# Patient Record
Sex: FEMALE | Race: WHITE | NOT HISPANIC OR LATINO | Employment: OTHER | URBAN - METROPOLITAN AREA
[De-identification: names, ages, dates, MRNs, and addresses within clinical notes are randomized per-mention and may not be internally consistent; named-entity substitution may affect disease eponyms.]

---

## 2017-01-27 ENCOUNTER — OFFICE VISIT (OUTPATIENT)
Dept: INTERNAL MEDICINE | Facility: CLINIC | Age: 52
End: 2017-01-27

## 2017-01-27 ENCOUNTER — TRANSCRIBE ORDERS (OUTPATIENT)
Dept: INTERNAL MEDICINE | Facility: CLINIC | Age: 52
End: 2017-01-27

## 2017-01-27 VITALS
SYSTOLIC BLOOD PRESSURE: 124 MMHG | HEIGHT: 66 IN | WEIGHT: 212 LBS | DIASTOLIC BLOOD PRESSURE: 86 MMHG | BODY MASS INDEX: 34.07 KG/M2

## 2017-01-27 DIAGNOSIS — R23.4 BREAST SKIN CHANGES: Primary | ICD-10-CM

## 2017-01-27 DIAGNOSIS — N63.24 BREAST LUMP ON LEFT SIDE AT 7 O'CLOCK POSITION: ICD-10-CM

## 2017-01-27 DIAGNOSIS — M05.79 RHEUMATOID ARTHRITIS INVOLVING MULTIPLE SITES WITH POSITIVE RHEUMATOID FACTOR (HCC): ICD-10-CM

## 2017-01-27 PROCEDURE — 99214 OFFICE O/P EST MOD 30 MIN: CPT | Performed by: INTERNAL MEDICINE

## 2017-01-27 RX ORDER — NAPROXEN SODIUM 220 MG
220 TABLET ORAL 2 TIMES DAILY PRN
COMMUNITY

## 2017-01-27 RX ORDER — IBUPROFEN 200 MG
200 TABLET ORAL NIGHTLY PRN
COMMUNITY
End: 2017-11-28 | Stop reason: ALTCHOICE

## 2017-01-27 NOTE — PROGRESS NOTES
"Chief Complaint   Patient presents with   • Left breast tenderness and redness       History of Present Illness  51 y.o.  woman, accompanied by her , presents for further eval of left breast tenderness and redness.  HPI started only 3 days ago (Tues) when she noticed purple discoloration at the left breast.  Since then it was become red and lighter but also associated with thickened skin.  It involves the dark area around the nipple but there is no nipple discharge.  Denies associated pain or itching.  Denies previous similar sxs.     Review of Systems  Having severe joint pain flare-up today.  Was slated to start new RA med next week. Has plans to change rheumatologist to Dr. Rodriguez in 3/2017.  All other ROS reviewed and negative.    [facility]  The following portions of the patient's history were reviewed and updated as appropriate: allergies, current medications, past family history, past medical history, past social history, past surgical history and problem list.      Current Outpatient Prescriptions:   •  cetirizine (zyrTEC) 10 MG tablet, Take 10 mg by mouth Daily., Disp: , Rfl:   •  DULoxetine (CYMBALTA) 60 MG capsule, take 1 capsule by mouth at bedtime, Disp: 30 capsule, Rfl: 4  •  ibuprofen (ADVIL,MOTRIN) 200 MG tablet, Take 200 mg by mouth At Night As Needed for mild pain (1-3)., Disp: , Rfl:   •  levothyroxine (SYNTHROID, LEVOTHROID) 75 MCG tablet, Take 1 tablet by mouth Every Morning., Disp: 90 tablet, Rfl: 1  •  naproxen sodium (ALEVE) 220 MG tablet, Take 220 mg by mouth 2 (Two) Times a Day As Needed for mild pain (1-3)., Disp: , Rfl:   •  traZODone (DESYREL) 50 MG tablet, take 1 tablet by mouth at bedtime, Disp: 30 tablet, Rfl: 5    Visit Vitals   • /86   • Ht 66\" (167.6 cm)   • Wt 212 lb (96.2 kg)   • BMI 34.22 kg/m2       Physical Exam   Constitutional: She is oriented to person, place, and time. She appears well-developed and well-nourished.   Cardiovascular: Normal rate, regular " "rhythm and normal heart sounds.    Pulmonary/Chest: Effort normal and breath sounds normal.   Abdominal: Soft. Bowel sounds are normal.   Genitourinary:   Genitourinary Comments: Breast exam unremarkable on the right without masses, skin changes, nipple discharge, or axillary adenopathy; left breast with faint pink discoloration at the 7 o'clock area with half breast and half extending to areola, approx 1x2 inch area, associated with peau d'orange change, no nipple discharge, no increased warmth, no clear delineation of \"rash\", no associated masses or other SQ changes     Neurological: She is alert and oriented to person, place, and time.   Psychiatric: She has a normal mood and affect. Her behavior is normal.   Nursing note and vitals reviewed.      LABS  12/21/16  ROUTINE SCREENING MAMMOGRAM        FINDINGS: The breasts are heterogeneously dense, which may obscure small masses. Presumed postsurgical changes are seen in the upper outer  quadrant on the left. There is no worrisome mass, group of calcifications, or architectural distortion to suggest malignancy.      IMPRESSION:  No findings suspicious for malignancy.       BI-RADS CATEGORY: II, BENIGN      RECOMMENDATION: Yearly mammogram, yearly clinical breast exam, and encourage self breast awareness.      ASSESSMENT/PLAN  Problem List Items Addressed This Visit     Rheumatoid arthritis     Flare-up currently with diffuse joint pains; new med to be started but would hold off until resolution of current concerns with the left breast; note patient will be transferring care to Dr. Rodriguez           Other Visit Diagnoses     Breast skin changes    -  Primary    LEFT breast; new changes 3d duration; nl mammo 12/16; dx L. mammo ordered    Relevant Orders    Mammo Diagnostic Left With CAD      Time Documentation  Counseled patient and   Counseling topics: lab results and follow up plan  Total encounter time: counseling time more than 50% of visit: 25 " minutes    FOLLOW-UP  1. Health maintenance - flu vaccine 11/16; screening mammo stable 12/16  2. RTC prn; next PE duea fter 11/28/17; fasting labs the week prior to appt (CBC, CMP, TSH, lipids, UA/micro, FT4, vit D)      Electronically signed by:    Brie Jalloh MD  01/27/2017

## 2017-01-27 NOTE — MR AVS SNAPSHOT
Yanet Clifton   1/27/2017 11:15 AM   Office Visit    Dept Phone:  [phone]   Encounter #:  [id number]    Provider:  Brie Jalloh MD   Department:  [facility] INTERNAL MEDICINE AND ENDOCRINOLOGY [place]                Your Full Care Plan              Today's Medication Changes          These changes are accurate as of: 1/27/17 12:32 PM.  If you have any questions, ask your nurse or doctor.               Stop taking medication(s)listed here:     melatonin 5 MG tablet tablet   Stopped by:  Brie Jalloh MD                      Your Updated Medication List          This list is accurate as of: 1/27/17 12:32 PM.  Always use your most recent med list.                cetirizine 10 MG tablet   Commonly known as:  zyrTEC       DULoxetine 60 MG capsule   Commonly known as:  CYMBALTA   take 1 capsule by mouth at bedtime       ibuprofen 200 MG tablet   Commonly known as:  ADVIL,MOTRIN       levothyroxine 75 MCG tablet   Commonly known as:  SYNTHROID, LEVOTHROID   Take 1 tablet by mouth Every Morning.       naproxen sodium 220 MG tablet   Commonly known as:  ALEVE       traZODone 50 MG tablet   Commonly known as:  DESYREL   take 1 tablet by mouth at bedtime               You Were Diagnosed With        Codes Comments    Breast skin changes    -  Primary ICD-10-CM: R23.4  ICD-9-CM: 782.8 LEFT breast; new changes 3d duration      Instructions     None    Patient Instructions History      Upcoming Appointments     Visit Type Date Time Department    FOLLOW UP 1/27/2017 11:15 AM [employer]    MAMMO MAUDE DIAG DIG DARÍO LT 2/6/2017  7:45 AM  [facility]    PHYSICAL 11/28/2017  9:30 AM [facility] [employer] Signup     Our records indicate that you have an active [email or web address] account.    You can view your After Visit Summary by going to [facility] and logging in with your [employer] username and password.  If you don't have a [employer] username and password but a parent  "or guardian has access to your record, the parent or guardian should login with their own [employer] username and password and access your record to view the After Visit Summary.    If you have questions, you can email [email or web address] or call [phone] to talk to our [employer] staff.  Remember, [employer] is NOT to be used for urgent needs.  For medical emergencies, dial 911.               Other Info from Your Visit           Your Appointments     Feb 06, 2017  7:45 AM EST   Mammo maude diag dig john lt with [facility] MAMM 5   [facility] ([place])    [address] [place], [place] [address] [phone]           Same day results for Mammography and Ultrasound may extend the patient's time in the department from 1 - 3 or more hours. Bring outside films/reports to your appoint.  Area of concern must be marked on films. If patinet is breastfeeding, vie instructions. Do not apply any powders, perfumes, deodorants, lotions, oils, or body sprays prior to your appointment on the day of the exam. Arrive 20 min prior to your scheduled appointment time.            Nov 28, 2017  9:30 AM EST   Physical with Brie Jalloh MD   [facility] INTERNAL MEDICINE AND ENDOCRINOLOGY [place] (--)    [address]  [facility] [address] [phone]           Arrive 15 minutes prior to appointment.              Allergies     Adalimumab      Other reaction(s): Headache (Humira)    Cimzia [Certolizumab Pegol]  Rash    Sulfa Antibiotics  Rash      Reason for Visit     Left breast tenderness and redness           Vital Signs     Blood Pressure Height Weight Body Mass Index Smoking Status       124/86 66\" (167.6 cm) 212 lb (96.2 kg) 34.22 kg/m2 Never Smoker       Problems and Diagnoses Noted     Breast skin changes    -  Primary        "

## 2017-01-28 NOTE — ASSESSMENT & PLAN NOTE
Flare-up currently with diffuse joint pains; new med to be started but would hold off until resolution of current concerns with the left breast; note patient will be transferring care to Dr. Rodriguez

## 2017-01-30 ENCOUNTER — TRANSCRIBE ORDERS (OUTPATIENT)
Dept: MAMMOGRAPHY | Facility: HOSPITAL | Age: 52
End: 2017-01-30

## 2017-01-30 ENCOUNTER — HOSPITAL ENCOUNTER (OUTPATIENT)
Dept: MAMMOGRAPHY | Facility: HOSPITAL | Age: 52
Discharge: HOME | End: 2017-01-30
Attending: INTERNAL MEDICINE | Admitting: INTERNAL MEDICINE

## 2017-01-30 ENCOUNTER — HOSPITAL ENCOUNTER (OUTPATIENT)
Dept: MAMMOGRAPHY | Facility: HOSPITAL | Age: 52
Discharge: HOME | End: 2017-01-30

## 2017-01-30 ENCOUNTER — HOSPITAL ENCOUNTER (OUTPATIENT)
Dept: ULTRASOUND IMAGING | Facility: HOSPITAL | Age: 52
Discharge: HOME | End: 2017-01-30

## 2017-01-30 DIAGNOSIS — R23.4 BREAST SKIN CHANGES: ICD-10-CM

## 2017-01-30 DIAGNOSIS — N63.24 BREAST LUMP ON LEFT SIDE AT 7 O'CLOCK POSITION: ICD-10-CM

## 2017-01-30 DIAGNOSIS — R92.8 ABNORMAL MAMMOGRAM: Primary | ICD-10-CM

## 2017-01-30 PROCEDURE — 76642 ULTRASOUND BREAST LIMITED: CPT | Performed by: RADIOLOGY

## 2017-01-30 PROCEDURE — 88305 TISSUE EXAM BY PATHOLOGIST: CPT | Performed by: RADIOLOGY

## 2017-01-30 PROCEDURE — 11100: CPT | Performed by: RADIOLOGY

## 2017-01-30 PROCEDURE — G0279 TOMOSYNTHESIS, MAMMO: HCPCS

## 2017-01-30 PROCEDURE — G0206 DX MAMMO INCL CAD UNI: HCPCS

## 2017-01-30 PROCEDURE — 77065 DX MAMMO INCL CAD UNI: CPT | Performed by: RADIOLOGY

## 2017-01-30 PROCEDURE — 76642 ULTRASOUND BREAST LIMITED: CPT

## 2017-01-30 PROCEDURE — 77061 BREAST TOMOSYNTHESIS UNI: CPT | Performed by: RADIOLOGY

## 2017-01-30 RX ORDER — LIDOCAINE HYDROCHLORIDE 10 MG/ML
5 INJECTION, SOLUTION INFILTRATION; PERINEURAL ONCE
Status: COMPLETED | OUTPATIENT
Start: 2017-01-30 | End: 2017-01-30

## 2017-01-30 RX ADMIN — LIDOCAINE HYDROCHLORIDE 2 ML: 10 INJECTION, SOLUTION INFILTRATION; PERINEURAL at 11:36

## 2017-01-31 LAB
CYTO UR: NORMAL
LAB AP CASE REPORT: NORMAL
LAB AP CLINICAL INFORMATION: NORMAL
LAB AP DIAGNOSIS COMMENT: NORMAL
Lab: NORMAL
PATH REPORT.FINAL DX SPEC: NORMAL
PATH REPORT.GROSS SPEC: NORMAL

## 2017-02-02 ENCOUNTER — TELEPHONE (OUTPATIENT)
Dept: MAMMOGRAPHY | Facility: HOSPITAL | Age: 52
End: 2017-02-02

## 2017-02-06 ENCOUNTER — APPOINTMENT (OUTPATIENT)
Dept: MAMMOGRAPHY | Facility: HOSPITAL | Age: 52
End: 2017-02-06
Attending: INTERNAL MEDICINE

## 2017-02-07 ENCOUNTER — OFFICE VISIT (OUTPATIENT)
Dept: INTERNAL MEDICINE | Facility: CLINIC | Age: 52
End: 2017-02-07

## 2017-02-07 ENCOUNTER — HOSPITAL ENCOUNTER (OUTPATIENT)
Dept: GENERAL RADIOLOGY | Facility: HOSPITAL | Age: 52
Discharge: HOME | End: 2017-02-07
Attending: INTERNAL MEDICINE | Admitting: INTERNAL MEDICINE

## 2017-02-07 VITALS
DIASTOLIC BLOOD PRESSURE: 84 MMHG | HEIGHT: 66 IN | BODY MASS INDEX: 34.07 KG/M2 | SYSTOLIC BLOOD PRESSURE: 124 MMHG | WEIGHT: 212 LBS

## 2017-02-07 DIAGNOSIS — R23.4 BREAST SKIN CHANGES: ICD-10-CM
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
## 2017-02-07 DIAGNOSIS — M25.532 LEFT WRIST PAIN: Primary | ICD-10-CM

## 2017-02-07 DIAGNOSIS — M25.532 LEFT WRIST PAIN: ICD-10-CM

## 2017-02-07 PROCEDURE — 73110 X-RAY EXAM OF WRIST: CPT

## 2017-02-07 PROCEDURE — 99214 OFFICE O/P EST MOD 30 MIN: CPT | Performed by: INTERNAL MEDICINE

## 2017-02-07 RX ORDER — PYRAZINAMIDE 500 MG/1
1-2 TABLET ORAL EVERY 6 HOURS PRN
Qty: 20 TABLET | Refills: 0 | Status: SHIPPED | OUTPATIENT
Start: 2017-02-07 | End: 2017-11-28

## 2017-02-07 NOTE — PROGRESS NOTES
Left wrist xray shows no fracture or dislocation; shows advanced arthritic changes, brenna on the thumb side; ice and elevate as discussed

## 2017-02-28 ENCOUNTER — APPOINTMENT (OUTPATIENT)
Dept: CT IMAGING | Facility: HOSPITAL | Age: 52
End: 2017-02-28

## 2017-02-28 ENCOUNTER — HOSPITAL ENCOUNTER (EMERGENCY)
Facility: HOSPITAL | Age: 52
Discharge: HOME | End: 2017-02-28
Attending: EMERGENCY MEDICINE | Admitting: EMERGENCY MEDICINE

## 2017-02-28 ENCOUNTER — APPOINTMENT (OUTPATIENT)
Dept: GENERAL RADIOLOGY | Facility: HOSPITAL | Age: 52
End: 2017-02-28

## 2017-02-28 VITALS
SYSTOLIC BLOOD PRESSURE: 136 MMHG | OXYGEN SATURATION: 99 % | HEIGHT: 66 IN | DIASTOLIC BLOOD PRESSURE: 74 MMHG | TEMPERATURE: 98 F | HEART RATE: 73 BPM | RESPIRATION RATE: 18 BRPM | BODY MASS INDEX: 32.14 KG/M2 | WEIGHT: 200 LBS

## 2017-02-28 DIAGNOSIS — R51.9 ACUTE NONINTRACTABLE HEADACHE, UNSPECIFIED HEADACHE TYPE: ICD-10-CM

## 2017-02-28 DIAGNOSIS — R03.0 ELEVATED BLOOD PRESSURE (NOT HYPERTENSION): Primary | ICD-10-CM

## 2017-02-28 LAB
ALBUMIN SERPL-MCNC: 4.3 G/DL (ref 3.2–4.8)
ALBUMIN/GLOB SERPL: 1.7 G/DL (ref 1.5–2.5)
ALP SERPL-CCNC: 65 U/L (ref 25–100)
ALT SERPL W P-5'-P-CCNC: 46 U/L (ref 7–40)
ANION GAP SERPL CALCULATED.3IONS-SCNC: 7 MMOL/L (ref 3–11)
AST SERPL-CCNC: 51 U/L (ref 0–33)
BASOPHILS # BLD AUTO: 0.02 10*3/MM3 (ref 0–0.2)
BASOPHILS NFR BLD AUTO: 0.4 % (ref 0–1)
BILIRUB SERPL-MCNC: 0.9 MG/DL (ref 0.3–1.2)
BNP SERPL-MCNC: 113 PG/ML (ref 0–100)
BUN BLD-MCNC: 15 MG/DL (ref 9–23)
BUN/CREAT SERPL: 15 (ref 7–25)
CALCIUM SPEC-SCNC: 9.8 MG/DL (ref 8.7–10.4)
CHLORIDE SERPL-SCNC: 104 MMOL/L (ref 99–109)
CO2 SERPL-SCNC: 27 MMOL/L (ref 20–31)
CREAT BLD-MCNC: 1 MG/DL (ref 0.6–1.3)
DEPRECATED RDW RBC AUTO: 41.1 FL (ref 37–54)
EOSINOPHIL # BLD AUTO: 0.35 10*3/MM3 (ref 0.1–0.3)
EOSINOPHIL NFR BLD AUTO: 7.4 % (ref 0–3)
ERYTHROCYTE [DISTWIDTH] IN BLOOD BY AUTOMATED COUNT: 13.7 % (ref 11.3–14.5)
GFR SERPL CREATININE-BSD FRML MDRD: 58 ML/MIN/1.73
GLOBULIN UR ELPH-MCNC: 2.6 GM/DL
GLUCOSE BLD-MCNC: 86 MG/DL (ref 70–100)
HCT VFR BLD AUTO: 37.2 % (ref 34.5–44)
HGB BLD-MCNC: 12.1 G/DL (ref 11.5–15.5)
HOLD SPECIMEN: NORMAL
HOLD SPECIMEN: NORMAL
IMM GRANULOCYTES # BLD: 0.01 10*3/MM3 (ref 0–0.03)
IMM GRANULOCYTES NFR BLD: 0.2 % (ref 0–0.6)
LIPASE SERPL-CCNC: 61 U/L (ref 6–51)
LYMPHOCYTES # BLD AUTO: 0.88 10*3/MM3 (ref 0.6–4.8)
LYMPHOCYTES NFR BLD AUTO: 18.7 % (ref 24–44)
MCH RBC QN AUTO: 26.9 PG (ref 27–31)
MCHC RBC AUTO-ENTMCNC: 32.5 G/DL (ref 32–36)
MCV RBC AUTO: 82.9 FL (ref 80–99)
MONOCYTES # BLD AUTO: 0.46 10*3/MM3 (ref 0–1)
MONOCYTES NFR BLD AUTO: 9.8 % (ref 0–12)
NEUTROPHILS # BLD AUTO: 2.99 10*3/MM3 (ref 1.5–8.3)
NEUTROPHILS NFR BLD AUTO: 63.5 % (ref 41–71)
PLATELET # BLD AUTO: 194 10*3/MM3 (ref 150–450)
PMV BLD AUTO: 9.7 FL (ref 6–12)
POTASSIUM BLD-SCNC: 3.8 MMOL/L (ref 3.5–5.5)
PROT SERPL-MCNC: 6.9 G/DL (ref 5.7–8.2)
RBC # BLD AUTO: 4.49 10*6/MM3 (ref 3.89–5.14)
SODIUM BLD-SCNC: 138 MMOL/L (ref 132–146)
TROPONIN I SERPL-MCNC: 0 NG/ML (ref 0–0.07)
TROPONIN I SERPL-MCNC: 0 NG/ML (ref 0–0.07)
WBC NRBC COR # BLD: 4.71 10*3/MM3 (ref 3.5–10.8)
WHOLE BLOOD HOLD SPECIMEN: NORMAL
WHOLE BLOOD HOLD SPECIMEN: NORMAL

## 2017-02-28 PROCEDURE — 70450 CT HEAD/BRAIN W/O DYE: CPT

## 2017-02-28 PROCEDURE — 96374 THER/PROPH/DIAG INJ IV PUSH: CPT

## 2017-02-28 PROCEDURE — 83880 ASSAY OF NATRIURETIC PEPTIDE: CPT | Performed by: EMERGENCY MEDICINE

## 2017-02-28 PROCEDURE — 71020 HC CHEST PA AND LATERAL: CPT

## 2017-02-28 PROCEDURE — 83690 ASSAY OF LIPASE: CPT | Performed by: EMERGENCY MEDICINE

## 2017-02-28 PROCEDURE — 99284 EMERGENCY DEPT VISIT MOD MDM: CPT

## 2017-02-28 PROCEDURE — 96361 HYDRATE IV INFUSION ADD-ON: CPT

## 2017-02-28 PROCEDURE — 93005 ELECTROCARDIOGRAM TRACING: CPT

## 2017-02-28 PROCEDURE — 80053 COMPREHEN METABOLIC PANEL: CPT | Performed by: EMERGENCY MEDICINE

## 2017-02-28 PROCEDURE — 99283 EMERGENCY DEPT VISIT LOW MDM: CPT

## 2017-02-28 PROCEDURE — 84484 ASSAY OF TROPONIN QUANT: CPT

## 2017-02-28 PROCEDURE — 25010000002 KETOROLAC TROMETHAMINE PER 15 MG: Performed by: EMERGENCY MEDICINE

## 2017-02-28 PROCEDURE — 85025 COMPLETE CBC W/AUTO DIFF WBC: CPT | Performed by: EMERGENCY MEDICINE

## 2017-02-28 PROCEDURE — 96375 TX/PRO/DX INJ NEW DRUG ADDON: CPT

## 2017-02-28 PROCEDURE — 25010000002 MORPHINE PER 10 MG: Performed by: EMERGENCY MEDICINE

## 2017-02-28 PROCEDURE — 25010000002 PROMETHAZINE PER 50 MG: Performed by: EMERGENCY MEDICINE

## 2017-02-28 PROCEDURE — 93005 ELECTROCARDIOGRAM TRACING: CPT | Performed by: EMERGENCY MEDICINE

## 2017-02-28 RX ORDER — DIPHENHYDRAMINE HCL 25 MG
25 CAPSULE ORAL NIGHTLY
COMMUNITY
End: 2018-06-08

## 2017-02-28 RX ORDER — SODIUM CHLORIDE 0.9 % (FLUSH) 0.9 %
10 SYRINGE (ML) INJECTION AS NEEDED
Status: DISCONTINUED | OUTPATIENT
Start: 2017-02-28 | End: 2017-03-01 | Stop reason: HOSPADM

## 2017-02-28 RX ORDER — PROMETHAZINE HYDROCHLORIDE 25 MG/ML
12.5 INJECTION, SOLUTION INTRAMUSCULAR; INTRAVENOUS ONCE
Status: COMPLETED | OUTPATIENT
Start: 2017-02-28 | End: 2017-02-28

## 2017-02-28 RX ORDER — METOPROLOL TARTRATE 5 MG/5ML
5 INJECTION INTRAVENOUS ONCE
Status: COMPLETED | OUTPATIENT
Start: 2017-02-28 | End: 2017-02-28

## 2017-02-28 RX ORDER — KETOROLAC TROMETHAMINE 30 MG/ML
30 INJECTION, SOLUTION INTRAMUSCULAR; INTRAVENOUS ONCE
Status: COMPLETED | OUTPATIENT
Start: 2017-02-28 | End: 2017-02-28

## 2017-02-28 RX ORDER — CLONIDINE HYDROCHLORIDE 0.2 MG/1
0.2 TABLET ORAL EVERY 8 HOURS PRN
Qty: 20 TABLET | Refills: 0 | Status: SHIPPED | OUTPATIENT
Start: 2017-02-28 | End: 2017-11-28

## 2017-02-28 RX ORDER — ASPIRIN 81 MG/1
324 TABLET, CHEWABLE ORAL ONCE
Status: DISCONTINUED | OUTPATIENT
Start: 2017-02-28 | End: 2017-02-28

## 2017-02-28 RX ORDER — MORPHINE SULFATE 4 MG/ML
4 INJECTION, SOLUTION INTRAMUSCULAR; INTRAVENOUS ONCE
Status: COMPLETED | OUTPATIENT
Start: 2017-02-28 | End: 2017-02-28

## 2017-02-28 RX ADMIN — KETOROLAC TROMETHAMINE 30 MG: 30 INJECTION, SOLUTION INTRAMUSCULAR at 19:40

## 2017-02-28 RX ADMIN — MORPHINE SULFATE 4 MG: 4 INJECTION, SOLUTION INTRAMUSCULAR; INTRAVENOUS at 21:14

## 2017-02-28 RX ADMIN — METOPROLOL TARTRATE 5 MG: 5 INJECTION INTRAVENOUS at 21:15

## 2017-02-28 RX ADMIN — PROMETHAZINE HYDROCHLORIDE 12.5 MG: 25 INJECTION INTRAMUSCULAR; INTRAVENOUS at 19:44

## 2017-02-28 RX ADMIN — SODIUM CHLORIDE 500 ML: 9 INJECTION, SOLUTION INTRAVENOUS at 19:46

## 2017-03-03 ENCOUNTER — OFFICE VISIT (OUTPATIENT)
Dept: INTERNAL MEDICINE | Facility: CLINIC | Age: 52
End: 2017-03-03

## 2017-03-03 VITALS
DIASTOLIC BLOOD PRESSURE: 82 MMHG | WEIGHT: 210 LBS | BODY MASS INDEX: 33.75 KG/M2 | SYSTOLIC BLOOD PRESSURE: 134 MMHG | HEIGHT: 66 IN

## 2017-03-03 DIAGNOSIS — R03.0 ELEVATED BLOOD-PRESSURE READING WITHOUT DIAGNOSIS OF HYPERTENSION: Primary | ICD-10-CM

## 2017-03-03 DIAGNOSIS — E03.9 ACQUIRED HYPOTHYROIDISM: ICD-10-CM

## 2017-03-03 DIAGNOSIS — R21 RASH OF ENTIRE BODY: ICD-10-CM

## 2017-03-03 LAB
T4 FREE SERPL-MCNC: 1.31 NG/DL (ref 0.89–1.76)
TSH SERPL DL<=0.05 MIU/L-ACNC: 3.02 MIU/ML (ref 0.35–5.35)

## 2017-03-03 PROCEDURE — 99214 OFFICE O/P EST MOD 30 MIN: CPT | Performed by: INTERNAL MEDICINE

## 2017-03-03 PROCEDURE — 84439 ASSAY OF FREE THYROXINE: CPT | Performed by: INTERNAL MEDICINE

## 2017-03-03 PROCEDURE — 84443 ASSAY THYROID STIM HORMONE: CPT | Performed by: INTERNAL MEDICINE

## 2017-03-03 RX ORDER — LEVOTHYROXINE SODIUM 0.07 MG/1
75 TABLET ORAL EVERY MORNING
Qty: 90 TABLET | Refills: 3 | Status: SHIPPED | OUTPATIENT
Start: 2017-03-03 | End: 2017-11-30 | Stop reason: SDUPTHER

## 2017-03-03 NOTE — PROGRESS NOTES
Chief Complaint   Patient presents with   • ER follow up     Elevated blood pressure, headache        History of Present Illness  51 y.o.  woman presents for ER follow-up for headache and elevated BP, attributed to initiation of actemra infusion for RA. Actemra infusion was about 3 weeks ago and she subsequently developed headache and tremors.  With worries that BP was going too low, she checked and found elevated BPs.  As it cont'd to rise, she called us and was referred to ER.  She called Dr. Espinoza, who stated that BP issues were not common s/e of actemra.  Patient has subsequently researched that it is top 5 of potential s/e.    ER eval was unremarkable.  She received morphine and clonidine.  Has only taken 1 dose of clonidine since ER visit.  Baseline BPs 130s/80s.  Took dose yesterday for 145/102.    Had rash on the right shin and back of left hand; using clobetasol cream since areas itch.  Not like the rash all over previously.    Review of Systems  Today is a good day.  ROS (+) for bord BPs.  Denies headache today.  Denies CP, palpitations, SOB, lightheadedness, abd pain, n/v.  ROS (+) rash as above.  Has rheum appt with [facility] next week but had wanted to see Dr. Rodriguez and thinks she is scheduled with someone else.    States feeling better on this dose levothyroxine.   All other ROS reviewed and negative.    [facility]  The following portions of the patient's history were reviewed and updated as appropriate: allergies, current medications, past family history, past medical history, past social history, past surgical history and problem list.      Current Outpatient Prescriptions:   •  acetaminophen-codeine (TYLENOL with CODEINE #3) 300-30 MG per tablet, Take 1-2 tablets by mouth Every 6 (Six) Hours As Needed for moderate pain (4-6)., Disp: 20 tablet, Rfl: 0  •  cetirizine (zyrTEC) 10 MG tablet, Take 10 mg by mouth Daily., Disp: , Rfl:   •  CloNIDine (CATAPRES) 0.2 MG tablet, Take 1 tablet by mouth Every 8  "(Eight) Hours As Needed for high blood pressure., Disp: 20 tablet, Rfl: 0  •  diphenhydrAMINE (BENADRYL) 25 mg capsule, Take 25 mg by mouth Every 6 (Six) Hours As Needed for itching., Disp: , Rfl:   •  DULoxetine (CYMBALTA) 60 MG capsule, take 1 capsule by mouth at bedtime, Disp: 30 capsule, Rfl: 4  •  ibuprofen (ADVIL,MOTRIN) 200 MG tablet, Take 200 mg by mouth At Night As Needed for mild pain (1-3)., Disp: , Rfl:   •  levothyroxine (SYNTHROID, LEVOTHROID) 75 MCG tablet, Take 1 tablet by mouth Every Morning., Disp: 90 tablet, Rfl: 3  •  naproxen sodium (ALEVE) 220 MG tablet, Take 220 mg by mouth 2 (Two) Times a Day As Needed for mild pain (1-3)., Disp: , Rfl:   •  traZODone (DESYREL) 50 MG tablet, take 1 tablet by mouth at bedtime (Patient taking differently: take 1 tablet by mouth at bedtime AS NEEDED), Disp: 30 tablet, Rfl: 5    Visit Vitals   • /82   • Ht 66\" (167.6 cm)   • Wt 210 lb (95.3 kg)   • LMP  (Approximate)   • BMI 33.89 kg/m2       Physical Exam   Constitutional: She is oriented to person, place, and time. She appears well-developed and well-nourished.   Mild moon facies   Eyes: Conjunctivae and EOM are normal. Pupils are equal, round, and reactive to light.   Neck: Normal range of motion. Neck supple. Carotid bruit is not present (bilaterally).   Cardiovascular: Normal rate, regular rhythm and normal heart sounds.    Pulmonary/Chest: Effort normal and breath sounds normal.   Abdominal: Soft. Bowel sounds are normal.   Lymphadenopathy:     She has no cervical adenopathy.   Neurological: She is alert and oriented to person, place, and time.   Skin: Skin is warm and dry. Rash (excoriations on right ant shin from scratching; maculopapule 2mm erythematous lesions dorsal left hand, pruritic, no vesicles) noted.   Psychiatric: She has a normal mood and affect. Her behavior is normal.   Nursing note and vitals reviewed.      LABS  2/28/17 normal head CT, nl CXR  2/28/17 stable CMP (stable bord LFTs), " CBC, lipase, neg troponins    ASSESSMENT/PLAN  Problem List Items Addressed This Visit     Rash of entire body     Current rash self-limited, not diffuse like cimzia; remains on zyrtec and prn clobetasol cream         Elevated blood-pressure reading without diagnosis of hypertension - Primary     Currently attributed to actemra infusion; should be resolving since monthly infusion, and was done 3 weeks ago; discussed low Na diet; discussed that headache could also elevate BP; has prn clonidine to use if BP consistently > 160/100 and symptomatic         Hypothyroidism     F/u TFTs on the way out the door on levothyroxine 75nmcg QAM #90, 3RF; patient states feeling better on this dose         Relevant Medications    levothyroxine (SYNTHROID, LEVOTHROID) 75 MCG tablet          FOLLOW-UP  RTC prn if BP remains elevated; warnings and recs re: use of prn clonidine    Electronically signed by:    Brie Jalloh MD  03/03/2017

## 2017-03-03 NOTE — PROGRESS NOTES
"Chief Complaint   Patient presents with   • ER follow up     Elevated blood pressure, headache        History of Present Illness  51 y.o. *** presents for ***    Review of Systems  *** All other ROS reviewed and negative.    [facility]  {Common H&P Review Areas:62603}      Current Outpatient Prescriptions:   •  acetaminophen-codeine (TYLENOL with CODEINE #3) 300-30 MG per tablet, Take 1-2 tablets by mouth Every 6 (Six) Hours As Needed for moderate pain (4-6)., Disp: 20 tablet, Rfl: 0  •  cetirizine (zyrTEC) 10 MG tablet, Take 10 mg by mouth Daily., Disp: , Rfl:   •  CloNIDine (CATAPRES) 0.2 MG tablet, Take 1 tablet by mouth Every 8 (Eight) Hours As Needed for high blood pressure., Disp: 20 tablet, Rfl: 0  •  diphenhydrAMINE (BENADRYL) 25 mg capsule, Take 25 mg by mouth Every 6 (Six) Hours As Needed for itching., Disp: , Rfl:   •  DULoxetine (CYMBALTA) 60 MG capsule, take 1 capsule by mouth at bedtime, Disp: 30 capsule, Rfl: 4  •  ibuprofen (ADVIL,MOTRIN) 200 MG tablet, Take 200 mg by mouth At Night As Needed for mild pain (1-3)., Disp: , Rfl:   •  levothyroxine (SYNTHROID, LEVOTHROID) 75 MCG tablet, Take 1 tablet by mouth Every Morning., Disp: 90 tablet, Rfl: 1  •  naproxen sodium (ALEVE) 220 MG tablet, Take 220 mg by mouth 2 (Two) Times a Day As Needed for mild pain (1-3)., Disp: , Rfl:   •  Tocilizumab (ACTEMRA IV), Infuse  into a venous catheter Every 30 (Thirty) Days. LAST INFUSION 3 WEEKS AGO, Disp: , Rfl:   •  traZODone (DESYREL) 50 MG tablet, take 1 tablet by mouth at bedtime (Patient taking differently: take 1 tablet by mouth at bedtime AS NEEDED), Disp: 30 tablet, Rfl: 5    Visit Vitals   • /82   • Ht 66\" (167.6 cm)   • Wt 210 lb (95.3 kg)   • LMP  (Approximate)   • BMI 33.89 kg/m2       Physical Exam    LABS  Results for orders placed or performed during the hospital encounter of 02/28/17   Comprehensive Metabolic Panel   Result Value Ref Range    Glucose 86 70 - 100 mg/dL    BUN 15 9 - 23 mg/dL    " Creatinine 1.00 0.60 - 1.30 mg/dL    Sodium 138 132 - 146 mmol/L    Potassium 3.8 3.5 - 5.5 mmol/L    Chloride 104 99 - 109 mmol/L    CO2 27.0 20.0 - 31.0 mmol/L    Calcium 9.8 8.7 - 10.4 mg/dL    Total Protein 6.9 5.7 - 8.2 g/dL    Albumin 4.30 3.20 - 4.80 g/dL    ALT (SGPT) 46 (H) 7 - 40 U/L    AST (SGOT) 51 (H) 0 - 33 U/L    Alkaline Phosphatase 65 25 - 100 U/L    Total Bilirubin 0.9 0.3 - 1.2 mg/dL    eGFR Non African Amer 58 (L) >60 mL/min/1.73    Globulin 2.6 gm/dL    A/G Ratio 1.7 1.5 - 2.5 g/dL    BUN/Creatinine Ratio 15.0 7.0 - 25.0    Anion Gap 7.0 3.0 - 11.0 mmol/L   Lipase   Result Value Ref Range    Lipase 61 (H) 6 - 51 U/L   BNP   Result Value Ref Range    .0 (H) 0.0 - 100.0 pg/mL   CBC Auto Differential   Result Value Ref Range    WBC 4.71 3.50 - 10.80 10*3/mm3    RBC 4.49 3.89 - 5.14 10*6/mm3    Hemoglobin 12.1 11.5 - 15.5 g/dL    Hematocrit 37.2 34.5 - 44.0 %    MCV 82.9 80.0 - 99.0 fL    MCH 26.9 (L) 27.0 - 31.0 pg    MCHC 32.5 32.0 - 36.0 g/dL    RDW 13.7 11.3 - 14.5 %    RDW-SD 41.1 37.0 - 54.0 fl    MPV 9.7 6.0 - 12.0 fL    Platelets 194 150 - 450 10*3/mm3    Neutrophil % 63.5 41.0 - 71.0 %    Lymphocyte % 18.7 (L) 24.0 - 44.0 %    Monocyte % 9.8 0.0 - 12.0 %    Eosinophil % 7.4 (H) 0.0 - 3.0 %    Basophil % 0.4 0.0 - 1.0 %    Immature Grans % 0.2 0.0 - 0.6 %    Neutrophils, Absolute 2.99 1.50 - 8.30 10*3/mm3    Lymphocytes, Absolute 0.88 0.60 - 4.80 10*3/mm3    Monocytes, Absolute 0.46 0.00 - 1.00 10*3/mm3    Eosinophils, Absolute 0.35 (H) 0.10 - 0.30 10*3/mm3    Basophils, Absolute 0.02 0.00 - 0.20 10*3/mm3    Immature Grans, Absolute 0.01 0.00 - 0.03 10*3/mm3   POC Troponin, Rapid   Result Value Ref Range    Troponin I 0.00 0.00 - 0.07 ng/mL   POC Troponin, Rapid   Result Value Ref Range    Troponin I 0.00 0.00 - 0.07 ng/mL   Light Blue Top   Result Value Ref Range    Extra Tube hold for add-on    Green Top (Gel)   Result Value Ref Range    Extra Tube Hold for add-ons.    Lavender Top    Result Value Ref Range    Extra Tube hold for add-on    Gold Top - SST   Result Value Ref Range    Extra Tube Hold for add-ons.        ASSESSMENT/PLAN  Problem List Items Addressed This Visit     None          FOLLOW-UP  RTC ***    Electronically signed by:    Brie Jalloh MD  03/03/2017

## 2017-03-03 NOTE — ASSESSMENT & PLAN NOTE
Currently attributed to actemra infusion; should be resolving since monthly infusion, and was done 3 weeks ago; discussed low Na diet; discussed that headache could also elevate BP; has prn clonidine to use if BP consistently > 160/100 and symptomatic

## 2017-03-03 NOTE — ASSESSMENT & PLAN NOTE
F/u TFTs on the way out the door on levothyroxine 75nmcg QAM #90, 3RF; patient states feeling better on this dose

## 2017-03-13 RX ORDER — DULOXETIN HYDROCHLORIDE 60 MG/1
CAPSULE, DELAYED RELEASE ORAL
Qty: 30 CAPSULE | Refills: 4 | Status: SHIPPED | OUTPATIENT
Start: 2017-03-13 | End: 2017-08-17 | Stop reason: SDUPTHER

## 2017-07-31 ENCOUNTER — HOSPITAL ENCOUNTER (OUTPATIENT)
Dept: MAMMOGRAPHY | Facility: HOSPITAL | Age: 52
Discharge: HOME | End: 2017-07-31
Attending: INTERNAL MEDICINE | Admitting: INTERNAL MEDICINE

## 2017-07-31 ENCOUNTER — HOSPITAL ENCOUNTER (OUTPATIENT)
Dept: ULTRASOUND IMAGING | Facility: HOSPITAL | Age: 52
Discharge: HOME | End: 2017-07-31

## 2017-07-31 ENCOUNTER — TRANSCRIBE ORDERS (OUTPATIENT)
Dept: MAMMOGRAPHY | Facility: HOSPITAL | Age: 52
End: 2017-07-31

## 2017-07-31 DIAGNOSIS — R92.8 ABNORMAL MAMMOGRAM: Primary | ICD-10-CM

## 2017-07-31 DIAGNOSIS — R92.8 ABNORMAL MAMMOGRAM: ICD-10-CM

## 2017-07-31 PROCEDURE — 76642 ULTRASOUND BREAST LIMITED: CPT

## 2017-07-31 PROCEDURE — 76642 ULTRASOUND BREAST LIMITED: CPT | Performed by: RADIOLOGY

## 2017-08-01 NOTE — PROGRESS NOTES
See L. Breast u/s (7/31/17) -  decreased size 11 o'clock mass (T[employer]ist), repeat L. Breast u/s with bilat dx mammo after 12/21/17

## 2017-08-17 RX ORDER — DULOXETIN HYDROCHLORIDE 60 MG/1
CAPSULE, DELAYED RELEASE ORAL
Qty: 30 CAPSULE | Refills: 4 | Status: SHIPPED | OUTPATIENT
Start: 2017-08-17 | End: 2018-01-18 | Stop reason: SDUPTHER

## 2017-08-17 RX ORDER — LUBIPROSTONE 24 UG/1
CAPSULE, GELATIN COATED ORAL
Qty: 60 CAPSULE | Refills: 5 | Status: SHIPPED | OUTPATIENT
Start: 2017-08-17 | End: 2017-11-28

## 2017-09-11 ENCOUNTER — TELEPHONE (OUTPATIENT)
Dept: ENDOCRINOLOGY | Facility: CLINIC | Age: 52
End: 2017-09-11

## 2017-09-11 NOTE — TELEPHONE ENCOUNTER
Pt called and states she has a UTI but theres no way she can get over here for a test bc she is in the hospital with her mom.  [phone]

## 2017-09-14 PROCEDURE — 87086 URINE CULTURE/COLONY COUNT: CPT | Performed by: FAMILY MEDICINE

## 2017-09-14 PROCEDURE — 87147 CULTURE TYPE IMMUNOLOGIC: CPT | Performed by: FAMILY MEDICINE

## 2017-09-15 ENCOUNTER — APPOINTMENT (OUTPATIENT)
Dept: CT IMAGING | Facility: HOSPITAL | Age: 52
End: 2017-09-15

## 2017-09-15 ENCOUNTER — HOSPITAL ENCOUNTER (EMERGENCY)
Facility: HOSPITAL | Age: 52
Discharge: HOME | End: 2017-09-16
Attending: EMERGENCY MEDICINE | Admitting: EMERGENCY MEDICINE

## 2017-09-15 ENCOUNTER — APPOINTMENT (OUTPATIENT)
Dept: GENERAL RADIOLOGY | Facility: HOSPITAL | Age: 52
End: 2017-09-15

## 2017-09-15 DIAGNOSIS — R50.9 FEVER, UNSPECIFIED FEVER CAUSE: Primary | ICD-10-CM

## 2017-09-15 DIAGNOSIS — J18.9 PNEUMONIA OF LEFT LOWER LOBE DUE TO INFECTIOUS ORGANISM: ICD-10-CM

## 2017-09-15 LAB
ALBUMIN SERPL-MCNC: 4 G/DL (ref 3.2–4.8)
ALBUMIN/GLOB SERPL: 1.4 G/DL (ref 1.5–2.5)
ALP SERPL-CCNC: 69 U/L (ref 25–100)
ALT SERPL W P-5'-P-CCNC: 28 U/L (ref 7–40)
ANION GAP SERPL CALCULATED.3IONS-SCNC: 5 MMOL/L (ref 3–11)
AST SERPL-CCNC: 33 U/L (ref 0–33)
BASOPHILS # BLD AUTO: 0.01 10*3/MM3 (ref 0–0.2)
BASOPHILS NFR BLD AUTO: 0.2 % (ref 0–1)
BILIRUB SERPL-MCNC: 0.7 MG/DL (ref 0.3–1.2)
BILIRUB UR QL STRIP: NEGATIVE
BUN BLD-MCNC: 11 MG/DL (ref 9–23)
BUN/CREAT SERPL: 13.8 (ref 7–25)
CALCIUM SPEC-SCNC: 9 MG/DL (ref 8.7–10.4)
CHLORIDE SERPL-SCNC: 108 MMOL/L (ref 99–109)
CLARITY UR: CLEAR
CO2 SERPL-SCNC: 24 MMOL/L (ref 20–31)
COLOR UR: ABNORMAL
CREAT BLD-MCNC: 0.8 MG/DL (ref 0.6–1.3)
D-LACTATE SERPL-SCNC: 1.1 MMOL/L (ref 0.5–2)
DEPRECATED RDW RBC AUTO: 44.9 FL (ref 37–54)
EOSINOPHIL # BLD AUTO: 0.22 10*3/MM3 (ref 0–0.3)
EOSINOPHIL NFR BLD AUTO: 4.3 % (ref 0–3)
ERYTHROCYTE [DISTWIDTH] IN BLOOD BY AUTOMATED COUNT: 14.4 % (ref 11.3–14.5)
FLUAV AG NPH QL: NEGATIVE
FLUBV AG NPH QL IA: NEGATIVE
GFR SERPL CREATININE-BSD FRML MDRD: 75 ML/MIN/1.73
GLOBULIN UR ELPH-MCNC: 2.8 GM/DL
GLUCOSE BLD-MCNC: 105 MG/DL (ref 70–100)
GLUCOSE UR STRIP-MCNC: NEGATIVE MG/DL
HCT VFR BLD AUTO: 35.2 % (ref 34.5–44)
HGB BLD-MCNC: 11.4 G/DL (ref 11.5–15.5)
HGB UR QL STRIP.AUTO: NEGATIVE
IMM GRANULOCYTES # BLD: 0.02 10*3/MM3 (ref 0–0.03)
IMM GRANULOCYTES NFR BLD: 0.4 % (ref 0–0.6)
KETONES UR QL STRIP: NEGATIVE
LEUKOCYTE ESTERASE UR QL STRIP.AUTO: NEGATIVE
LYMPHOCYTES # BLD AUTO: 0.2 10*3/MM3 (ref 0.6–4.8)
LYMPHOCYTES NFR BLD AUTO: 3.9 % (ref 24–44)
MCH RBC QN AUTO: 27.5 PG (ref 27–31)
MCHC RBC AUTO-ENTMCNC: 32.4 G/DL (ref 32–36)
MCV RBC AUTO: 84.8 FL (ref 80–99)
MONOCYTES # BLD AUTO: 0.53 10*3/MM3 (ref 0–1)
MONOCYTES NFR BLD AUTO: 10.4 % (ref 0–12)
NEUTROPHILS # BLD AUTO: 4.1 10*3/MM3 (ref 1.5–8.3)
NEUTROPHILS NFR BLD AUTO: 80.8 % (ref 41–71)
NITRITE UR QL STRIP: NEGATIVE
PH UR STRIP.AUTO: 7.5 [PH] (ref 5–8)
PLATELET # BLD AUTO: 188 10*3/MM3 (ref 150–450)
PMV BLD AUTO: 9.9 FL (ref 6–12)
POTASSIUM BLD-SCNC: 3.9 MMOL/L (ref 3.5–5.5)
PROT SERPL-MCNC: 6.8 G/DL (ref 5.7–8.2)
PROT UR QL STRIP: NEGATIVE
RBC # BLD AUTO: 4.15 10*6/MM3 (ref 3.89–5.14)
SODIUM BLD-SCNC: 137 MMOL/L (ref 132–146)
SP GR UR STRIP: 1.02 (ref 1–1.03)
UROBILINOGEN UR QL STRIP: ABNORMAL
WBC NRBC COR # BLD: 5.08 10*3/MM3 (ref 3.5–10.8)

## 2017-09-15 PROCEDURE — 87040 BLOOD CULTURE FOR BACTERIA: CPT | Performed by: PHYSICIAN ASSISTANT

## 2017-09-15 PROCEDURE — 87804 INFLUENZA ASSAY W/OPTIC: CPT | Performed by: EMERGENCY MEDICINE

## 2017-09-15 PROCEDURE — 25010000002 KETOROLAC TROMETHAMINE PER 15 MG: Performed by: PHYSICIAN ASSISTANT

## 2017-09-15 PROCEDURE — 25010000002 ONDANSETRON PER 1 MG: Performed by: PHYSICIAN ASSISTANT

## 2017-09-15 PROCEDURE — 80053 COMPREHEN METABOLIC PANEL: CPT | Performed by: PHYSICIAN ASSISTANT

## 2017-09-15 PROCEDURE — 96361 HYDRATE IV INFUSION ADD-ON: CPT

## 2017-09-15 PROCEDURE — 85025 COMPLETE CBC W/AUTO DIFF WBC: CPT | Performed by: PHYSICIAN ASSISTANT

## 2017-09-15 PROCEDURE — 96375 TX/PRO/DX INJ NEW DRUG ADDON: CPT

## 2017-09-15 PROCEDURE — 71010 HC CHEST PA OR AP: CPT

## 2017-09-15 PROCEDURE — 99284 EMERGENCY DEPT VISIT MOD MDM: CPT

## 2017-09-15 PROCEDURE — 74176 CT ABD & PELVIS W/O CONTRAST: CPT

## 2017-09-15 PROCEDURE — 83605 ASSAY OF LACTIC ACID: CPT | Performed by: PHYSICIAN ASSISTANT

## 2017-09-15 PROCEDURE — 81003 URINALYSIS AUTO W/O SCOPE: CPT | Performed by: EMERGENCY MEDICINE

## 2017-09-15 RX ORDER — CEFTRIAXONE SODIUM 1 G/50ML
1 INJECTION, SOLUTION INTRAVENOUS ONCE
Status: COMPLETED | OUTPATIENT
Start: 2017-09-15 | End: 2017-09-16

## 2017-09-15 RX ORDER — ONDANSETRON 2 MG/ML
4 INJECTION INTRAMUSCULAR; INTRAVENOUS ONCE
Status: COMPLETED | OUTPATIENT
Start: 2017-09-15 | End: 2017-09-15

## 2017-09-15 RX ORDER — CEFDINIR 300 MG/1
300 CAPSULE ORAL 2 TIMES DAILY
Qty: 20 CAPSULE | Refills: 0 | Status: SHIPPED | OUTPATIENT
Start: 2017-09-15 | End: 2017-11-28

## 2017-09-15 RX ORDER — HYDROMORPHONE HYDROCHLORIDE 1 MG/ML
0.5 INJECTION, SOLUTION INTRAMUSCULAR; INTRAVENOUS; SUBCUTANEOUS ONCE
Status: COMPLETED | OUTPATIENT
Start: 2017-09-15 | End: 2017-09-16

## 2017-09-15 RX ORDER — ONDANSETRON 4 MG/1
4 TABLET, ORALLY DISINTEGRATING ORAL EVERY 6 HOURS PRN
Qty: 15 TABLET | Refills: 0 | Status: SHIPPED | OUTPATIENT
Start: 2017-09-15 | End: 2017-11-28

## 2017-09-15 RX ORDER — KETOROLAC TROMETHAMINE 30 MG/ML
15 INJECTION, SOLUTION INTRAMUSCULAR; INTRAVENOUS ONCE
Status: COMPLETED | OUTPATIENT
Start: 2017-09-15 | End: 2017-09-15

## 2017-09-15 RX ORDER — SODIUM CHLORIDE 0.9 % (FLUSH) 0.9 %
10 SYRINGE (ML) INJECTION AS NEEDED
Status: DISCONTINUED | OUTPATIENT
Start: 2017-09-15 | End: 2017-09-16 | Stop reason: HOSPADM

## 2017-09-15 RX ADMIN — SODIUM CHLORIDE 1000 ML: 9 INJECTION, SOLUTION INTRAVENOUS at 22:33

## 2017-09-15 RX ADMIN — ONDANSETRON 4 MG: 2 INJECTION INTRAMUSCULAR; INTRAVENOUS at 22:34

## 2017-09-15 RX ADMIN — KETOROLAC TROMETHAMINE 15 MG: 30 INJECTION, SOLUTION INTRAMUSCULAR at 22:34

## 2017-09-16 VITALS
SYSTOLIC BLOOD PRESSURE: 97 MMHG | WEIGHT: 200 LBS | BODY MASS INDEX: 32.14 KG/M2 | DIASTOLIC BLOOD PRESSURE: 62 MMHG | OXYGEN SATURATION: 98 % | TEMPERATURE: 102 F | RESPIRATION RATE: 22 BRPM | HEART RATE: 83 BPM | HEIGHT: 66 IN

## 2017-09-16 PROCEDURE — 25010000002 HYDROMORPHONE PER 4 MG: Performed by: EMERGENCY MEDICINE

## 2017-09-16 PROCEDURE — 96375 TX/PRO/DX INJ NEW DRUG ADDON: CPT

## 2017-09-16 PROCEDURE — 96365 THER/PROPH/DIAG IV INF INIT: CPT

## 2017-09-16 PROCEDURE — 96367 TX/PROPH/DG ADDL SEQ IV INF: CPT

## 2017-09-16 PROCEDURE — 96366 THER/PROPH/DIAG IV INF ADDON: CPT

## 2017-09-16 PROCEDURE — 25010000002 AZITHROMYCIN: Performed by: PHYSICIAN ASSISTANT

## 2017-09-16 PROCEDURE — 25010000002 CEFTRIAXONE PER 250 MG: Performed by: PHYSICIAN ASSISTANT

## 2017-09-16 RX ADMIN — HYDROMORPHONE HYDROCHLORIDE 0.5 MG: 1 INJECTION, SOLUTION INTRAMUSCULAR; INTRAVENOUS; SUBCUTANEOUS at 00:05

## 2017-09-16 RX ADMIN — AZITHROMYCIN 500 MG: 500 INJECTION, POWDER, LYOPHILIZED, FOR SOLUTION INTRAVENOUS at 00:49

## 2017-09-16 RX ADMIN — CEFTRIAXONE SODIUM 1 G: 1 INJECTION, SOLUTION INTRAVENOUS at 00:09

## 2017-09-16 NOTE — DISCHARGE INSTRUCTIONS
Rest.  Tylenol or Motrin as directed for fever and pain.  Omnicef as prescribed.  Stop the Macrobid.  Zofran for nausea.  Follow up with your PCP on Monday.  Return to ER if worse.

## 2017-09-16 NOTE — ED PROVIDER NOTES
Subjective   HPI Comments: 52-year-old female presents to the emergency department with complaints of fever, chills, body aches and pain underneath the right ribs.  She has had some nausea without vomiting.  She has mild diarrhea.  She also has some low back pain diffusely.  No significant cough or shortness of breath.  The patient has a history of rheumatoid arthritis (on Zeljanz), Sjogren's syndrome, Hashimoto's thyroiditis, kidney stones, prior meningitis ×2, benign lumpectomy.  She has had a cholecystectomy in 2002 hysterectomy in 2013.  Her PCP is Brie Jalloh.  She is a nonsmoker.  No alcohol or drug use.  The patient states that she was seen by [facility] urgent care yesterday and diagnosed with UTI and discharged on Macrobid.  Her symptoms of been present for several days but worse today.    Patient is a 52 y.o. female presenting with fever.   History provided by:  Patient  Fever   Max temp prior to arrival:  101.3  Temp source:  Oral  Severity:  Moderate  Onset quality:  Gradual  Duration: several days, worse today.  Timing:  Constant  Progression:  Worsening  Chronicity:  New  Relieved by:  Nothing  Worsened by:  Nothing  Ineffective treatments:  None tried  Associated symptoms: chills, diarrhea (mild), myalgias and nausea    Associated symptoms: no chest pain, no congestion, no cough, no dysuria, no ear pain, no headaches, no rash, no rhinorrhea, no sore throat and no vomiting        Review of Systems   Constitutional: Positive for chills and fever.   HENT: Negative for congestion, ear pain, nosebleeds, rhinorrhea and sore throat.    Eyes: Negative for pain, discharge and visual disturbance.   Respiratory: Negative for cough, shortness of breath and wheezing.    Cardiovascular: Negative for chest pain, palpitations and leg swelling.   Gastrointestinal: Positive for abdominal pain (RUQ), diarrhea (mild) and nausea. Negative for blood in stool and vomiting.   Endocrine: Negative.    Genitourinary: Negative for  dysuria, hematuria and urgency.   Musculoskeletal: Positive for back pain (diffuse lower) and myalgias. Negative for arthralgias.   Skin: Negative for pallor and rash.   Allergic/Immunologic: Negative for immunocompromised state.   Neurological: Negative for dizziness, speech difficulty, weakness and headaches.   Hematological: Negative for adenopathy. Does not bruise/bleed easily.   Psychiatric/Behavioral: Negative.        Past Medical History:   Diagnosis Date   • Aseptic meningitis 12/2009   • Nephrolithiasis    • Rheumatoid arthritis        Allergies   Allergen Reactions   • Adalimumab      Other reaction(s): Headache (Humira)   • Cimzia [Certolizumab Pegol] Rash   • Sulfa Antibiotics Rash       Past Surgical History:   Procedure Laterality Date   • BREAST EXCISIONAL BIOPSY Left 1984   • CHOLECYSTECTOMY  2001    secondary to cholelithiasis   • MAMMO SKIN LESION BIOPSY SINGLE LESION UNILATERAL Left 01/30/2017    s/p L. breast skin bx (1/30/17); nonspecific chronic perivascular dermatitis   • TEMPORAL ARTERY BIOPSY Left 11/05/2015    neg for arteritis; surg -  Page   • TONSILLECTOMY     • TOTAL ABDOMINAL HYSTERECTOMY WITH SALPINGO OOPHORECTOMY  03/2013    secondary to fibroids/endometriosis (3/13); GYN - Dr. Naik       Family History   Problem Relation Age of Onset   • Fibromyalgia Mother    • Hypertension Sister    • Other Sister      loss of hair from head   • Breast cancer Maternal Grandmother 60   • Multiple myeloma Paternal Grandmother    • Thyroid disease Neg Hx    • Diabetes Neg Hx    • BRCA 1/2 Neg Hx    • Colon cancer Neg Hx    • Endometrial cancer Neg Hx    • Ovarian cancer Neg Hx        Social History     Social History   • Marital status:      Spouse name: N/A   • Number of children: N/A   • Years of education: N/A     Occupational History   • , [employer] accompanist      Social History Main Topics   • Smoking status: Never Smoker   • Smokeless tobacco: Never Used   • Alcohol  use Yes   • Drug use: No   • Sexual activity: Defer     Other Topics Concern   • None     Social History Narrative   • None           Objective   Physical Exam   Constitutional: She is oriented to person, place, and time. She appears well-developed and well-nourished. She appears distressed (shivering, appears uncomfortable).   HENT:   Head: Normocephalic and atraumatic.   Nose: Nose normal.   Mouth/Throat: Oropharynx is clear and moist.   Eyes: EOM are normal. Pupils are equal, round, and reactive to light. No scleral icterus.   Neck: Normal range of motion. Neck supple.   Cardiovascular: Regular rhythm and normal heart sounds.    No murmur heard.  Sinus tachycardia in the 120s   Pulmonary/Chest: Effort normal and breath sounds normal. No respiratory distress. She has no wheezes. She has no rales. She exhibits no tenderness.   Abdominal: Soft. Bowel sounds are normal. There is tenderness (moderate right upper quadrant tenderness.). There is no rebound and no guarding.   Musculoskeletal: Normal range of motion. She exhibits no edema or tenderness.   Neurological: She is alert and oriented to person, place, and time.   Skin: Skin is warm and dry. No rash noted. She is not diaphoretic.   Psychiatric: She has a normal mood and affect.   Nursing note and vitals reviewed.      Procedures         ED Course  ED Course    The patient is febrile.  Chest x-ray shows atelectasis versus pneumonia in the left lower lobe.  Urinalysis is unremarkable.  White blood cell count is normal at 5.08.  Lactic acid is normal at 1.1.  No concerning labs.  CT scan of the abdomen and pelvis shows no acute process.  Given the patient's fever and the chest x-ray suggesting atelectasis versus pneumonia, the patient was given IV Rocephin and Zithromax.  This may turn out to be a viral syndrome, but I think it is reasonable to cover her for pneumonia.  I will plan to discharge her home on Omnicef and have her follow-up with her PCP on Monday.  She  has been instructed to return to the emergency department if her symptoms worsen.              MDM    Final diagnoses:   Fever, unspecified fever cause   Pneumonia of left lower lobe due to infectious organism            MIKE Campbell  09/15/17 0096

## 2017-09-17 ENCOUNTER — TELEPHONE (OUTPATIENT)
Dept: URGENT CARE | Facility: CLINIC | Age: 52
End: 2017-09-17

## 2017-09-17 NOTE — TELEPHONE ENCOUNTER
Patient called regarding urine culture.  Asked per Dr. Chaudhary if patient felt better.  Patient explained she went to ER and received anti-biotics.  Patient is feeling better and instructed to finish medication.  FF 9/17/17

## 2017-09-19 ENCOUNTER — OFFICE VISIT (OUTPATIENT)
Dept: INTERNAL MEDICINE | Facility: CLINIC | Age: 52
End: 2017-09-19

## 2017-09-19 ENCOUNTER — APPOINTMENT (OUTPATIENT)
Dept: CT IMAGING | Facility: HOSPITAL | Age: 52
End: 2017-09-19
Attending: INTERNAL MEDICINE

## 2017-09-19 DIAGNOSIS — B34.9 VIRAL SYNDROME: Primary | ICD-10-CM

## 2017-09-19 DIAGNOSIS — E55.9 VITAMIN D DEFICIENCY: ICD-10-CM

## 2017-09-19 DIAGNOSIS — E78.2 MIXED HYPERLIPIDEMIA: ICD-10-CM

## 2017-09-19 DIAGNOSIS — E03.9 ACQUIRED HYPOTHYROIDISM: ICD-10-CM

## 2017-09-19 DIAGNOSIS — R06.02 SHORTNESS OF BREATH: ICD-10-CM

## 2017-09-19 DIAGNOSIS — R79.89 ELEVATED LFTS: ICD-10-CM

## 2017-09-19 DIAGNOSIS — G93.31 POSTVIRAL FATIGUE SYNDROME: ICD-10-CM

## 2017-09-19 PROCEDURE — 99214 OFFICE O/P EST MOD 30 MIN: CPT | Performed by: INTERNAL MEDICINE

## 2017-09-19 NOTE — PROGRESS NOTES
Chief Complaint   Patient presents with   • ER follow up       History of Present Illness  52 y.o.  woman presents for ER follow-up and further evaluation of fever and fatigue.    HPI started last week with some burning with urination and OTC RX'd her with abx.  She then went to the ER the next day due to fevers, feeling ill, and headache and was prescribed different abx (omnicef) for presumptive pneumonia (CXR read pneumonia vs left basilar atelectasis). Labs were otherwise unremarkable, including nl blood counts.  CT abd/pelvis was negative as well.    Subsequent lab results have shown neg bcxs and bord ucx.    [place] better yesterday but today feels more run down, subjective fevers, and mild headache.  Denies visual changes, confusion, runny nose, sore throat, abd pain, n/v, rashes, neck stiffness, new joint sxs.  Has mild dry cough and feels some difficulty in breathing, unchanged with activity or at rest.      Review of Systems  ROS (+) for fevers, fatigue, headaches, dry cough.  No further urinary sxs.  No over bleeding. Denies CP, palpitations, SOB, abd pain, n/v/d, vaginal bleeding, rashes, new joint sxs. All other ROS reviewed and negative.    [facility]  The following portions of the patient's history were reviewed and updated as appropriate: allergies, current medications, past family history, past medical history, past social history, past surgical history and problem list.      Current Outpatient Prescriptions:   •  acetaminophen-codeine (TYLENOL with CODEINE #3) 300-30 MG per tablet, Take 1-2 tablets by mouth Every 6 (Six) Hours As Needed for moderate pain (4-6)., Disp: 20 tablet, Rfl: 0  •  AMITIZA 24 MCG capsule, take 1 capsule by mouth twice a day with food, Disp: 60 capsule, Rfl: 5  •  cefdinir (OMNICEF) 300 MG capsule, Take 1 capsule by mouth 2 (Two) Times a Day., Disp: 20 capsule, Rfl: 0  •  cetirizine (zyrTEC) 10 MG tablet, Take 10 mg by mouth Daily., Disp: , Rfl:   •  CloNIDine (CATAPRES) 0.2  MG tablet, Take 1 tablet by mouth Every 8 (Eight) Hours As Needed for high blood pressure., Disp: 20 tablet, Rfl: 0  •  diphenhydrAMINE (BENADRYL) 25 mg capsule, Take 25 mg by mouth Every 6 (Six) Hours As Needed for itching., Disp: , Rfl:   •  DULoxetine (CYMBALTA) 60 MG capsule, take 1 capsule by mouth at bedtime, Disp: 30 capsule, Rfl: 4  •  ibuprofen (ADVIL,MOTRIN) 200 MG tablet, Take 200 mg by mouth At Night As Needed for mild pain (1-3)., Disp: , Rfl:   •  levothyroxine (SYNTHROID, LEVOTHROID) 75 MCG tablet, Take 1 tablet by mouth Every Morning., Disp: 90 tablet, Rfl: 3  •  naproxen sodium (ALEVE) 220 MG tablet, Take 220 mg by mouth 2 (Two) Times a Day As Needed for mild pain (1-3)., Disp: , Rfl:   •  ondansetron ODT (ZOFRAN-ODT) 4 MG disintegrating tablet, Take 1 tablet by mouth Every 6 (Six) Hours As Needed (nausea)., Disp: 15 tablet, Rfl: 0  •  traZODone (DESYREL) 50 MG tablet, take 1 tablet by mouth at bedtime (Patient taking differently: take 1 tablet by mouth at bedtime AS NEEDED), Disp: 30 tablet, Rfl: 5    VITALS:  /74  Temp 98.3 °F (36.8 °C)  Wt 207 lb (93.9 kg)  LMP  (Approximate)  BMI 33.41 kg/m2 R12, pulse ox 98%, pulse 86    Physical Exam   Constitutional: She is oriented to person, place, and time. She appears well-developed and well-nourished.   HENT:   Right Ear: External ear normal.   Left Ear: External ear normal.   Mouth/Throat: Oropharynx is clear and moist. No oropharyngeal exudate.   Eyes: Conjunctivae and EOM are normal. No scleral icterus.   Neck: Normal range of motion. Neck supple. No thyromegaly present.   Cardiovascular: Normal rate, regular rhythm and normal heart sounds.    Pulmonary/Chest: Effort normal and breath sounds normal. No respiratory distress. She has no wheezes. She has no rales.   Abdominal: Soft. Bowel sounds are normal. She exhibits no distension and no mass. There is no tenderness. There is no rebound and no guarding.   Musculoskeletal: Normal range of  motion. She exhibits no tenderness.   Lymphadenopathy:     She has no cervical adenopathy.   Neurological: She is alert and oriented to person, place, and time.   Skin: Skin is warm and dry. Rash noted.   Psychiatric: She has a normal mood and affect. Her behavior is normal.   Nursing note and vitals reviewed.      LABS  Results for orders placed or performed during the hospital encounter of 09/15/17   Influenza Antigen   Result Value Ref Range    Influenza A Ag, EIA Negative Negative    Influenza B Ag, EIA Negative Negative   Blood Culture   Result Value Ref Range    Blood Culture No growth at 4 days    Blood Culture   Result Value Ref Range    Blood Culture No growth at 4 days    Urinalysis With / Culture If Indicated   Result Value Ref Range    Color, UA Dark Yellow (A) Yellow, Straw    Appearance, UA Clear Clear    pH, UA 7.5 5.0 - 8.0    Specific Gravity, UA 1.022 1.001 - 1.030    Glucose, UA Negative Negative    Ketones, UA Negative Negative    Bilirubin, UA Negative Negative    Blood, UA Negative Negative    Protein, UA Negative Negative    Leuk Esterase, UA Negative Negative    Nitrite, UA Negative Negative    Urobilinogen, UA 1.0 E.U./dL 0.2 - 1.0 E.U./dL   Comprehensive Metabolic Panel   Result Value Ref Range    Glucose 105 (H) 70 - 100 mg/dL    BUN 11 9 - 23 mg/dL    Creatinine 0.80 0.60 - 1.30 mg/dL    Sodium 137 132 - 146 mmol/L    Potassium 3.9 3.5 - 5.5 mmol/L    Chloride 108 99 - 109 mmol/L    CO2 24.0 20.0 - 31.0 mmol/L    Calcium 9.0 8.7 - 10.4 mg/dL    Total Protein 6.8 5.7 - 8.2 g/dL    Albumin 4.00 3.20 - 4.80 g/dL    ALT (SGPT) 28 7 - 40 U/L    AST (SGOT) 33 0 - 33 U/L    Alkaline Phosphatase 69 25 - 100 U/L    Total Bilirubin 0.7 0.3 - 1.2 mg/dL    eGFR Non African Amer 75 >60 mL/min/1.73    Globulin 2.8 gm/dL    A/G Ratio 1.4 (L) 1.5 - 2.5 g/dL    BUN/Creatinine Ratio 13.8 7.0 - 25.0    Anion Gap 5.0 3.0 - 11.0 mmol/L   Lactic Acid, Plasma   Result Value Ref Range    Lactate 1.1 0.5 - 2.0  mmol/L   CBC Auto Differential   Result Value Ref Range    WBC 5.08 3.50 - 10.80 10*3/mm3    RBC 4.15 3.89 - 5.14 10*6/mm3    Hemoglobin 11.4 (L) 11.5 - 15.5 g/dL    Hematocrit 35.2 34.5 - 44.0 %    MCV 84.8 80.0 - 99.0 fL    MCH 27.5 27.0 - 31.0 pg    MCHC 32.4 32.0 - 36.0 g/dL    RDW 14.4 11.3 - 14.5 %    RDW-SD 44.9 37.0 - 54.0 fl    MPV 9.9 6.0 - 12.0 fL    Platelets 188 150 - 450 10*3/mm3    Neutrophil % 80.8 (H) 41.0 - 71.0 %    Lymphocyte % 3.9 (L) 24.0 - 44.0 %    Monocyte % 10.4 0.0 - 12.0 %    Eosinophil % 4.3 (H) 0.0 - 3.0 %    Basophil % 0.2 0.0 - 1.0 %    Immature Grans % 0.4 0.0 - 0.6 %    Neutrophils, Absolute 4.10 1.50 - 8.30 10*3/mm3    Lymphocytes, Absolute 0.20 (L) 0.60 - 4.80 10*3/mm3    Monocytes, Absolute 0.53 0.00 - 1.00 10*3/mm3    Eosinophils, Absolute 0.22 0.00 - 0.30 10*3/mm3    Basophils, Absolute 0.01 0.00 - 0.20 10*3/mm3    Immature Grans, Absolute 0.02 0.00 - 0.03 10*3/mm3     9/15/17 pCXR: left basilar atelectasis vs pneumonia  9/15/17 CT abd/pelvis: diffuse fatty liver, h/o priya and hyst      ASSESSMENT/PLAN  Problem List Items Addressed This Visit     Elevated LFTs     fatty liver on CT abd/pelvis but most recent LFTs normal         Fatigue     Attributed to viral syndrome but not completely certain nothing else if brewing; blood counts unremarkable; TFTs stable; bord pCXR; follow clinically           Other Visit Diagnoses     Viral syndrome    -  Primary    versus L. pneumonia; neg lung exam today; encourage fluids/rest; finish omnicef; f/u if further fevers, neck stiffness, new sxs    Shortness of breath        with fever, fatigue, dry cough; pulse ox normal; travel to [place] 1 month ago; check chest CT to r/o PE    Relevant Orders    CT Angiogram Chest With & Without Contrast          FOLLOW-UP  1. Health maintenance - she will update flu vaccine when feeling better, when no longer having fevers  2. RTC 11/28/17 as scheduled for PE: fasting labs the week prior to appt (only  needs TSH, FT4, vit D, lipids) - escribed      Electronically signed by:    Brie Jalloh MD  09/19/2017

## 2017-09-20 VITALS
DIASTOLIC BLOOD PRESSURE: 74 MMHG | OXYGEN SATURATION: 98 % | BODY MASS INDEX: 33.41 KG/M2 | TEMPERATURE: 98.3 F | HEART RATE: 86 BPM | SYSTOLIC BLOOD PRESSURE: 108 MMHG | WEIGHT: 207 LBS

## 2017-09-21 LAB
BACTERIA SPEC AEROBE CULT: NORMAL
BACTERIA SPEC AEROBE CULT: NORMAL

## 2017-09-21 NOTE — ASSESSMENT & PLAN NOTE
Attributed to viral syndrome but not completely certain nothing else if brewing; blood counts unremarkable; TFTs stable; bord pCXR; follow clinically

## 2017-11-28 ENCOUNTER — OFFICE VISIT (OUTPATIENT)
Dept: INTERNAL MEDICINE | Facility: CLINIC | Age: 52
End: 2017-11-28

## 2017-11-28 VITALS
SYSTOLIC BLOOD PRESSURE: 102 MMHG | BODY MASS INDEX: 33.43 KG/M2 | HEIGHT: 66 IN | DIASTOLIC BLOOD PRESSURE: 70 MMHG | WEIGHT: 208 LBS

## 2017-11-28 DIAGNOSIS — K21.9 GASTROESOPHAGEAL REFLUX DISEASE, ESOPHAGITIS PRESENCE NOT SPECIFIED: ICD-10-CM

## 2017-11-28 DIAGNOSIS — M05.79 RHEUMATOID ARTHRITIS INVOLVING MULTIPLE SITES WITH POSITIVE RHEUMATOID FACTOR (HCC): ICD-10-CM

## 2017-11-28 DIAGNOSIS — Z00.00 PE (PHYSICAL EXAM), ROUTINE: Primary | ICD-10-CM

## 2017-11-28 DIAGNOSIS — E03.9 ACQUIRED HYPOTHYROIDISM: ICD-10-CM

## 2017-11-28 DIAGNOSIS — E78.2 MIXED HYPERLIPIDEMIA: ICD-10-CM

## 2017-11-28 DIAGNOSIS — E55.9 VITAMIN D DEFICIENCY: ICD-10-CM

## 2017-11-28 DIAGNOSIS — M25.442 FINGER JOINT SWELLING, LEFT: ICD-10-CM

## 2017-11-28 DIAGNOSIS — Z78.0 MENOPAUSE: ICD-10-CM

## 2017-11-28 LAB
25(OH)D3 SERPL-MCNC: 26.2 NG/ML
ALBUMIN SERPL-MCNC: 4.4 G/DL (ref 3.2–4.8)
ALBUMIN/GLOB SERPL: 1.8 G/DL (ref 1.5–2.5)
ALP SERPL-CCNC: 70 U/L (ref 25–100)
ALT SERPL W P-5'-P-CCNC: 20 U/L (ref 7–40)
ANION GAP SERPL CALCULATED.3IONS-SCNC: 6 MMOL/L (ref 3–11)
ARTICHOKE IGE QN: 172 MG/DL (ref 0–130)
AST SERPL-CCNC: 20 U/L (ref 0–33)
BASOPHILS # BLD AUTO: 0.01 10*3/MM3 (ref 0–0.2)
BASOPHILS NFR BLD AUTO: 0.2 % (ref 0–1)
BILIRUB SERPL-MCNC: 0.5 MG/DL (ref 0.3–1.2)
BUN BLD-MCNC: 17 MG/DL (ref 9–23)
BUN/CREAT SERPL: 17 (ref 7–25)
CALCIUM SPEC-SCNC: 9.2 MG/DL (ref 8.7–10.4)
CHLORIDE SERPL-SCNC: 102 MMOL/L (ref 99–109)
CHOLEST SERPL-MCNC: 221 MG/DL (ref 0–200)
CO2 SERPL-SCNC: 31 MMOL/L (ref 20–31)
CREAT BLD-MCNC: 1 MG/DL (ref 0.6–1.3)
CRP SERPL-MCNC: 5.14 MG/DL (ref 0–1)
DEPRECATED RDW RBC AUTO: 44.9 FL (ref 37–54)
EOSINOPHIL # BLD AUTO: 0.22 10*3/MM3 (ref 0–0.3)
EOSINOPHIL NFR BLD AUTO: 4.3 % (ref 0–3)
ERYTHROCYTE [DISTWIDTH] IN BLOOD BY AUTOMATED COUNT: 13.7 % (ref 11.3–14.5)
ERYTHROCYTE [SEDIMENTATION RATE] IN BLOOD: 16 MM/HR (ref 0–30)
GFR SERPL CREATININE-BSD FRML MDRD: 58 ML/MIN/1.73
GLOBULIN UR ELPH-MCNC: 2.5 GM/DL
GLUCOSE BLD-MCNC: 99 MG/DL (ref 70–100)
HCT VFR BLD AUTO: 38 % (ref 34.5–44)
HDLC SERPL-MCNC: 49 MG/DL (ref 40–60)
HGB BLD-MCNC: 12.1 G/DL (ref 11.5–15.5)
IMM GRANULOCYTES # BLD: 0.02 10*3/MM3 (ref 0–0.03)
IMM GRANULOCYTES NFR BLD: 0.4 % (ref 0–0.6)
LYMPHOCYTES # BLD AUTO: 0.93 10*3/MM3 (ref 0.6–4.8)
LYMPHOCYTES NFR BLD AUTO: 18.1 % (ref 24–44)
MCH RBC QN AUTO: 28.3 PG (ref 27–31)
MCHC RBC AUTO-ENTMCNC: 31.8 G/DL (ref 32–36)
MCV RBC AUTO: 89 FL (ref 80–99)
MONOCYTES # BLD AUTO: 0.44 10*3/MM3 (ref 0–1)
MONOCYTES NFR BLD AUTO: 8.6 % (ref 0–12)
NEUTROPHILS # BLD AUTO: 3.52 10*3/MM3 (ref 1.5–8.3)
NEUTROPHILS NFR BLD AUTO: 68.4 % (ref 41–71)
PLATELET # BLD AUTO: 252 10*3/MM3 (ref 150–450)
PMV BLD AUTO: 10.1 FL (ref 6–12)
POTASSIUM BLD-SCNC: 4.1 MMOL/L (ref 3.5–5.5)
PROT SERPL-MCNC: 6.9 G/DL (ref 5.7–8.2)
RBC # BLD AUTO: 4.27 10*6/MM3 (ref 3.89–5.14)
SODIUM BLD-SCNC: 139 MMOL/L (ref 132–146)
T4 FREE SERPL-MCNC: 1.06 NG/DL (ref 0.89–1.76)
TRIGL SERPL-MCNC: 135 MG/DL (ref 0–150)
TSH SERPL DL<=0.05 MIU/L-ACNC: 2.35 MIU/ML (ref 0.35–5.35)
WBC NRBC COR # BLD: 5.14 10*3/MM3 (ref 3.5–10.8)

## 2017-11-28 PROCEDURE — 80061 LIPID PANEL: CPT | Performed by: INTERNAL MEDICINE

## 2017-11-28 PROCEDURE — 99213 OFFICE O/P EST LOW 20 MIN: CPT | Performed by: INTERNAL MEDICINE

## 2017-11-28 PROCEDURE — 82306 VITAMIN D 25 HYDROXY: CPT | Performed by: INTERNAL MEDICINE

## 2017-11-28 PROCEDURE — 86140 C-REACTIVE PROTEIN: CPT | Performed by: INTERNAL MEDICINE

## 2017-11-28 PROCEDURE — 84443 ASSAY THYROID STIM HORMONE: CPT | Performed by: INTERNAL MEDICINE

## 2017-11-28 PROCEDURE — 99396 PREV VISIT EST AGE 40-64: CPT | Performed by: INTERNAL MEDICINE

## 2017-11-28 PROCEDURE — 84439 ASSAY OF FREE THYROXINE: CPT | Performed by: INTERNAL MEDICINE

## 2017-11-28 PROCEDURE — 85652 RBC SED RATE AUTOMATED: CPT | Performed by: INTERNAL MEDICINE

## 2017-11-28 PROCEDURE — 85025 COMPLETE CBC W/AUTO DIFF WBC: CPT | Performed by: INTERNAL MEDICINE

## 2017-11-28 PROCEDURE — 90471 IMMUNIZATION ADMIN: CPT | Performed by: INTERNAL MEDICINE

## 2017-11-28 PROCEDURE — 80053 COMPREHEN METABOLIC PANEL: CPT | Performed by: INTERNAL MEDICINE

## 2017-11-28 PROCEDURE — 90686 IIV4 VACC NO PRSV 0.5 ML IM: CPT | Performed by: INTERNAL MEDICINE

## 2017-11-28 RX ORDER — OMEPRAZOLE 40 MG/1
40 CAPSULE, DELAYED RELEASE ORAL DAILY
Qty: 30 CAPSULE | Refills: 5 | Status: SHIPPED | OUTPATIENT
Start: 2017-11-28 | End: 2018-09-01 | Stop reason: SDUPTHER

## 2017-11-28 RX ORDER — CHOLECALCIFEROL (VITAMIN D3) 125 MCG
5 CAPSULE ORAL NIGHTLY
COMMUNITY
End: 2018-10-18

## 2017-11-28 RX ORDER — FUROSEMIDE 20 MG/1
20 TABLET ORAL DAILY
Qty: 10 TABLET | Refills: 0 | Status: SHIPPED | OUTPATIENT
Start: 2017-11-28 | End: 2018-06-08

## 2017-11-28 RX ORDER — TOFACITINIB 11 MG/1
11 TABLET, FILM COATED, EXTENDED RELEASE ORAL DAILY
COMMUNITY
Start: 2017-11-27 | End: 2018-10-18

## 2017-11-28 NOTE — PROGRESS NOTES
"Chief Complaint   Patient presents with   • Annual Exam       History of Present Illness  52 y.o.  timbo presents for updated phys examination.      Reports increased reflux since starting Xeljanz in 8/17; has been taking 4 TUMS nightly.  No assoc'd n/v, bleeding.    Complains of finger swelling; unable to get ring off of left 4th finger.  Notes pain and \"cutting off circulation.\"    Notes worsened depression; also with fatigue and inability to lose weight.  Has been going to [place] 2-4x/week and eating better (more fruits/vegetables) and not losing weight.  Concerned about depression versus thyroid.    Review of Systems  Denies headaches, visual changes, CP, palpitations, SOB, cough, n/v/d, difficulty with urination, vaginal discharge/bleeding (hyst due to fibroids, endometriosis), numbness/tingling, falls, mood changes, lightheadedness, hearing changes, rashes.    ROS (+) for worsened depression.  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  Remains on duloxetine.    ROS (+) for increased reflux.     ROS (+) for chronic insomnia - utilizing benadryl and melatonin nightly; no longer on trazodone.     Needs labs every 12 weeks per Dr. Rodriguez; wants to do them today.   Cannot recall last DEXA.     All other ROS reviewed and negative.    [facility]  The following portions of the patient's history were reviewed and updated as appropriate: allergies, current medications, past family history, past medical history, past social history, past surgical history and problem list.    Current Outpatient Prescriptions:   •  diphenhydrAMINE (BENADRYL) 25 mg capsule, q6 prn itching  •  DULoxetine (CYMBALTA) 60 MG capsule, QPM  •  levothyroxine (SYNTHROID, LEVOTHROID) 75 MCG tablet, QD  •  naproxen sodium (ALEVE) 220 MG tablet, BID prn  •  XELJANZ XR 11 MG tablet sustained-release 24 hour, QD    VITALS:  /70  Ht 66\" (167.6 cm)  Wt 208 lb (94.3 kg)  BMI 33.57 kg/m2    Physical Exam   Constitutional: " She is oriented to person, place, and time. She appears well-developed and well-nourished.   HENT:   Head: Normocephalic.   Right Ear: External ear normal.   Left Ear: External ear normal.   Nose: Nose normal.   Mouth/Throat: Oropharynx is clear and moist and mucous membranes are normal. No oropharyngeal exudate.   Eyes: Conjunctivae and EOM are normal. Pupils are equal, round, and reactive to light.   Neck: Normal range of motion. Neck supple. Carotid bruit is not present (bilaterally). No thyromegaly present.   Cardiovascular: Normal rate, regular rhythm and normal heart sounds.    Pulmonary/Chest: Effort normal and breath sounds normal. No respiratory distress. She has no wheezes. She has no rales.   Abdominal: Soft. Bowel sounds are normal. She exhibits no distension and no mass. There is no hepatosplenomegaly. There is no tenderness.   Genitourinary:   Genitourinary Comments: Breast exam unremarkable without masses, skin changes, nipple discharge, or axillary adenopathy.       Musculoskeletal: Normal range of motion. She exhibits edema (diffuse finger edema, ring on 4th ring finger with erythema at base).   Lymphadenopathy:     She has no cervical adenopathy.   Neurological: She is alert and oriented to person, place, and time. She has normal reflexes. She displays normal reflexes. No cranial nerve deficit. Coordination normal.   Skin: Skin is warm and dry. No rash noted.   Psychiatric: She has a normal mood and affect. Her behavior is normal.   Nursing note and vitals reviewed.      LABS  9/17 stable CBC, CMP (Cr 0.8, GFR 75), UA, lactate    ASSESSMENT/PLAN  Problem List Items Addressed This Visit     Gastroesophageal reflux disease     Patient states sxs worsened after starting xeljanz (for RA); reviewed common food triggers and advised staying upright for at least 2-3 hours after eating; resume omeprazole 40mg QD - she will take 30 min before dinner #30, 5RF         Relevant Medications    omeprazole  (priLOSEC) 40 MG capsule    Hyperlipidemia     Update lipids, currently no meds; goal LDL < 130; patient notes xeljanz can affect lipids         Hypothyroidism     Needs updated TFTs on levothyroxine 75mcg QD; note complaints of fatigue, inability to lose weight, worsened depression         Rheumatoid arthritis     CBC, CMP, ESR, CRP per Dr. Rodriguez; patient also states Xeljanz can affect lipids         Relevant Orders    Comprehensive Metabolic Panel    CBC & Differential    Sedimentation Rate    C-reactive Protein    CBC Auto Differential    Vitamin D deficiency     Needs updated vit D level; currently no supplementation         PE (physical exam), routine - Primary     Health maintenance - flu vacc given toay; Prevnar 9/15, PVX 11/16, Tdap 2/11; no Zostavax per rheum; mammo pending 1/2/18; no further Paps unless abnlities s/p USMAN/BSO; DEXA ordered (sees rheum Dr. Rodriguez); will request last colonosc with Marcelo 2016 - poss repeat 2019; eye exam to be updated; dental exam - rec every 6mos (currently annually); (+) seat belt use         Menopause    Relevant Orders    DEXA Bone Density Axial      Other Visit Diagnoses     Finger joint swelling, left        unclear etiology and ? med s/e; proceed with furosemide 20mg QD for 3d (prn #10, 0RF); discussed s/e including diuretic effect and hypokalemia    Relevant Medications    furosemide (LASIX) 20 MG tablet          FOLLOW-UP  RTC 1yr for annual PE; fasting labs the week prior to appt (CBC, CMP, TSH, lipids, UA/micro, FT4, vit D)      Electronically signed by:    Brie Jalloh MD  11/28/2017

## 2017-11-28 NOTE — ASSESSMENT & PLAN NOTE
Needs updated TFTs on levothyroxine 75mcg QD; note complaints of fatigue, inability to lose weight, worsened depression

## 2017-11-28 NOTE — ASSESSMENT & PLAN NOTE
Health maintenance - flu vacc given toay; Prevnar 9/15, PVX 11/16, Tdap 2/11; no Zostavax per rheum; mammo pending 1/2/18; no further Paps unless abnlities s/p USMAN/BSO; DEXA ordered (sees rheum Dr. Rodriguez); will request last colonosc with Marcelo 2016 - poss repeat 2019; eye exam to be updated; dental exam - rec every 6mos (currently annually); (+) seat belt use

## 2017-11-28 NOTE — ASSESSMENT & PLAN NOTE
Patient states sxs worsened after starting xeljanz (for RA); reviewed common food triggers and advised staying upright for at least 2-3 hours after eating; resume omeprazole 40mg QD - she will take 30 min before dinner #30, 5RF

## 2017-11-30 ENCOUNTER — PATIENT MESSAGE (OUTPATIENT)
Dept: INTERNAL MEDICINE | Facility: CLINIC | Age: 52
End: 2017-11-30

## 2017-11-30 DIAGNOSIS — E78.2 MIXED HYPERLIPIDEMIA: Primary | ICD-10-CM

## 2017-11-30 DIAGNOSIS — E03.9 ACQUIRED HYPOTHYROIDISM: ICD-10-CM

## 2017-11-30 RX ORDER — LEVOTHYROXINE SODIUM 88 UG/1
88 TABLET ORAL EVERY MORNING
Qty: 30 TABLET | Refills: 3 | Status: SHIPPED | OUTPATIENT
Start: 2017-11-30 | End: 2018-05-07 | Stop reason: SDUPTHER

## 2017-11-30 NOTE — PROGRESS NOTES
Please forward labs to rheum Dr. Rodriguez    Over labs are stable; thyroid function is normal (no change in med dose recommended).  Blood counts, liver/kidney function, electrolytes are all stable.  Vitamin D is borderline at 26 (goal > 30).    The only significant abnormality is the elevated cholesterol profile.  Total cholesterol 221 (normal < 200) and your LDL, the bad cholesterol, is very elevated at 172 (previously 127 last year and goal < 130).  Recommend decreasing saturated fats/cholesterol in the diet and repeat the cholesterol profile in 6 months.

## 2018-01-02 ENCOUNTER — APPOINTMENT (OUTPATIENT)
Dept: MAMMOGRAPHY | Facility: HOSPITAL | Age: 53
End: 2018-01-02
Attending: INTERNAL MEDICINE

## 2018-01-18 RX ORDER — DULOXETIN HYDROCHLORIDE 60 MG/1
CAPSULE, DELAYED RELEASE ORAL
Qty: 30 CAPSULE | Refills: 4 | Status: SHIPPED | OUTPATIENT
Start: 2018-01-18 | End: 2018-06-08 | Stop reason: SINTOL

## 2018-02-09 ENCOUNTER — HOSPITAL ENCOUNTER (OUTPATIENT)
Dept: ULTRASOUND IMAGING | Facility: HOSPITAL | Age: 53
Discharge: HOME | End: 2018-02-09

## 2018-02-09 ENCOUNTER — HOSPITAL ENCOUNTER (OUTPATIENT)
Dept: MAMMOGRAPHY | Facility: HOSPITAL | Age: 53
Discharge: HOME | End: 2018-02-09
Attending: INTERNAL MEDICINE | Admitting: INTERNAL MEDICINE

## 2018-02-09 DIAGNOSIS — R92.8 ABNORMAL MAMMOGRAM: ICD-10-CM

## 2018-02-09 PROCEDURE — 76642 ULTRASOUND BREAST LIMITED: CPT

## 2018-02-09 PROCEDURE — 77062 BREAST TOMOSYNTHESIS BI: CPT | Performed by: RADIOLOGY

## 2018-02-09 PROCEDURE — G0279 TOMOSYNTHESIS, MAMMO: HCPCS

## 2018-02-09 PROCEDURE — 77066 DX MAMMO INCL CAD BI: CPT

## 2018-02-09 PROCEDURE — 77066 DX MAMMO INCL CAD BI: CPT | Performed by: RADIOLOGY

## 2018-02-09 PROCEDURE — 76642 ULTRASOUND BREAST LIMITED: CPT | Performed by: RADIOLOGY

## 2018-02-09 PROCEDURE — 88307 TISSUE EXAM BY PATHOLOGIST: CPT | Performed by: RADIOLOGY

## 2018-02-09 PROCEDURE — 19083 BX BREAST 1ST LESION US IMAG: CPT | Performed by: RADIOLOGY

## 2018-02-09 RX ORDER — LIDOCAINE HYDROCHLORIDE 10 MG/ML
5 INJECTION, SOLUTION INFILTRATION; PERINEURAL ONCE
Status: COMPLETED | OUTPATIENT
Start: 2018-02-09 | End: 2018-02-09

## 2018-02-09 RX ADMIN — LIDOCAINE HYDROCHLORIDE 3 ML: 10 INJECTION, SOLUTION INFILTRATION; PERINEURAL at 11:01

## 2018-02-15 ENCOUNTER — TELEPHONE (OUTPATIENT)
Dept: MAMMOGRAPHY | Facility: HOSPITAL | Age: 53
End: 2018-02-15

## 2018-02-15 NOTE — TELEPHONE ENCOUNTER
02.15.18 @ 1215: Pathology results given. Pt will be notified of recommendation when Dr Bailey returns from vacation and signs off report. Verbalizes understanding. Denies discomfort. Denies any signs and symptoms of infection.

## 2018-02-21 LAB
CYTO UR: NORMAL
LAB AP CASE REPORT: NORMAL
LAB AP CLINICAL INFORMATION: NORMAL
LAB AP FLOW CYTOMETRY SUMMARY: NORMAL
Lab: NORMAL
PATH REPORT.FINAL DX SPEC: NORMAL
PATH REPORT.GROSS SPEC: NORMAL

## 2018-05-07 DIAGNOSIS — E03.9 ACQUIRED HYPOTHYROIDISM: ICD-10-CM

## 2018-05-07 RX ORDER — LEVOTHYROXINE SODIUM 88 UG/1
88 TABLET ORAL EVERY MORNING
Qty: 30 TABLET | Refills: 3 | Status: SHIPPED | OUTPATIENT
Start: 2018-05-07 | End: 2018-10-11 | Stop reason: SDUPTHER

## 2018-05-15 ENCOUNTER — LAB (OUTPATIENT)
Dept: INTERNAL MEDICINE | Facility: CLINIC | Age: 53
End: 2018-05-15

## 2018-05-15 DIAGNOSIS — E78.2 MIXED HYPERLIPIDEMIA: Primary | ICD-10-CM

## 2018-05-15 LAB
ARTICHOKE IGE QN: 128 MG/DL (ref 0–130)
CHOLEST SERPL-MCNC: 185 MG/DL (ref 0–200)
HDLC SERPL-MCNC: 43 MG/DL (ref 40–60)
TRIGL SERPL-MCNC: 165 MG/DL (ref 0–150)

## 2018-05-15 PROCEDURE — 80061 LIPID PANEL: CPT | Performed by: INTERNAL MEDICINE

## 2018-05-16 NOTE — PROGRESS NOTES
Cholesterol profile is much improved.  LDL, the bad cholesterol, improved from 172 down to 128 (goal LDL < 130).  Continue healthy diet and physical activity; will repeat cholesterol profile with next Physical in 11/2018

## 2018-06-08 ENCOUNTER — OFFICE VISIT (OUTPATIENT)
Dept: INTERNAL MEDICINE | Facility: CLINIC | Age: 53
End: 2018-06-08

## 2018-06-08 VITALS
RESPIRATION RATE: 16 BRPM | BODY MASS INDEX: 33.92 KG/M2 | DIASTOLIC BLOOD PRESSURE: 70 MMHG | SYSTOLIC BLOOD PRESSURE: 116 MMHG | WEIGHT: 210.13 LBS | HEART RATE: 104 BPM

## 2018-06-08 DIAGNOSIS — R61 EXCESSIVE SWEATING: ICD-10-CM

## 2018-06-08 DIAGNOSIS — F33.1 MODERATE EPISODE OF RECURRENT MAJOR DEPRESSIVE DISORDER (HCC): ICD-10-CM

## 2018-06-08 DIAGNOSIS — Z78.0 MENOPAUSE: ICD-10-CM

## 2018-06-08 DIAGNOSIS — E78.2 MIXED HYPERLIPIDEMIA: Primary | ICD-10-CM

## 2018-06-08 PROCEDURE — 99214 OFFICE O/P EST MOD 30 MIN: CPT | Performed by: INTERNAL MEDICINE

## 2018-06-08 RX ORDER — DULOXETIN HYDROCHLORIDE 30 MG/1
30 CAPSULE, DELAYED RELEASE ORAL DAILY
Qty: 30 CAPSULE | Refills: 0 | Status: SHIPPED | OUTPATIENT
Start: 2018-06-08 | End: 2018-06-15

## 2018-06-08 NOTE — ASSESSMENT & PLAN NOTE
Much improved lipids with  down to 128;patient unsure what she did to make it better; currently no meds; decrease saturated fats and cholesterol in the diet; repeat lipids in 6 mos

## 2018-06-09 NOTE — ASSESSMENT & PLAN NOTE
Probably efficacy with duloxetine but notes sexual s/e with decreased libido; duloxetine chosen (prev on paroxetine) due to potential to help joint pains; she will wean off duloxetine 30mg QD x 1 week, then QOD x 1 week, then discontinue thereafter; RX for #30, 0RF; thereafter, she should re-evaluate her mood and decide whether she wants to try bupropion XL; reviewed indications, side effects, risks of bupropion - discussed most likely take 150mg QD x 1 week, then ramp up to 300mg QD thereafter

## 2018-06-09 NOTE — ASSESSMENT & PLAN NOTE
Symptomatic with hot flashes and sweats; ongoing for years; unsure whether it coincides with initiation of duloxetine; discussed possibility of contribution from duloxetine; since she is weaning off duloxetine, follow clinically; discussed weighing risks/benefits of HRT

## 2018-06-15 ENCOUNTER — PATIENT MESSAGE (OUTPATIENT)
Dept: INTERNAL MEDICINE | Facility: CLINIC | Age: 53
End: 2018-06-15

## 2018-06-15 RX ORDER — DULOXETIN HYDROCHLORIDE 20 MG/1
20 CAPSULE, DELAYED RELEASE ORAL DAILY
Qty: 30 CAPSULE | Refills: 0 | Status: SHIPPED | OUTPATIENT
Start: 2018-06-15 | End: 2018-07-19 | Stop reason: SDUPTHER

## 2018-06-15 NOTE — TELEPHONE ENCOUNTER
From: Yanet Clifton  To: Brie Jalloh MD  Sent: 6/15/2018 7:38 AM EDT  Subject: Prescription Question    Dr Jalloh, I have reduced my Duloxetine dose to 30 mg since Monday. I am now experiencing bad headaches, nausea, and dizziness. Is it possible I could try 2-20mg capsules for a week or so, then, 30s, 20s, then off?    Thank you  --Nancy Clifton

## 2018-06-17 PROBLEM — K57.30 DIVERTICULOSIS OF COLON: Status: ACTIVE | Noted: 2018-06-17

## 2018-06-17 PROBLEM — K64.9 HEMORRHOIDS: Status: ACTIVE | Noted: 2018-06-17

## 2018-07-19 ENCOUNTER — TELEPHONE (OUTPATIENT)
Dept: INTERNAL MEDICINE | Facility: CLINIC | Age: 53
End: 2018-07-19

## 2018-07-19 DIAGNOSIS — F33.1 MODERATE EPISODE OF RECURRENT MAJOR DEPRESSIVE DISORDER (HCC): Primary | ICD-10-CM

## 2018-07-19 RX ORDER — DULOXETIN HYDROCHLORIDE 20 MG/1
20 CAPSULE, DELAYED RELEASE ORAL DAILY
Qty: 30 CAPSULE | Refills: 0 | OUTPATIENT
Start: 2018-07-19

## 2018-07-19 RX ORDER — DULOXETIN HYDROCHLORIDE 20 MG/1
20 CAPSULE, DELAYED RELEASE ORAL DAILY
Qty: 30 CAPSULE | Refills: 2 | Status: SHIPPED | OUTPATIENT
Start: 2018-07-19 | End: 2018-11-07 | Stop reason: SDUPTHER

## 2018-07-19 NOTE — TELEPHONE ENCOUNTER
Spoke with PT and she states she was trying to titrate off but with the infusions (rituxan) and the side effects were too much and she went back on the 20mg qd.

## 2018-07-19 NOTE — TELEPHONE ENCOUNTER
"Message is not clear on what dose of duloxetine patient is actually taking at this time.    Should have already weaned off theduloxetine by now since she started weaning 6/15/18.    And what meds exactly is she getting for the infusion and when exactly is that \"over\" as this would dictate what we would prescribe.  "

## 2018-07-19 NOTE — TELEPHONE ENCOUNTER
CALL BACK [phone]    PATIENT IS REQUESTING A RETURN CALL RE: A DENIED REFILL REQUEST FOR DULOXETINE. SHE STATES SHE IS CURRENTLY RECEIVING CHEMO INFUSION AND WOULD LIKE TO CONTINUE TAKING THIS MEDICATION UNTIL THE INFUSION IS OVER TO AVOID SIDE EFFECTS/WITHDRAWAL ISSUES DURING THIS TIME.

## 2018-09-01 DIAGNOSIS — K21.9 GASTROESOPHAGEAL REFLUX DISEASE, ESOPHAGITIS PRESENCE NOT SPECIFIED: ICD-10-CM

## 2018-09-01 RX ORDER — OMEPRAZOLE 40 MG/1
CAPSULE, DELAYED RELEASE ORAL
Qty: 30 CAPSULE | Refills: 5 | Status: SHIPPED | OUTPATIENT
Start: 2018-09-01 | End: 2019-06-24 | Stop reason: SDUPTHER

## 2018-09-26 NOTE — PROGRESS NOTES
Chief Complaint   Patient presents with   • Medication Problem     medication side effect   • Hot Flashes       History of Present Illness  52 y.o.  woman presents for further discussion re: depression and hot flashes. Patient interested in getting off of duloxetine due to sexual s/e; notes missing it for a weekend and libido improved.  Was told wellbutrin wouldn't have same side effect.    Reports having night sweats and hot flashes, ongoing for a few years (at least 3-4 yrs), unchanged. Has had hysterectomy.    Reports frustration with Dr. Rodriguez's office.  States they were going to change her Xeljanz to rituxan in 11/17 - didn't ever hear back from the office re: authorization.  Then saw Dr. Rodriguez in 4/18 and she said it still must be in process; patient has since made several more phone calls and still no response from the office.  Complains of swelling in hands bilaterally.    Review of Systems  ROS (+) decreased libido (attributed to cymbalta) and hot flashes/night sweats.   Notes puffy fingers diffusely and joint pains. (+) frustration as above.   All other ROS reviewed and negative.    [facility]  The following portions of the patient's history were reviewed and updated as appropriate: allergies, current medications, past family history, past medical history, past social history, past surgical history and problem list.    Current Outpatient Prescriptions:   •  DULoxetine (CYMBALTA) 60 MG capsule, QHS  •  levothyroxine (SYNTHROID, LEVOTHROID) 88 MCG tablet,QD  •  melatonin 5 MG tablet tablet, QD  •  naproxen sodium (ALEVE) 220 MG tablet, BID prn  •  omeprazole (priLOSEC) 40 MG capsule,QD  •  XELJANZ XR 11 MG tablet sustained-release 24 hour, QD    VITALS:  /70 (BP Location: Left arm, Patient Position: Sitting)   Pulse 104   Resp 16   Wt 95.3 kg (210 lb 2 oz)   BMI 33.92 kg/m²     Physical Exam   Constitutional: She is oriented to person, place, and time. She appears well-developed and  well-nourished.   Eyes: Conjunctivae and EOM are normal.   Cardiovascular: Normal rate, regular rhythm and normal heart sounds.    Pulmonary/Chest: Effort normal and breath sounds normal. No respiratory distress. She has no wheezes.   Neurological: She is alert and oriented to person, place, and time.   Skin: Skin is warm and dry.   Psychiatric: She has a normal mood and affect. Her behavior is normal.   Nursing note and vitals reviewed.      LABS  Results for orders placed or performed in visit on 05/15/18   Lipid Panel   Result Value Ref Range    Total Cholesterol 185 0 - 200 mg/dL    Triglycerides 165 (H) 0 - 150 mg/dL    HDL Cholesterol 43 40 - 60 mg/dL    LDL Cholesterol  128 0 - 130 mg/dL       ASSESSMENT/PLAN  Problem List Items Addressed This Visit     Depression     Probably efficacy with duloxetine but notes sexual s/e with decreased libido; duloxetine chosen (prev on paroxetine) due to potential to help joint pains; she will wean off duloxetine 30mg QD x 1 week, then QOD x 1 week, then discontinue thereafter; RX for #30, 0RF; thereafter, she should re-evaluate her mood and decide whether she wants to try bupropion XL; reviewed indications, side effects, risks of bupropion - discussed most likely take 150mg QD x 1 week, then ramp up to 300mg QD thereafter         Relevant Medications    DULoxetine (CYMBALTA) 30 MG capsule    Hyperlipidemia - Primary     Much improved lipids with  down to 128;patient unsure what she did to make it better; currently no meds; decrease saturated fats and cholesterol in the diet; repeat lipids in 6 mos           Menopause     Symptomatic with hot flashes and sweats; ongoing for years; unsure whether it coincides with initiation of duloxetine; discussed possibility of contribution from duloxetine; since she is weaning off duloxetine, follow clinically; discussed weighing risks/benefits of HRT           Other Visit Diagnoses     Excessive sweating        discussed  possibility due to duloxetine; eval after weaning off duloxetine for sexual s/e; if persistent, consider menopause; TSH nl 11/17          FOLLOW-UP  1. Health maintenance - rec she discuss Shingrix with rheum  2. Recommend she discuss her rheum office problems with the   3. RTC for next PE as scheduled 11/29/18; fasting labs wk prior to appt (CBC, CMP, TSH, lipids, UA/micr, FT4, vit D)    Electronically signed by:    Brie Jalloh MD  06/08/2018       none

## 2018-10-11 DIAGNOSIS — E03.9 ACQUIRED HYPOTHYROIDISM: ICD-10-CM

## 2018-10-12 RX ORDER — LEVOTHYROXINE SODIUM 88 UG/1
88 TABLET ORAL EVERY MORNING
Qty: 30 TABLET | Refills: 3 | Status: SHIPPED | OUTPATIENT
Start: 2018-10-12 | End: 2018-12-04 | Stop reason: SDUPTHER

## 2018-10-17 ENCOUNTER — APPOINTMENT (OUTPATIENT)
Dept: GENERAL RADIOLOGY | Facility: HOSPITAL | Age: 53
End: 2018-10-17

## 2018-10-17 ENCOUNTER — HOSPITAL ENCOUNTER (EMERGENCY)
Facility: HOSPITAL | Age: 53
Discharge: HOME | End: 2018-10-18
Attending: EMERGENCY MEDICINE | Admitting: EMERGENCY MEDICINE

## 2018-10-17 DIAGNOSIS — R51.9 ACUTE NONINTRACTABLE HEADACHE, UNSPECIFIED HEADACHE TYPE: Primary | ICD-10-CM

## 2018-10-17 DIAGNOSIS — R42 DIZZINESS: ICD-10-CM

## 2018-10-17 DIAGNOSIS — H53.143 PHOTOPHOBIA OF BOTH EYES: ICD-10-CM

## 2018-10-17 DIAGNOSIS — M35.00 HISTORY OF SJOGREN'S DISEASE (HCC): ICD-10-CM

## 2018-10-17 DIAGNOSIS — E03.9 HYPOTHYROIDISM, UNSPECIFIED TYPE: ICD-10-CM

## 2018-10-17 DIAGNOSIS — Z87.39 HISTORY OF RHEUMATOID ARTHRITIS: ICD-10-CM

## 2018-10-17 LAB
BASOPHILS # BLD AUTO: 0.01 10*3/MM3 (ref 0–0.2)
BASOPHILS NFR BLD AUTO: 0.1 % (ref 0–1)
DEPRECATED RDW RBC AUTO: 42.2 FL (ref 37–54)
EOSINOPHIL # BLD AUTO: 0.43 10*3/MM3 (ref 0–0.3)
EOSINOPHIL NFR BLD AUTO: 6.4 % (ref 0–3)
ERYTHROCYTE [DISTWIDTH] IN BLOOD BY AUTOMATED COUNT: 14.2 % (ref 11.3–14.5)
HCT VFR BLD AUTO: 37.1 % (ref 34.5–44)
HGB BLD-MCNC: 11.6 G/DL (ref 11.5–15.5)
IMM GRANULOCYTES # BLD: 0.01 10*3/MM3 (ref 0–0.03)
IMM GRANULOCYTES NFR BLD: 0.1 % (ref 0–0.6)
LYMPHOCYTES # BLD AUTO: 1.21 10*3/MM3 (ref 0.6–4.8)
LYMPHOCYTES NFR BLD AUTO: 18.1 % (ref 24–44)
MCH RBC QN AUTO: 25.5 PG (ref 27–31)
MCHC RBC AUTO-ENTMCNC: 31.3 G/DL (ref 32–36)
MCV RBC AUTO: 81.5 FL (ref 80–99)
MONOCYTES # BLD AUTO: 0.55 10*3/MM3 (ref 0–1)
MONOCYTES NFR BLD AUTO: 8.2 % (ref 0–12)
NEUTROPHILS # BLD AUTO: 4.47 10*3/MM3 (ref 1.5–8.3)
NEUTROPHILS NFR BLD AUTO: 67.2 % (ref 41–71)
PLATELET # BLD AUTO: 297 10*3/MM3 (ref 150–450)
PMV BLD AUTO: 10.2 FL (ref 6–12)
RBC # BLD AUTO: 4.55 10*6/MM3 (ref 3.89–5.14)
WBC NRBC COR # BLD: 6.67 10*3/MM3 (ref 3.5–10.8)

## 2018-10-17 PROCEDURE — 96361 HYDRATE IV INFUSION ADD-ON: CPT

## 2018-10-17 PROCEDURE — 25010000002 ONDANSETRON PER 1 MG: Performed by: EMERGENCY MEDICINE

## 2018-10-17 PROCEDURE — 96374 THER/PROPH/DIAG INJ IV PUSH: CPT

## 2018-10-17 PROCEDURE — 25010000002 MORPHINE PER 10 MG: Performed by: EMERGENCY MEDICINE

## 2018-10-17 PROCEDURE — 99284 EMERGENCY DEPT VISIT MOD MDM: CPT

## 2018-10-17 PROCEDURE — 85025 COMPLETE CBC W/AUTO DIFF WBC: CPT | Performed by: EMERGENCY MEDICINE

## 2018-10-17 PROCEDURE — 77003 FLUOROGUIDE FOR SPINE INJECT: CPT

## 2018-10-17 PROCEDURE — 96375 TX/PRO/DX INJ NEW DRUG ADDON: CPT

## 2018-10-17 RX ORDER — ONDANSETRON 2 MG/ML
4 INJECTION INTRAMUSCULAR; INTRAVENOUS ONCE
Status: COMPLETED | OUTPATIENT
Start: 2018-10-17 | End: 2018-10-17

## 2018-10-17 RX ORDER — SODIUM CHLORIDE 0.9 % (FLUSH) 0.9 %
10 SYRINGE (ML) INJECTION AS NEEDED
Status: DISCONTINUED | OUTPATIENT
Start: 2018-10-17 | End: 2018-10-18 | Stop reason: HOSPADM

## 2018-10-17 RX ORDER — LIDOCAINE HYDROCHLORIDE 10 MG/ML
10 INJECTION, SOLUTION EPIDURAL; INFILTRATION; INTRACAUDAL; PERINEURAL ONCE
Status: DISCONTINUED | OUTPATIENT
Start: 2018-10-17 | End: 2018-10-18 | Stop reason: HOSPADM

## 2018-10-17 RX ORDER — MORPHINE SULFATE 4 MG/ML
4 INJECTION, SOLUTION INTRAMUSCULAR; INTRAVENOUS ONCE
Status: COMPLETED | OUTPATIENT
Start: 2018-10-17 | End: 2018-10-17

## 2018-10-17 RX ORDER — LIDOCAINE HYDROCHLORIDE 10 MG/ML
INJECTION, SOLUTION EPIDURAL; INFILTRATION; INTRACAUDAL; PERINEURAL
Status: DISCONTINUED
Start: 2018-10-17 | End: 2018-10-18 | Stop reason: HOSPADM

## 2018-10-17 RX ORDER — DIPHENHYDRAMINE HCL 50 MG
50 CAPSULE ORAL NIGHTLY PRN
COMMUNITY
End: 2022-09-19

## 2018-10-17 RX ADMIN — MORPHINE SULFATE 4 MG: 4 INJECTION INTRAVENOUS at 22:01

## 2018-10-17 RX ADMIN — ONDANSETRON 4 MG: 2 INJECTION, SOLUTION INTRAMUSCULAR; INTRAVENOUS at 22:00

## 2018-10-17 RX ADMIN — SODIUM CHLORIDE 1000 ML: 9 INJECTION, SOLUTION INTRAVENOUS at 22:00

## 2018-10-18 ENCOUNTER — APPOINTMENT (OUTPATIENT)
Dept: CT IMAGING | Facility: HOSPITAL | Age: 53
End: 2018-10-18

## 2018-10-18 VITALS
TEMPERATURE: 98.1 F | DIASTOLIC BLOOD PRESSURE: 67 MMHG | HEIGHT: 66 IN | WEIGHT: 204 LBS | BODY MASS INDEX: 32.78 KG/M2 | SYSTOLIC BLOOD PRESSURE: 129 MMHG | HEART RATE: 75 BPM | OXYGEN SATURATION: 96 % | RESPIRATION RATE: 14 BRPM

## 2018-10-18 LAB
APPEARANCE CSF: CLEAR
APPEARANCE CSF: CLEAR
C GATTII+NEOFOR DNA CSF QL NAA+NON-PROBE: NOT DETECTED
CMV DNA CSF QL NAA+PROBE: NOT DETECTED
COLOR CSF: ABNORMAL
COLOR CSF: ABNORMAL
E COLI K1 DNA CSF QL NAA+NON-PROBE: NOT DETECTED
EV RNA CSF QL NAA+PROBE: NOT DETECTED
GLUCOSE CSF-MCNC: 48 MG/DL (ref 40–70)
GP B STREP DNA SPEC QL NAA+PROBE: NOT DETECTED
HAEM INFLU SEROTYP DNA SPEC NAA+PROBE: NOT DETECTED
HHV6 DNA CSF QL NAA+PROBE: NOT DETECTED
HSV1 DNA CSF QL NAA+PROBE: NOT DETECTED
HSV2 DNA CSF QL NAA+PROBE: NOT DETECTED
L MONOCYTOG RRNA SPEC QL PROBE: NOT DETECTED
LYMPHOCYTES NFR CSF MANUAL: 92 % (ref 62–100)
N MEN DNA SPEC QL NAA+PROBE: NOT DETECTED
NEUTROPHILS NFR CSF MICRO: 8 % (ref 0–6)
PARECHOVIRUS A RNA CSF QL NAA+NON-PROBE: NOT DETECTED
PROT CSF-MCNC: 32 MG/DL (ref 15–45)
RBC # CSF MANUAL: 1700 /MM3 (ref 0–5)
RBC # CSF MANUAL: 2500 /MM3 (ref 0–5)
S PNEUM DNA CSF QL NAA+NON-PROBE: NOT DETECTED
SPECIMEN VOL CSF: 8 ML
SPECIMEN VOL CSF: 8 ML
TUBE # CSF: 1
TUBE # CSF: 4
VZV DNA CSF QL NAA+PROBE: NOT DETECTED
WBC # CSF MANUAL: 2 /MM3 (ref 0–5)
WBC # CSF MANUAL: 8 /MM3 (ref 0–5)

## 2018-10-18 PROCEDURE — 96376 TX/PRO/DX INJ SAME DRUG ADON: CPT

## 2018-10-18 PROCEDURE — 87498 ENTEROVIRUS PROBE&REVRS TRNS: CPT | Performed by: EMERGENCY MEDICINE

## 2018-10-18 PROCEDURE — 87483 CNS DNA AMP PROBE TYPE 12-25: CPT | Performed by: EMERGENCY MEDICINE

## 2018-10-18 PROCEDURE — 25010000002 MORPHINE PER 10 MG: Performed by: PHYSICIAN ASSISTANT

## 2018-10-18 PROCEDURE — 84157 ASSAY OF PROTEIN OTHER: CPT | Performed by: EMERGENCY MEDICINE

## 2018-10-18 PROCEDURE — 89051 BODY FLUID CELL COUNT: CPT | Performed by: EMERGENCY MEDICINE

## 2018-10-18 PROCEDURE — 82945 GLUCOSE OTHER FLUID: CPT | Performed by: EMERGENCY MEDICINE

## 2018-10-18 PROCEDURE — 89050 BODY FLUID CELL COUNT: CPT | Performed by: EMERGENCY MEDICINE

## 2018-10-18 PROCEDURE — 87070 CULTURE OTHR SPECIMN AEROBIC: CPT | Performed by: EMERGENCY MEDICINE

## 2018-10-18 PROCEDURE — 25010000002 ONDANSETRON PER 1 MG: Performed by: PHYSICIAN ASSISTANT

## 2018-10-18 PROCEDURE — 87015 SPECIMEN INFECT AGNT CONCNTJ: CPT | Performed by: EMERGENCY MEDICINE

## 2018-10-18 PROCEDURE — 70450 CT HEAD/BRAIN W/O DYE: CPT

## 2018-10-18 PROCEDURE — 25010000002 ONDANSETRON PER 1 MG

## 2018-10-18 PROCEDURE — 87205 SMEAR GRAM STAIN: CPT | Performed by: EMERGENCY MEDICINE

## 2018-10-18 RX ORDER — OXYCODONE HYDROCHLORIDE AND ACETAMINOPHEN 5; 325 MG/1; MG/1
1 TABLET ORAL EVERY 4 HOURS PRN
Qty: 6 TABLET | Refills: 0 | Status: SHIPPED | OUTPATIENT
Start: 2018-10-18 | End: 2018-12-05

## 2018-10-18 RX ORDER — ONDANSETRON 2 MG/ML
4 INJECTION INTRAMUSCULAR; INTRAVENOUS ONCE
Status: COMPLETED | OUTPATIENT
Start: 2018-10-18 | End: 2018-10-18

## 2018-10-18 RX ORDER — ONDANSETRON 2 MG/ML
INJECTION INTRAMUSCULAR; INTRAVENOUS
Status: COMPLETED
Start: 2018-10-18 | End: 2018-10-18

## 2018-10-18 RX ORDER — MORPHINE SULFATE 4 MG/ML
4 INJECTION, SOLUTION INTRAMUSCULAR; INTRAVENOUS ONCE
Status: COMPLETED | OUTPATIENT
Start: 2018-10-18 | End: 2018-10-18

## 2018-10-18 RX ORDER — LIDOCAINE HYDROCHLORIDE 10 MG/ML
5 INJECTION, SOLUTION EPIDURAL; INFILTRATION; INTRACAUDAL; PERINEURAL ONCE
Status: DISCONTINUED | OUTPATIENT
Start: 2018-10-17 | End: 2018-10-18 | Stop reason: HOSPADM

## 2018-10-18 RX ADMIN — MORPHINE SULFATE 4 MG: 4 INJECTION INTRAVENOUS at 04:21

## 2018-10-18 RX ADMIN — ONDANSETRON 4 MG: 2 INJECTION INTRAMUSCULAR; INTRAVENOUS at 00:03

## 2018-10-18 RX ADMIN — ONDANSETRON HYDROCHLORIDE 4 MG: 2 INJECTION, SOLUTION INTRAMUSCULAR; INTRAVENOUS at 04:21

## 2018-10-18 NOTE — ED PROVIDER NOTES
Subjective   Yanet Clifton is a 53 y.o.female who presents to the ED with c/o headache with onset 4 days ago. She reports that she suddenly developed a severe headache with neck stiffness and back pain. Her headache worsens with light. She has hx of meningitis x 2 and states that her current episode feels similar. She also complains of dizziness and lightheadedness but denies any fever, vomiting, numbness, tingling, weakness, confusion, or any other complaints at this time. She denies any blood thinner use.        History provided by:  Patient  Headache   Pain location:  Generalized  Quality:  Unable to specify  Radiates to:  Does not radiate  Severity currently:  Unable to specify  Severity at highest:  Unable to specify  Onset quality:  Sudden  Timing:  Constant  Progression:  Worsening  Chronicity:  Recurrent  Similar to prior headaches: yes    Relieved by:  None tried  Worsened by:  Nothing  Ineffective treatments:  None tried  Associated symptoms: back pain, dizziness and neck stiffness    Associated symptoms: no fever, no numbness, no vomiting and no weakness        Review of Systems   Constitutional: Negative for fever.   Eyes: Positive for visual disturbance.   Gastrointestinal: Negative for vomiting.   Musculoskeletal: Positive for back pain and neck stiffness.   Neurological: Positive for dizziness, light-headedness and headaches. Negative for weakness and numbness.   Psychiatric/Behavioral: Negative for confusion.   All other systems reviewed and are negative.      Past Medical History:   Diagnosis Date   • Aseptic meningitis 12/2009   • Disease of thyroid gland    • Encephalitis 2014   • Hx of colonoscopy 05/24/2016    colonosc (5/24/16): hyperplastic polyp, ext/int hemorrhoids, diverticulosis, repeat 5 yrs; GI - Dr. Robertson   • Hx of EGD 05/24/2016    EGD (5/24/16): gastritis, reflux esophagitis, neg for H.pylori; GI - Dr. Robertson   • Hypothyroid    • Meningitis 2009   • Nephrolithiasis    • Rheumatoid  arteritis    • Sjogren's disease (CMS/HCC)        Allergies   Allergen Reactions   • Adalimumab      Other reaction(s): Headache (Humira)   • Cimzia [Certolizumab Pegol] Rash   • Sulfa Antibiotics Rash       Past Surgical History:   Procedure Laterality Date   • BREAST EXCISIONAL BIOPSY Left 1984   • CHOLECYSTECTOMY  2001    secondary to cholelithiasis   • MAMMO SKIN LESION BIOPSY SINGLE LESION UNILATERAL Left 01/30/2017    s/p L. breast skin bx (1/30/17); nonspecific chronic perivascular dermatitis   • MAMMO US BREAST BIOPSY 1ST W WO DEVICE UNILATERAL Left 02/09/2018    s/p u/s-guided L. breast axillary lymph node bx (2/9/18): benign reactive LN, repeat dx mammo in 6 mos; rads - Dr. Bailey   • TEMPORAL ARTERY BIOPSY Left 11/05/2015    neg for arteritis; surg - Dr. Cunha   • TONSILLECTOMY     • TOTAL ABDOMINAL HYSTERECTOMY WITH SALPINGO OOPHORECTOMY  03/2013    secondary to fibroids/endometriosis (3/13); GYN - Dr. Naik       Family History   Problem Relation Age of Onset   • Fibromyalgia Mother    • Hypertension Sister    • Other Sister         loss of hair from head   • Breast cancer Maternal Grandmother 60   • Multiple myeloma Paternal Grandmother    • Thyroid disease Neg Hx    • Diabetes Neg Hx    • BRCA 1/2 Neg Hx    • Colon cancer Neg Hx    • Endometrial cancer Neg Hx    • Ovarian cancer Neg Hx        Social History     Social History   • Marital status:      Occupational History   • , [employer] accompanist      Social History Main Topics   • Smoking status: Never Smoker   • Smokeless tobacco: Never Used   • Alcohol use Yes      Comment: RARELY   • Drug use: No   • Sexual activity: Defer     Other Topics Concern   • Not on file         Objective   Physical Exam   Constitutional: She is oriented to person, place, and time. She appears well-developed and well-nourished. No distress.   HENT:   Head: Normocephalic and atraumatic.   Nose: Nose normal.   Eyes: Pupils are equal, round, and  reactive to light. Conjunctivae and EOM are normal. No scleral icterus.   Neck: Normal range of motion. Neck supple.   Cardiovascular: Normal rate, regular rhythm and normal heart sounds.    No murmur heard.  Pulmonary/Chest: Effort normal and breath sounds normal. No respiratory distress.   Abdominal: Soft. Bowel sounds are normal. There is no tenderness.   Neurological: She is alert and oriented to person, place, and time. No cranial nerve deficit. She exhibits normal muscle tone. Coordination normal.   Skin: Skin is warm and dry.   Psychiatric: She has a normal mood and affect. Her behavior is normal.   Nursing note and vitals reviewed.      Lumbar Puncture  Date/Time: 10/18/2018 12:21 AM  Performed by: JAMIE MENESES  Authorized by: JAMIE MENESES     Consent:     Consent obtained:  Verbal    Consent given by:  Patient    Risks discussed:  Bleeding, pain and headache    Alternatives discussed:  No treatment  Pre-procedure details:     Procedure purpose:  Diagnostic    Preparation: Patient was prepped and draped in usual sterile fashion    Anesthesia (see MAR for exact dosages):     Anesthesia method:  Local infiltration    Local anesthetic:  Lidocaine 1% w/o epi  Procedure details:     Lumbar space:  L4-L5 interspace    Patient position: supine.    Needle gauge:  22    Needle type:  Spinal needle - Quincke tip    Needle length (in):  3.5    Ultrasound guidance: no      Number of attempts:  2    Fluid appearance:  Blood-tinged then clearing    Tubes of fluid:  4    Total volume (ml):  5  Post-procedure:     Puncture site:  Adhesive bandage applied    Patient tolerance of procedure:  Tolerated well, no immediate complications  Comments:      Fluoroscopy assisted      LP was apparently a traumatic tap - multiple attempts, fluoroscopy required.  CT head obtained given high RBC count but low suspicion for SAH.  HA improving with meds.  No neuro deficits and no meningeal signs on exam.  Serial rechecks at  the bedside.       ED Course  ED Course as of Oct 18 0447   Thu Oct 18, 2018   0432 CT of the brain without contrast showed no acute intracranial abnormality.  LP results were also within normal limits. RBC count is Tube 1 was 2,500.  RBC count in Tube 4 was 1,700.  I re-examined patient and discussed the case with Dr. Flood.  Patient says headache is still present, but rates it a 4-5/10.  We will give another dose of pain medication prior to discharge and recommend close follow-up with primary care physician.  Also recommend patient to contact her rheumatologist for close follow-up.  She does report headaches associated with her Sjogrens disease.  [FC]      ED Course User Index  [FC] Roslyn Griffiths, NOREEN NICOLE query complete. Treatment plan to include limited course of prescribed  controlled substance. Risks including addiction, benefits, and alternatives presented to patient.               MDM  Number of Diagnoses or Management Options     Amount and/or Complexity of Data Reviewed  Clinical lab tests: ordered and reviewed        Final diagnoses:   Acute nonintractable headache, unspecified headache type   Photophobia of both eyes   Dizziness   History of Sjogren's disease   History of rheumatoid arthritis   Hypothyroidism, unspecified type       Documentation assistance provided by steven Wood.  Information recorded by the steven was done at my direction and has been verified and validated by me.     Beni Wood  10/17/18 [id number]       Aakash Roth MD  10/20/18 [id number]

## 2018-10-18 NOTE — DISCHARGE INSTRUCTIONS
ER evaluation for headache revealed negative lumbar puncture, as well as normal CT the brain without contrast.  There was no evidence of intracranial hemorrhage and labs revealed no evidence of acute infectious process.  Recommend close follow-up with primary care physician for recheck in 24-48 hours.  Also recommended patient to give her rheumatologist for re-evaluation and discussion of headaches.  Rx for Percocet 5 mg by mouth every 4-6 hours as needed for moderate pain dispensed 6 with no refills.  Continue with all other current medical management.  Return if worsening symptoms of headache or any neurologic symptoms.

## 2018-10-22 LAB — EV RNA SPEC QL NAA+PROBE: NEGATIVE

## 2018-10-25 LAB
BACTERIA SPEC AEROBE CULT: NORMAL
GRAM STN SPEC: NORMAL
GRAM STN SPEC: NORMAL

## 2018-11-07 DIAGNOSIS — F33.1 MODERATE EPISODE OF RECURRENT MAJOR DEPRESSIVE DISORDER (HCC): ICD-10-CM

## 2018-11-07 RX ORDER — DULOXETIN HYDROCHLORIDE 20 MG/1
CAPSULE, DELAYED RELEASE ORAL
Qty: 30 CAPSULE | Refills: 2 | Status: SHIPPED | OUTPATIENT
Start: 2018-11-07 | End: 2019-02-10 | Stop reason: SDUPTHER

## 2018-11-29 ENCOUNTER — OFFICE VISIT (OUTPATIENT)
Dept: INTERNAL MEDICINE | Facility: CLINIC | Age: 53
End: 2018-11-29

## 2018-11-29 VITALS
DIASTOLIC BLOOD PRESSURE: 76 MMHG | HEIGHT: 66 IN | WEIGHT: 208 LBS | HEART RATE: 108 BPM | SYSTOLIC BLOOD PRESSURE: 122 MMHG | OXYGEN SATURATION: 98 % | BODY MASS INDEX: 33.43 KG/M2

## 2018-11-29 DIAGNOSIS — M20.001 FINGER DEFORMITY, RIGHT: ICD-10-CM

## 2018-11-29 DIAGNOSIS — F33.1 MODERATE EPISODE OF RECURRENT MAJOR DEPRESSIVE DISORDER (HCC): ICD-10-CM

## 2018-11-29 DIAGNOSIS — N30.00 ACUTE CYSTITIS WITHOUT HEMATURIA: ICD-10-CM

## 2018-11-29 DIAGNOSIS — E55.9 VITAMIN D DEFICIENCY: ICD-10-CM

## 2018-11-29 DIAGNOSIS — Z78.0 MENOPAUSE: ICD-10-CM

## 2018-11-29 DIAGNOSIS — E03.9 ACQUIRED HYPOTHYROIDISM: ICD-10-CM

## 2018-11-29 DIAGNOSIS — R21 RASH: ICD-10-CM

## 2018-11-29 DIAGNOSIS — E78.2 MIXED HYPERLIPIDEMIA: ICD-10-CM

## 2018-11-29 DIAGNOSIS — R79.89 ELEVATED LFTS: ICD-10-CM

## 2018-11-29 DIAGNOSIS — Z00.00 PE (PHYSICAL EXAM), ROUTINE: Primary | ICD-10-CM

## 2018-11-29 LAB
25(OH)D3 SERPL-MCNC: 24.1 NG/ML
ALBUMIN SERPL-MCNC: 4.83 G/DL (ref 3.2–4.8)
ALBUMIN/GLOB SERPL: 2.5 G/DL (ref 1.5–2.5)
ALP SERPL-CCNC: 82 U/L (ref 25–100)
ALT SERPL W P-5'-P-CCNC: 50 U/L (ref 7–40)
ANION GAP SERPL CALCULATED.3IONS-SCNC: 8 MMOL/L (ref 3–11)
ARTICHOKE IGE QN: 158 MG/DL (ref 0–130)
AST SERPL-CCNC: 43 U/L (ref 0–33)
BASOPHILS # BLD AUTO: 0.05 10*3/MM3 (ref 0–0.2)
BASOPHILS NFR BLD AUTO: 0.7 % (ref 0–1)
BILIRUB BLD-MCNC: ABNORMAL MG/DL
BILIRUB SERPL-MCNC: 0.4 MG/DL (ref 0.3–1.2)
BUN BLD-MCNC: 23 MG/DL (ref 9–23)
BUN/CREAT SERPL: 25.3 (ref 7–25)
CALCIUM SPEC-SCNC: 9.4 MG/DL (ref 8.7–10.4)
CHLORIDE SERPL-SCNC: 104 MMOL/L (ref 99–109)
CHOLEST SERPL-MCNC: 203 MG/DL (ref 0–200)
CLARITY, POC: CLEAR
CO2 SERPL-SCNC: 28 MMOL/L (ref 20–31)
COLOR UR: YELLOW
CREAT BLD-MCNC: 0.91 MG/DL (ref 0.6–1.3)
DEPRECATED RDW RBC AUTO: 46 FL (ref 37–54)
EOSINOPHIL # BLD AUTO: 0.49 10*3/MM3 (ref 0–0.3)
EOSINOPHIL NFR BLD AUTO: 7.1 % (ref 0–3)
ERYTHROCYTE [DISTWIDTH] IN BLOOD BY AUTOMATED COUNT: 15.2 % (ref 11.3–14.5)
GFR SERPL CREATININE-BSD FRML MDRD: 65 ML/MIN/1.73
GLOBULIN UR ELPH-MCNC: 2 GM/DL
GLUCOSE BLD-MCNC: 106 MG/DL (ref 70–100)
GLUCOSE UR STRIP-MCNC: NEGATIVE MG/DL
HCT VFR BLD AUTO: 39 % (ref 34.5–44)
HDLC SERPL-MCNC: 37 MG/DL (ref 40–60)
HGB BLD-MCNC: 11.9 G/DL (ref 11.5–15.5)
IMM GRANULOCYTES # BLD: 0.03 10*3/MM3 (ref 0–0.03)
IMM GRANULOCYTES NFR BLD: 0.4 % (ref 0–0.6)
KETONES UR QL: NEGATIVE
LEUKOCYTE EST, POC: ABNORMAL
LYMPHOCYTES # BLD AUTO: 1.27 10*3/MM3 (ref 0.6–4.8)
LYMPHOCYTES NFR BLD AUTO: 18.3 % (ref 24–44)
MCH RBC QN AUTO: 25.6 PG (ref 27–31)
MCHC RBC AUTO-ENTMCNC: 30.5 G/DL (ref 32–36)
MCV RBC AUTO: 83.9 FL (ref 80–99)
MONOCYTES # BLD AUTO: 0.72 10*3/MM3 (ref 0–1)
MONOCYTES NFR BLD AUTO: 10.4 % (ref 0–12)
NEUTROPHILS # BLD AUTO: 4.4 10*3/MM3 (ref 1.5–8.3)
NEUTROPHILS NFR BLD AUTO: 63.5 % (ref 41–71)
NITRITE UR-MCNC: NEGATIVE MG/ML
PH UR: 5 [PH] (ref 5–8)
PLATELET # BLD AUTO: 291 10*3/MM3 (ref 150–450)
PMV BLD AUTO: 10.7 FL (ref 6–12)
POTASSIUM BLD-SCNC: 4 MMOL/L (ref 3.5–5.5)
PROT SERPL-MCNC: 6.8 G/DL (ref 5.7–8.2)
PROT UR STRIP-MCNC: NEGATIVE MG/DL
RBC # BLD AUTO: 4.65 10*6/MM3 (ref 3.89–5.14)
RBC # UR STRIP: NEGATIVE /UL
SODIUM BLD-SCNC: 140 MMOL/L (ref 132–146)
SP GR UR: 1.03 (ref 1–1.03)
T4 FREE SERPL-MCNC: 0.92 NG/DL (ref 0.89–1.76)
TRIGL SERPL-MCNC: 171 MG/DL (ref 0–150)
TSH SERPL DL<=0.05 MIU/L-ACNC: 5.16 MIU/ML (ref 0.35–5.35)
URATE SERPL-MCNC: 4.5 MG/DL (ref 3.1–7.8)
UROBILINOGEN UR QL: ABNORMAL
WBC NRBC COR # BLD: 6.93 10*3/MM3 (ref 3.5–10.8)

## 2018-11-29 PROCEDURE — 82306 VITAMIN D 25 HYDROXY: CPT | Performed by: INTERNAL MEDICINE

## 2018-11-29 PROCEDURE — 90471 IMMUNIZATION ADMIN: CPT | Performed by: INTERNAL MEDICINE

## 2018-11-29 PROCEDURE — 90472 IMMUNIZATION ADMIN EACH ADD: CPT | Performed by: INTERNAL MEDICINE

## 2018-11-29 PROCEDURE — 99396 PREV VISIT EST AGE 40-64: CPT | Performed by: INTERNAL MEDICINE

## 2018-11-29 PROCEDURE — 84439 ASSAY OF FREE THYROXINE: CPT | Performed by: INTERNAL MEDICINE

## 2018-11-29 PROCEDURE — 80053 COMPREHEN METABOLIC PANEL: CPT | Performed by: INTERNAL MEDICINE

## 2018-11-29 PROCEDURE — 84550 ASSAY OF BLOOD/URIC ACID: CPT | Performed by: INTERNAL MEDICINE

## 2018-11-29 PROCEDURE — 93000 ELECTROCARDIOGRAM COMPLETE: CPT | Performed by: INTERNAL MEDICINE

## 2018-11-29 PROCEDURE — 84443 ASSAY THYROID STIM HORMONE: CPT | Performed by: INTERNAL MEDICINE

## 2018-11-29 PROCEDURE — 90632 HEPA VACCINE ADULT IM: CPT | Performed by: INTERNAL MEDICINE

## 2018-11-29 PROCEDURE — 99214 OFFICE O/P EST MOD 30 MIN: CPT | Performed by: INTERNAL MEDICINE

## 2018-11-29 PROCEDURE — 85025 COMPLETE CBC W/AUTO DIFF WBC: CPT | Performed by: INTERNAL MEDICINE

## 2018-11-29 PROCEDURE — 80061 LIPID PANEL: CPT | Performed by: INTERNAL MEDICINE

## 2018-11-29 PROCEDURE — 81003 URINALYSIS AUTO W/O SCOPE: CPT | Performed by: INTERNAL MEDICINE

## 2018-11-29 PROCEDURE — 90674 CCIIV4 VAC NO PRSV 0.5 ML IM: CPT | Performed by: INTERNAL MEDICINE

## 2018-11-29 RX ORDER — MELATONIN 10 MG
10 CAPSULE ORAL DAILY
COMMUNITY
End: 2022-09-19

## 2018-12-01 RX ORDER — NITROFURANTOIN 25; 75 MG/1; MG/1
100 CAPSULE ORAL 2 TIMES DAILY
Qty: 14 CAPSULE | Refills: 0 | Status: SHIPPED | OUTPATIENT
Start: 2018-12-01 | End: 2018-12-05

## 2018-12-01 NOTE — ASSESSMENT & PLAN NOTE
Has been trying to wean off of duloxetine (due to libido s/e) but reports sensitivity so that she cannot get off of the 20mg dose; rec trial of very slow taper, such as 20mg QD except skip 1 day per week for 2 weeks, then skip 2 days during the week (not back to back) for a few weeks, and so on; cannot cut capsules and rec against opening the capsules as it there is a controlled release in the capsules

## 2018-12-01 NOTE — ASSESSMENT & PLAN NOTE
Unlikely gout but will check uric acid; referral to hand ortho with concerns re: long term functional deficit

## 2018-12-03 NOTE — PROGRESS NOTES
pls make sure patient got message about UA showing UTI (macrobid was sent to pharmacy already)    Uric acid level normal - no gout;  Thyroid tests are within normal limits    Abnlities:  1) vitamin D level mildly low at 24.1; if not taking any OTC vitamin D, get 2000 units and take once daily  2) cholesterol profile worsened with  (was 128); repeat again in 3-4 months and will need meds if not back down  3) liver tests borderline elevated; could be due to worsened cholesterol

## 2018-12-04 ENCOUNTER — TELEPHONE (OUTPATIENT)
Dept: INTERNAL MEDICINE | Facility: CLINIC | Age: 53
End: 2018-12-04

## 2018-12-04 DIAGNOSIS — E03.9 ACQUIRED HYPOTHYROIDISM: Primary | ICD-10-CM

## 2018-12-04 RX ORDER — LEVOTHYROXINE SODIUM 0.1 MG/1
100 TABLET ORAL EVERY MORNING
Qty: 30 TABLET | Refills: 3 | Status: SHIPPED | OUTPATIENT
Start: 2018-12-04 | End: 2020-02-20 | Stop reason: SDUPTHER

## 2018-12-04 NOTE — TELEPHONE ENCOUNTER
----- Message from Cinthia Vitale sent at 12/3/2018  1:53 PM EST -----      ----- Message -----  From: Brie Jalloh MD  Sent: 12/2/2018  10:42 PM  To: Cinthia Vitale    pls make sure patient got message about UA showing UTI (macrobid was sent to pharmacy already)    Uric acid level normal - no gout;  Thyroid tests are within normal limits    Abnlities:  1) vitamin D level mildly low at 24.1; if not taking any OTC vitamin D, get 2000 units and take once daily  2) cholesterol profile worsened with  (was 128); repeat again in 3-4 months and will need meds if not back down  3) liver tests borderline elevated; could be due to worsened cholesterol

## 2018-12-04 NOTE — TELEPHONE ENCOUNTER
Pt notified of results. Pt states she noticed her FT4 was very close to low and she has been feeling very tired lately. She wants to know if you would increase her levothyroxine dose?

## 2018-12-05 ENCOUNTER — TELEPHONE (OUTPATIENT)
Dept: INTERNAL MEDICINE | Facility: CLINIC | Age: 53
End: 2018-12-05

## 2018-12-05 ENCOUNTER — OFFICE VISIT (OUTPATIENT)
Dept: INTERNAL MEDICINE | Facility: CLINIC | Age: 53
End: 2018-12-05

## 2018-12-05 VITALS
WEIGHT: 210.8 LBS | HEART RATE: 112 BPM | DIASTOLIC BLOOD PRESSURE: 60 MMHG | SYSTOLIC BLOOD PRESSURE: 100 MMHG | OXYGEN SATURATION: 95 % | HEIGHT: 66 IN | TEMPERATURE: 98.8 F | BODY MASS INDEX: 33.88 KG/M2

## 2018-12-05 DIAGNOSIS — R10.9 FLANK PAIN: ICD-10-CM

## 2018-12-05 DIAGNOSIS — R11.0 NAUSEA: ICD-10-CM

## 2018-12-05 DIAGNOSIS — N39.0 URINARY TRACT INFECTION WITHOUT HEMATURIA, SITE UNSPECIFIED: Primary | ICD-10-CM

## 2018-12-05 DIAGNOSIS — M79.10 MYALGIA: ICD-10-CM

## 2018-12-05 LAB
BILIRUB BLD-MCNC: NEGATIVE MG/DL
CLARITY, POC: CLEAR
COLOR UR: YELLOW
EXPIRATION DATE: NORMAL
FLUAV AG NPH QL: NORMAL
FLUBV AG NPH QL: NORMAL
GLUCOSE UR STRIP-MCNC: NEGATIVE MG/DL
INTERNAL CONTROL: NORMAL
KETONES UR QL: ABNORMAL
LEUKOCYTE EST, POC: ABNORMAL
Lab: NORMAL
NITRITE UR-MCNC: NEGATIVE MG/ML
PH UR: 7 [PH] (ref 5–8)
PROT UR STRIP-MCNC: ABNORMAL MG/DL
RBC # UR STRIP: NEGATIVE /UL
SP GR UR: 1 (ref 1–1.03)
UROBILINOGEN UR QL: ABNORMAL

## 2018-12-05 PROCEDURE — 87086 URINE CULTURE/COLONY COUNT: CPT | Performed by: NURSE PRACTITIONER

## 2018-12-05 PROCEDURE — 87804 INFLUENZA ASSAY W/OPTIC: CPT | Performed by: NURSE PRACTITIONER

## 2018-12-05 PROCEDURE — 87077 CULTURE AEROBIC IDENTIFY: CPT | Performed by: NURSE PRACTITIONER

## 2018-12-05 PROCEDURE — 99214 OFFICE O/P EST MOD 30 MIN: CPT | Performed by: NURSE PRACTITIONER

## 2018-12-05 PROCEDURE — 96372 THER/PROPH/DIAG INJ SC/IM: CPT | Performed by: NURSE PRACTITIONER

## 2018-12-05 PROCEDURE — 81003 URINALYSIS AUTO W/O SCOPE: CPT | Performed by: NURSE PRACTITIONER

## 2018-12-05 PROCEDURE — 87186 SC STD MICRODIL/AGAR DIL: CPT | Performed by: NURSE PRACTITIONER

## 2018-12-05 RX ORDER — CEFTRIAXONE 1 G/1
1 INJECTION, POWDER, FOR SOLUTION INTRAMUSCULAR; INTRAVENOUS ONCE
Status: COMPLETED | OUTPATIENT
Start: 2018-12-05 | End: 2018-12-05

## 2018-12-05 RX ORDER — CEFUROXIME AXETIL 500 MG/1
500 TABLET ORAL 2 TIMES DAILY
Qty: 20 TABLET | Refills: 0 | Status: SHIPPED | OUTPATIENT
Start: 2018-12-05 | End: 2018-12-15

## 2018-12-05 RX ORDER — ONDANSETRON 4 MG/1
4 TABLET, FILM COATED ORAL EVERY 8 HOURS PRN
Qty: 21 TABLET | Refills: 0 | OUTPATIENT
Start: 2018-12-05 | End: 2018-12-31

## 2018-12-05 RX ADMIN — CEFTRIAXONE 1 G: 1 INJECTION, POWDER, FOR SOLUTION INTRAMUSCULAR; INTRAVENOUS at 12:35

## 2018-12-05 NOTE — PROGRESS NOTES
Chief Complaint   Patient presents with   • Generalized Body Aches     flu like symptoms x2 days   • Urinary Tract Infection     dx friday by Dr. Jalloh still in pain.   • Nausea       History of Present Illness  53 y.o.female presents for generalized body aches, uti, nausea.    Last 24 hours with nausea, generalized body aches, continued dysuria and left flank pain no hematuria..  Fever last night 101.9.  Recent seen 11-29 for physical; dx UTI started on Macrobid yesterday.  Not sure if symptoms related to macrobid reaction; only had one dose also feels like might be the flu.  Has mild rhinorrhea with headache.      Review of Systems   Constitutional: Positive for chills and fever.   HENT: Positive for rhinorrhea. Negative for congestion, sinus pressure, sneezing and sore throat.    Respiratory: Negative for cough and shortness of breath.    Gastrointestinal: Positive for nausea and vomiting. Negative for constipation, diarrhea and GERD.   Genitourinary: Positive for dysuria and flank pain. Negative for difficulty urinating, frequency and hematuria.   Musculoskeletal: Positive for myalgias.   Neurological: Positive for headache.         [facility]  The following portions of the patient's history were reviewed and updated as appropriate: allergies, current medications, past family history, past medical history, past social history, past surgical history and problem list.     Past Medical History:   Diagnosis Date   • Aseptic meningitis 12/2009   • Encephalitis 2014   • Hx of colonoscopy 05/24/2016    colonosc (5/24/16): hyperplastic polyp, ext/int hemorrhoids, diverticulosis, repeat 5 yrs; GI - Dr. Robertson   • Hx of CT scan of head 10/18/2018    nl noncontrast head CT (10/18/18); ER   • Hx of EGD 05/24/2016    EGD (5/24/16): gastritis, reflux esophagitis, neg for H.pylori; GI - Dr. Robertson   • Nephrolithiasis       Past Surgical History:   Procedure Laterality Date   • BREAST EXCISIONAL BIOPSY Left 1984   • CHOLECYSTECTOMY   2001    secondary to cholelithiasis   • MAMMO SKIN LESION BIOPSY SINGLE LESION UNILATERAL Left 01/30/2017    s/p L. breast skin bx (1/30/17); nonspecific chronic perivascular dermatitis   • MAMMO US BREAST BIOPSY 1ST W WO DEVICE UNILATERAL Left 02/09/2018    s/p u/s-guided L. breast axillary lymph node bx (2/9/18): benign reactive LN, repeat dx mammo in 6 mos; rads - Dr. Bailey   • TEMPORAL ARTERY BIOPSY Left 11/05/2015    neg for arteritis; surg - Dr. Cunha   • TONSILLECTOMY     • TOTAL ABDOMINAL HYSTERECTOMY WITH SALPINGO OOPHORECTOMY  03/2013    secondary to fibroids/endometriosis (3/13); GYN - Dr. Naik      Allergies   Allergen Reactions   • Adalimumab      Other reaction(s): Headache (Humira)   • Cimzia [Certolizumab Pegol] Rash   • Sulfa Antibiotics Rash      Family History   Problem Relation Age of Onset   • Fibromyalgia Mother    • Hypertension Sister    • Other Sister         loss of hair from head   • Breast cancer Maternal Grandmother 60   • Multiple myeloma Paternal Grandmother    • Thyroid disease Neg Hx    • Diabetes Neg Hx    • BRCA 1/2 Neg Hx    • Colon cancer Neg Hx    • Endometrial cancer Neg Hx    • Ovarian cancer Neg Hx       Social History     Socioeconomic History   • Marital status:    Occupational History   • Occupation: , [employer] accompanist   Tobacco Use   • Smoking status: Never Smoker   • Smokeless tobacco: Never Used   Substance and Sexual Activity   • Alcohol use: Yes     Comment: RARELY   • Drug use: No   • Sexual activity: Defer   Other Topics Concern   • Not on file   Social History Narrative   • Not on file         Current Outpatient Medications:   •  diphenhydrAMINE (BENADRYL) 50 MG capsule, Take 50 mg by mouth At Night As Needed for Itching., Disp: , Rfl:   •  DULoxetine (CYMBALTA) 20 MG capsule, take 1 capsule by mouth once daily, Disp: 30 capsule, Rfl: 2  •  levothyroxine (SYNTHROID, LEVOTHROID) 100 MCG tablet, Take 1 tablet by mouth Every Morning., Disp:  "30 tablet, Rfl: 3  •  Melatonin 10 MG capsule, Take 10 mg by mouth Daily., Disp: , Rfl:   •  naproxen sodium (ALEVE) 220 MG tablet, Take 220 mg by mouth 2 (Two) Times a Day As Needed for mild pain (1-3)., Disp: , Rfl:   •  nitrofurantoin, macrocrystal-monohydrate, (MACROBID) 100 MG capsule, Take 1 capsule by mouth 2 (Two) Times a Day for 7 days., Disp: 14 capsule, Rfl: 0  •  omeprazole (priLOSEC) 40 MG capsule, take 1 capsule by mouth once daily, Disp: 30 capsule, Rfl: 5  •  RiTUXimab (RITUXAN) 10 MG/ML solution injection, Infuse  into a venous catheter Every 4 (Four) Months., Disp: , Rfl:     VITALS:  /60   Pulse 112   Temp 98.8 °F (37.1 °C)   Ht 167.6 cm (66\")   Wt 95.6 kg (210 lb 12.8 oz)   SpO2 95%   BMI 34.02 kg/m²     Physical Exam   Constitutional: She is oriented to person, place, and time. She appears well-developed and well-nourished. No distress.   HENT:   Head: Normocephalic.   Eyes: Pupils are equal, round, and reactive to light.   Cardiovascular: Normal rate, regular rhythm and normal heart sounds.   Pulmonary/Chest: Effort normal and breath sounds normal. No respiratory distress.   Abdominal: Soft. Bowel sounds are normal. There is no tenderness. There is CVA tenderness.   Left flank pain   Neurological: She is alert and oriented to person, place, and time.   Skin: Skin is warm and dry. Capillary refill takes less than 2 seconds.       LABS  Results for orders placed or performed in visit on 12/05/18   POCT Influenza A/B   Result Value Ref Range    Rapid Influenza A Ag neg Negative    Rapid Influenza B Ag neg Negative    Internal Control Passed Passed    Lot Number [id number]     Expiration Date 11-    POCT urinalysis dipstick, automated   Result Value Ref Range    Color Yellow Yellow, Straw, Dark Yellow, Leonie    Clarity, UA Clear Clear    Specific Gravity  1.005 1.005 - 1.030    pH, Urine 7.0 5.0 - 8.0    Leukocytes 500 Elinor/ul (A) Negative    Nitrite, UA Negative Negative    " Protein, POC Trace (A) Negative mg/dL    Glucose, UA Negative Negative, 1000 mg/dL (3+) mg/dL    Ketones, UA 15 mg/dL (A) Negative    Urobilinogen, UA 1 E.U./dL  (A) Normal    Bilirubin Negative Negative    Blood, UA Negative Negative       ASSESSMENT/PLAN  Yanet was seen today for generalized body aches, urinary tract infection and nausea.    Diagnoses and all orders for this visit:    Urinary tract infection without hematuria, site unspecified  -     Urine Culture - Urine, Urine, Clean Catch  -     cefTRIAXone (ROCEPHIN) injection 1 g; Inject 1 g into the appropriate muscle as directed by prescriber 1 (One) Time.  -     cefuroxime (CEFTIN) 500 MG tablet; Take 1 tablet by mouth 2 (Two) Times a Day for 10 days.  -     POCT urinalysis dipstick, automated    Flank pain  Comments:  concerning for pyelonephritis    Myalgia  -     POCT Influenza A/B    Nausea  -     ondansetron (ZOFRAN) 4 MG tablet; Take 1 tablet by mouth Every 8 (Eight) Hours As Needed for Nausea or Vomiting.    Presenting symptoms concerning for pyelonephritis with fever 101.9, uti, flank pain, nausea vomiting, myalgias; tx with 1gm rocephin and to continue oral ceftin starting tomorrow. Pt with immunosuppressant and RA history trying to avoid flouroquinolone; sulfa allergy. If no better or continuing fevers after 24 hours of cephalosporin, pt is to come back for follow up; or worsening go to ED.  Verbalized understanding.    I discussed the patients findings and my recommendations with patient.     Patient was encouraged to keep me informed of any acute changes, lack of improvement, or any new concerning symptoms.    Patient voiced understanding of all instructions and denied further questions.      FOLLOW-UP  Return if symptoms worsen or fail to improve.    Electronically signed by:    CHERYL Chery  12/05/2018

## 2018-12-05 NOTE — TELEPHONE ENCOUNTER
Pt made aware that Dr. Jalloh is ok with increasing dosage of Levothyroxine to 100mcg and that she needs to have follow up labs in 8 weeks. Pt verbalized understanding.

## 2018-12-07 ENCOUNTER — TELEPHONE (OUTPATIENT)
Dept: INTERNAL MEDICINE | Facility: CLINIC | Age: 53
End: 2018-12-07

## 2018-12-07 NOTE — TELEPHONE ENCOUNTER
PT WAS SEEN THIS PAST Wednesday BY BRAIN AND WAS DIAGNOSED WITH A KIDNEY INFECTION; SHE CALLED AND STATED THAT SHE IS NOT FEELING ANY BETTER; SHE HAS NO FEVER BUT HER LEGS ARE ACHY, PAIN IN KIDNEYS, DIARRHEA AND HEAD ACHE;  SHE WAS PRESCRIBED CEFTIN AND ZOFRAN; PT WANTS TO KNOW IF THERE IS ANYTHING ELSE SHE CAN TAKE OR DO; PLEASE CALL PT AND ADVISE [phone]

## 2018-12-08 LAB — BACTERIA SPEC AEROBE CULT: ABNORMAL

## 2018-12-10 NOTE — TELEPHONE ENCOUNTER
Pt states she mostly feels the same as last week so not much better, but her headache is not continuous now. She states she will call back for OV appt later this week if she thinks she needs it.

## 2018-12-10 NOTE — TELEPHONE ENCOUNTER
Would need OV for f/u eval; may need flu test or other blood tests    Ucx did not show overwhelming UTI so that is not the reason for her sxs.  In addition, the abx can cause diarrhea

## 2018-12-20 PROBLEM — M20.011 MALLET FINGER OF RIGHT FINGER(S): Status: ACTIVE | Noted: 2018-11-29

## 2019-01-03 ENCOUNTER — OFFICE VISIT (OUTPATIENT)
Dept: INTERNAL MEDICINE | Facility: CLINIC | Age: 54
End: 2019-01-03

## 2019-01-03 VITALS
HEIGHT: 66 IN | BODY MASS INDEX: 33.43 KG/M2 | DIASTOLIC BLOOD PRESSURE: 80 MMHG | HEART RATE: 89 BPM | SYSTOLIC BLOOD PRESSURE: 138 MMHG | WEIGHT: 208 LBS | TEMPERATURE: 98.6 F | OXYGEN SATURATION: 96 %

## 2019-01-03 DIAGNOSIS — J40 BRONCHITIS: Primary | ICD-10-CM

## 2019-01-03 DIAGNOSIS — J01.00 ACUTE NON-RECURRENT MAXILLARY SINUSITIS: ICD-10-CM

## 2019-01-03 PROCEDURE — 99214 OFFICE O/P EST MOD 30 MIN: CPT | Performed by: NURSE PRACTITIONER

## 2019-01-03 RX ORDER — AZITHROMYCIN 250 MG/1
TABLET, FILM COATED ORAL
Qty: 6 TABLET | Refills: 0 | Status: SHIPPED | OUTPATIENT
Start: 2019-01-03 | End: 2019-03-21

## 2019-01-03 RX ORDER — BENZONATATE 200 MG/1
200 CAPSULE ORAL 3 TIMES DAILY PRN
Qty: 21 CAPSULE | Refills: 0 | Status: SHIPPED | OUTPATIENT
Start: 2019-01-03 | End: 2019-03-21

## 2019-01-03 RX ORDER — ALBUTEROL SULFATE 90 UG/1
2 AEROSOL, METERED RESPIRATORY (INHALATION) EVERY 4 HOURS PRN
Qty: 1 INHALER | Refills: 0 | Status: SHIPPED | OUTPATIENT
Start: 2019-01-03 | End: 2019-03-21

## 2019-01-03 NOTE — PATIENT INSTRUCTIONS
Daily nasal steroid spray such as flonase or nasacort; 1 spray each nostril daily.  Daily oral antihistamine such as allegra or claritin

## 2019-01-03 NOTE — PROGRESS NOTES
Chief Complaint   Patient presents with   • QIANI     has sinus infection moved into her chest       History of Present Illness  53 y.o.female presents for uri    Feels like had Sinus infection Wednesday with nasal congestion sinus pressure; had her infusion thurs of rituxan.  URI worse feels like now in chest cough productive green brown no short of breath, no fever. URI congestion headache.      Review of Systems   Constitutional: Negative for chills and fever.   HENT: Positive for congestion, postnasal drip, rhinorrhea and sinus pressure. Negative for sneezing and sore throat.    Respiratory: Positive for cough. Negative for shortness of breath.    Neurological: Positive for headache.         PMS[facility]  The following portions of the patient's history were reviewed and updated as appropriate: allergies, current medications, past family history, past medical history, past social history, past surgical history and problem list.     Past Medical History:   Diagnosis Date   • Aseptic meningitis 12/2009   • Encephalitis 2014   • Hx of colonoscopy 05/24/2016    colonosc (5/24/16): hyperplastic polyp, ext/int hemorrhoids, diverticulosis, repeat 5 yrs; GI - Dr. Robertson   • Hx of CT scan of head 10/18/2018    nl noncontrast head CT (10/18/18); ER   • Hx of EGD 05/24/2016    EGD (5/24/16): gastritis, reflux esophagitis, neg for H.pylori; GI - Dr. Robertson   • Nephrolithiasis       Past Surgical History:   Procedure Laterality Date   • BREAST EXCISIONAL BIOPSY Left 1984   • CHOLECYSTECTOMY  2001    secondary to cholelithiasis   • MAMMO SKIN LESION BIOPSY SINGLE LESION UNILATERAL Left 01/30/2017    s/p L. breast skin bx (1/30/17); nonspecific chronic perivascular dermatitis   • MAMMO US BREAST BIOPSY 1ST W WO DEVICE UNILATERAL Left 02/09/2018    s/p u/s-guided L. breast axillary lymph node bx (2/9/18): benign reactive LN, repeat dx mammo in 6 mos; rads - Dr. Bailey   • TEMPORAL ARTERY BIOPSY Left 11/05/2015    neg for arteritis;  surg -  Page   • TONSILLECTOMY     • TOTAL ABDOMINAL HYSTERECTOMY WITH SALPINGO OOPHORECTOMY  03/2013    secondary to fibroids/endometriosis (3/13); GYN - Dr. Naik      Allergies   Allergen Reactions   • Adalimumab      Other reaction(s): Headache (Humira)   • Cimzia [Certolizumab Pegol] Rash   • Sulfa Antibiotics Rash      Family History   Problem Relation Age of Onset   • Fibromyalgia Mother    • Hypertension Sister    • Other Sister         loss of hair from head   • Breast cancer Maternal Grandmother 60   • Multiple myeloma Paternal Grandmother    • Thyroid disease Neg Hx    • Diabetes Neg Hx    • BRCA 1/2 Neg Hx    • Colon cancer Neg Hx    • Endometrial cancer Neg Hx    • Ovarian cancer Neg Hx       Social History     Socioeconomic History   • Marital status:      Spouse name: Not on file   • Number of children: Not on file   • Years of education: Not on file   • Highest education level: Not on file   Social Needs   • Financial resource strain: Not on file   • Food insecurity - worry: Not on file   • Food insecurity - inability: Not on file   • Transportation needs - medical: Not on file   • Transportation needs - non-medical: Not on file   Occupational History   • Occupation: , [employer] accompanist   Tobacco Use   • Smoking status: Never Smoker   • Smokeless tobacco: Never Used   Substance and Sexual Activity   • Alcohol use: Yes     Comment: RARELY   • Drug use: No   • Sexual activity: Yes     Partners: Male   Other Topics Concern   • Not on file   Social History Narrative   • Not on file         Current Outpatient Medications:   •  amoxicillin-clavulanate (AUGMENTIN) 875-125 MG per tablet, Take 1 tablet by mouth Every 12 (Twelve) Hours., Disp: 20 tablet, Rfl: 0  •  diphenhydrAMINE (BENADRYL) 50 MG capsule, Take 50 mg by mouth At Night As Needed for Itching., Disp: , Rfl:   •  DULoxetine (CYMBALTA) 20 MG capsule, take 1 capsule by mouth once daily, Disp: 30 capsule, Rfl: 2  •   "levothyroxine (SYNTHROID, LEVOTHROID) 100 MCG tablet, Take 1 tablet by mouth Every Morning., Disp: 30 tablet, Rfl: 3  •  Melatonin 10 MG capsule, Take 10 mg by mouth Daily., Disp: , Rfl:   •  naproxen sodium (ALEVE) 220 MG tablet, Take 220 mg by mouth 2 (Two) Times a Day As Needed for mild pain (1-3)., Disp: , Rfl:   •  omeprazole (priLOSEC) 40 MG capsule, take 1 capsule by mouth once daily, Disp: 30 capsule, Rfl: 5  •  RiTUXimab (RITUXAN) 10 MG/ML solution injection, Infuse  into a venous catheter Every 4 (Four) Months., Disp: , Rfl:     VITALS:  /80   Pulse 89   Temp 98.6 °F (37 °C)   Ht 167.6 cm (66\")   Wt 94.3 kg (208 lb)   SpO2 96%   BMI 33.57 kg/m²     Physical Exam   Constitutional: She is oriented to person, place, and time. She appears well-developed and well-nourished.   HENT:   Head: Normocephalic and atraumatic.   Right Ear: Tympanic membrane, external ear and ear canal normal.   Left Ear: Tympanic membrane, external ear and ear canal normal.   Nose: Mucosal edema, rhinorrhea, sinus tenderness and congestion present. Right sinus exhibits maxillary sinus tenderness. Right sinus exhibits no frontal sinus tenderness. Left sinus exhibits maxillary sinus tenderness. Left sinus exhibits no frontal sinus tenderness.   Mouth/Throat: Oropharynx is clear and moist. No oropharyngeal exudate.   Eyes: Pupils are equal, round, and reactive to light.   Cardiovascular: Normal rate, regular rhythm and normal heart sounds.   Pulmonary/Chest: Effort normal and breath sounds normal. No respiratory distress.   Frequent productive cough   Neurological: She is alert and oriented to person, place, and time.   Skin: Skin is warm and dry.   Nursing note and vitals reviewed.      LABS  No new labs    ASSESSMENT/PLAN  Yanet was seen today for uri.    Diagnoses and all orders for this visit:    Bronchitis  -     azithromycin (ZITHROMAX) 250 MG tablet; Take 2 tablets the first day, then 1 tablet daily for 4 days.  -     " benzonatate (TESSALON) 200 MG capsule; Take 1 capsule by mouth 3 (Three) Times a Day As Needed for Cough.  -     albuterol sulfate  (90 Base) MCG/ACT inhaler; Inhale 2 puffs Every 4 (Four) Hours As Needed for Wheezing (bronchospasm cough).    Acute non-recurrent maxillary sinusitis  -     azithromycin (ZITHROMAX) 250 MG tablet; Take 2 tablets the first day, then 1 tablet daily for 4 days.    Daily nasal steroid spray such as flonase or nasacort; 1 spray each nostril daily.  Daily oral antihistamine such as allegra or claritin  If worsening of symptoms or no improvement in symptoms or fever > 101.5 patient should contact our office for further evaluation treatment or seek urgent care.    I discussed the patients findings and my recommendations with patient.     Patient was encouraged to keep me informed of any acute changes, lack of improvement, or any new concerning symptoms.    Patient voiced understanding of all instructions and denied further questions.      FOLLOW-UP  Return if symptoms worsen or fail to improve.    Electronically signed by:    CHERYL Chery  01/03/2019

## 2019-01-09 ENCOUNTER — APPOINTMENT (OUTPATIENT)
Dept: BONE DENSITY | Facility: HOSPITAL | Age: 54
End: 2019-01-09
Attending: INTERNAL MEDICINE

## 2019-02-08 ENCOUNTER — HOSPITAL ENCOUNTER (OUTPATIENT)
Dept: BONE DENSITY | Facility: HOSPITAL | Age: 54
Discharge: HOME | End: 2019-02-08
Attending: INTERNAL MEDICINE | Admitting: INTERNAL MEDICINE

## 2019-02-08 DIAGNOSIS — Z78.0 MENOPAUSE: ICD-10-CM

## 2019-02-08 PROCEDURE — 77080 DXA BONE DENSITY AXIAL: CPT

## 2019-02-08 NOTE — PROGRESS NOTES
Dear Nancy,    Thank you for obtaining your DEXA (bone density) scan.  I have received those results and would like to review them with you.      Your bone density remains in the normal range.     Please maintain regular calcium and vitamin D supplementation.  Calcium intake should be 1000mg per day between diet and supplements.  Weight bearing exercise is good for bone health as well.    We should plan to repeat your DEXA in 3-4 years.  Please let me know if you have any questions or concerns regarding these results.        Sincerely,  Brie Jalloh MD

## 2019-02-10 DIAGNOSIS — F33.1 MODERATE EPISODE OF RECURRENT MAJOR DEPRESSIVE DISORDER (HCC): ICD-10-CM

## 2019-02-11 RX ORDER — DULOXETIN HYDROCHLORIDE 20 MG/1
CAPSULE, DELAYED RELEASE ORAL
Qty: 30 CAPSULE | Refills: 2 | Status: SHIPPED | OUTPATIENT
Start: 2019-02-11 | End: 2019-05-20 | Stop reason: SDUPTHER

## 2019-03-21 ENCOUNTER — OFFICE VISIT (OUTPATIENT)
Dept: INTERNAL MEDICINE | Facility: CLINIC | Age: 54
End: 2019-03-21

## 2019-03-21 VITALS
BODY MASS INDEX: 34.23 KG/M2 | TEMPERATURE: 98.5 F | HEIGHT: 66 IN | WEIGHT: 213 LBS | SYSTOLIC BLOOD PRESSURE: 122 MMHG | OXYGEN SATURATION: 98 % | HEART RATE: 94 BPM | DIASTOLIC BLOOD PRESSURE: 84 MMHG

## 2019-03-21 DIAGNOSIS — R30.0 DYSURIA: Primary | ICD-10-CM

## 2019-03-21 LAB
BILIRUB BLD-MCNC: NEGATIVE MG/DL
CLARITY, POC: ABNORMAL
COLOR UR: YELLOW
GLUCOSE UR STRIP-MCNC: NEGATIVE MG/DL
KETONES UR QL: NEGATIVE
LEUKOCYTE EST, POC: ABNORMAL
NITRITE UR-MCNC: NEGATIVE MG/ML
PH UR: 5 [PH] (ref 5–8)
PROT UR STRIP-MCNC: ABNORMAL MG/DL
RBC # UR STRIP: ABNORMAL /UL
SP GR UR: 1.02 (ref 1–1.03)
UROBILINOGEN UR QL: NORMAL

## 2019-03-21 PROCEDURE — 87086 URINE CULTURE/COLONY COUNT: CPT | Performed by: NURSE PRACTITIONER

## 2019-03-21 PROCEDURE — 87077 CULTURE AEROBIC IDENTIFY: CPT | Performed by: NURSE PRACTITIONER

## 2019-03-21 PROCEDURE — 81003 URINALYSIS AUTO W/O SCOPE: CPT | Performed by: NURSE PRACTITIONER

## 2019-03-21 PROCEDURE — 87186 SC STD MICRODIL/AGAR DIL: CPT | Performed by: NURSE PRACTITIONER

## 2019-03-21 PROCEDURE — 99213 OFFICE O/P EST LOW 20 MIN: CPT | Performed by: NURSE PRACTITIONER

## 2019-03-21 RX ORDER — NITROFURANTOIN 25; 75 MG/1; MG/1
100 CAPSULE ORAL 2 TIMES DAILY
Qty: 14 CAPSULE | Refills: 0 | Status: SHIPPED | OUTPATIENT
Start: 2019-03-21 | End: 2019-03-21

## 2019-03-21 RX ORDER — CEFDINIR 300 MG/1
300 CAPSULE ORAL 2 TIMES DAILY
Qty: 20 CAPSULE | Refills: 0 | Status: SHIPPED | OUTPATIENT
Start: 2019-03-21 | End: 2019-04-09

## 2019-03-21 NOTE — PROGRESS NOTES
"Subjective   Yanet Clifton is a 53 y.o. female.   Chief Complaint   Patient presents with   • Urinary Tract Infection     x 3 days   • Fatigue      History of Present Illness Has lower back pain, abd cramping, burning with urination onset 3 days ago.  No taking anything for it.  No fever chills hematuria, vag DC.  Has generalize itching/rash when she gets a bladder infection.  She denies headache, ear pain, sore throat, shortness of air, chest pain, nausea vomiting.    The following portions of the patient's history were reviewed and updated as appropriate: allergies, current medications, past family history, past medical history, past social history, past surgical history and problem list.    Current Outpatient Medications:   •  diphenhydrAMINE (BENADRYL) 50 MG capsule, Take 50 mg by mouth At Night As Needed for Itching., Disp: , Rfl:   •  DULoxetine (CYMBALTA) 20 MG capsule, take 1 capsule by mouth once daily, Disp: 30 capsule, Rfl: 2  •  levothyroxine (SYNTHROID, LEVOTHROID) 100 MCG tablet, Take 1 tablet by mouth Every Morning., Disp: 30 tablet, Rfl: 3  •  Melatonin 10 MG capsule, Take 10 mg by mouth Daily., Disp: , Rfl:   •  naproxen sodium (ALEVE) 220 MG tablet, Take 220 mg by mouth 2 (Two) Times a Day As Needed for mild pain (1-3)., Disp: , Rfl:   •  omeprazole (priLOSEC) 40 MG capsule, take 1 capsule by mouth once daily, Disp: 30 capsule, Rfl: 5  •  RiTUXimab (RITUXAN) 10 MG/ML solution injection, Infuse  into a venous catheter Every 4 (Four) Months., Disp: , Rfl:   •  cefdinir (OMNICEF) 300 MG capsule, Take 1 capsule by mouth 2 (Two) Times a Day., Disp: 20 capsule, Rfl: 0    Review of Systems Consitutional, HEENT, Respiratory, CV, GI, , Skin, Musculoskeletal, Neuro-mental, Endocrinological, Hematological were reviewed.  Positives were discussed in the HPI, otherwise ROS was negative   /84   Pulse 94   Temp 98.5 °F (36.9 °C)   Ht 167.6 cm (66\")   Wt 96.6 kg (213 lb)   SpO2 98%   Breastfeeding? " No   BMI 34.38 kg/m²     Objective   Allergies   Allergen Reactions   • Adalimumab      Other reaction(s): Headache (Humira)   • Macrobid [Nitrofurantoin Macrocrystal] Nausea Only   • Cimzia [Certolizumab Pegol] Rash   • Sulfa Antibiotics Rash       Physical Exam   Constitutional: She is oriented to person, place, and time. She appears well-developed and well-nourished.   Cardiovascular: Normal rate, regular rhythm, normal heart sounds and intact distal pulses. Exam reveals no gallop and no friction rub.   No murmur heard.  Pulmonary/Chest: Effort normal and breath sounds normal. No stridor. No respiratory distress. She has no wheezes. She has no rales.   Abdominal: Soft. Bowel sounds are normal. She exhibits no mass. There is no tenderness.   No flank tenderness.   Musculoskeletal:   DAVIS well.  Gait upright and steady    Neurological: She is alert and oriented to person, place, and time.   Skin: Skin is warm and dry. Capillary refill takes less than 2 seconds.   Is pink, no rash    Nursing note and vitals reviewed.      Procedures    LABS  Results for orders placed or performed in visit on 03/21/19   POCT urinalysis dipstick, automated   Result Value Ref Range    Color Yellow Yellow, Straw, Dark Yellow, Leonie    Clarity, UA Cloudy (A) Clear    Specific Gravity  1.025 1.005 - 1.030    pH, Urine 5.0 5.0 - 8.0    Leukocytes 500 Elinor/ul (A) Negative    Nitrite, UA Negative Negative    Protein, POC 30 mg/dL (A) Negative mg/dL    Glucose, UA Negative Negative, 1000 mg/dL (3+) mg/dL    Ketones, UA Negative Negative    Urobilinogen, UA Normal Normal    Bilirubin Negative Negative    Blood,  Stepan/ul (A) Negative       Assessment/Plan   Yanet was seen today for urinary tract infection and fatigue.    Diagnoses and all orders for this visit:    Dysuria  -     POCT urinalysis dipstick, automated  -     Urine Culture - Urine, Urine, Clean Catch  -     cefdinir (OMNICEF) 300 MG capsule; Take 1 capsule by mouth 2 (Two)  Times a Day.    Other orders  -     Discontinue: nitrofurantoin, macrocrystal-monohydrate, (MACROBID) 100 MG capsule; Take 1 capsule by mouth 2 (Two) Times a Day.        Patient Instructions   Urine culture from December 5, 2018 grew 10,000 colonies Proteus mirabilis.  She is allergic to sulfa and intolerant of Macrobid.  We will repeat culture.  Drink plenty of fluids.  Wipe from front to back.  Omnicef as discussed.  Return to the clinic in 1 week for urine recheck sooner if not improving  Pt verbalizes understanding and agreement with plan of care.     EMR Dragon/transcription disclaimer:  Please note that portions of this note were completed with a voice recognition program.  Electronic transcription of the voice recognition program may permit erroneous words or phrases to be inadvertently transcribed.  Although I have reviewed the note for such errors, some may still exist in this documentation     Ashley Uribe APRN

## 2019-03-23 LAB — BACTERIA SPEC AEROBE CULT: ABNORMAL

## 2019-03-25 ENCOUNTER — TELEPHONE (OUTPATIENT)
Dept: INTERNAL MEDICINE | Facility: CLINIC | Age: 54
End: 2019-03-25

## 2019-03-25 NOTE — TELEPHONE ENCOUNTER
----- Message from CHERYL Gavrey sent at 3/25/2019 12:15 PM EDT -----  Please call patient urine culture is growing greater than 100,000 colonies of E. coli.  You are on the appropriate antibiotic.  Follow-up with Dr. Noyola in 1-2 weeks for urine recheck, sooner if you are not getting better.

## 2019-04-09 ENCOUNTER — OFFICE VISIT (OUTPATIENT)
Dept: INTERNAL MEDICINE | Facility: CLINIC | Age: 54
End: 2019-04-09

## 2019-04-09 VITALS
SYSTOLIC BLOOD PRESSURE: 122 MMHG | OXYGEN SATURATION: 94 % | HEART RATE: 102 BPM | TEMPERATURE: 98.3 F | RESPIRATION RATE: 20 BRPM | WEIGHT: 208 LBS | DIASTOLIC BLOOD PRESSURE: 68 MMHG | BODY MASS INDEX: 33.57 KG/M2

## 2019-04-09 DIAGNOSIS — G43.019 COMMON MIGRAINE WITH INTRACTABLE MIGRAINE: ICD-10-CM

## 2019-04-09 DIAGNOSIS — R19.7 DIARRHEA, UNSPECIFIED TYPE: Primary | ICD-10-CM

## 2019-04-09 DIAGNOSIS — R11.2 NON-INTRACTABLE VOMITING WITH NAUSEA, UNSPECIFIED VOMITING TYPE: ICD-10-CM

## 2019-04-09 PROCEDURE — 99214 OFFICE O/P EST MOD 30 MIN: CPT | Performed by: INTERNAL MEDICINE

## 2019-04-09 RX ORDER — ELETRIPTAN HYDROBROMIDE 40 MG/1
TABLET, FILM COATED ORAL
COMMUNITY
Start: 2019-01-09 | End: 2020-12-15

## 2019-04-09 RX ORDER — ONDANSETRON 4 MG/1
4 TABLET, ORALLY DISINTEGRATING ORAL EVERY 8 HOURS PRN
Qty: 20 TABLET | Refills: 0 | Status: SHIPPED | OUTPATIENT
Start: 2019-04-09 | End: 2019-05-09

## 2019-04-10 NOTE — PROGRESS NOTES
Chief Complaint   Patient presents with   • Diarrhea     nausea   • Back Pain     headache       History of Present Illness  53 y.o.  woman presents for further evaluation of nausea/vom/diarrhea and headache.  HPI started with dx of UTI a few wks ago, treated with cefdinir.  States she did not have GI issues at that time.    Subsequently developed increased headache and was placed on relpax per Dr. Mendoza.  This seemed helpful but with increased headaches, was started on Emgality.  No known side effects from the injection.    Over the weekend (Saturday), developed water/nonbloody diarrhea, assoc'd with nausea.  Has had a few episodes of vomiting.  Diarrhea worsens with eating; notes also some assoc'd fecal urgency; denies fecal incontinence. Reports decreased appetite. Has not started any new meds; has not eaten anything out of the ordinary.  No one around her has the same sxs.    Continues to have headache, described as pain all over the head; not the worst headache ever.  No assoc'd vision changes except for light sensitivity; no confusion.  Reports some neck pain but no neck stiffness.    Review of Systems  ROS (+) for migraine with n/v and photophobia.  ROS (+) for n/v/d onset 3 days ago, nonbloody.  Has assoc'd abd pain.  Denies lightheadedness, CP, palpitations, SOB.  All other ROS reviewed and negative.    [facility]  The following portions of the patient's history were reviewed and updated as appropriate: allergies, current medications, past family history, past medical history, past social history, past surgical history and problem list.      Current Outpatient Medications:   •  diphenhydrAMINE (BENADRYL) 50 MG qhs prn  •  DULoxetine (CYMBALTA) 20 MG capsule, QD  •  eletriptan (RELPAX) 40 MG tablet, prn  •  galcanezumab-gnlm (EMGALITY) 120 MG/ML prefilled syringe, per Dr. Mendoza  •  levothyroxine (SYNTHROID, LEVOTHROID) 100 MCG QD  •  Melatonin 10 MG capsule, QHS  •  naproxen sodium (ALEVE) 220 MG tablet,  BID prn  •  omeprazole (priLOSEC) 40 MG QD  •  RiTUXimab (RITUXAN) 10 MG/ML solution injection, q4 mos    VITALS:  /68 (BP Location: Right arm, Patient Position: Sitting, Cuff Size: Adult)   Pulse 102   Temp 98.3 °F (36.8 °C) (Oral)   Resp 20   Wt 94.3 kg (208 lb)   SpO2 94%   BMI 33.57 kg/m²     Physical Exam   Constitutional: She is oriented to person, place, and time. She appears well-developed and well-nourished.   Eyes: Conjunctivae and EOM are normal. Pupils are equal, round, and reactive to light.   (+) sensitivity   Neck: Neck supple.   Able to almost touch chin to chest   Cardiovascular: Normal rate, regular rhythm and normal heart sounds.   Pulmonary/Chest: Effort normal and breath sounds normal. No respiratory distress. She has no wheezes. She has no rales.   Abdominal: Soft. Bowel sounds are normal. She exhibits no distension. There is tenderness (mild-mod tend with palpation at th epigastrium and LLQ> RLQ, no rebound tend, no guarding). There is no guarding.   Lymphadenopathy:     She has no cervical adenopathy.   Neurological: She is alert and oriented to person, place, and time.   Skin: Skin is warm and dry.   Psychiatric: She has a normal mood and affect. Her behavior is normal.   Nursing note and vitals reviewed.      LABS  Results for orders placed or performed in visit on 03/21/19   Urine Culture - Urine, Urine, Clean Catch   Result Value Ref Range    Urine Culture >100,000 CFU/mL Escherichia coli (A)        Susceptibility    Escherichia coli - VAIBHAV     Ampicillin <=8 Susceptible ug/ml     Ampicillin + Sulbactam <=8/4 Susceptible ug/ml     Aztreonam <=8 Susceptible ug/ml     Cefepime <=8 Susceptible ug/ml     Cefotaxime <=2 Susceptible ug/ml     Ceftriaxone <=8 Susceptible ug/ml     Cefuroxime sodium <=4 Susceptible ug/ml     Cephalothin <=8 Susceptible ug/ml     Ertapenem <=1 Susceptible ug/ml     Gentamicin <=4 Susceptible ug/ml     Levofloxacin <=2 Susceptible ug/ml     Meropenem <=1  Susceptible ug/ml     Nitrofurantoin <=32 Susceptible ug/ml     Piperacillin + Tazobactam <=16 Susceptible ug/ml     Tetracycline <=4 Susceptible ug/ml     Tobramycin <=4 Susceptible ug/ml     Trimethoprim + Sulfamethoxazole <=2/38 Susceptible ug/ml   POCT urinalysis dipstick, automated   Result Value Ref Range    Color Yellow Yellow, Straw, Dark Yellow, Leonie    Clarity, UA Cloudy (A) Clear    Specific Gravity  1.025 1.005 - 1.030    pH, Urine 5.0 5.0 - 8.0    Leukocytes 500 Elinor/ul (A) Negative    Nitrite, UA Negative Negative    Protein, POC 30 mg/dL (A) Negative mg/dL    Glucose, UA Negative Negative, 1000 mg/dL (3+) mg/dL    Ketones, UA Negative Negative    Urobilinogen, UA Normal Normal    Bilirubin Negative Negative    Blood,  Stepan/ul (A) Negative       ASSESSMENT/PLAN  Problem List Items Addressed This Visit     Diarrhea - Primary     x3 days, currently 3-4 episodes/day; no known triggers except for recent course of cefdinir; last colonosc 2016; rec bland diet, small portions, aggressive fluids; check stool studies; rec prn imodium only if 5 or more BMs/day to limit fluid loss; f/u if does not improve/resolve over the next week         Relevant Orders    Clostridium Difficile Toxin - Stool, Per Rectum    Fecal Leukocytes - Stool, Per Rectum    Stool Culture - Stool, Per Rectum    Occult Blood X 1, Stool - Stool, Per Rectum    Common migraine with intractable migraine     Prn relpax per Dr. Mendoza and recently started on Emgality; do not except current n/v/d to be due to Emgality; no current neck stiffness; no narcotic to break the headache cycle due to current GI issues; defer further rx plans to Dr. Mendoza           Relevant Medications    eletriptan (RELPAX) 40 MG tablet    galcanezumab-gnlm (EMGALITY) 120 MG/ML prefilled syringe    Nausea and vomiting     Likely gastroenteritis with n/v/d; note recent course of cefdinir; no other new meds except for Emgality; could also be assoc'd with migraine  headaches; bland diet, small portions, and aggressive fluids; RX for zofran 4mg ODT q8 prn #20, 0RF to help her keep fluids down; s/e reviewed; had EGD/colonosc in 2016 with Dr. Fagan         Relevant Medications    ondansetron ODT (ZOFRAN-ODT) 4 MG disintegrating tablet          FOLLOW-UP  RTC prn; next PE already scheduled 12/3/19; fasting labs the week prior to appt (CBC, CMP, TSH, lipids, UA/micro, FT4, vit D)      Electronically signed by:    Brie Jalloh MD  04/09/2019

## 2019-04-10 NOTE — ASSESSMENT & PLAN NOTE
Likely gastroenteritis with n/v/d; note recent course of cefdinir; no other new meds except for Emgality; could also be assoc'd with migraine headaches; bland diet, small portions, and aggressive fluids; RX for zofran 4mg ODT q8 prn #20, 0RF to help her keep fluids down; s/e reviewed; had EGD/colonosc in 2016 with Dr. Fagan

## 2019-04-10 NOTE — ASSESSMENT & PLAN NOTE
x3 days, currently 3-4 episodes/day; no known triggers except for recent course of cefdinir; last colonosc 2016; rec bland diet, small portions, aggressive fluids; check stool studies; rec prn imodium only if 5 or more BMs/day to limit fluid loss; f/u if does not improve/resolve over the next week

## 2019-04-10 NOTE — ASSESSMENT & PLAN NOTE
Prn relpax per Dr. Mendoza and recently started on Emgality; do not except current n/v/d to be due to Emgality; no current neck stiffness; no narcotic to break the headache cycle due to current GI issues; defer further rx plans to Dr. Mendoza

## 2019-04-15 ENCOUNTER — TELEPHONE (OUTPATIENT)
Dept: INTERNAL MEDICINE | Facility: CLINIC | Age: 54
End: 2019-04-15

## 2019-04-15 NOTE — TELEPHONE ENCOUNTER
Pt states that she will be in the hospital for 3-4 more days. She will have them send over notes, labs etc to our office so you can be updated. She will call when she gets discharged to make a hospital follow up with you.

## 2019-04-15 NOTE — TELEPHONE ENCOUNTER
Patient wanted to let dr. hartman know that she was admitted yesterday to [facility] and she has meningitis.

## 2019-04-23 ENCOUNTER — OFFICE VISIT (OUTPATIENT)
Dept: INTERNAL MEDICINE | Facility: CLINIC | Age: 54
End: 2019-04-23

## 2019-04-23 VITALS
OXYGEN SATURATION: 98 % | HEIGHT: 66 IN | TEMPERATURE: 98.6 F | SYSTOLIC BLOOD PRESSURE: 118 MMHG | HEART RATE: 106 BPM | BODY MASS INDEX: 33.43 KG/M2 | DIASTOLIC BLOOD PRESSURE: 72 MMHG | WEIGHT: 208 LBS

## 2019-04-23 DIAGNOSIS — D72.810 LYMPHOCYTOPENIA: ICD-10-CM

## 2019-04-23 DIAGNOSIS — R51.9 NONINTRACTABLE EPISODIC HEADACHE, UNSPECIFIED HEADACHE TYPE: ICD-10-CM

## 2019-04-23 DIAGNOSIS — G03.9 MENINGITIS: Primary | ICD-10-CM

## 2019-04-23 LAB
BASOPHILS # BLD AUTO: 0.02 10*3/MM3 (ref 0–0.2)
BASOPHILS NFR BLD AUTO: 0.3 % (ref 0–1.5)
DEPRECATED RDW RBC AUTO: 45.7 FL (ref 37–54)
EOSINOPHIL # BLD AUTO: 0.34 10*3/MM3 (ref 0–0.4)
EOSINOPHIL NFR BLD AUTO: 5 % (ref 0.3–6.2)
ERYTHROCYTE [DISTWIDTH] IN BLOOD BY AUTOMATED COUNT: 14.5 % (ref 12.3–15.4)
HCT VFR BLD AUTO: 37.7 % (ref 34–46.6)
HGB BLD-MCNC: 11.2 G/DL (ref 12–15.9)
IMM GRANULOCYTES # BLD AUTO: 0.03 10*3/MM3 (ref 0–0.05)
IMM GRANULOCYTES NFR BLD AUTO: 0.4 % (ref 0–0.5)
LYMPHOCYTES # BLD AUTO: 0.96 10*3/MM3 (ref 0.7–3.1)
LYMPHOCYTES NFR BLD AUTO: 14 % (ref 19.6–45.3)
MCH RBC QN AUTO: 25.6 PG (ref 26.6–33)
MCHC RBC AUTO-ENTMCNC: 29.7 G/DL (ref 31.5–35.7)
MCV RBC AUTO: 86.3 FL (ref 79–97)
MONOCYTES # BLD AUTO: 0.73 10*3/MM3 (ref 0.1–0.9)
MONOCYTES NFR BLD AUTO: 10.6 % (ref 5–12)
NEUTROPHILS # BLD AUTO: 4.78 10*3/MM3 (ref 1.7–7)
NEUTROPHILS NFR BLD AUTO: 69.7 % (ref 42.7–76)
NRBC BLD AUTO-RTO: 0 /100 WBC (ref 0–0.2)
PLATELET # BLD AUTO: 297 10*3/MM3 (ref 140–450)
PMV BLD AUTO: 10.3 FL (ref 6–12)
RBC # BLD AUTO: 4.37 10*6/MM3 (ref 3.77–5.28)
WBC NRBC COR # BLD: 6.86 10*3/MM3 (ref 3.4–10.8)

## 2019-04-23 PROCEDURE — 85025 COMPLETE CBC W/AUTO DIFF WBC: CPT | Performed by: INTERNAL MEDICINE

## 2019-04-23 PROCEDURE — 99215 OFFICE O/P EST HI 40 MIN: CPT | Performed by: INTERNAL MEDICINE

## 2019-04-23 PROCEDURE — 96372 THER/PROPH/DIAG INJ SC/IM: CPT | Performed by: INTERNAL MEDICINE

## 2019-04-23 RX ORDER — OXYCODONE HYDROCHLORIDE 10 MG/1
TABLET ORAL
Refills: 0 | COMMUNITY
Start: 2019-04-18 | End: 2019-12-03

## 2019-04-23 RX ORDER — DEXAMETHASONE SODIUM PHOSPHATE 4 MG/ML
4 INJECTION, SOLUTION INTRA-ARTICULAR; INTRALESIONAL; INTRAMUSCULAR; INTRAVENOUS; SOFT TISSUE ONCE
Status: COMPLETED | OUTPATIENT
Start: 2019-04-23 | End: 2019-04-23

## 2019-04-23 RX ORDER — PREDNISONE 10 MG/1
TABLET ORAL
Qty: 48 TABLET | Refills: 0 | Status: SHIPPED | OUTPATIENT
Start: 2019-04-23 | End: 2019-05-05

## 2019-04-23 RX ADMIN — DEXAMETHASONE SODIUM PHOSPHATE 4 MG: 4 INJECTION, SOLUTION INTRA-ARTICULAR; INTRALESIONAL; INTRAMUSCULAR; INTRAVENOUS; SOFT TISSUE at 14:06

## 2019-04-23 NOTE — PROGRESS NOTES
"Chief Complaint   Patient presents with   • Meningitis     follow up, still not feeling well.   • Headache   • Generalized Body Aches       History of Present Illness  53 y.o.  woman presents for hospital follow-up.  Admitted to the hospital for severe headache (pain all over the head) assoc'd with neck stiffness, despite ability to maintain neck flexion. Went to the ER, was given \"migraine cocktail\" with decadron and reglan, which did not help her headache.  She then received LP with subsequent acyclovir and van.  She was seen by Dr. Mukherjee; states she was given 125mg dose of methylprednisolone and discharged with percocet.    Continues to have all over headache but less severe than when she went to the ER.  Still has persistent neck stiffness with cont'd ability to flex neck, also with dizziness and ringing of the ears bilaterally.    States percocet dulls the headache but then the pain comes right back after pain med wears off.  Denies fevers/chills, confusion.    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     Review of Systems  ROS (+) for headache all over the head with neck stiffness and not feeling well in general.  Denies fevers/chills, visual changes, CP, palpitations, SOB, lightheadedness, confusion.  ROS (+) for dizziness but nl gait.  All other ROS reviewed and negative.    [facility]  The following portions of the patient's history were reviewed and updated as appropriate: allergies, current medications, past family history, past medical history, past social history, past surgical history and problem list.      Current Outpatient Medications:   •  diphenhydrAMINE (BENADRYL) 50 MG QHS prn itching  •  DULoxetine (CYMBALTA) 20 MG QD  •  eletriptan (RELPAX) 40 MG tablet, , Disp: , Rfl:   •  galcanezumab-gnlm (EMGALITY) 120 MG/ML prefilled syringe per neuro  •  levothyroxine (SYNTHROID, LEVOTHROID) 100 MCG QD  •  Melatonin 10 MG capsule, QHS  •  naproxen " "sodium (ALEVE) 220 MG tablet, TBID prnake 220 mg by mouth 2 (Two) Times a Day As Needed for mild pain (1-3)., Disp: , Rfl:   •  omeprazole (priLOSEC) 40 MG capsule QD  •  ondansetron ODT (ZOFRAN-ODT) 4 MG q8 prn n/v  •  oxyCODONE (ROXICODONE) 10 MG q6 prn  •  RiTUXimab (RITUXAN) 10 MG/ML solution injection, q4 mos    VITALS:  /72   Pulse 106   Temp 98.6 °F (37 °C) (Oral)   Ht 167.6 cm (66\")   Wt 94.3 kg (208 lb)   SpO2 98%   BMI 33.57 kg/m²     Physical Exam   Constitutional: She is oriented to person, place, and time. She appears well-developed and well-nourished.   Appears fatigued   HENT:   Right Ear: External ear normal.   Left Ear: External ear normal.   Eyes: Conjunctivae and EOM are normal. Pupils are equal, round, and reactive to light.   Neck:   Neck with near FROM   Cardiovascular: Normal rate, regular rhythm and normal heart sounds.   Pulmonary/Chest: Effort normal and breath sounds normal. No respiratory distress.   Abdominal: Soft. Bowel sounds are normal.   Musculoskeletal: She exhibits no edema (BLE).   Normal gait   Neurological: She is alert and oriented to person, place, and time.   Skin: Skin is warm and dry.   Psychiatric: She has a normal mood and affect. Her behavior is normal.   Discouraged, despairing, frustrated affect   Nursing note and vitals reviewed.      LABS  4/15/19 [facility]  Stable BMP with Cr 1.0, lactate 1.0, ammonia < 10  Stable CBC with H&H 10/32; nl diff  BCX neg x2  CSF with rare WBCs, cell count 86 WBCs, 89% lymphocytes    CT head negative    ASSESSMENT/PLAN  Problem List Items Addressed This Visit     Headache     Attributed to meningitis; also has background of migraine; rec f/u with Dr. Mendoza         Meningitis - Primary     Presumed meningitis; headache and neck pain better but persistent, assoc'd with tinnitus and dizziness; rec f/u with Dr. Mendoza; in the interim, RX prednisone taper #48, 0RF         Relevant Medications    predniSONE (DELTASONE) 10 MG tablet    " dexamethasone (DECADRON) injection 4 mg (Completed)    Other Relevant Orders    CBC & Differential (Completed)    CBC Auto Differential (Completed)      Other Visit Diagnoses     Lymphocytopenia        rituxan/inducible CD4 lymphcocytopenia per Dr. Mukherjee; still has a few outstanding tests; will f/u with Dr. Mukherjee; discussed autoimm disease and immune syste    Relevant Medications    predniSONE (DELTASONE) 10 MG tablet      Time Documentation  Counseled patient  Counseling topics: diagnosis, lab results, treatment options and follow up plan  Total encounter time: counseling time more than 50% of visit: 40 minutes      FOLLOW-UP  1. Reviewed Dr. Mukherjee's consult with patient; will request other hospitalization records, incl hospital discharge  2. RTC for next PE 12/3/19; fasting labs wk prior to appt (CBC, CMP, TSH, lipids, UA/micr, FT4, vit D)    Electronically signed by:    Brie Jalloh MD  04/23/2019

## 2019-04-24 ENCOUNTER — TELEPHONE (OUTPATIENT)
Dept: INTERNAL MEDICINE | Facility: CLINIC | Age: 54
End: 2019-04-24

## 2019-04-24 NOTE — TELEPHONE ENCOUNTER
----- Message from Brie Jalloh MD sent at 4/24/2019  2:31 PM EDT -----  Blood counts remain normal - nl WBC and no anemia

## 2019-04-24 NOTE — ASSESSMENT & PLAN NOTE
Presumed meningitis; headache and neck pain better but persistent, assoc'd with tinnitus and dizziness; rec f/u with Dr. Mendoza; in the interim, RX prednisone taper #48, 0RF

## 2019-05-20 DIAGNOSIS — F33.1 MODERATE EPISODE OF RECURRENT MAJOR DEPRESSIVE DISORDER (HCC): ICD-10-CM

## 2019-05-20 RX ORDER — DULOXETIN HYDROCHLORIDE 20 MG/1
CAPSULE, DELAYED RELEASE ORAL
Qty: 30 CAPSULE | Refills: 2 | Status: SHIPPED | OUTPATIENT
Start: 2019-05-20 | End: 2019-08-20 | Stop reason: SDUPTHER

## 2019-06-24 DIAGNOSIS — K21.9 GASTROESOPHAGEAL REFLUX DISEASE, ESOPHAGITIS PRESENCE NOT SPECIFIED: ICD-10-CM

## 2019-06-24 RX ORDER — OMEPRAZOLE 40 MG/1
CAPSULE, DELAYED RELEASE ORAL
Qty: 90 CAPSULE | Refills: 1 | Status: SHIPPED | OUTPATIENT
Start: 2019-06-24 | End: 2019-12-03 | Stop reason: SDUPTHER

## 2019-08-20 DIAGNOSIS — F33.1 MODERATE EPISODE OF RECURRENT MAJOR DEPRESSIVE DISORDER (HCC): ICD-10-CM

## 2019-08-21 RX ORDER — DULOXETIN HYDROCHLORIDE 20 MG/1
CAPSULE, DELAYED RELEASE ORAL
Qty: 30 CAPSULE | Refills: 2 | Status: SHIPPED | OUTPATIENT
Start: 2019-08-21 | End: 2019-11-19 | Stop reason: SDUPTHER

## 2019-11-04 ENCOUNTER — TELEPHONE (OUTPATIENT)
Dept: INTERNAL MEDICINE | Facility: CLINIC | Age: 54
End: 2019-11-04

## 2019-11-04 DIAGNOSIS — E03.9 ACQUIRED HYPOTHYROIDISM: ICD-10-CM

## 2019-11-04 DIAGNOSIS — E55.9 VITAMIN D DEFICIENCY: ICD-10-CM

## 2019-11-04 DIAGNOSIS — Z00.00 PE (PHYSICAL EXAM), ROUTINE: Primary | ICD-10-CM

## 2019-11-04 DIAGNOSIS — E78.2 MIXED HYPERLIPIDEMIA: ICD-10-CM

## 2019-11-19 DIAGNOSIS — F33.1 MODERATE EPISODE OF RECURRENT MAJOR DEPRESSIVE DISORDER (HCC): ICD-10-CM

## 2019-11-19 RX ORDER — DULOXETIN HYDROCHLORIDE 20 MG/1
CAPSULE, DELAYED RELEASE ORAL
Qty: 30 CAPSULE | Refills: 0 | Status: SHIPPED | OUTPATIENT
Start: 2019-11-19 | End: 2019-12-03 | Stop reason: ALTCHOICE

## 2019-12-02 NOTE — ASSESSMENT & PLAN NOTE
Health maintenance - flu vacc and HAV #2 given today; Prevnar 9/15; PVX 11/16; Tdap 2/11 (Td due 2021), rec Shingrix - but needs to ask since she is on rituxan; overdue for 6-mo f/u L. Dx mammo (bilat mammo due after 2/9/19); no further Paps s/p USMAN/BSO; nl DEXA 2/19, repeat 2022; colonosc 5/16, repeat 2021 per Dr. Robertson; eye exam to be updated; dental exam UTD q6 mos; (+) seat belt use

## 2019-12-03 ENCOUNTER — OFFICE VISIT (OUTPATIENT)
Dept: INTERNAL MEDICINE | Facility: CLINIC | Age: 54
End: 2019-12-03

## 2019-12-03 VITALS
BODY MASS INDEX: 33.59 KG/M2 | DIASTOLIC BLOOD PRESSURE: 70 MMHG | OXYGEN SATURATION: 98 % | WEIGHT: 209 LBS | HEART RATE: 72 BPM | HEIGHT: 66 IN | SYSTOLIC BLOOD PRESSURE: 118 MMHG

## 2019-12-03 DIAGNOSIS — E55.9 VITAMIN D DEFICIENCY: ICD-10-CM

## 2019-12-03 DIAGNOSIS — R79.89 ELEVATED LFTS: ICD-10-CM

## 2019-12-03 DIAGNOSIS — N60.12 BILATERAL FIBROCYSTIC BREAST CHANGES: ICD-10-CM

## 2019-12-03 DIAGNOSIS — N60.11 BILATERAL FIBROCYSTIC BREAST CHANGES: ICD-10-CM

## 2019-12-03 DIAGNOSIS — E03.9 ACQUIRED HYPOTHYROIDISM: ICD-10-CM

## 2019-12-03 DIAGNOSIS — Z00.00 PE (PHYSICAL EXAM), ROUTINE: Primary | ICD-10-CM

## 2019-12-03 DIAGNOSIS — E78.2 MIXED HYPERLIPIDEMIA: ICD-10-CM

## 2019-12-03 DIAGNOSIS — K21.9 GASTROESOPHAGEAL REFLUX DISEASE, ESOPHAGITIS PRESENCE NOT SPECIFIED: ICD-10-CM

## 2019-12-03 DIAGNOSIS — F33.1 MODERATE EPISODE OF RECURRENT MAJOR DEPRESSIVE DISORDER (HCC): ICD-10-CM

## 2019-12-03 LAB
25(OH)D3 SERPL-MCNC: 39.1 NG/ML (ref 30–100)
ALBUMIN SERPL-MCNC: 4.4 G/DL (ref 3.5–5.2)
ALBUMIN/GLOB SERPL: 1.7 G/DL
ALP SERPL-CCNC: 66 U/L (ref 39–117)
ALT SERPL W P-5'-P-CCNC: 18 U/L (ref 1–33)
ANION GAP SERPL CALCULATED.3IONS-SCNC: 13.1 MMOL/L (ref 5–15)
AST SERPL-CCNC: 18 U/L (ref 1–32)
BACTERIA UR QL AUTO: NORMAL /HPF
BASOPHILS # BLD AUTO: 0.04 10*3/MM3 (ref 0–0.2)
BASOPHILS NFR BLD AUTO: 0.7 % (ref 0–1.5)
BILIRUB SERPL-MCNC: 0.2 MG/DL (ref 0.2–1.2)
BILIRUB UR QL STRIP: NEGATIVE
BUN BLD-MCNC: 18 MG/DL (ref 6–20)
BUN/CREAT SERPL: 22.2 (ref 7–25)
CALCIUM SPEC-SCNC: 9.7 MG/DL (ref 8.6–10.5)
CHLORIDE SERPL-SCNC: 105 MMOL/L (ref 98–107)
CHOLEST SERPL-MCNC: 189 MG/DL (ref 0–200)
CLARITY UR: CLEAR
CO2 SERPL-SCNC: 25.9 MMOL/L (ref 22–29)
COD CRY URNS QL: NORMAL /HPF
COLOR UR: YELLOW
CREAT BLD-MCNC: 0.81 MG/DL (ref 0.57–1)
DEPRECATED RDW RBC AUTO: 41.9 FL (ref 37–54)
EOSINOPHIL # BLD AUTO: 0.28 10*3/MM3 (ref 0–0.4)
EOSINOPHIL NFR BLD AUTO: 5.2 % (ref 0.3–6.2)
ERYTHROCYTE [DISTWIDTH] IN BLOOD BY AUTOMATED COUNT: 13.8 % (ref 12.3–15.4)
GFR SERPL CREATININE-BSD FRML MDRD: 74 ML/MIN/1.73
GLOBULIN UR ELPH-MCNC: 2.6 GM/DL
GLUCOSE BLD-MCNC: 90 MG/DL (ref 65–99)
GLUCOSE UR STRIP-MCNC: NEGATIVE MG/DL
HCT VFR BLD AUTO: 32.7 % (ref 34–46.6)
HDLC SERPL-MCNC: 35 MG/DL (ref 40–60)
HGB BLD-MCNC: 10.3 G/DL (ref 12–15.9)
HGB UR QL STRIP.AUTO: NEGATIVE
HYALINE CASTS UR QL AUTO: NORMAL /LPF
IMM GRANULOCYTES # BLD AUTO: 0.03 10*3/MM3 (ref 0–0.05)
IMM GRANULOCYTES NFR BLD AUTO: 0.6 % (ref 0–0.5)
KETONES UR QL STRIP: ABNORMAL
LDLC SERPL CALC-MCNC: 115 MG/DL (ref 0–100)
LDLC/HDLC SERPL: 3.3 {RATIO}
LEUKOCYTE ESTERASE UR QL STRIP.AUTO: NEGATIVE
LYMPHOCYTES # BLD AUTO: 1 10*3/MM3 (ref 0.7–3.1)
LYMPHOCYTES NFR BLD AUTO: 18.6 % (ref 19.6–45.3)
MCH RBC QN AUTO: 26.4 PG (ref 26.6–33)
MCHC RBC AUTO-ENTMCNC: 31.5 G/DL (ref 31.5–35.7)
MCV RBC AUTO: 83.8 FL (ref 79–97)
MONOCYTES # BLD AUTO: 0.64 10*3/MM3 (ref 0.1–0.9)
MONOCYTES NFR BLD AUTO: 11.9 % (ref 5–12)
NEUTROPHILS # BLD AUTO: 3.4 10*3/MM3 (ref 1.7–7)
NEUTROPHILS NFR BLD AUTO: 63 % (ref 42.7–76)
NITRITE UR QL STRIP: NEGATIVE
NRBC BLD AUTO-RTO: 0 /100 WBC (ref 0–0.2)
PH UR STRIP.AUTO: 5.5 [PH] (ref 5–8)
PLATELET # BLD AUTO: 250 10*3/MM3 (ref 140–450)
PMV BLD AUTO: 11 FL (ref 6–12)
POTASSIUM BLD-SCNC: 4.2 MMOL/L (ref 3.5–5.2)
PROT SERPL-MCNC: 7 G/DL (ref 6–8.5)
PROT UR QL STRIP: ABNORMAL
RBC # BLD AUTO: 3.9 10*6/MM3 (ref 3.77–5.28)
RBC # UR: NORMAL /HPF
REF LAB TEST METHOD: NORMAL
SODIUM BLD-SCNC: 144 MMOL/L (ref 136–145)
SP GR UR STRIP: >=1.03 (ref 1–1.03)
SQUAMOUS #/AREA URNS HPF: NORMAL /HPF
T4 FREE SERPL-MCNC: 1.18 NG/DL (ref 0.93–1.7)
TRIGL SERPL-MCNC: 193 MG/DL (ref 0–150)
TSH SERPL DL<=0.05 MIU/L-ACNC: 2.73 UIU/ML (ref 0.27–4.2)
UROBILINOGEN UR QL STRIP: ABNORMAL
VLDLC SERPL-MCNC: 38.6 MG/DL (ref 5–40)
WBC NRBC COR # BLD: 5.39 10*3/MM3 (ref 3.4–10.8)
WBC UR QL AUTO: NORMAL /HPF

## 2019-12-03 PROCEDURE — 90632 HEPA VACCINE ADULT IM: CPT | Performed by: INTERNAL MEDICINE

## 2019-12-03 PROCEDURE — 84443 ASSAY THYROID STIM HORMONE: CPT | Performed by: INTERNAL MEDICINE

## 2019-12-03 PROCEDURE — 81001 URINALYSIS AUTO W/SCOPE: CPT | Performed by: INTERNAL MEDICINE

## 2019-12-03 PROCEDURE — 93000 ELECTROCARDIOGRAM COMPLETE: CPT | Performed by: INTERNAL MEDICINE

## 2019-12-03 PROCEDURE — 85025 COMPLETE CBC W/AUTO DIFF WBC: CPT | Performed by: INTERNAL MEDICINE

## 2019-12-03 PROCEDURE — 99214 OFFICE O/P EST MOD 30 MIN: CPT | Performed by: INTERNAL MEDICINE

## 2019-12-03 PROCEDURE — 90472 IMMUNIZATION ADMIN EACH ADD: CPT | Performed by: INTERNAL MEDICINE

## 2019-12-03 PROCEDURE — 80053 COMPREHEN METABOLIC PANEL: CPT | Performed by: INTERNAL MEDICINE

## 2019-12-03 PROCEDURE — 80061 LIPID PANEL: CPT | Performed by: INTERNAL MEDICINE

## 2019-12-03 PROCEDURE — 90686 IIV4 VACC NO PRSV 0.5 ML IM: CPT | Performed by: INTERNAL MEDICINE

## 2019-12-03 PROCEDURE — 82306 VITAMIN D 25 HYDROXY: CPT | Performed by: INTERNAL MEDICINE

## 2019-12-03 PROCEDURE — 99396 PREV VISIT EST AGE 40-64: CPT | Performed by: INTERNAL MEDICINE

## 2019-12-03 PROCEDURE — 90471 IMMUNIZATION ADMIN: CPT | Performed by: INTERNAL MEDICINE

## 2019-12-03 PROCEDURE — 84439 ASSAY OF FREE THYROXINE: CPT | Performed by: INTERNAL MEDICINE

## 2019-12-03 RX ORDER — ESCITALOPRAM OXALATE 10 MG/1
10 TABLET ORAL DAILY
Qty: 30 TABLET | Refills: 1 | Status: SHIPPED | OUTPATIENT
Start: 2019-12-03 | End: 2020-01-31 | Stop reason: SDUPTHER

## 2019-12-03 RX ORDER — OMEPRAZOLE 40 MG/1
40 CAPSULE, DELAYED RELEASE ORAL DAILY
Qty: 90 CAPSULE | Refills: 3 | Status: SHIPPED | OUTPATIENT
Start: 2019-12-03 | End: 2020-12-14 | Stop reason: SDUPTHER

## 2019-12-03 NOTE — PROGRESS NOTES
"Chief Complaint   Patient presents with   • Annual Exam   • Depression   • breast concerns       History of Present Illness  54 y.o.  woman presents for updated phys examination. Wondering about getting order for mammogram, stating currently scheduling out into 2020 but told if we ordered it, it would be quicker.  Reports change at the left nipple, more \"lumpy feeling\" than the right side with some indention.  No associated pain, specific nodules palpated, nipple discharge, or rash.    Feeling more blue during the season.  States that she cannot get off of duloxetine because it makes her feel suicidal and get terrible sweats.  Notes history of Lexapro as well as escitalopram.  Notes history of Paxil caused weight gain.    Complains of swelling at the right side of the neck for the last few weeks.  She notes TMJ jaw pain is flared up.  The right neck swollen area seems have increased in size but does not cause any associated pain.  No teeth problems or gum problems.  No fevers or chills.  No associated rash.    She states she has gotten down to 197lbs with intermittent fasting.     Review of Systems  Denies headaches, visual changes, CP, palpitations, SOB, cough, abd pain, n/v/d, difficulty with urination, numbness/tingling, falls, lightheadedness, hearing changes, rashes.    ROS (+) for right neck swelling as above with R. TMJ pains; denies mouth sores or gum changes.     ROS (+) for seasonal affective disorder but frustrated at not being able to get off of duloxetine due to s/e. Hx of other meds a noted.    ROS (+) for L. Breast/nipple changes as above without discharge, rash, nodules palpated, or pain    .Denies vaginal discharge of abnl bleeding.    All other ROS reviewed and negative.    [facility]  The following portions of the patient's history were reviewed and updated as appropriate: allergies, current medications, past family history, past medical history, past social history, past surgical history and " "problem list.      Current Outpatient Medications:   •  diphenhydrAMINE (BENADRYL) 50 MG qhs prn itch  •  DULoxetine (CYMBALTA) 20 MG QD  •  eletriptan (RELPAX) 40 MG tablet, prn  •  galcanezumab-gnlm (EMGALITY) 120 MG/ML monthly  •  levothyroxine (SYNTHROID, LEVOTHROID) 100 MCG QD  •  Melatonin 10 MG capsule qhs  •  naproxen sodium (ALEVE) 220 MG tablet bid prn  •  omeprazole (priLOSEC) 40 MG QD  •  ondansetron ODT (ZOFRAN-ODT) 4 MG prn  •  RiTUXimab (RITUXAN) 10 MG/ML solution injection q4 mos      VITALS:  /70   Pulse 72   Ht 167.6 cm (66\")   Wt 94.8 kg (209 lb)   SpO2 98%   BMI 33.73 kg/m²     Physical Exam   Constitutional: She is oriented to person, place, and time. She appears well-developed and well-nourished.   HENT:   Head: Normocephalic.   Right Ear: External ear normal.   Left Ear: External ear normal.   Nose: Nose normal.   Mouth/Throat: Oropharynx is clear and moist and mucous membranes are normal. No oropharyngeal exudate.   Eyes: Conjunctivae and EOM are normal. Pupils are equal, round, and reactive to light.   Neck: Normal range of motion. Neck supple. Carotid bruit is not present (bilaterally). No thyromegaly present.   Cardiovascular: Normal rate, regular rhythm and normal heart sounds.   Pulmonary/Chest: Effort normal and breath sounds normal. No respiratory distress. She has no wheezes. She has no rales.   Abdominal: Soft. Bowel sounds are normal. She exhibits no distension and no mass. There is no hepatosplenomegaly. There is no tenderness.   Genitourinary:   Genitourinary Comments: Breast exam with fibrocystic changes, incl at the outer mid aspects of bilat nipples L>R, no discrete nodules palpated and no nipple discharge; otherwise unremarkable without masses, skin changes, or axillary adenopathy.     Musculoskeletal: Normal range of motion. She exhibits no edema.   Lymphadenopathy:     She has no cervical adenopathy.   Neurological: She is alert and oriented to person, place, and " time. She displays normal reflexes. No cranial nerve deficit.   Skin: Skin is warm and dry. No rash noted.   Psychiatric: She has a normal mood and affect. Her behavior is normal.   Nursing note and vitals reviewed.      LABS  11/18 , vit D 24.1      ECG 12 Lead  Date/Time: 12/3/2019 10:53 AM  Performed by: Brie Jalloh MD  Authorized by: Brie Jalloh MD   Comparison: compared with previous ECG from 11/29/2018  Similar to previous ECG  Rhythm: sinus rhythm  Rate: normal  BPM: 68  Conduction: conduction normal  ST Segments: ST segments normal  T Waves: T waves normal  QRS axis: normal  Clinical impression comment: stable EKG              ASSESSMENT/PLAN  Problem List Items Addressed This Visit     Vitamin D deficiency     Update vit D level on no supplementation         PE (physical exam), routine - Primary     Health maintenance - flu vacc and HAV #2 given today; Prevnar 9/15; PVX 11/16; Tdap 2/11 (Td due 2021), rec Shingrix - but needs to ask since she is on rituxan; overdue for 6-mo f/u L. Dx mammo (bilat mammo due after 2/9/19); no further Paps s/p USMAN/BSO; nl DEXA 2/19, repeat 2022; colonosc 5/16, repeat 2021 per Dr. Robertson; eye exam to be updated; dental exam UTD q6 mos; (+) seat belt use         Relevant Orders    CBC Auto Differential (Completed)    Urinalysis without microscopic (no culture) - Urine, Clean Catch (Completed)    Urinalysis, Microscopic Only - Urine, Clean Catch (Completed)    Hypothyroidism     Update TFTs; on levothyroxine 100mcg QD - renew when results available         Hyperlipidemia     Update lipids with goal LDL < 130 (previously 158 in 11/18)         Relevant Orders    ECG 12 Lead    Gastroesophageal reflux disease     Stable on omeprazole 40mg QD #90, 3RF         Relevant Medications    omeprazole (priLOSEC) 40 MG capsule    Elevated LFTs     Needs f/u LFTs, note prev elevation but also with elevated lipids         Depression     Counseled patient regarding multimodal  approach with healthy nutrition, healthy sleep, regular physical activity, social activities, counseling, and medications.  Reviewed medication options and common side effects. Patient would like to proceed with changing duloxetine to escitalopram 10mg QD #30, 1RF; reviewed goals, risks, side effects, and how to take correctly; f/u in 4 wks but call with questions/concerns.           Relevant Medications    escitalopram (LEXAPRO) 10 MG tablet      Other Visit Diagnoses     Bilateral fibrocystic breast changes        increased nodular changes at outer nipple lat areas bilaterally; dx mammo ordered    Relevant Orders    Mammo Diagnostic Digital Tomosynthesis Bilateral With CAD        Time Documentation  Counseled patient  I spent 45 minutes minutes face to face with the patient and > 50 % of the time was spent counseling, coordinating care, answering questions, and discussing evaluation and treatment plans.  Counseling topics: diagnosis, lab results, treatment options, follow up plan and discussion of mental health issues      FOLLOW-UP  1. PE labs on the way out the door  2. RTC 1 month for f/u of change from duloxetine to escitalopram    Electronically signed by:    Brie Jalloh MD, FACP  12/03/2019    EMR Dragon/Transcription disclaimer:  Parts of this encounter note is an electronic transcription/translation of spoken language to printed text. Electronic translation of spoken language may permit erroneous, or at times, nonsensical words or phrases, to be inadvertently transcribed. Although I have reviewed the note for such errors, some may still exist.

## 2019-12-06 NOTE — ASSESSMENT & PLAN NOTE
Counseled patient regarding multimodal approach with healthy nutrition, healthy sleep, regular physical activity, social activities, counseling, and medications.  Reviewed medication options and common side effects. Patient would like to proceed with changing duloxetine to escitalopram 10mg QD #30, 1RF; reviewed goals, risks, side effects, and how to take correctly; f/u in 4 wks but call with questions/concerns.

## 2019-12-10 ENCOUNTER — PATIENT MESSAGE (OUTPATIENT)
Dept: INTERNAL MEDICINE | Facility: CLINIC | Age: 54
End: 2019-12-10

## 2019-12-10 ENCOUNTER — TELEPHONE (OUTPATIENT)
Dept: INTERNAL MEDICINE | Facility: CLINIC | Age: 54
End: 2019-12-10

## 2019-12-10 DIAGNOSIS — D64.9 ANEMIA, UNSPECIFIED TYPE: Primary | ICD-10-CM

## 2019-12-10 NOTE — PROGRESS NOTES
Stable liver, kidney, thyroid, vitamin D tests in addition to much improved cholesterol profile ( down to 115).    Concerning for new anemia. Unsure whether rituxan can cause this.  Would also make her more tired. Recommend repeat labs 1st - CBC, ferritin, iron panel, B12, folate, stool occult x3, reticulocyte count (nonfasting is ok)

## 2019-12-10 NOTE — TELEPHONE ENCOUNTER
----- Message from Brie Jalloh MD sent at 12/9/2019  9:32 PM EST -----  Stable liver, kidney, thyroid, vitamin D tests in addition to much improved cholesterol profile ( down to 115).    Concerning for new anemia. Unsure whether rituxan can cause this.  Would also make her more tired. Recommend repeat labs 1st - CBC, ferritin, iron panel, B12, folate, stool occult x3, reticulocyte count (nonfasting is ok)

## 2019-12-12 NOTE — TELEPHONE ENCOUNTER
From: Yanet Clifton  To: Brie Jalloh MD  Sent: 12/10/2019 6:06 PM EST  Subject: Test Results Question    [facility] Roma Kwan let me know that I need to do blood work and give a stool sample due to the anemia result of my bloodwork. I can't remember if I reported to you last year that my paternal uncle  last year of multiple myeloma, of which my paternal grandmother  as well. Just wanted to make sure you knew family history as anemia was their first symptom.

## 2019-12-18 ENCOUNTER — LAB (OUTPATIENT)
Dept: INTERNAL MEDICINE | Facility: CLINIC | Age: 54
End: 2019-12-18

## 2019-12-18 DIAGNOSIS — D64.9 ANEMIA, UNSPECIFIED TYPE: ICD-10-CM

## 2019-12-18 PROCEDURE — 85025 COMPLETE CBC W/AUTO DIFF WBC: CPT | Performed by: INTERNAL MEDICINE

## 2019-12-18 PROCEDURE — 82607 VITAMIN B-12: CPT | Performed by: INTERNAL MEDICINE

## 2019-12-18 PROCEDURE — 82728 ASSAY OF FERRITIN: CPT | Performed by: INTERNAL MEDICINE

## 2019-12-18 PROCEDURE — 84466 ASSAY OF TRANSFERRIN: CPT | Performed by: INTERNAL MEDICINE

## 2019-12-18 PROCEDURE — 85045 AUTOMATED RETICULOCYTE COUNT: CPT | Performed by: INTERNAL MEDICINE

## 2019-12-18 PROCEDURE — 84155 ASSAY OF PROTEIN SERUM: CPT | Performed by: INTERNAL MEDICINE

## 2019-12-18 PROCEDURE — 84165 PROTEIN E-PHORESIS SERUM: CPT | Performed by: INTERNAL MEDICINE

## 2019-12-18 PROCEDURE — 82746 ASSAY OF FOLIC ACID SERUM: CPT | Performed by: INTERNAL MEDICINE

## 2019-12-18 PROCEDURE — 83540 ASSAY OF IRON: CPT | Performed by: INTERNAL MEDICINE

## 2019-12-19 LAB
BASOPHILS # BLD AUTO: 0.04 10*3/MM3 (ref 0–0.2)
BASOPHILS NFR BLD AUTO: 0.7 % (ref 0–1.5)
DEPRECATED RDW RBC AUTO: 38.5 FL (ref 37–54)
EOSINOPHIL # BLD AUTO: 0.36 10*3/MM3 (ref 0–0.4)
EOSINOPHIL NFR BLD AUTO: 6.1 % (ref 0.3–6.2)
ERYTHROCYTE [DISTWIDTH] IN BLOOD BY AUTOMATED COUNT: 13.6 % (ref 12.3–15.4)
FERRITIN SERPL-MCNC: 17.4 NG/ML (ref 13–150)
FOLATE SERPL-MCNC: 18.3 NG/ML (ref 4.78–24.2)
HCT VFR BLD AUTO: 36.6 % (ref 34–46.6)
HGB BLD-MCNC: 11.6 G/DL (ref 12–15.9)
IMM GRANULOCYTES # BLD AUTO: 0.03 10*3/MM3 (ref 0–0.05)
IMM GRANULOCYTES NFR BLD AUTO: 0.5 % (ref 0–0.5)
IRON 24H UR-MRATE: 37 MCG/DL (ref 37–145)
IRON SATN MFR SERPL: 8 % (ref 20–50)
LYMPHOCYTES # BLD AUTO: 1.12 10*3/MM3 (ref 0.7–3.1)
LYMPHOCYTES NFR BLD AUTO: 19 % (ref 19.6–45.3)
MCH RBC QN AUTO: 25.3 PG (ref 26.6–33)
MCHC RBC AUTO-ENTMCNC: 31.7 G/DL (ref 31.5–35.7)
MCV RBC AUTO: 79.9 FL (ref 79–97)
MONOCYTES # BLD AUTO: 0.57 10*3/MM3 (ref 0.1–0.9)
MONOCYTES NFR BLD AUTO: 9.7 % (ref 5–12)
NEUTROPHILS # BLD AUTO: 3.78 10*3/MM3 (ref 1.7–7)
NEUTROPHILS NFR BLD AUTO: 64 % (ref 42.7–76)
NRBC BLD AUTO-RTO: 0 /100 WBC (ref 0–0.2)
PLATELET # BLD AUTO: 338 10*3/MM3 (ref 140–450)
PMV BLD AUTO: 11 FL (ref 6–12)
RBC # BLD AUTO: 4.58 10*6/MM3 (ref 3.77–5.28)
RETICS # AUTO: 0.07 10*6/MM3 (ref 0.02–0.13)
RETICS/RBC NFR AUTO: 1.52 % (ref 0.7–1.9)
TIBC SERPL-MCNC: 475 MCG/DL (ref 298–536)
TRANSFERRIN SERPL-MCNC: 319 MG/DL (ref 200–360)
VIT B12 BLD-MCNC: 549 PG/ML (ref 211–946)
WBC NRBC COR # BLD: 5.9 10*3/MM3 (ref 3.4–10.8)

## 2019-12-19 NOTE — TELEPHONE ENCOUNTER
Blood counts improved.  Normal B12 and folic acid levels.    Borderline low in iron stores.  Likely would benefit from iron supplement - ferrous sulfate 325mg QD

## 2019-12-20 LAB
ALBUMIN SERPL-MCNC: 3.6 G/DL (ref 2.9–4.4)
ALBUMIN/GLOB SERPL: 1.1 {RATIO} (ref 0.7–1.7)
ALPHA1 GLOB FLD ELPH-MCNC: 0.3 G/DL (ref 0–0.4)
ALPHA2 GLOB SERPL ELPH-MCNC: 0.9 G/DL (ref 0.4–1)
B-GLOBULIN SERPL ELPH-MCNC: 1.2 G/DL (ref 0.7–1.3)
GAMMA GLOB SERPL ELPH-MCNC: 0.9 G/DL (ref 0.4–1.8)
GLOBULIN SER CALC-MCNC: 3.3 G/DL (ref 2.2–3.9)
Lab: NORMAL
M-SPIKE: NORMAL G/DL
PROT PATTERN SERPL ELPH-IMP: NORMAL
PROT SERPL-MCNC: 6.9 G/DL (ref 6–8.5)

## 2020-01-03 ENCOUNTER — HOSPITAL ENCOUNTER (OUTPATIENT)
Dept: MAMMOGRAPHY | Facility: HOSPITAL | Age: 55
Discharge: HOME | End: 2020-01-03
Admitting: INTERNAL MEDICINE

## 2020-01-03 ENCOUNTER — HOSPITAL ENCOUNTER (OUTPATIENT)
Dept: ULTRASOUND IMAGING | Facility: HOSPITAL | Age: 55
Discharge: HOME | End: 2020-01-03

## 2020-01-03 DIAGNOSIS — N60.11 BILATERAL FIBROCYSTIC BREAST CHANGES: ICD-10-CM

## 2020-01-03 DIAGNOSIS — N60.12 BILATERAL FIBROCYSTIC BREAST CHANGES: ICD-10-CM

## 2020-01-03 PROCEDURE — 77066 DX MAMMO INCL CAD BI: CPT | Performed by: RADIOLOGY

## 2020-01-03 PROCEDURE — G0279 TOMOSYNTHESIS, MAMMO: HCPCS

## 2020-01-03 PROCEDURE — 76642 ULTRASOUND BREAST LIMITED: CPT | Performed by: RADIOLOGY

## 2020-01-03 PROCEDURE — 76642 ULTRASOUND BREAST LIMITED: CPT

## 2020-01-03 PROCEDURE — 77062 BREAST TOMOSYNTHESIS BI: CPT | Performed by: RADIOLOGY

## 2020-01-03 PROCEDURE — 77066 DX MAMMO INCL CAD BI: CPT

## 2020-01-30 ENCOUNTER — TELEPHONE (OUTPATIENT)
Dept: INTERNAL MEDICINE | Facility: CLINIC | Age: 55
End: 2020-01-30

## 2020-01-30 DIAGNOSIS — F33.1 MODERATE EPISODE OF RECURRENT MAJOR DEPRESSIVE DISORDER (HCC): ICD-10-CM

## 2020-01-30 RX ORDER — ESCITALOPRAM OXALATE 10 MG/1
10 TABLET ORAL DAILY
Qty: 30 TABLET | Refills: 1 | Status: CANCELLED | OUTPATIENT
Start: 2020-01-30

## 2020-01-30 NOTE — TELEPHONE ENCOUNTER
She needs to f/u to get further refills as we need to discuss efficacy after change of duloxetine to escitalopram.  We also do not want to stop escitalopram cold turkey.    Make appt within next 1-3weeks and ok for RX to give her enough until appt

## 2020-01-31 RX ORDER — ESCITALOPRAM OXALATE 10 MG/1
10 TABLET ORAL DAILY
Qty: 30 TABLET | Refills: 0 | Status: SHIPPED | OUTPATIENT
Start: 2020-01-31 | End: 2020-02-19 | Stop reason: SDUPTHER

## 2020-02-03 DIAGNOSIS — E03.9 ACQUIRED HYPOTHYROIDISM: ICD-10-CM

## 2020-02-03 RX ORDER — LEVOTHYROXINE SODIUM 0.1 MG/1
100 TABLET ORAL EVERY MORNING
Qty: 30 TABLET | Refills: 3 | OUTPATIENT
Start: 2020-02-03

## 2020-02-20 ENCOUNTER — OFFICE VISIT (OUTPATIENT)
Dept: INTERNAL MEDICINE | Facility: CLINIC | Age: 55
End: 2020-02-20

## 2020-02-20 ENCOUNTER — APPOINTMENT (OUTPATIENT)
Dept: LAB | Facility: HOSPITAL | Age: 55
End: 2020-02-20

## 2020-02-20 VITALS
HEART RATE: 71 BPM | HEIGHT: 66 IN | OXYGEN SATURATION: 98 % | WEIGHT: 214 LBS | SYSTOLIC BLOOD PRESSURE: 118 MMHG | BODY MASS INDEX: 34.39 KG/M2 | DIASTOLIC BLOOD PRESSURE: 74 MMHG

## 2020-02-20 DIAGNOSIS — E03.9 ACQUIRED HYPOTHYROIDISM: ICD-10-CM

## 2020-02-20 DIAGNOSIS — R19.7 DIARRHEA, UNSPECIFIED TYPE: ICD-10-CM

## 2020-02-20 DIAGNOSIS — D50.9 IRON DEFICIENCY ANEMIA, UNSPECIFIED IRON DEFICIENCY ANEMIA TYPE: ICD-10-CM

## 2020-02-20 DIAGNOSIS — F33.1 MODERATE EPISODE OF RECURRENT MAJOR DEPRESSIVE DISORDER (HCC): Primary | ICD-10-CM

## 2020-02-20 LAB
ANION GAP SERPL CALCULATED.3IONS-SCNC: 13.5 MMOL/L (ref 5–15)
BASOPHILS # BLD AUTO: 0.04 10*3/MM3 (ref 0–0.2)
BASOPHILS NFR BLD AUTO: 0.8 % (ref 0–1.5)
BUN BLD-MCNC: 14 MG/DL (ref 6–20)
BUN/CREAT SERPL: 16.1 (ref 7–25)
CALCIUM SPEC-SCNC: 9 MG/DL (ref 8.6–10.5)
CHLORIDE SERPL-SCNC: 106 MMOL/L (ref 98–107)
CO2 SERPL-SCNC: 25.5 MMOL/L (ref 22–29)
CREAT BLD-MCNC: 0.87 MG/DL (ref 0.57–1)
DEPRECATED RDW RBC AUTO: 48.5 FL (ref 37–54)
DEVELOPER EXPIRATION DATE: ABNORMAL
DEVELOPER EXPIRATION DATE: NORMAL
DEVELOPER LOT NUMBER: ABNORMAL
DEVELOPER LOT NUMBER: NORMAL
EOSINOPHIL # BLD AUTO: 0.26 10*3/MM3 (ref 0–0.4)
EOSINOPHIL NFR BLD AUTO: 5.2 % (ref 0.3–6.2)
ERYTHROCYTE [DISTWIDTH] IN BLOOD BY AUTOMATED COUNT: 15.9 % (ref 12.3–15.4)
EXPIRATION DATE: ABNORMAL
EXPIRATION DATE: NORMAL
FECAL OCCULT BLOOD SCREEN, POC: NEGATIVE
FECAL OCCULT BLOOD SCREEN, POC: POSITIVE
GFR SERPL CREATININE-BSD FRML MDRD: 68 ML/MIN/1.73
GLUCOSE BLD-MCNC: 100 MG/DL (ref 65–99)
HCT VFR BLD AUTO: 36.5 % (ref 34–46.6)
HGB BLD-MCNC: 11.6 G/DL (ref 12–15.9)
IMM GRANULOCYTES # BLD AUTO: 0.02 10*3/MM3 (ref 0–0.05)
IMM GRANULOCYTES NFR BLD AUTO: 0.4 % (ref 0–0.5)
LYMPHOCYTES # BLD AUTO: 1.16 10*3/MM3 (ref 0.7–3.1)
LYMPHOCYTES NFR BLD AUTO: 23.4 % (ref 19.6–45.3)
Lab: ABNORMAL
Lab: NORMAL
MCH RBC QN AUTO: 26.4 PG (ref 26.6–33)
MCHC RBC AUTO-ENTMCNC: 31.8 G/DL (ref 31.5–35.7)
MCV RBC AUTO: 83.1 FL (ref 79–97)
MONOCYTES # BLD AUTO: 0.56 10*3/MM3 (ref 0.1–0.9)
MONOCYTES NFR BLD AUTO: 11.3 % (ref 5–12)
NEGATIVE CONTROL: NEGATIVE
NEGATIVE CONTROL: NEGATIVE
NEUTROPHILS # BLD AUTO: 2.92 10*3/MM3 (ref 1.7–7)
NEUTROPHILS NFR BLD AUTO: 58.9 % (ref 42.7–76)
NRBC BLD AUTO-RTO: 0 /100 WBC (ref 0–0.2)
PLATELET # BLD AUTO: 266 10*3/MM3 (ref 140–450)
PMV BLD AUTO: 10.7 FL (ref 6–12)
POSITIVE CONTROL: POSITIVE
POSITIVE CONTROL: POSITIVE
POTASSIUM BLD-SCNC: 3.8 MMOL/L (ref 3.5–5.2)
RBC # BLD AUTO: 4.39 10*6/MM3 (ref 3.77–5.28)
SODIUM BLD-SCNC: 145 MMOL/L (ref 136–145)
WBC NRBC COR # BLD: 4.96 10*3/MM3 (ref 3.4–10.8)

## 2020-02-20 PROCEDURE — 86255 FLUORESCENT ANTIBODY SCREEN: CPT | Performed by: INTERNAL MEDICINE

## 2020-02-20 PROCEDURE — 85025 COMPLETE CBC W/AUTO DIFF WBC: CPT | Performed by: INTERNAL MEDICINE

## 2020-02-20 PROCEDURE — 83516 IMMUNOASSAY NONANTIBODY: CPT | Performed by: INTERNAL MEDICINE

## 2020-02-20 PROCEDURE — 99214 OFFICE O/P EST MOD 30 MIN: CPT | Performed by: INTERNAL MEDICINE

## 2020-02-20 PROCEDURE — 80048 BASIC METABOLIC PNL TOTAL CA: CPT | Performed by: INTERNAL MEDICINE

## 2020-02-20 PROCEDURE — 82784 ASSAY IGA/IGD/IGG/IGM EACH: CPT | Performed by: INTERNAL MEDICINE

## 2020-02-20 PROCEDURE — 82270 OCCULT BLOOD FECES: CPT | Performed by: INTERNAL MEDICINE

## 2020-02-20 RX ORDER — LEVOTHYROXINE SODIUM 0.1 MG/1
100 TABLET ORAL EVERY MORNING
Qty: 90 TABLET | Refills: 2 | Status: SHIPPED | OUTPATIENT
Start: 2020-02-20 | End: 2020-12-02

## 2020-02-20 RX ORDER — ESCITALOPRAM OXALATE 10 MG/1
10 TABLET ORAL DAILY
Qty: 30 TABLET | Refills: 0 | Status: SHIPPED | OUTPATIENT
Start: 2020-02-20 | End: 2020-05-01 | Stop reason: SINTOL

## 2020-02-20 NOTE — PROGRESS NOTES
Chief Complaint   Patient presents with   • Depression   • Hypothyroidism   • Diarrhea       History of Present Illness  54 y.o.  woman presents for follow-up on escitalopram. Notes weight gain immediately with change of duloxetine to escitalopram.  Has no energy but unsure effectiveness on mood.    Reports worsening diarrhea, loose and watery, nonbloody, ongoing 3-4 months.  Every BM is abnormal.  Has nausea and occ abd pain, no vomiting.  Not necessarily assoc'd or relieved with BM.  Stools are dark but taking iron supplementation. Has occ lightheadedness; not consistently without associated symptoms including no chest pain, confusion, dizziness, shortness of breath.    Interested in getting off of escitalopram and not substituting with any other medication at this time.  Has not utilized bupropion in the past.  Reports diarrhea started before Lexapro was started.      Review of Systems  ROS (+) for progressively worsening diarrhea, sometimes loose and sometimes mushy, nonbloody.  Associated with dark discoloration, attributed to iron as well as decreased energy.  Notes occasional abdominal pains with nausea but no vomiting.  Has not eaten anything out of the ordinary.  Has not traveled anywhere for the last 3 months nor taken any antibiotics.     Complains of fatigue.  Occasional lightheadedness that is not positional and has no associated symptoms.  Denies any chest pain, palpitation, shortness of breath.     ROS (+) for worsened weight gain on escitalopram.  All other ROS reviewed and negative.    [facility]  The following portions of the patient's history were reviewed and updated as appropriate: allergies, current medications, past family history, past medical history, past social history, past surgical history and problem list.      Current Outpatient Medications:   •  diphenhydrAMINE (BENADRYL) 50 MG QHS prn  •  eletriptan (RELPAX) 40 MG tablet, prn  •  escitalopram (LEXAPRO) 10 MG QD  •   "galcanezumab-gnlm (EMGALITY) 120 MG/ML prefilled monthly  •  levothyroxine (SYNTHROID, LEVOTHROID) 100 MCG QD  •  Melatonin 10 MG QHS  •  naproxen sodium (ALEVE) 220 MG tablet, BID prn  •  omeprazole (priLOSEC) 40 MG QD  •  ondansetron ODT (ZOFRAN-ODT) 4 MG q8 prn  •  RiTUXimab (RITUXAN) 10 MG/ML solution injection q4 mos      VITALS:  /74   Pulse 71   Ht 167.6 cm (66\")   Wt 97.1 kg (214 lb)   SpO2 98%   BMI 34.54 kg/m²     Physical Exam   Constitutional: She is oriented to person, place, and time. She appears well-developed and well-nourished.   Up 5 lbs over last 6 weeks   Eyes: Conjunctivae and EOM are normal.   Cardiovascular: Normal rate, regular rhythm and normal heart sounds.   Pulmonary/Chest: Effort normal and breath sounds normal. No respiratory distress. She has no wheezes. She has no rales.   Abdominal: Soft. Bowel sounds are normal. She exhibits no distension. There is no tenderness. There is no guarding.   Neurological: She is alert and oriented to person, place, and time.   Skin: Skin is warm and dry.   Psychiatric: She has a normal mood and affect. Her behavior is normal.   Nursing note and vitals reviewed.      LABS  Results for orders placed or performed in visit on 02/20/20   POCT occult blood x 1 stool   Result Value Ref Range    Fecal Occult Blood Positive (A) Negative    Lot Number [id number]     Expiration Date 04/30/2021     DEVELOPER LOT NUMBER [id number]     DEVELOPER EXPIRATION DATE 04/30/2021     Positive Control Positive Positive    Negative Control Negative Negative   POCT occult blood x 1 stool   Result Value Ref Range    Fecal Occult Blood Negative Negative    Lot Number [id number]     Expiration Date /30/2021     DEVELOPER LOT NUMBER [id number]     DEVELOPER EXPIRATION DATE 04/30/2021     Positive Control Positive Positive    Negative Control Negative Negative     12/19 TSH 2.73    ASSESSMENT/PLAN  Problem List Items Addressed This Visit     Hypothyroidism     12/19 euthyroid labs; " cont levothyroxine 100mcg QD #90, 2RF; at the higher end of normal, this thyroid is not causing her diarrhea         Relevant Medications    levothyroxine (SYNTHROID, LEVOTHROID) 100 MCG tablet    Diarrhea     Progressively worsened over the last 3-4 months per patient; has been taking iron due to work-up for mild iron deficiency, thus this could be causing the darkened discoloration of the stool; she has brought back to stool guaiac samples today, which will be processed; no associated med changes to cause diarrhea symptoms started before change of duloxetine to escitalopram; still, it could be an SSRI/SNRI side effect; check stool studies, C. Diff, celiac disease panel in the interim; also reports no new foods/diet, no recent travel, no recent antibiotics; if stool studies are negative and if all work-up otherwise unremarkable, she will need to proceed with diagnostic colonoscopy for further evaluation         Relevant Orders    CBC & Differential    Clostridium Difficile Toxin - Stool, Per Rectum    Fecal Leukocytes - Stool, Per Rectum    Stool Culture With Yersinia - Stool, Per Rectum    Ova & Parasite Examination - Stool, Per Rectum    Fecal Fat, Qualitative - Stool, Per Rectum    Basic Metabolic Panel    Celiac Comprehensive Panel    CBC Auto Differential    POCT occult blood x 1 stool (Completed)    POCT occult blood x 1 stool (Completed)    Depression - Primary     Unsure of efficacy due to fatigue but concerned with immediate weight gain on escitalopram 10mg QD; discussed med options include SSRIs versus SNR eyes versus bupropion; patient would like to wean off of escitalopram and not take anything else for depression at this time; recommend that she utilize escitalopram 10 mg #30, 0RF - 1/2 QD x 2 wks, then 1/2 QOD x 2 wks, then 1/2 q3d x 2 wks, then stop as she reports difficulty weaning off of duloxetine         Relevant Medications    escitalopram (LEXAPRO) 10 MG tablet    Anemia     Reviewed normal  TIBC but decreased % sat and ferritin 17; on iron supplementation currently; repeat CBC with complaints if increased fatigue; note new dev't of progressively worsening diarrhea over the last 3-4 mos; she will turn in stool cards x2 to the lab today; ADDENDUM: 1 of 2 are positive               FOLLOW-UP  1. Health maintenance - flu  12/19; rec Shingrix vaccine - look into insurance coverage  2. RTC depending on workup of anemia and diarrhea  3. Next PE 12/15/20; fasting labs prior to appt (CBC, CMP, TSH, lipids, UA/micr, FT4, vit D)    Electronically signed by:    Brie Jalloh MD, FACP  02/20/2020

## 2020-02-20 NOTE — ASSESSMENT & PLAN NOTE
Progressively worsened over the last 3-4 months per patient; has been taking iron due to work-up for mild iron deficiency, thus this could be causing the darkened discoloration of the stool; she has brought back to stool guaiac samples today, which will be processed; no associated med changes to cause diarrhea symptoms started before change of duloxetine to escitalopram; still, it could be an SSRI/SNRI side effect; check stool studies, C. Diff, celiac disease panel in the interim; also reports no new foods/diet, no recent travel, no recent antibiotics; if stool studies are negative and if all work-up otherwise unremarkable, she will need to proceed with diagnostic colonoscopy for further evaluation

## 2020-02-20 NOTE — ASSESSMENT & PLAN NOTE
Reviewed normal TIBC but decreased % sat and ferritin 17; on iron supplementation currently; repeat CBC with complaints if increased fatigue; note new dev't of progressively worsening diarrhea over the last 3-4 mos; she will turn in stool cards x2 to the lab today; ADDENDUM: 1 of 2 are positive

## 2020-02-20 NOTE — ASSESSMENT & PLAN NOTE
Unsure of efficacy due to fatigue but concerned with immediate weight gain on escitalopram 10mg QD; discussed med options include SSRIs versus SNR eyes versus bupropion; patient would like to wean off of escitalopram and not take anything else for depression at this time; recommend that she utilize escitalopram 10 mg #30, 0RF - 1/2 QD x 2 wks, then 1/2 QOD x 2 wks, then 1/2 q3d x 2 wks, then stop as she reports difficulty weaning off of duloxetine

## 2020-02-20 NOTE — ASSESSMENT & PLAN NOTE
12/19 euthyroid labs; cont levothyroxine 100mcg QD #90, 2RF; at the higher end of normal, this thyroid is not causing her diarrhea

## 2020-02-21 LAB
ENDOMYSIUM IGA SER QL: NEGATIVE
GLIADIN PEPTIDE IGA SER-ACNC: 2 UNITS (ref 0–19)
GLIADIN PEPTIDE IGG SER-ACNC: 1 UNITS (ref 0–19)
IGA SERPL-MCNC: 70 MG/DL (ref 87–352)
TTG IGA SER-ACNC: <2 U/ML (ref 0–3)
TTG IGG SER-ACNC: <2 U/ML (ref 0–5)

## 2020-02-21 NOTE — PROGRESS NOTES
Electrolytes incl potassium and kidney function normal. Blood counts stable - at lower end of normal, unchanged from last time. Stool tests 1 out of 2 is positive for blood.    Wean off of escitalopram as discussed.  Await stool studies.

## 2020-03-02 ENCOUNTER — LAB (OUTPATIENT)
Dept: LAB | Facility: HOSPITAL | Age: 55
End: 2020-03-02

## 2020-03-02 DIAGNOSIS — R19.7 DIARRHEA, UNSPECIFIED TYPE: Primary | ICD-10-CM

## 2020-03-02 LAB
C DIFF TOX GENS STL QL NAA+PROBE: NOT DETECTED
WBC STL QL MICRO: NORMAL

## 2020-03-02 PROCEDURE — 87046 STOOL CULTR AEROBIC BACT EA: CPT | Performed by: INTERNAL MEDICINE

## 2020-03-02 PROCEDURE — 89125 SPECIMEN FAT STAIN: CPT | Performed by: INTERNAL MEDICINE

## 2020-03-02 PROCEDURE — 87045 FECES CULTURE AEROBIC BACT: CPT | Performed by: INTERNAL MEDICINE

## 2020-03-02 PROCEDURE — 87427 SHIGA-LIKE TOXIN AG IA: CPT | Performed by: INTERNAL MEDICINE

## 2020-03-02 PROCEDURE — 87177 OVA AND PARASITES SMEARS: CPT | Performed by: INTERNAL MEDICINE

## 2020-03-02 PROCEDURE — 87205 SMEAR GRAM STAIN: CPT | Performed by: INTERNAL MEDICINE

## 2020-03-02 PROCEDURE — 87209 SMEAR COMPLEX STAIN: CPT | Performed by: INTERNAL MEDICINE

## 2020-03-02 PROCEDURE — 87493 C DIFF AMPLIFIED PROBE: CPT | Performed by: INTERNAL MEDICINE

## 2020-03-03 LAB
FATTY ACIDS: NORMAL
NEUTRAL FATS: NORMAL

## 2020-03-05 LAB
O+P SPEC MICRO: NORMAL
O+P STL TRI STN: NORMAL

## 2020-03-07 LAB
CAMPYLOBACTER STL CULT: NORMAL
E COLI SXT STL QL IA: NEGATIVE
Lab: NORMAL
Lab: NORMAL
SALM + SHIG STL CULT: NORMAL
YERSINIA SPEC CULT: NORMAL
YERSINIA SPEC CULT: NORMAL

## 2020-03-10 ENCOUNTER — TELEPHONE (OUTPATIENT)
Dept: INTERNAL MEDICINE | Facility: CLINIC | Age: 55
End: 2020-03-10

## 2020-03-10 DIAGNOSIS — R19.7 DIARRHEA, UNSPECIFIED TYPE: Primary | ICD-10-CM

## 2020-03-10 NOTE — TELEPHONE ENCOUNTER
Patient called in regards to her test results indicated the need for a colonoscopy and stated that she would prefer to do that sooner rather than later.  Please return call and advise.

## 2020-03-17 ENCOUNTER — PATIENT MESSAGE (OUTPATIENT)
Dept: INTERNAL MEDICINE | Facility: CLINIC | Age: 55
End: 2020-03-17

## 2020-03-20 ENCOUNTER — PATIENT MESSAGE (OUTPATIENT)
Dept: INTERNAL MEDICINE | Facility: CLINIC | Age: 55
End: 2020-03-20

## 2020-04-03 ENCOUNTER — PATIENT MESSAGE (OUTPATIENT)
Dept: INTERNAL MEDICINE | Facility: CLINIC | Age: 55
End: 2020-04-03

## 2020-04-03 DIAGNOSIS — Z12.11 SCREENING FOR COLON CANCER: Primary | ICD-10-CM

## 2020-04-08 NOTE — TELEPHONE ENCOUNTER
"From: Yanet Clifton  To: Brie Jalloh MD  Sent: 4/3/2020 2:10 PM EDT  Subject: Non-Urgent Medical Question    Hello Dr. Jalloh,   I just want to make sure I'm doing what I need to in regard to my gastrointestinal issues. I'm still having diarrhea all day long everyday. My stomach is now uncomfortably bloated and tight most of the time, and I've had some bouts of vomiting in the last two weeks. I'm still taking iron but still fatigued. I imagine this is probably not cancer, (Crohns or polyps, etc), but if it is, how long can I wait to get a colonoscopy before it progresses? Both of my grandfathers  of colon cancer, so it is more on my radar. Dr. Robertson's office said that elective \"surgeries\" are being put on hold due to the virus. I do understand that, but is this considered elective if there may be a serious issue?     Thank you so much for your time. Stay well!  --Nancy Clifton  "

## 2020-05-26 ENCOUNTER — TELEPHONE (OUTPATIENT)
Dept: INTERNAL MEDICINE | Facility: CLINIC | Age: 55
End: 2020-05-26

## 2020-05-26 DIAGNOSIS — F33.1 MODERATE EPISODE OF RECURRENT MAJOR DEPRESSIVE DISORDER (HCC): ICD-10-CM

## 2020-05-26 RX ORDER — BUPROPION HYDROCHLORIDE 300 MG/1
300 TABLET ORAL DAILY
Qty: 30 TABLET | Refills: 1 | Status: CANCELLED | OUTPATIENT
Start: 2020-05-26

## 2020-05-26 NOTE — TELEPHONE ENCOUNTER
PATIENT IS REQUESTING A REFILL ON  buPROPion XL (WELLBUTRIN XL) 300 MG 24 hr tablet    PATIENT HAD TO CANCEL HER APPT ON Friday WITH DR STOVER BECAUSE SHE HAS CHEMO INFUSION FOR THAT SAME DAY.  PATIENT STATES THE APPT WAS FOR A FOLLOWUP  TO SEE HOW SHE WAS DOING ON THE WELLBUTRIN. PATIENT STATES SHE IS DOING GREAT ON IT AND HAS NO SIDE EFFECTS.  PATIENT IS REQUESTING A REFILL BE CALLED BEFORE Friday BECAUSE SHE WILL BE OUT OF THIS MEDICATION. THERE WERE NO AVAILABLE APPOINTMENTS WITH DR STOVER IN THE MEANTIME.    PHARMACY VERIFIED AS:  [id number] [address] AT [facility] - [phone] [facility] [phone] FX     PLEASE CALL PATIENT AND CONFIRM.  [phone]

## 2020-05-27 RX ORDER — BUPROPION HYDROCHLORIDE 300 MG/1
300 TABLET ORAL DAILY
Qty: 7 TABLET | Refills: 0 | Status: SHIPPED | OUTPATIENT
Start: 2020-05-27 | End: 2020-05-31 | Stop reason: SDUPTHER

## 2020-06-01 NOTE — ASSESSMENT & PLAN NOTE
Neg stool studies in 3/2020; actually resolved after initiating bupropion  mg; therefore, no acute need to pursue colonoscopy at this time

## 2020-06-02 ENCOUNTER — OFFICE VISIT (OUTPATIENT)
Dept: INTERNAL MEDICINE | Facility: CLINIC | Age: 55
End: 2020-06-02

## 2020-06-02 VITALS
WEIGHT: 213 LBS | HEART RATE: 90 BPM | OXYGEN SATURATION: 99 % | BODY MASS INDEX: 34.23 KG/M2 | DIASTOLIC BLOOD PRESSURE: 82 MMHG | SYSTOLIC BLOOD PRESSURE: 124 MMHG | HEIGHT: 66 IN

## 2020-06-02 DIAGNOSIS — R19.7 DIARRHEA, UNSPECIFIED TYPE: ICD-10-CM

## 2020-06-02 DIAGNOSIS — F33.1 MODERATE EPISODE OF RECURRENT MAJOR DEPRESSIVE DISORDER (HCC): Primary | ICD-10-CM

## 2020-06-02 DIAGNOSIS — K59.03 DRUG-INDUCED CONSTIPATION: ICD-10-CM

## 2020-06-02 PROCEDURE — 99214 OFFICE O/P EST MOD 30 MIN: CPT | Performed by: INTERNAL MEDICINE

## 2020-06-02 RX ORDER — BUPROPION HYDROCHLORIDE 300 MG/1
300 TABLET ORAL DAILY
Qty: 90 TABLET | Refills: 1 | Status: SHIPPED | OUTPATIENT
Start: 2020-06-02 | End: 2020-09-25 | Stop reason: SDUPTHER

## 2020-06-02 NOTE — PROGRESS NOTES
"Chief Complaint   Patient presents with   • Depression       History of Present Illness  54 y.o.  woman presents for follow-up after initiation of bupropion.  Seems to give her more energy and mood is stable.      Reports Dr. Robertson did not want to do colonoscopy because of low risk as well as COVID-19 restrictions.  Interestingly after patient started the bupropion, diarrhea has resolved.  In fact she is now constipated.  She took a Dulcolax yesterday and is planning to start daily MiraLAX tomorrow.  Notes slowed bowel movements, not hard stools.  No blood in the stool.  Again no further diarrhea.    Review of Systems  ROS (+) for improved mood and increased energy.  Diarrhea resolved.  No constipation with infrequent BMs.  No melena or hematochezia.  No abdominal pain, nausea or vomiting.  All other ROS reviewed and negative.    [facility]  The following portions of the patient's history were reviewed and updated as appropriate: allergies, current medications, past family history, past medical history, past social history, past surgical history and problem list.    Current Outpatient Medications:   •  buPROPion XL (WELLBUTRIN XL) 300 MG QD  •  diphenhydrAMINE (BENADRYL) 50 MG capsule prn itching  •  eletriptan (RELPAX) 40 MG tablet, , Disp: , Rfl:   •  levothyroxine (SYNTHROID, LEVOTHROID) 100 MCG QD  •  Melatonin 10 MG capsule QHS  •  naproxen sodium (ALEVE) 220 MG tablet, BID prn  •  omeprazole (priLOSEC) 40 MG QD  •  ondansetron ODT (ZOFRAN-ODT) 4 MG prn  •  RiTUXimab (RITUXAN) 10 MG/ML solution injection q4 mos    VITALS:  /82   Pulse 90   Ht 167.6 cm (66\")   Wt 96.6 kg (213 lb)   SpO2 99%   BMI 34.38 kg/m²     Physical Exam   Constitutional: She is oriented to person, place, and time. She appears well-developed and well-nourished.   Wt unchanged for last 4 mos   Eyes: Conjunctivae and EOM are normal.   Cardiovascular: Normal rate, regular rhythm and normal heart sounds.   Pulmonary/Chest: " Effort normal and breath sounds normal. No respiratory distress.   Abdominal: Soft. Bowel sounds are normal.   Neurological: She is alert and oriented to person, place, and time.   Psychiatric: She has a normal mood and affect. Her behavior is normal. Thought content normal.   Nursing note and vitals reviewed.      LABS  Results for orders placed or performed in visit on 03/02/20   Stool + Yersinia Culture - Stool, Per Rectum   Result Value Ref Range    Salmonella/Shigella Screen Final report     Result 1 Comment     Campylobacter Culture Final report     Result 1 Comment     E coli, Shiga toxin Assay Negative Negative    Yersinia enterocolitica Final report     Yersinia Stool, Result 1 No Yersinia isolated    Fecal Fat, Qualitative - Stool, Per Rectum   Result Value Ref Range    Fecal Fats, Qualitative, Fatty Acids 0-100 fat droplets / hpf 0-100 fat droplets / hpf    Fecal Fat Qualitative, Neutral Fats 0-50 fat droplets / hpf 0-50 fat droplets / hpf       ASSESSMENT/PLAN  Problem List Items Addressed This Visit        Digestive    Diarrhea     Neg stool studies in 3/2020; actually resolved after initiating bupropion  mg; therefore, no acute need to pursue colonoscopy at this time         Constipation     Unsure with a side effect of bupropion XL 300mg QD; recommend daily fiber supplement and increase water intake; agree with trial of MiraLAX 1 cup QD; f/u prn            Other    Depression - Primary     Mood is stable on bupropion XL 300mg QD #90, 1RF         Relevant Medications    buPROPion XL (WELLBUTRIN XL) 300 MG 24 hr tablet          FOLLOW-UP  RTC  for next PE 12/15/20; fasting labs prior to appt (CBC, CMP, TSH, lipids, UA/micr, FT4, vit D)    Electronically signed by:    Brie Jalloh MD, FACP  06/02/2020    EMR Dragon/Transcription disclaimer:  Parts of this encounter note is an electronic transcription/translation of spoken language to printed text. Electronic translation of spoken language may  permit erroneous, or at times, nonsensical words or phrases, to be inadvertently transcribed. Although I have reviewed the note for such errors, some may still exist.

## 2020-06-02 NOTE — ASSESSMENT & PLAN NOTE
Unsure with a side effect of bupropion XL 300mg QD; recommend daily fiber supplement and increase water intake; agree with trial of MiraLAX 1 cup QD; f/u prn

## 2020-07-10 ENCOUNTER — OFFICE VISIT (OUTPATIENT)
Dept: INTERNAL MEDICINE | Facility: CLINIC | Age: 55
End: 2020-07-10

## 2020-07-10 VITALS
HEART RATE: 83 BPM | SYSTOLIC BLOOD PRESSURE: 124 MMHG | HEIGHT: 66 IN | WEIGHT: 210 LBS | OXYGEN SATURATION: 98 % | DIASTOLIC BLOOD PRESSURE: 76 MMHG | BODY MASS INDEX: 33.75 KG/M2

## 2020-07-10 DIAGNOSIS — N39.0 URINARY TRACT INFECTION WITHOUT HEMATURIA, SITE UNSPECIFIED: Primary | ICD-10-CM

## 2020-07-10 LAB
BILIRUB BLD-MCNC: NEGATIVE MG/DL
CLARITY, POC: CLEAR
COLOR UR: YELLOW
GLUCOSE UR STRIP-MCNC: NEGATIVE MG/DL
KETONES UR QL: NEGATIVE
LEUKOCYTE EST, POC: ABNORMAL
NITRITE UR-MCNC: NEGATIVE MG/ML
PH UR: 6 [PH] (ref 5–8)
PROT UR STRIP-MCNC: ABNORMAL MG/DL
RBC # UR STRIP: ABNORMAL /UL
SP GR UR: 1.02 (ref 1–1.03)
UROBILINOGEN UR QL: NORMAL

## 2020-07-10 PROCEDURE — 99213 OFFICE O/P EST LOW 20 MIN: CPT | Performed by: PHYSICIAN ASSISTANT

## 2020-07-10 PROCEDURE — 81003 URINALYSIS AUTO W/O SCOPE: CPT | Performed by: PHYSICIAN ASSISTANT

## 2020-07-10 RX ORDER — CIPROFLOXACIN 500 MG/1
500 TABLET, FILM COATED ORAL 2 TIMES DAILY
Qty: 10 TABLET | Refills: 0 | Status: SHIPPED | OUTPATIENT
Start: 2020-07-10 | End: 2020-07-15

## 2020-07-10 NOTE — PROGRESS NOTES
Chief Complaint   Patient presents with   • Urinary Tract Infection   • Difficulty Urinating   • Flank Pain       Subjective   Yanet Clifton is a 55 y.o. female.       History of Present Illness     Pt developed some urinary urgency that has now progressed to cramping and urgency with urination. Some left lower back pain. No hematuria. Has pylenephritis before. No fever or nausea or vomiting. Diarrhea today.      Current Outpatient Medications:   •  buPROPion XL (WELLBUTRIN XL) 300 MG 24 hr tablet, Take 1 tablet by mouth Daily., Disp: 90 tablet, Rfl: 1  •  diphenhydrAMINE (BENADRYL) 50 MG capsule, Take 50 mg by mouth At Night As Needed for Itching., Disp: , Rfl:   •  eletriptan (RELPAX) 40 MG tablet, , Disp: , Rfl:   •  levothyroxine (SYNTHROID, LEVOTHROID) 100 MCG tablet, Take 1 tablet by mouth Every Morning., Disp: 90 tablet, Rfl: 2  •  Melatonin 10 MG capsule, Take 10 mg by mouth Daily., Disp: , Rfl:   •  naproxen sodium (ALEVE) 220 MG tablet, Take 220 mg by mouth 2 (Two) Times a Day As Needed for mild pain (1-3)., Disp: , Rfl:   •  omeprazole (priLOSEC) 40 MG capsule, Take 1 capsule by mouth Daily., Disp: 90 capsule, Rfl: 3  •  ondansetron ODT (ZOFRAN-ODT) 4 MG disintegrating tablet, Take 1 tablet by mouth Every 8 (Eight) Hours As Needed for Nausea or Vomiting., Disp: 20 tablet, Rfl: 0  •  RiTUXimab (RITUXAN) 10 MG/ML solution injection, Infuse  into a venous catheter Every 4 (Four) Months., Disp: , Rfl:   •  ciprofloxacin (Cipro) 500 MG tablet, Take 1 tablet by mouth 2 (Two) Times a Day., Disp: 10 tablet, Rfl: 0     PMFSH  The following portions of the patient's history were reviewed and updated as appropriate: allergies, current medications, past family history, past medical history, past social history, past surgical history and problem list.    Review of Systems   Constitutional: Negative for appetite change, chills, fever and unexpected weight change.   HENT: Negative.    Eyes: Negative.    Respiratory:  "Negative for chest tightness, shortness of breath and wheezing.    Cardiovascular: Negative for chest pain and palpitations.   Gastrointestinal: Negative for abdominal distention, abdominal pain and vomiting.   Endocrine: Negative.    Genitourinary: Positive for difficulty urinating, dysuria, frequency and urgency. Negative for genital sores, hematuria, menstrual problem, pelvic pain, vaginal bleeding, vaginal discharge and vaginal pain.   Musculoskeletal: Negative for joint swelling.   Skin: Negative for color change and rash.   Allergic/Immunologic: Negative.    Neurological: Negative for seizures, syncope and numbness.   Hematological: Negative for adenopathy. Does not bruise/bleed easily.   Psychiatric/Behavioral: Negative for confusion and decreased concentration.       Objective   /76   Pulse 83   Ht 167.6 cm (66\")   Wt 95.3 kg (210 lb)   SpO2 98%   BMI 33.89 kg/m²     Physical Exam   Constitutional: She appears well-developed and well-nourished.   HENT:   Head: Normocephalic and atraumatic.   Right Ear: External ear normal.   Left Ear: External ear normal.   Eyes: Conjunctivae are normal.   Neck: Normal range of motion.   Cardiovascular: Normal rate and regular rhythm.   Pulmonary/Chest: Effort normal and breath sounds normal.   Abdominal: Soft. Normal appearance. There is no tenderness. There is no CVA tenderness.   Musculoskeletal: Normal range of motion.   Skin: Skin is warm and dry.   Psychiatric: She has a normal mood and affect. Her behavior is normal.   Nursing note and vitals reviewed.      Results for orders placed or performed in visit on 07/10/20   POC Urinalysis Dipstick, Automated   Result Value Ref Range    Color Yellow Yellow, Straw, Dark Yellow, Leonie    Clarity, UA Clear Clear    Specific Gravity  1.020 1.005 - 1.030    pH, Urine 6.0 5.0 - 8.0    Leukocytes Trace (A) Negative    Nitrite, UA Negative Negative    Protein, POC Trace (A) Negative mg/dL    Glucose, UA Negative Negative, " 1000 mg/dL (3+) mg/dL    Ketones, UA Negative Negative    Urobilinogen, UA Normal Normal    Bilirubin Negative Negative    Blood, UA Trace (A) Negative        ASSESSMENT/PLAN    Problem List Items Addressed This Visit     None      Visit Diagnoses     Urinary tract infection without hematuria, site unspecified    -  Primary    Send urine for culture. Treat with cipro (allergic to macrobid and sulfa) due to history of pyelonephritis. Increase water intake. Further recs based on culture    Relevant Medications    ciprofloxacin (Cipro) 500 MG tablet    Other Relevant Orders    POC Urinalysis Dipstick, Automated (Completed)    Urine Culture - Urine, Urine, Clean Catch               Return if symptoms worsen or fail to improve.

## 2020-07-13 ENCOUNTER — TELEPHONE (OUTPATIENT)
Dept: INTERNAL MEDICINE | Facility: CLINIC | Age: 55
End: 2020-07-13

## 2020-07-14 NOTE — TELEPHONE ENCOUNTER
Looks like this patient's urine culture did not get sent. Could someone please let her know and see how she is feeling? If still symptomatic, we can collect another urine.

## 2020-07-14 NOTE — TELEPHONE ENCOUNTER
Called patient and patient states UTI symptoms are better and going away. Advised to call office if symptoms return.

## 2020-09-22 ENCOUNTER — PATIENT MESSAGE (OUTPATIENT)
Dept: INTERNAL MEDICINE | Facility: CLINIC | Age: 55
End: 2020-09-22

## 2020-09-22 DIAGNOSIS — E03.9 ACQUIRED HYPOTHYROIDISM: Primary | ICD-10-CM

## 2020-09-22 NOTE — TELEPHONE ENCOUNTER
From: Yanet Clifton  To: Brie Jalloh MD  Sent: 9/22/2020 10:24 AM EDT  Subject: Non-Urgent Medical Question    Fernando Jalloh,  I would like to know if I can get a thyroid panel done at the lab. I'm still taking Synthroid daily, but I feel like my thyroid has stalled. I have changed my lifestyle considerably in the last 6 weeks-eating 1200 calories (measuring and keeping diary) and being VERY active 2-4 hours 6 days a week. My weight has been meandering between 206 and 199 this whole time. It's driving me nuts! Thank you for your opinion. I'm really trying to get healthy again.     Take care,  Nancy

## 2020-09-23 ENCOUNTER — LAB (OUTPATIENT)
Dept: LAB | Facility: HOSPITAL | Age: 55
End: 2020-09-23

## 2020-09-23 DIAGNOSIS — E03.9 ACQUIRED HYPOTHYROIDISM: ICD-10-CM

## 2020-09-23 LAB
T4 FREE SERPL-MCNC: 1.63 NG/DL (ref 0.93–1.7)
TSH SERPL DL<=0.05 MIU/L-ACNC: 0.26 UIU/ML (ref 0.27–4.2)

## 2020-09-23 PROCEDURE — 84439 ASSAY OF FREE THYROXINE: CPT | Performed by: INTERNAL MEDICINE

## 2020-09-23 PROCEDURE — 84443 ASSAY THYROID STIM HORMONE: CPT | Performed by: INTERNAL MEDICINE

## 2020-09-23 NOTE — PROGRESS NOTES
Thyroid hormone level is actually borderline high. Options are to slightly decrease the thyroid medication dose or continue the current dose and repeat the thyroid tests in 6-8 weeks. If she chooses to remain on current dose and the abnormality persists, then we will definitely need to decrease the dose.

## 2020-09-24 ENCOUNTER — TELEPHONE (OUTPATIENT)
Dept: INTERNAL MEDICINE | Facility: CLINIC | Age: 55
End: 2020-09-24

## 2020-09-24 DIAGNOSIS — E03.9 ACQUIRED HYPOTHYROIDISM: Primary | ICD-10-CM

## 2020-09-24 NOTE — TELEPHONE ENCOUNTER
----- Message from Brie Jalloh MD sent at 9/23/2020  7:24 PM EDT -----  Thyroid hormone level is actually borderline high. Options are to slightly decrease the thyroid medication dose or continue the current dose and repeat the thyroid tests in 6-8 weeks. If she chooses to remain on current dose and the abnormality persists, then we will definitely need to decrease the dose.

## 2020-09-25 DIAGNOSIS — F33.1 MODERATE EPISODE OF RECURRENT MAJOR DEPRESSIVE DISORDER (HCC): ICD-10-CM

## 2020-09-25 RX ORDER — BUPROPION HYDROCHLORIDE 300 MG/1
300 TABLET ORAL DAILY
Qty: 90 TABLET | Refills: 0 | Status: SHIPPED | OUTPATIENT
Start: 2020-09-25 | End: 2020-12-14 | Stop reason: SDUPTHER

## 2020-10-03 ENCOUNTER — PATIENT MESSAGE (OUTPATIENT)
Dept: INTERNAL MEDICINE | Facility: CLINIC | Age: 55
End: 2020-10-03

## 2020-10-03 DIAGNOSIS — Z12.11 SCREENING FOR COLON CANCER: Primary | ICD-10-CM

## 2020-10-05 NOTE — TELEPHONE ENCOUNTER
From: Yanet Clifton  To: Brie Jalloh MD  Sent: 10/3/2020 11:06 AM EDT  Subject: Non-Urgent Medical Question    Fernando Jalloh,   Sorry to bother you, but I'm wondering if I should get this checked out. I have been having a feeling of dull pressure in my lower right abdomen for weeks. I'd been feeling really well so I thought nothing of it. The last three days it's been getting to be more of a sharp pain at times and also aches in the same location. I'm not feeling very well the last three days. My stomach is bloated, but I'm not vomiting or doubled over with pain. No fever. I'm really trying to avoid going to the hospital unless it's necessary. Is there somewhere else I can go to get a scan if I need one?    Thank you  --Nancy

## 2020-10-08 NOTE — PATIENT INSTRUCTIONS
Pre op Diagnosis: Lumbar spondylosis, low back pain, facet arthropathy     Post op: The same    Procedure: right  Intra-articular lumbar facet joint steroid injection L5/S1      After informed consent was obtained,and side was marked,patient was brought to procedure room. @She@ was placed prone on the procedure table. Low back area was prepped and drape in the sterile fashion. With the aid of fluoroscopy,using 20 degrees caudal tilt and 20 degrees rotation to the Right level of {LUMBARSPINE:L5/S1 facet was identified and marked on the skin.After local anesthesia with 1% Lidocaine 10 cc 22 gauge 3.5 inches spinal needle was placed under real time fluoroscopy into facet joint using tunnel vision.Placement of the needle was confirmed by injecting small amount of Isovue dye under real-time fluoroscopy, that showed excellent spread of the contrast inside the join with no intravascular or epidural uptake of it.  There was read a significant leak of the contrast after out of the facet joint which indicate capsule tear, At that time mixture of 0.5% Marcaine 0.5 cc with 40 mg of Depomedrol was injected.  Patient tolerated procedure well, and  was discharged home after brief observation.  Fluoroscopy time:  14 sec  Con Ramirez MD  10/8/2020       Urine culture from December 5, 2018 grew 10,000 colonies Proteus mirabilis.  She is allergic to sulfa and intolerant of Macrobid.  We will repeat culture.  Drink plenty of fluids.  Wipe from front to back.  Omnicef as discussed.  Return to the clinic in 1 week for urine recheck sooner if not improving  Pt verbalizes understanding and agreement with plan of care.

## 2020-12-02 DIAGNOSIS — E03.9 ACQUIRED HYPOTHYROIDISM: ICD-10-CM

## 2020-12-02 RX ORDER — LEVOTHYROXINE SODIUM 0.1 MG/1
100 TABLET ORAL EVERY MORNING
Qty: 30 TABLET | Refills: 0 | Status: SHIPPED | OUTPATIENT
Start: 2020-12-02 | End: 2020-12-14 | Stop reason: SDUPTHER

## 2020-12-02 NOTE — TELEPHONE ENCOUNTER
Last Office Visit: 7/10/20 acute [facility], 6/2/20 w/ you  Next Office Visit:    Labs completed in past 6 months? yes  Labs completed in past year? yes    Last Refill Date:2/20/20  Quantity:90  Refills:2    Pharmacy:[facility]

## 2020-12-10 ENCOUNTER — LAB (OUTPATIENT)
Dept: LAB | Facility: HOSPITAL | Age: 55
End: 2020-12-10

## 2020-12-10 DIAGNOSIS — Z00.00 PE (PHYSICAL EXAM), ROUTINE: Primary | ICD-10-CM

## 2020-12-10 DIAGNOSIS — E03.9 ACQUIRED HYPOTHYROIDISM: ICD-10-CM

## 2020-12-10 DIAGNOSIS — E55.9 VITAMIN D DEFICIENCY: ICD-10-CM

## 2020-12-10 LAB
T4 FREE SERPL-MCNC: 1.1 NG/DL (ref 0.93–1.7)
TSH SERPL DL<=0.05 MIU/L-ACNC: 2.67 UIU/ML (ref 0.27–4.2)

## 2020-12-10 PROCEDURE — 81001 URINALYSIS AUTO W/SCOPE: CPT

## 2020-12-10 PROCEDURE — 80053 COMPREHEN METABOLIC PANEL: CPT

## 2020-12-10 PROCEDURE — 84443 ASSAY THYROID STIM HORMONE: CPT

## 2020-12-10 PROCEDURE — 84439 ASSAY OF FREE THYROXINE: CPT

## 2020-12-10 PROCEDURE — 82306 VITAMIN D 25 HYDROXY: CPT

## 2020-12-10 PROCEDURE — 80061 LIPID PANEL: CPT

## 2020-12-10 PROCEDURE — 85025 COMPLETE CBC W/AUTO DIFF WBC: CPT

## 2020-12-11 PROBLEM — R73.01 IMPAIRED FASTING GLUCOSE: Status: ACTIVE | Noted: 2020-12-11

## 2020-12-11 LAB
25(OH)D3 SERPL-MCNC: 34.5 NG/ML (ref 30–100)
ALBUMIN SERPL-MCNC: 4.6 G/DL (ref 3.5–5.2)
ALBUMIN/GLOB SERPL: 1.8 G/DL
ALP SERPL-CCNC: 65 U/L (ref 39–117)
ALT SERPL W P-5'-P-CCNC: 25 U/L (ref 1–33)
ANION GAP SERPL CALCULATED.3IONS-SCNC: 7.3 MMOL/L (ref 5–15)
AST SERPL-CCNC: 21 U/L (ref 1–32)
BACTERIA UR QL AUTO: NORMAL /HPF
BASOPHILS # BLD AUTO: 0.03 10*3/MM3 (ref 0–0.2)
BASOPHILS NFR BLD AUTO: 0.6 % (ref 0–1.5)
BILIRUB SERPL-MCNC: 0.4 MG/DL (ref 0–1.2)
BILIRUB UR QL STRIP: NEGATIVE
BUN SERPL-MCNC: 14 MG/DL (ref 6–20)
BUN/CREAT SERPL: 13.7 (ref 7–25)
CALCIUM SPEC-SCNC: 9.6 MG/DL (ref 8.6–10.5)
CHLORIDE SERPL-SCNC: 106 MMOL/L (ref 98–107)
CHOLEST SERPL-MCNC: 210 MG/DL (ref 0–200)
CLARITY UR: CLEAR
CO2 SERPL-SCNC: 28.7 MMOL/L (ref 22–29)
COLOR UR: YELLOW
CREAT SERPL-MCNC: 1.02 MG/DL (ref 0.57–1)
DEPRECATED RDW RBC AUTO: 41.8 FL (ref 37–54)
EOSINOPHIL # BLD AUTO: 0.07 10*3/MM3 (ref 0–0.4)
EOSINOPHIL NFR BLD AUTO: 1.3 % (ref 0.3–6.2)
ERYTHROCYTE [DISTWIDTH] IN BLOOD BY AUTOMATED COUNT: 12.7 % (ref 12.3–15.4)
GFR SERPL CREATININE-BSD FRML MDRD: 56 ML/MIN/1.73
GLOBULIN UR ELPH-MCNC: 2.6 GM/DL
GLUCOSE SERPL-MCNC: 114 MG/DL (ref 65–99)
GLUCOSE UR STRIP-MCNC: NEGATIVE MG/DL
HCT VFR BLD AUTO: 41 % (ref 34–46.6)
HDLC SERPL-MCNC: 44 MG/DL (ref 40–60)
HGB BLD-MCNC: 13.5 G/DL (ref 12–15.9)
HGB UR QL STRIP.AUTO: NEGATIVE
HYALINE CASTS UR QL AUTO: NORMAL /LPF
IMM GRANULOCYTES # BLD AUTO: 0.03 10*3/MM3 (ref 0–0.05)
IMM GRANULOCYTES NFR BLD AUTO: 0.6 % (ref 0–0.5)
KETONES UR QL STRIP: NEGATIVE
LDLC SERPL CALC-MCNC: 134 MG/DL (ref 0–100)
LDLC/HDLC SERPL: 2.97 {RATIO}
LEUKOCYTE ESTERASE UR QL STRIP.AUTO: NEGATIVE
LYMPHOCYTES # BLD AUTO: 0.77 10*3/MM3 (ref 0.7–3.1)
LYMPHOCYTES NFR BLD AUTO: 14.3 % (ref 19.6–45.3)
MCH RBC QN AUTO: 29.4 PG (ref 26.6–33)
MCHC RBC AUTO-ENTMCNC: 32.9 G/DL (ref 31.5–35.7)
MCV RBC AUTO: 89.3 FL (ref 79–97)
MONOCYTES # BLD AUTO: 0.33 10*3/MM3 (ref 0.1–0.9)
MONOCYTES NFR BLD AUTO: 6.1 % (ref 5–12)
NEUTROPHILS NFR BLD AUTO: 4.16 10*3/MM3 (ref 1.7–7)
NEUTROPHILS NFR BLD AUTO: 77.1 % (ref 42.7–76)
NITRITE UR QL STRIP: NEGATIVE
NRBC BLD AUTO-RTO: 0 /100 WBC (ref 0–0.2)
PH UR STRIP.AUTO: 6 [PH] (ref 5–8)
PLATELET # BLD AUTO: 198 10*3/MM3 (ref 140–450)
PMV BLD AUTO: 10.6 FL (ref 6–12)
POTASSIUM SERPL-SCNC: 4 MMOL/L (ref 3.5–5.2)
PROT SERPL-MCNC: 7.2 G/DL (ref 6–8.5)
PROT UR QL STRIP: NEGATIVE
RBC # BLD AUTO: 4.59 10*6/MM3 (ref 3.77–5.28)
RBC # UR: NORMAL /HPF
REF LAB TEST METHOD: NORMAL
SODIUM SERPL-SCNC: 142 MMOL/L (ref 136–145)
SP GR UR STRIP: 1.02 (ref 1–1.03)
SQUAMOUS #/AREA URNS HPF: NORMAL /HPF
TRIGL SERPL-MCNC: 177 MG/DL (ref 0–150)
UROBILINOGEN UR QL STRIP: NORMAL
VLDLC SERPL-MCNC: 32 MG/DL (ref 5–40)
WBC # BLD AUTO: 5.39 10*3/MM3 (ref 3.4–10.8)
WBC UR QL AUTO: NORMAL /HPF

## 2020-12-14 NOTE — ASSESSMENT & PLAN NOTE
Health maintenance - flu vacc and Td given today (next Td due 2030); Prevnar 9/15; PVX 11/16; HAV done; rec Shingrix - counseling given re: vaccine, rec looking into insurance coverage; nl bilat Dx mammo 1/3/20; no further Paps s/p USMAN/BSO; nl DEXA 2/19, repeat 2022; colonosc 5/16, repeat 2021 per Dr. Robertson; eye exam TBD; dental exam q6 mos; (+) seat belt use

## 2020-12-14 NOTE — ASSESSMENT & PLAN NOTE
NEW dx with ; A1C 5.3; discussed role of chronic prednisone, currently 5mg QD; encouraged reg phys activity to decr insulin resistance, moderation in unhealthy starches/sweets; f/u A1C in 4 mos

## 2020-12-14 NOTE — ASSESSMENT & PLAN NOTE
Lipids mildly worsened with ; goal LDL < 130, ideally < 100; decrease saturated fats and cholesterol in the diet; repeat lipids in 4 mos

## 2020-12-15 ENCOUNTER — OFFICE VISIT (OUTPATIENT)
Dept: INTERNAL MEDICINE | Facility: CLINIC | Age: 55
End: 2020-12-15

## 2020-12-15 VITALS
DIASTOLIC BLOOD PRESSURE: 76 MMHG | SYSTOLIC BLOOD PRESSURE: 118 MMHG | HEIGHT: 66 IN | WEIGHT: 194 LBS | BODY MASS INDEX: 31.18 KG/M2 | HEART RATE: 97 BPM | OXYGEN SATURATION: 97 %

## 2020-12-15 DIAGNOSIS — E78.2 MIXED HYPERLIPIDEMIA: ICD-10-CM

## 2020-12-15 DIAGNOSIS — Z23 NEED FOR INFLUENZA VACCINATION: ICD-10-CM

## 2020-12-15 DIAGNOSIS — E55.9 VITAMIN D DEFICIENCY: ICD-10-CM

## 2020-12-15 DIAGNOSIS — Z00.00 PE (PHYSICAL EXAM), ROUTINE: Primary | ICD-10-CM

## 2020-12-15 DIAGNOSIS — N28.9 RENAL INSUFFICIENCY: ICD-10-CM

## 2020-12-15 DIAGNOSIS — M05.79 RHEUMATOID ARTHRITIS INVOLVING MULTIPLE SITES WITH POSITIVE RHEUMATOID FACTOR (HCC): ICD-10-CM

## 2020-12-15 DIAGNOSIS — F33.1 MODERATE EPISODE OF RECURRENT MAJOR DEPRESSIVE DISORDER (HCC): ICD-10-CM

## 2020-12-15 DIAGNOSIS — K21.9 GASTROESOPHAGEAL REFLUX DISEASE: ICD-10-CM

## 2020-12-15 DIAGNOSIS — R73.01 IMPAIRED FASTING GLUCOSE: ICD-10-CM

## 2020-12-15 DIAGNOSIS — M54.9 MID BACK PAIN: ICD-10-CM

## 2020-12-15 DIAGNOSIS — D50.9 IRON DEFICIENCY ANEMIA, UNSPECIFIED IRON DEFICIENCY ANEMIA TYPE: ICD-10-CM

## 2020-12-15 DIAGNOSIS — G03.9 MENINGITIS: ICD-10-CM

## 2020-12-15 DIAGNOSIS — E03.9 ACQUIRED HYPOTHYROIDISM: ICD-10-CM

## 2020-12-15 LAB — HBA1C MFR BLD: 5.3 %

## 2020-12-15 PROCEDURE — 83036 HEMOGLOBIN GLYCOSYLATED A1C: CPT | Performed by: INTERNAL MEDICINE

## 2020-12-15 PROCEDURE — 90686 IIV4 VACC NO PRSV 0.5 ML IM: CPT | Performed by: INTERNAL MEDICINE

## 2020-12-15 PROCEDURE — 90714 TD VACC NO PRESV 7 YRS+ IM: CPT | Performed by: INTERNAL MEDICINE

## 2020-12-15 PROCEDURE — 90472 IMMUNIZATION ADMIN EACH ADD: CPT | Performed by: INTERNAL MEDICINE

## 2020-12-15 PROCEDURE — 93000 ELECTROCARDIOGRAM COMPLETE: CPT | Performed by: INTERNAL MEDICINE

## 2020-12-15 PROCEDURE — 90471 IMMUNIZATION ADMIN: CPT | Performed by: INTERNAL MEDICINE

## 2020-12-15 PROCEDURE — 99396 PREV VISIT EST AGE 40-64: CPT | Performed by: INTERNAL MEDICINE

## 2020-12-15 RX ORDER — PREDNISONE 1 MG/1
5 TABLET ORAL DAILY
COMMUNITY
End: 2021-04-15

## 2020-12-15 RX ORDER — OMEPRAZOLE 40 MG/1
40 CAPSULE, DELAYED RELEASE ORAL DAILY
Qty: 90 CAPSULE | Refills: 3 | Status: SHIPPED | OUTPATIENT
Start: 2020-12-15 | End: 2021-12-22

## 2020-12-15 RX ORDER — CAFFEINE 200 MG
TABLET ORAL
COMMUNITY

## 2020-12-15 RX ORDER — FERROUS SULFATE 325(65) MG
325 TABLET ORAL
COMMUNITY
End: 2021-04-15

## 2020-12-15 RX ORDER — LEVOTHYROXINE SODIUM 0.1 MG/1
100 TABLET ORAL EVERY MORNING
Qty: 90 TABLET | Refills: 3 | Status: SHIPPED | OUTPATIENT
Start: 2020-12-15 | End: 2021-12-22

## 2020-12-15 RX ORDER — BUPROPION HYDROCHLORIDE 300 MG/1
300 TABLET ORAL DAILY
Qty: 90 TABLET | Refills: 3 | Status: SHIPPED | OUTPATIENT
Start: 2020-12-15 | End: 2021-12-23

## 2020-12-15 NOTE — PROGRESS NOTES
Chief Complaint   Patient presents with   • Annual Exam   • Hypothyroidism   • Hyperlipidemia       History of Present Illness  55 y.o.  woman presents for updated phys examination. Has tried to improve diet and increase phys activity. Has stopped drinking soda. Reports successful weight loss    Complains of chronic back pain at the middle of the back, unchanged and persistent (but comes and goes) for at least 6 months. Reports relief with aleve. No known trigger or injury except likely exacerbated with lifting thirty 50-lb bags of wet mulch recently. Denies radiation of pain. Denies issues with walking.    Has had a recurrent episode of meningitis with severe headache and neck pain. Did not go to the ER due to fears of COVID and hx of meningitis. Upped her prednisone dose and has weaned back down to her baseline 5mg dose. Currently asx.    Review of Systems  Denies headaches, visual changes, CP, palpitations, SOB, cough, abd pain, n/v/d, difficulty with urination, numbness/tingling, falls, mood changes, lightheadedness, hearing changes, rashes.    Denies vaginal discharge or bleeding (no periods) or breast concerns.    ROS (+) for mid-back pain as noted without radiation, numbness/tingling.  All other ROS reviewed and negative.    [facility]  The following portions of the patient's history were reviewed and updated as appropriate: allergies, current medications, past family history, past medical history, past social history, past surgical history and problem list.    Current Outpatient Medications:   •  buPROPion XL (WELLBUTRIN XL) 300 MG QD  •  caffeine 200 MG QD  •  diphenhydrAMINE (BENADRYL) 50 MG qd prn itching  •  ferrous sulfate 325 (65 FE) MG QD  •  levothyroxine (SYNTHROID, LEVOTHROID) 100 MCG QD  •  Melatonin 10 MG QHS  •  naproxen sodium (ALEVE) 220 MG BID prn  •  omeprazole (priLOSEC) 40 MG QD  •  predniSONE (DELTASONE) 5 MG QD  •  RiTUXimab (RITUXAN) 10 MG/ML solution inj q4 mos  • inconsistent vit  "D supplementation      VITALS:  /76   Pulse 97   Ht 166.4 cm (65.5\")   Wt 88 kg (194 lb)   SpO2 97%   BMI 31.79 kg/m²     Physical Exam  Vitals signs and nursing note reviewed.   Constitutional:       General: She is not in acute distress.     Appearance: Normal appearance. She is well-developed.   HENT:      Head: Normocephalic.      Right Ear: Ear canal and external ear normal.      Left Ear: Ear canal and external ear normal.      Nose: Nose normal.   Eyes:      Extraocular Movements: Extraocular movements intact.      Conjunctiva/sclera: Conjunctivae normal.      Pupils: Pupils are equal, round, and reactive to light.   Neck:      Musculoskeletal: Normal range of motion and neck supple. No neck rigidity.      Vascular: No carotid bruit (bilaterally).   Cardiovascular:      Rate and Rhythm: Normal rate and regular rhythm.      Heart sounds: Normal heart sounds.   Pulmonary:      Effort: Pulmonary effort is normal. No respiratory distress.      Breath sounds: Normal breath sounds. No wheezing or rales.   Abdominal:      General: Bowel sounds are normal. There is no distension.      Palpations: Abdomen is soft. There is no mass.      Tenderness: There is no abdominal tenderness.   Genitourinary:     Comments: Chaperone declined by patient.    Breast exam unremarkable without masses, skin changes, nipple discharge, or axillary adenopathy.    Musculoskeletal:         General: Tenderness (right-mid-thoracic paraspinal muscle tend wit palpation, no point tend w/ palpation of vertebral spine; trunk FROM; non erythema) present.      Right lower leg: No edema.      Left lower leg: No edema.   Lymphadenopathy:      Cervical: No cervical adenopathy.   Skin:     General: Skin is warm and dry.      Findings: No rash.   Neurological:      Mental Status: She is alert and oriented to person, place, and time.      Cranial Nerves: No cranial nerve deficit.      Gait: Gait normal.      Deep Tendon Reflexes: Reflexes " normal.   Psychiatric:         Mood and Affect: Mood normal.         Behavior: Behavior normal.           LABS  Results for orders placed or performed in visit on 12/15/20   POC Glycosylated Hemoglobin (Hb A1C)    Specimen: Blood   Result Value Ref Range    Hemoglobin A1C 5.3 %       12/19       ECG 12 Lead    Date/Time: 12/15/2020 9:49 AM  Performed by: Brie Jalloh MD  Authorized by: Brie Jalloh MD   Comparison: compared with previous ECG from 12/3/2019  Similar to previous ECG  Rhythm: sinus rhythm  Rate: normal  BPM: 89  Conduction: conduction normal  ST Segments: ST segments normal  T Waves: T waves normal  QRS axis: normal  Other findings: non-specific ST-T wave changes  Clinical impression comment: stable EKG              ASSESSMENT/PLAN    Diagnoses and all orders for this visit:    1. PE (physical exam), routine (Primary)  Assessment & Plan:  Health maintenance - flu vacc and Td given today (next Td due 2030); Prevnar 9/15; PVX 11/16; HAV done; rec Shingrix - counseling given re: vaccine, rec looking into insurance coverage; nl bilat Dx mammo 1/3/20; no further Paps s/p USMNA/BSO; nl DEXA 2/19, repeat 2022; colonosc 5/16, repeat 2021 per Dr. Robertson; eye exam TBD; dental exam q6 mos; (+) seat belt use    Orders:  -     Td Vaccine Greater Than or Equal To 6yo Preservative Free IM    2. Impaired fasting glucose  Assessment & Plan:  NEW dx with ; A1C 5.3; discussed role of chronic prednisone, currently 5mg QD; encouraged reg phys activity to decr insulin resistance, moderation in unhealthy starches/sweets; f/u A1C in 4 mos      Orders:  -     Hemoglobin A1c; Future  -     POC Glycosylated Hemoglobin (Hb A1C)    3. Hyperlipidemia  Assessment & Plan:  Lipids mildly worsened with ; goal LDL < 130, ideally < 100; decrease saturated fats and cholesterol in the diet; repeat lipids in 4 mos      Orders:  -     Lipid Panel; Future  -     ECG 12 Lead    4. Hypothyroidism  Assessment &  Plan:  Euthyroid on levothyroxine 100mcg QD #90, 3RF    Orders:  -     levothyroxine (SYNTHROID, LEVOTHROID) 100 MCG tablet; Take 1 tablet by mouth Every Morning.  Dispense: 90 tablet; Refill: 3    5. Vitamin D deficiency  Assessment & Plan:  Stable vit D level 34.5; on inconsistent vit D supplementation      6. Depression  Assessment & Plan:  Remains stable on bupropion XL 300mg QD #90, 3RF    Orders:  -     buPROPion XL (WELLBUTRIN XL) 300 MG 24 hr tablet; Take 1 tablet by mouth Daily.  Dispense: 90 tablet; Refill: 3    7. Gastroesophageal reflux disease  Assessment & Plan:  Stable on omeprazole 40mg QD #90, 3RF    Orders:  -     omeprazole (priLOSEC) 40 MG capsule; Take 1 capsule by mouth Daily.  Dispense: 90 capsule; Refill: 3    8. Renal insufficiency  Comments:  Bord Cr 1.02, GFR 56; discussed drinking water on daily basis, minimize NSAIDs; repeat BMP in well-hydrated, nonfasting state  Orders:  -     Basic Metabolic Panel; Future    9. Need for influenza vaccination  -     Fluarix/Fluzone/Afluria Quad>6 Months    10. Mid back pain  Assessment & Plan:  Chronic ongoing for months, likely musculoskeletal, exacerbated by recent extreme lifting (of mulch); cont prn aleve and warm compresses; due to duration, referral for PT    Orders:  -     Ambulatory Referral to Physical Therapy Evaluate and treat    11. Meningitis  Assessment & Plan:  Recent flare-up per patient, effectively tx'd with increased prednisone; back to baseline 5mg dose and currently asx; afebrile, no neuro deficits      12. Rheumatoid arthritis involving multiple sites with positive rheumatoid factor (CMS/[facility])    13. Iron deficiency anemia, unspecified iron deficiency anemia type      FOLLOW-UP  RTC 4 mos with lipids, BMP, A1C (escribed)    Electronically signed by:    Brie Jalloh MD, FACP  12/15/2020

## 2020-12-16 NOTE — ASSESSMENT & PLAN NOTE
Chronic ongoing for months, likely musculoskeletal, exacerbated by recent extreme lifting (of mulch); cont prn aleve and warm compresses; due to duration, referral for PT

## 2020-12-16 NOTE — ASSESSMENT & PLAN NOTE
Recent flare-up per patient, effectively tx'd with increased prednisone; back to baseline 5mg dose and currently asx; afebrile, no neuro deficits

## 2021-01-19 DIAGNOSIS — E03.9 ACQUIRED HYPOTHYROIDISM: ICD-10-CM

## 2021-01-19 RX ORDER — LEVOTHYROXINE SODIUM 0.1 MG/1
100 TABLET ORAL EVERY MORNING
Qty: 30 TABLET | OUTPATIENT
Start: 2021-01-19

## 2021-02-14 ENCOUNTER — TELEPHONE (OUTPATIENT)
Dept: INTERNAL MEDICINE | Facility: CLINIC | Age: 56
End: 2021-02-14

## 2021-02-14 NOTE — TELEPHONE ENCOUNTER
----- Message from Brie Jalloh MD sent at 2/13/2021  2:29 AM EST -----  Regarding: test results   Just received 1/26/21 rheum labs  Kidney function was abnormal.    Needs to drink enough water daily. Needs to minimize NSAIDs, including naproxen.    Should repeat BMP at her convenience, well-hydrated, nonfasting

## 2021-04-05 ENCOUNTER — TREATMENT (OUTPATIENT)
Dept: PHYSICAL THERAPY | Facility: CLINIC | Age: 56
End: 2021-04-05

## 2021-04-05 DIAGNOSIS — G89.29 CHRONIC MIDLINE THORACIC BACK PAIN: Primary | ICD-10-CM

## 2021-04-05 DIAGNOSIS — M54.6 CHRONIC MIDLINE THORACIC BACK PAIN: Primary | ICD-10-CM

## 2021-04-05 PROCEDURE — 97161 PT EVAL LOW COMPLEX 20 MIN: CPT | Performed by: PHYSICAL THERAPIST

## 2021-04-05 PROCEDURE — 97110 THERAPEUTIC EXERCISES: CPT | Performed by: PHYSICAL THERAPIST

## 2021-04-05 NOTE — PROGRESS NOTES
Physical Therapy Initial Evaluation and Plan of Care      Patient: Yanet Clifton   : 1965  Diagnosis/ICD-10 Code:  The encounter diagnosis was Chronic midline thoracic back pain.   Referring practitioner: Brie Jalloh MD  Date of Initial Visit: Type: THERAPY  Noted: 2021  Today's Date: 2021  Patient seen for 1 sessions         Yanet Clifton reports:  Chronic mid and upper back pain of 6-7 months duration.  Subjective Questionnaire: Oswestry: 18%    Subjective Evaluation    History of Present Illness  Onset date: September or October of last year ()  Mechanism of injury: Pt noticed onset of midline upper and mid back pain after lifting bags of mulch last fall.  She primarily notices the pain when sitting.  She is able to perform activities in standing without limitation.  She works as a  which involves several hours of sitting at a time.    Subjective comment: Pt complains of mid and upper back pain since the  of .  Patient Occupation: -difficult to sit for prolonged periods Pain  Current pain rating: 3  At best pain ratin  At worst pain ratin  Location: midline upper and mid back  Quality: dull ache  Relieving factors: change in position and rest (pt cannot take NSAID's)  Aggravating factors: overhead activity (sitting, maybe using arms)  Progression: no change    Hand dominance: right    Diagnostic Tests  No diagnostic tests performed    Treatments  No previous or current treatments  Patient Goals  Patient goals for therapy: decreased pain and increased motion           Treatment                Objective          Postural Observations  Seated posture: poor  Standing posture: fair  Correction of posture: makes symptoms worse    Additional Postural Observation Details  FHP, increased thoracic kyphosis    Tenderness     Additional Tenderness Details  Pt is hypersensitive to light PA pressure to thoracic region.  This does not seem to be level  specific.    Neurological Testing     Sensation   Cervical/Thoracic   Left   Intact: light touch    Right   Intact: light touch    Additional Neurological Details  Pt denies any distal/UE symptoms    Active Range of Motion   Cervical/Thoracic Spine   Cervical    Flexion: 54 degrees   Extension: 33 degrees   Left lateral flexion: 30 degrees   Right lateral flexion: 30 degrees   Left rotation: 67 degrees   Right rotation: 67 degrees     Thoracic   Flexion: WFL  Extension: with pain  Left lateral flexion: WFL  Right lateral flexion: WFL  Left rotation: WFL  Right rotation: WFL    Additional Active Range of Motion Details  Notices pulling in upper back with chin tuck.  Otherwise, CROM is not painful beyond typical stretching.  Thoracic ROM is painful in extension which is also limited in mobility.    Thoracic flexion, lateral flexion, or rotation are not painful.    B shoulder flexion is approximately 75% of normal.  This is partially limited by lack of thoracic flexion.    Strength/Myotome Testing     Additional Strength Details  UE strength WNL's with exception of interscapular mm. Which fatigue quickly with postural correction exercises.          Assessment & Plan     Assessment  Impairments: abnormal or restricted ROM, activity intolerance, impaired physical strength, lacks appropriate home exercise program and pain with function  Assessment details: Pt presents with mid and upper back pain present for several months.  Symptoms began after lifting bags of mulch last fall and have persisted ever since.  Pt will benefit from PT intervention to address restricted mobility in thoracic spine and shoulders as well as postural dysfunction particularly noted in sitting.  Prognosis: good  Functional Limitations: lifting, uncomfortable because of pain, sitting and reaching overhead  Goals  Plan Goals: 4 weeks  Pt. demonstrates independence and compliance with initial HEP.  Pt. reports reduction in pain intensity to no worse  than 3/10 on NPRS.  AROM of B shoulders and thoracic spine shows improvement over baseline measures.  Functional outcome measure is improved by 6%, indicating increasing tolerance for functional tasks.    8 weeks  Pt. demonstrates independence in advanced HEP for ongoing improvement.  AROM of thoracic spine and shoulders is sufficient for performance of daily activities.  Pt demonstrates improving sitting tolerance and improving awareness of sitting posture.         Plan  Therapy options: will be seen for skilled physical therapy services  Planned modality interventions: cryotherapy and thermotherapy (hydrocollator packs)  Planned therapy interventions: manual therapy, neuromuscular re-education, postural training, soft tissue mobilization, spinal/joint mobilization, strengthening, stretching, therapeutic activities, joint mobilization, home exercise program, functional ROM exercises, flexibility and body mechanics training  Frequency: 1x week  Duration in visits: 8  Treatment plan discussed with: patient  Plan details: PT weekly per POC, addressing limitations in thoracic and shoulder mobility, as well as postural endurance and awareness.        Timed:  Manual Therapy:         mins  14329;  Therapeutic Exercise:    10     mins  77122;     Neuromuscular Andres:        mins  48145;    Therapeutic Activity:          mins  82798;     Gait Training:           mins  27242;     Ultrasound:          mins  88905;    Electrical Stimulation:         mins  43497 ( );    Untimed:  Electrical Stimulation:         mins  76737 ( );  Mechanical Traction:         mins  34512;     Timed Treatment:   10   mins   Total Treatment:     45   mins    PT SIGNATURE: Cristiane Oneal, PT   DATE TREATMENT INITIATED: 4/5/2021    Initial Certification  Certification Period: 7/4/2021  I certify that the therapy services are furnished while this patient is under my care.  The services outlined above are required by this patient, and  will be reviewed every 90 days.     PHYSICIAN: Brie Jalloh MD      DATE:     Please sign and return via fax to [phone].. Thank you, [facility].

## 2021-04-12 ENCOUNTER — PATIENT MESSAGE (OUTPATIENT)
Dept: INTERNAL MEDICINE | Facility: CLINIC | Age: 56
End: 2021-04-12

## 2021-04-12 DIAGNOSIS — E78.2 MIXED HYPERLIPIDEMIA: Primary | ICD-10-CM

## 2021-04-12 DIAGNOSIS — E03.9 ACQUIRED HYPOTHYROIDISM: ICD-10-CM

## 2021-04-12 DIAGNOSIS — R73.01 IMPAIRED FASTING GLUCOSE: ICD-10-CM

## 2021-04-12 NOTE — TELEPHONE ENCOUNTER
From: Yanet Clifton  To: Brie Jalloh MD  Sent: 4/12/2021 8:14 AM EDT  Subject: Non-Urgent Medical Question    Hi Dr Jalloh,   I'm coming in for follow-up labs tmoro and seeing you Thursday. Would it be possible to check my thyroid levels tmoro as well?     Thank you  --Nancy Clifton

## 2021-04-13 ENCOUNTER — LAB (OUTPATIENT)
Dept: LAB | Facility: HOSPITAL | Age: 56
End: 2021-04-13

## 2021-04-13 DIAGNOSIS — E03.9 ACQUIRED HYPOTHYROIDISM: ICD-10-CM

## 2021-04-13 DIAGNOSIS — E78.2 MIXED HYPERLIPIDEMIA: ICD-10-CM

## 2021-04-13 DIAGNOSIS — R73.01 IMPAIRED FASTING GLUCOSE: ICD-10-CM

## 2021-04-13 LAB
ANION GAP SERPL CALCULATED.3IONS-SCNC: 9.8 MMOL/L (ref 5–15)
BUN SERPL-MCNC: 12 MG/DL (ref 6–20)
BUN/CREAT SERPL: 14.8 (ref 7–25)
CALCIUM SPEC-SCNC: 9.6 MG/DL (ref 8.6–10.5)
CHLORIDE SERPL-SCNC: 105 MMOL/L (ref 98–107)
CHOLEST SERPL-MCNC: 214 MG/DL (ref 0–200)
CO2 SERPL-SCNC: 25.2 MMOL/L (ref 22–29)
CREAT SERPL-MCNC: 0.81 MG/DL (ref 0.57–1)
GFR SERPL CREATININE-BSD FRML MDRD: 73 ML/MIN/1.73
GLUCOSE SERPL-MCNC: 70 MG/DL (ref 65–99)
HBA1C MFR BLD: 4.83 % (ref 4.8–5.6)
HDLC SERPL-MCNC: 46 MG/DL (ref 40–60)
LDLC SERPL CALC-MCNC: 152 MG/DL (ref 0–100)
LDLC/HDLC SERPL: 3.27 {RATIO}
POTASSIUM SERPL-SCNC: 3.9 MMOL/L (ref 3.5–5.2)
SODIUM SERPL-SCNC: 140 MMOL/L (ref 136–145)
TRIGL SERPL-MCNC: 88 MG/DL (ref 0–150)
VLDLC SERPL-MCNC: 16 MG/DL (ref 5–40)

## 2021-04-13 PROCEDURE — 84439 ASSAY OF FREE THYROXINE: CPT

## 2021-04-13 PROCEDURE — 80048 BASIC METABOLIC PNL TOTAL CA: CPT

## 2021-04-13 PROCEDURE — 80061 LIPID PANEL: CPT

## 2021-04-13 PROCEDURE — 84443 ASSAY THYROID STIM HORMONE: CPT

## 2021-04-13 PROCEDURE — 83036 HEMOGLOBIN GLYCOSYLATED A1C: CPT

## 2021-04-14 LAB
T4 FREE SERPL-MCNC: 1.44 NG/DL (ref 0.93–1.7)
TSH SERPL DL<=0.05 MIU/L-ACNC: 0.5 UIU/ML (ref 0.27–4.2)

## 2021-04-14 NOTE — PROGRESS NOTES
Chief Complaint   Patient presents with   • Follow-up   • Hypothyroidism   • Hyperlipidemia       History of Present Illness  55 y.o.  woman presents for f/u on cholesterol, thyroid and sugars.    Has questions about wt loss - did well initially but now has plateaued. Has been sticking to 1200cal diet and exercising every day. Walks 4 miles in an hour (cannot run).  Also following counseling through Z[employer].    Complains of constipation; does not go for 3 days at a time and feel uncomfortable. Is taking miralax QD; not on fiber. Has done well previously with amitiza and wonders about restarting that. Not taking Fe supplement.    Has read that rituxan increases cholesterol.     Review of Systems  Denies CP, palpitations, SOB. ROS (+) for constipation, not rock hard stools, just not going regularly and having secondary abd discomfort. Denies n/v. All other ROS reviewed and negative.      Current Outpatient Medications:   •  buPROPion XL (WELLBUTRIN XL) 300 MG QD  •  caffeine 200 MG AD  •  diphenhydrAMINE (BENADRYL) 50 MG QHS prn  •  miralax QD  •  levothyroxine 100 MCG QD  •  Melatonin 10 MG QHS  •  naproxen sodium (ALEVE) 220 MG BID prn  •  omeprazole (priLOSEC) 40 MG QD  •  predniSONE (DELTASONE) 5 MG QD  •  RiTUXimab (RITUXAN) 10 MG/ML solution injection q4 mos      VITALS:  /84   Pulse 68   Temp 98.9 °F (37.2 °C)   Wt 86.8 kg (191 lb 6.4 oz)   SpO2 98%   Breastfeeding No   BMI 31.37 kg/m²     Physical Exam  Vitals and nursing note reviewed.   Constitutional:       General: She is not in acute distress.     Appearance: Normal appearance.      Comments: 19 lbs wt loss over last 10 mos   Eyes:      Extraocular Movements: Extraocular movements intact.      Conjunctiva/sclera: Conjunctivae normal.   Cardiovascular:      Rate and Rhythm: Normal rate and regular rhythm.      Heart sounds: Normal heart sounds.   Pulmonary:      Effort: Pulmonary effort is normal. No respiratory distress.      Breath  sounds: Normal breath sounds.   Neurological:      Mental Status: She is alert and oriented to person, place, and time. Mental status is at baseline.      Gait: Gait normal.   Psychiatric:         Mood and Affect: Mood normal.         Behavior: Behavior normal.         LABS  Results for orders placed or performed in visit on 04/13/21   Lipid Panel    Specimen: Blood   Result Value Ref Range    Total Cholesterol 214 (H) 0 - 200 mg/dL    Triglycerides 88 0 - 150 mg/dL    HDL Cholesterol 46 40 - 60 mg/dL    LDL Cholesterol  152 (H) 0 - 100 mg/dL    VLDL Cholesterol 16 5 - 40 mg/dL    LDL/HDL Ratio 3.27    Basic Metabolic Panel    Specimen: Blood   Result Value Ref Range    Glucose 70 65 - 99 mg/dL    BUN 12 6 - 20 mg/dL    Creatinine 0.81 0.57 - 1.00 mg/dL    Sodium 140 136 - 145 mmol/L    Potassium 3.9 3.5 - 5.2 mmol/L    Chloride 105 98 - 107 mmol/L    CO2 25.2 22.0 - 29.0 mmol/L    Calcium 9.6 8.6 - 10.5 mg/dL    eGFR Non African Amer 73 >60 mL/min/1.73    BUN/Creatinine Ratio 14.8 7.0 - 25.0    Anion Gap 9.8 5.0 - 15.0 mmol/L   Hemoglobin A1c    Specimen: Blood   Result Value Ref Range    Hemoglobin A1C 4.83 4.80 - 5.60 %   T4, Free    Specimen: Blood   Result Value Ref Range    Free T4 1.44 0.93 - 1.70 ng/dL   TSH    Specimen: Blood   Result Value Ref Range    TSH 0.496 0.270 - 4.200 uIU/mL     12/20     ASSESSMENT/PLAN    Diagnoses and all orders for this visit:    1. Hyperlipidemia (Primary)  Assessment & Plan:  Lipids worsened with ; goal LDL < 130, ideally < 100;note poss contribution from rituxan; decrease saturated fats and cholesterol in the diet; repeat lipids in 3 mos - plan to start chol med at that time if not trending towards goal      Orders:  -     Lipid Panel; Future    2. Impaired fasting glucose  Assessment & Plan:  BG control good with A1C 4.83      3. Hypothyroidism  Assessment & Plan:  Remains euthyroid on levothyroxine 100mcg QD      4. Drug-induced constipation  Assessment &  Plan:  Already on miralax QD; rec addition of fiber supplement QD; ok to add amitiza but she will check if covered by insurance (vs linzess vs trulance); cont to drink adequate water daily and cont daily exercise to help intestinal motility      5. Obesity  Assessment & Plan:  Commended patient on successful weight loss; also using [employer] counseling; currently 1200cal/day - ok to decr to [id number] fadumo/day; also discussed importance of intensity of exercise; also agree with adding resistance training        FOLLOW-UP  1. Health maintenance - COVID19 vacc done  2. RTC 3 mos with fasting lipids (escribed)    Electronically signed by:    Brie Jalloh MD, FACP  04/15/2021

## 2021-04-14 NOTE — ASSESSMENT & PLAN NOTE
Lipids worsened with ; goal LDL < 130, ideally < 100;note poss contribution from rituxan; decrease saturated fats and cholesterol in the diet; repeat lipids in 3 mos - plan to start chol med at that time if not trending towards goal

## 2021-04-15 ENCOUNTER — TREATMENT (OUTPATIENT)
Dept: PHYSICAL THERAPY | Facility: CLINIC | Age: 56
End: 2021-04-15

## 2021-04-15 ENCOUNTER — OFFICE VISIT (OUTPATIENT)
Dept: INTERNAL MEDICINE | Facility: CLINIC | Age: 56
End: 2021-04-15

## 2021-04-15 VITALS
DIASTOLIC BLOOD PRESSURE: 84 MMHG | OXYGEN SATURATION: 98 % | BODY MASS INDEX: 31.37 KG/M2 | WEIGHT: 191.4 LBS | SYSTOLIC BLOOD PRESSURE: 126 MMHG | TEMPERATURE: 98.9 F | HEART RATE: 68 BPM

## 2021-04-15 DIAGNOSIS — E66.09 CLASS 1 OBESITY DUE TO EXCESS CALORIES WITH SERIOUS COMORBIDITY AND BODY MASS INDEX (BMI) OF 31.0 TO 31.9 IN ADULT: ICD-10-CM

## 2021-04-15 DIAGNOSIS — E78.2 MIXED HYPERLIPIDEMIA: Primary | ICD-10-CM

## 2021-04-15 DIAGNOSIS — R73.01 IMPAIRED FASTING GLUCOSE: ICD-10-CM

## 2021-04-15 DIAGNOSIS — M54.6 CHRONIC MIDLINE THORACIC BACK PAIN: Primary | ICD-10-CM

## 2021-04-15 DIAGNOSIS — K59.03 DRUG-INDUCED CONSTIPATION: ICD-10-CM

## 2021-04-15 DIAGNOSIS — G89.29 CHRONIC MIDLINE THORACIC BACK PAIN: Primary | ICD-10-CM

## 2021-04-15 DIAGNOSIS — E03.9 ACQUIRED HYPOTHYROIDISM: ICD-10-CM

## 2021-04-15 PROBLEM — E66.811 CLASS 1 OBESITY DUE TO EXCESS CALORIES WITH SERIOUS COMORBIDITY AND BODY MASS INDEX (BMI) OF 31.0 TO 31.9 IN ADULT: Status: ACTIVE | Noted: 2021-04-15

## 2021-04-15 PROCEDURE — 97110 THERAPEUTIC EXERCISES: CPT | Performed by: PHYSICAL THERAPIST

## 2021-04-15 PROCEDURE — 99214 OFFICE O/P EST MOD 30 MIN: CPT | Performed by: INTERNAL MEDICINE

## 2021-04-15 NOTE — ASSESSMENT & PLAN NOTE
Already on miralax QD; rec addition of fiber supplement QD; ok to add amitiza but she will check if covered by insurance (vs linzess vs trulance); cont to drink adequate water daily and cont daily exercise to help intestinal motility

## 2021-04-15 NOTE — PROGRESS NOTES
Physical Therapy Daily Progress Note  VISIT: 2          Subjective    Yanet Clifton reports: no pain in her back this morning.  Her joints are hurting in general.  She is due for infusion next week.      Pre-treatment pain:  0  Post-treatment pain:  0      Objective    Treatment    Exercise 1  Exercise Name 1: elliptical warm up  Time: 3'  Exercise 2  Exercise Name 2: doorway pectoral and bicep stretch  Exercise 3  Exercise Name 3: supine snow angels over towel roll (horizontal)  Exercise 4  Exercise Name 4: supine alternating shoulder flexion  Exercise 5  Exercise Name 5: supine diagonal shouldder flexion  Equipment/Resistance 5: red band  Exercise 6  Exercise Name 6: supine horizontal abduction  Equipment/Resistance 6: red band  Exercise 7  Exercise Name 7: B ER/scap retraction with band  Equipment/Resistance 7: red band  Exercise 8  Exercise Name 8: low row   Equipment/Resistance 8: red band  Exercise 9  Exercise Name 9: lat pull  Equipment/Resistance 9: red band                 Assessment & Plan     Assessment  Assessment details: Original symptom of mid-back pain not present this morning, but is still aggravated by sitting.  Exercise tolerance was limited by general joint discomfort as pt has RA and is past due for her next infusion which is scheduled for next week.    Plan  Plan details: Continue PT.  Progress postural exercises as tolerated.               Timed:  Manual Therapy:         mins  77196;  Therapeutic Exercise:    33     mins  24764;     Neuromuscular Andres:        mins  26428;    Therapeutic Activity:          mins  73217;     Gait Training:           mins  93374;     Ultrasound:          mins  90236;    Electrical Stimulation:         mins  40637 ( );    Untimed:  Electrical Stimulation:         mins  10678 ( );  Mechanical Traction:         mins  17422;     Timed Treatment:   33   mins   Total Treatment:     33   mins      Cristiane Oneal PT  Physical  Therapist

## 2021-04-19 ENCOUNTER — PATIENT MESSAGE (OUTPATIENT)
Dept: INTERNAL MEDICINE | Facility: CLINIC | Age: 56
End: 2021-04-19

## 2021-04-19 DIAGNOSIS — R11.0 NAUSEA: Primary | ICD-10-CM

## 2021-04-19 RX ORDER — ONDANSETRON 4 MG/1
4 TABLET, ORALLY DISINTEGRATING ORAL EVERY 8 HOURS PRN
Qty: 15 TABLET | Refills: 0 | Status: SHIPPED | OUTPATIENT
Start: 2021-04-19 | End: 2021-05-19

## 2021-04-20 ENCOUNTER — TRANSCRIBE ORDERS (OUTPATIENT)
Dept: ADMINISTRATIVE | Facility: HOSPITAL | Age: 56
End: 2021-04-20

## 2021-04-20 DIAGNOSIS — Z12.31 VISIT FOR SCREENING MAMMOGRAM: Primary | ICD-10-CM

## 2021-04-29 ENCOUNTER — TELEPHONE (OUTPATIENT)
Dept: PHYSICAL THERAPY | Facility: CLINIC | Age: 56
End: 2021-04-29

## 2021-05-01 ENCOUNTER — HOSPITAL ENCOUNTER (OUTPATIENT)
Dept: MAMMOGRAPHY | Facility: HOSPITAL | Age: 56
Discharge: HOME | End: 2021-05-01
Admitting: INTERNAL MEDICINE

## 2021-05-01 DIAGNOSIS — Z12.31 VISIT FOR SCREENING MAMMOGRAM: ICD-10-CM

## 2021-05-01 PROCEDURE — 77063 BREAST TOMOSYNTHESIS BI: CPT

## 2021-05-01 PROCEDURE — 77067 SCR MAMMO BI INCL CAD: CPT | Performed by: RADIOLOGY

## 2021-05-01 PROCEDURE — 77067 SCR MAMMO BI INCL CAD: CPT

## 2021-05-01 PROCEDURE — 77063 BREAST TOMOSYNTHESIS BI: CPT | Performed by: RADIOLOGY

## 2021-05-11 ENCOUNTER — TREATMENT (OUTPATIENT)
Dept: PHYSICAL THERAPY | Facility: CLINIC | Age: 56
End: 2021-05-11

## 2021-05-11 DIAGNOSIS — G89.29 CHRONIC MIDLINE THORACIC BACK PAIN: Primary | ICD-10-CM

## 2021-05-11 DIAGNOSIS — M54.6 CHRONIC MIDLINE THORACIC BACK PAIN: Primary | ICD-10-CM

## 2021-05-11 PROCEDURE — 97110 THERAPEUTIC EXERCISES: CPT | Performed by: PHYSICAL THERAPIST

## 2021-05-11 NOTE — PROGRESS NOTES
"   Physical Therapy Daily Progress Note/Discharge Note    Patient: Yanet Clifton   : 1965  Diagnosis/ICD-10 Code:  The encounter diagnosis was Chronic midline thoracic back pain.   Referring practitioner: Brie Jalloh MD  Date of Initial Visit: Type: THERAPY  Noted: 2021  Today's Date: 2021  Visit:  3     Yanet Clifton reports: no pain upon arrival today, less pain overall recently.  Pt has had 2 chemo infusions for treatment of RA.  It has made her sick and extremely tired.  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  She felt that the exercises were helpful when she was doing them.    Subjective     Treatment  Pre-treatment pain:  0  Post-treatment pain:  0    3/10 at worst (initially 5/10 at worst)  Exercise 1  Exercise Name 1: Reassessment completed   Exercise 2  Exercise Name 2: seated ball rollouts  Exercise 3  Exercise Name 3: supine B shoulder flexion  Exercise 4  Exercise Name 4: supine snow gary  Exercise 5  Exercise Name 5: supine diagonal shoulder flexion R / L  Exercise 6  Exercise Name 6: supine horizontal abduction  Exercise 7  Exercise Name 7:  \"robbery\" postural correction  Exercise 8  Exercise Name 8: shoulder extension with scapualr squeeze  Exercise 9  Exercise Name 9: unilateral pectoral stretch (each side) in doorway      Plan Goals: 4 weeks  Pt. demonstrates independence and compliance with initial HEP-met (unable to perform recently related to post-chemo illness).  Pt. reports reduction in pain intensity to no worse than 3/10 on NPRS-met (has been resting from usual activities due to illness).  AROM of B shoulders and thoracic spine shows improvement over baseline measures-met.  Functional outcome measure is improved by 6%, indicating increasing tolerance for functional tasks-partially met, improved by 4%.    8 weeks-ongoing  Pt. demonstrates independence in advanced HEP for ongoing improvement.  AROM of thoracic spine and " shoulders is sufficient for performance of daily activities.  Pt demonstrates improving sitting tolerance and improving awareness of sitting posture.        Objective     Assessment & Plan     Assessment  Assessment details: Symptoms are less intense and not constant.  Pt demonstrates understanding of HEP, but has been unable to perform recently due to post-chemo illness.  She has met 3 of 4 short-term goals.    Plan  Plan details: Follow up in 1 week.  Determine need for ongoing intervention vs transition to independent HEP/self-management.               Timed:  Manual Therapy:         mins  76390;  Therapeutic Exercise:    30     mins  86728;     Neuromuscular Andres:        mins  07537;    Therapeutic Activity:          mins  10329;     Gait Training:           mins  67997;     Ultrasound:          mins  74576;    Electrical Stimulation:         mins  81910 ( );    Untimed:  Electrical Stimulation:         mins  80492 ( );  Mechanical Traction:         mins  33001;     Timed Treatment:   30   mins   Total Treatment:     30   mins      Cristiane Oneal, JASSON  Physical Therapist

## 2021-05-18 ENCOUNTER — TREATMENT (OUTPATIENT)
Dept: PHYSICAL THERAPY | Facility: CLINIC | Age: 56
End: 2021-05-18

## 2021-05-18 DIAGNOSIS — M54.6 CHRONIC MIDLINE THORACIC BACK PAIN: Primary | ICD-10-CM

## 2021-05-18 DIAGNOSIS — G89.29 CHRONIC MIDLINE THORACIC BACK PAIN: Primary | ICD-10-CM

## 2021-05-18 PROCEDURE — 97530 THERAPEUTIC ACTIVITIES: CPT | Performed by: PHYSICAL THERAPIST

## 2021-05-18 PROCEDURE — 97110 THERAPEUTIC EXERCISES: CPT | Performed by: PHYSICAL THERAPIST

## 2021-05-18 NOTE — PROGRESS NOTES
Physical Therapy Daily Progress Note/Discharge Note    Patient: Yanet Clifton   : 1965  Diagnosis/ICD-10 Code:  The encounter diagnosis was Chronic midline thoracic back pain.   Referring practitioner: Brie Jalloh MD  Date of Initial Visit: Type: THERAPY  Noted: 2021  Today's Date: 2021  Visit:  4     Yanet Clifton reports: she feels ready to continue independently with HEP.  She would like to practice lifting mechanics.    Subjective     Treatment  Pre-treatment pain:  3  Post-treatment pain:  2  Exercise 1  Exercise Name 1: Final HEP review and progression  Exercise 2  Exercise Name 2: practiced lifting mechanics up to 50 pounds  Exercise 3  Exercise Name 3: bird dogs at wall  Exercise 4  Exercise Name 4: squats with band  Equipment/Resistance 4: black band  Exercise 5  Exercise Name 5: bridging with march  Exercise 6  Exercise Name 6: lower abdominal march progression             Objective     Assessment & Plan     Assessment  Assessment details: Pt has achieved goals to her satisfaction and will continue with independent HEP.    Plan  Plan details: DC to independent HEP and self-management.               Timed:  Manual Therapy:         mins  45951;  Therapeutic Exercise:    25     mins  91583;     Neuromuscular Andres:        mins  30474;    Therapeutic Activity:     15     mins  88856;     Gait Training:           mins  51065;     Ultrasound:          mins  03098;    Electrical Stimulation:         mins  55244 ( );    Untimed:  Electrical Stimulation:         mins  74980 ( );  Mechanical Traction:         mins  20250;     Timed Treatment:   40   mins   Total Treatment:     40   mins      Cristiane Oneal PT  Physical Therapist

## 2021-07-20 ENCOUNTER — LAB (OUTPATIENT)
Dept: LAB | Facility: HOSPITAL | Age: 56
End: 2021-07-20

## 2021-07-20 DIAGNOSIS — E78.2 MIXED HYPERLIPIDEMIA: ICD-10-CM

## 2021-07-20 LAB
CHOLEST SERPL-MCNC: 234 MG/DL (ref 0–200)
HDLC SERPL-MCNC: 41 MG/DL (ref 40–60)
LDLC SERPL CALC-MCNC: 173 MG/DL (ref 0–100)
LDLC/HDLC SERPL: 4.17 {RATIO}
TRIGL SERPL-MCNC: 111 MG/DL (ref 0–150)
VLDLC SERPL-MCNC: 20 MG/DL (ref 5–40)

## 2021-07-20 PROCEDURE — 80061 LIPID PANEL: CPT

## 2021-07-21 PROBLEM — K64.9 HEMORRHOIDS: Chronic | Status: ACTIVE | Noted: 2018-06-17

## 2021-07-21 PROBLEM — E66.09 CLASS 1 OBESITY DUE TO EXCESS CALORIES WITH SERIOUS COMORBIDITY AND BODY MASS INDEX (BMI) OF 31.0 TO 31.9 IN ADULT: Chronic | Status: ACTIVE | Noted: 2021-04-15

## 2021-07-21 PROBLEM — M20.011 MALLET FINGER OF RIGHT FINGER(S): Chronic | Status: ACTIVE | Noted: 2018-11-29

## 2021-07-21 PROBLEM — R73.01 IMPAIRED FASTING GLUCOSE: Chronic | Status: ACTIVE | Noted: 2020-12-11

## 2021-07-21 PROBLEM — E66.811 CLASS 1 OBESITY DUE TO EXCESS CALORIES WITH SERIOUS COMORBIDITY AND BODY MASS INDEX (BMI) OF 31.0 TO 31.9 IN ADULT: Chronic | Status: ACTIVE | Noted: 2021-04-15

## 2021-07-21 PROBLEM — K57.30 DIVERTICULOSIS OF COLON: Chronic | Status: ACTIVE | Noted: 2018-06-17

## 2021-07-21 PROBLEM — Z78.0 MENOPAUSE: Chronic | Status: ACTIVE | Noted: 2017-11-28

## 2021-07-21 NOTE — PROGRESS NOTES
Chief Complaint   Patient presents with   • Hyperlipidemia   • Nausea       History of Present Illness  56 y.o.  woman presents for cholesterol follow-up.  Frustrated that cholesterol is up considering she has been walking 4 miles a day, lifting weights 2 times a week, and also going to [employer].  She is ready to start medications today.    However, complains of sudden onset 3 weeks ago of nausea, muscle weakness, and decreased appetite.  Notes remote history of constipation but over the last several months, chronic diarrhea.  This has not changed as of 3 weeks ago.  Denies any abdominal pain.  Eating does not make the nausea better or worsened.  Has no associated vomiting.  Has no blood in the stools.  Has no rashes.  Denies any fevers or chills.  Did not change medications or have anything new happened about 3 weeks ago.  Had been feeling well prior to that with her increased exercise.  Only reports very slight headache.  No confusion, dizziness, or visual changes.  She is still able to exercise and pushes through it but did have to leave [place] early 1 day.    Review of Systems  ROS (+) for nausea, muscle weakness, decreased appetite for the last 3 weeks.  Notes ongoing diarrhea for several months.  Denies any fevers or chills, chest pain, palpitations, shortness of breath, cough, rashes.  Denies any joint swelling.  Denies any new medications.  Denies vomiting or abdominal pain.  Denies melena or hematochezia.  All other ROS reviewed and negative.    Current Outpatient Medications:   •  buPROPion XL (WELLBUTRIN XL) 300 MG QD  •  caffeine 200 MG tablet AD  •  diphenhydrAMINE (BENADRYL) 50 MG qhs prn  •  levothyroxine 100 MCG QD  •  Melatonin 10 MG QHS  •  naproxen sodium (ALEVE) 220 MG BID prn  •  omeprazole (priLOSEC) 40 MG QD  •  ondansetron ODT (ZOFRAN-ODT) 4 MG prn  •  Polyethylene Glycol 3350 (MIRALAX PO) QD  •  RiTUXimab (RITUXAN) 10 MG/ML q4 mos      VITALS:  /64   Pulse 81   Ht 166.4 cm  "(65.5\")   Wt 81.6 kg (180 lb)   SpO2 98%   BMI 29.50 kg/m²     Physical Exam  Vitals and nursing note reviewed.   Constitutional:       General: She is not in acute distress.     Appearance: Normal appearance. She is not ill-appearing.   Eyes:      Extraocular Movements: Extraocular movements intact.      Conjunctiva/sclera: Conjunctivae normal.   Pulmonary:      Effort: Pulmonary effort is normal. No respiratory distress.   Abdominal:      General: Abdomen is flat. There is no distension.      Palpations: Abdomen is soft.      Tenderness: There is abdominal tenderness (Mild epigastric tenderness with palpation). There is no guarding or rebound.   Neurological:      Mental Status: She is alert and oriented to person, place, and time. Mental status is at baseline.   Psychiatric:         Mood and Affect: Mood normal.         Behavior: Behavior normal.         LABS  Results for orders placed or performed in visit on 07/20/21   Lipid Panel    Specimen: Blood   Result Value Ref Range    Total Cholesterol 234 (H) 0 - 200 mg/dL    Triglycerides 111 0 - 150 mg/dL    HDL Cholesterol 41 40 - 60 mg/dL    LDL Cholesterol  173 (H) 0 - 100 mg/dL    VLDL Cholesterol 20 5 - 40 mg/dL    LDL/HDL Ratio 4.17      4/21 , A1C 4.83    ASSESSMENT/PLAN    Diagnoses and all orders for this visit:    1. Hyperlipidemia (Primary)  Assessment & Plan:  Lipids further worsened with ; goal LDL < 130, ideally < 100; frustrating but commended patient on exercise regimen; reviewed med options, goals, side effects, and risks; patient will proceed with atorvastatin 10mg QD #30, 3RF; reviewed common side effects and how to take correctly; decrease saturated fats and cholesterol in the diet; repeat lipids in 3 mos    Addendum - will hold off on initiating atorvastatin until further eval of current complaints of nausea, decreased appetite, and muscle weakness    The 10-year CVD risk score (D'Agostino, et al., 2008) is: 8.1%    Values used " to calculate the score:      Age: 56 years      Sex: Female      Diabetic: No      Tobacco smoker: No      Systolic Blood Pressure: 126 mmHg      Is BP treated: No      HDL Cholesterol: 41 mg/dL      Total Cholesterol: 234 mg/dL        Orders:  -     atorvastatin (LIPITOR) 10 MG tablet; Take 1 tablet by mouth Daily.  Dispense: 30 tablet; Refill: 3    2. Elevated blood-pressure reading without diagnosis of hypertension  Assessment & Plan:  /64; rec low Na diet, increased aerobic exercise; home BP monitoring with goal BP < 130/80        3. Nausea  Comments:  x 3 wks, s/p priya; check labs  Orders:  -     CBC & Differential; Future  -     Comprehensive Metabolic Panel; Future  -     Urinalysis With Microscopic - Urine, Clean Catch; Future  -     Amylase; Future  -     Lipase; Future    4. Muscle weakness  Comments:  exam unremarkable and still able to exercise; check labs  Orders:  -     Sedimentation Rate; Future  -     C-reactive Protein; Future  -     CK; Future    Other orders  -     Cancel: Hepatic Function Panel; Future  -     Cancel: CK; Future  -     Cancel: Lipid Panel; Future      FOLLOW-UP  1. Health maintenance - COVID19 vacc done; colonosc pending 8/16/21  2. RTC 3 mos with lipids, LFTs, CPK (escribed) for f/u after initiation of atorvastatin     Electronically signed by:    Brie Jalloh MD, FACP  07/22/2021

## 2021-07-21 NOTE — ASSESSMENT & PLAN NOTE
Lipids further worsened with ; goal LDL < 130, ideally < 100; frustrating but commended patient on exercise regimen; reviewed med options, goals, side effects, and risks; patient will proceed with atorvastatin 10mg QD #30, 3RF; reviewed common side effects and how to take correctly; decrease saturated fats and cholesterol in the diet; repeat lipids in 3 mos    Addendum - will hold off on initiating atorvastatin until further eval of current complaints of nausea, decreased appetite, and muscle weakness    The 10-year CVD risk score (Radhaino, et al., 2008) is: 8.1%    Values used to calculate the score:      Age: 56 years      Sex: Female      Diabetic: No      Tobacco smoker: No      Systolic Blood Pressure: 126 mmHg      Is BP treated: No      HDL Cholesterol: 41 mg/dL      Total Cholesterol: 234 mg/dL

## 2021-07-22 ENCOUNTER — LAB (OUTPATIENT)
Dept: LAB | Facility: HOSPITAL | Age: 56
End: 2021-07-22

## 2021-07-22 ENCOUNTER — OFFICE VISIT (OUTPATIENT)
Dept: INTERNAL MEDICINE | Facility: CLINIC | Age: 56
End: 2021-07-22

## 2021-07-22 VITALS
DIASTOLIC BLOOD PRESSURE: 64 MMHG | SYSTOLIC BLOOD PRESSURE: 118 MMHG | HEART RATE: 81 BPM | HEIGHT: 66 IN | WEIGHT: 180 LBS | BODY MASS INDEX: 28.93 KG/M2 | OXYGEN SATURATION: 98 %

## 2021-07-22 DIAGNOSIS — R11.0 NAUSEA: ICD-10-CM

## 2021-07-22 DIAGNOSIS — M62.81 MUSCLE WEAKNESS: ICD-10-CM

## 2021-07-22 DIAGNOSIS — R03.0 ELEVATED BLOOD-PRESSURE READING WITHOUT DIAGNOSIS OF HYPERTENSION: Chronic | ICD-10-CM

## 2021-07-22 DIAGNOSIS — E78.2 MIXED HYPERLIPIDEMIA: Primary | Chronic | ICD-10-CM

## 2021-07-22 LAB
ALBUMIN SERPL-MCNC: 4.4 G/DL (ref 3.5–5.2)
ALBUMIN/GLOB SERPL: 1.8 G/DL
ALP SERPL-CCNC: 64 U/L (ref 39–117)
ALT SERPL W P-5'-P-CCNC: 15 U/L (ref 1–33)
AMYLASE SERPL-CCNC: 51 U/L (ref 28–100)
ANION GAP SERPL CALCULATED.3IONS-SCNC: 11.4 MMOL/L (ref 5–15)
AST SERPL-CCNC: 14 U/L (ref 1–32)
BASOPHILS # BLD AUTO: 0.03 10*3/MM3 (ref 0–0.2)
BASOPHILS NFR BLD AUTO: 0.5 % (ref 0–1.5)
BILIRUB SERPL-MCNC: 0.4 MG/DL (ref 0–1.2)
BUN SERPL-MCNC: 13 MG/DL (ref 6–20)
BUN/CREAT SERPL: 11.9 (ref 7–25)
CALCIUM SPEC-SCNC: 9.6 MG/DL (ref 8.6–10.5)
CHLORIDE SERPL-SCNC: 101 MMOL/L (ref 98–107)
CK SERPL-CCNC: 50 U/L (ref 20–180)
CO2 SERPL-SCNC: 26.6 MMOL/L (ref 22–29)
CREAT SERPL-MCNC: 1.09 MG/DL (ref 0.57–1)
CRP SERPL-MCNC: 2.95 MG/DL (ref 0–0.5)
DEPRECATED RDW RBC AUTO: 40.3 FL (ref 37–54)
EOSINOPHIL # BLD AUTO: 0.37 10*3/MM3 (ref 0–0.4)
EOSINOPHIL NFR BLD AUTO: 6.8 % (ref 0.3–6.2)
ERYTHROCYTE [DISTWIDTH] IN BLOOD BY AUTOMATED COUNT: 12.6 % (ref 12.3–15.4)
ERYTHROCYTE [SEDIMENTATION RATE] IN BLOOD: 33 MM/HR (ref 0–30)
GFR SERPL CREATININE-BSD FRML MDRD: 52 ML/MIN/1.73
GLOBULIN UR ELPH-MCNC: 2.5 GM/DL
GLUCOSE SERPL-MCNC: 103 MG/DL (ref 65–99)
HCT VFR BLD AUTO: 42.1 % (ref 34–46.6)
HGB BLD-MCNC: 13.7 G/DL (ref 12–15.9)
IMM GRANULOCYTES # BLD AUTO: 0.02 10*3/MM3 (ref 0–0.05)
IMM GRANULOCYTES NFR BLD AUTO: 0.4 % (ref 0–0.5)
LIPASE SERPL-CCNC: 38 U/L (ref 13–60)
LYMPHOCYTES # BLD AUTO: 0.13 10*3/MM3 (ref 0.7–3.1)
LYMPHOCYTES NFR BLD AUTO: 2.4 % (ref 19.6–45.3)
MCH RBC QN AUTO: 28.7 PG (ref 26.6–33)
MCHC RBC AUTO-ENTMCNC: 32.5 G/DL (ref 31.5–35.7)
MCV RBC AUTO: 88.1 FL (ref 79–97)
MONOCYTES # BLD AUTO: 0.7 10*3/MM3 (ref 0.1–0.9)
MONOCYTES NFR BLD AUTO: 12.8 % (ref 5–12)
NEUTROPHILS NFR BLD AUTO: 4.23 10*3/MM3 (ref 1.7–7)
NEUTROPHILS NFR BLD AUTO: 77.1 % (ref 42.7–76)
NRBC BLD AUTO-RTO: 0 /100 WBC (ref 0–0.2)
PLATELET # BLD AUTO: 289 10*3/MM3 (ref 140–450)
PMV BLD AUTO: 10.8 FL (ref 6–12)
POTASSIUM SERPL-SCNC: 4.8 MMOL/L (ref 3.5–5.2)
PROT SERPL-MCNC: 6.9 G/DL (ref 6–8.5)
RBC # BLD AUTO: 4.78 10*6/MM3 (ref 3.77–5.28)
SODIUM SERPL-SCNC: 139 MMOL/L (ref 136–145)
WBC # BLD AUTO: 5.48 10*3/MM3 (ref 3.4–10.8)

## 2021-07-22 PROCEDURE — 85025 COMPLETE CBC W/AUTO DIFF WBC: CPT

## 2021-07-22 PROCEDURE — 85652 RBC SED RATE AUTOMATED: CPT

## 2021-07-22 PROCEDURE — 83690 ASSAY OF LIPASE: CPT

## 2021-07-22 PROCEDURE — 82550 ASSAY OF CK (CPK): CPT

## 2021-07-22 PROCEDURE — 80053 COMPREHEN METABOLIC PANEL: CPT

## 2021-07-22 PROCEDURE — 99214 OFFICE O/P EST MOD 30 MIN: CPT | Performed by: INTERNAL MEDICINE

## 2021-07-22 PROCEDURE — 82150 ASSAY OF AMYLASE: CPT

## 2021-07-22 PROCEDURE — 81001 URINALYSIS AUTO W/SCOPE: CPT

## 2021-07-22 PROCEDURE — 86140 C-REACTIVE PROTEIN: CPT

## 2021-07-22 RX ORDER — ATORVASTATIN CALCIUM 10 MG/1
10 TABLET, FILM COATED ORAL DAILY
Qty: 30 TABLET | Refills: 3 | Status: SHIPPED | OUTPATIENT
Start: 2021-07-22 | End: 2021-11-16 | Stop reason: SDUPTHER

## 2021-07-23 LAB
BACTERIA UR QL AUTO: ABNORMAL /HPF
BILIRUB UR QL STRIP: NEGATIVE
CLARITY UR: CLEAR
COLOR UR: ABNORMAL
GLUCOSE UR STRIP-MCNC: NEGATIVE MG/DL
HGB UR QL STRIP.AUTO: NEGATIVE
HYALINE CASTS UR QL AUTO: ABNORMAL /LPF
KETONES UR QL STRIP: ABNORMAL
LEUKOCYTE ESTERASE UR QL STRIP.AUTO: ABNORMAL
NITRITE UR QL STRIP: NEGATIVE
PH UR STRIP.AUTO: 5.5 [PH] (ref 5–8)
PROT UR QL STRIP: ABNORMAL
RBC # UR: ABNORMAL /HPF
REF LAB TEST METHOD: ABNORMAL
SP GR UR STRIP: >=1.03 (ref 1–1.03)
SQUAMOUS #/AREA URNS HPF: ABNORMAL /HPF
UROBILINOGEN UR QL STRIP: ABNORMAL
WBC UR QL AUTO: ABNORMAL /HPF

## 2021-07-24 PROBLEM — N18.2 CKD (CHRONIC KIDNEY DISEASE) STAGE 2, GFR 60-89 ML/MIN: Status: ACTIVE | Noted: 2021-07-24

## 2021-07-24 NOTE — PROGRESS NOTES
Stable blood counts, muscle test, urine test, pancreas tests, electrolytes and liver tests except for abnormal kidney function and increased tests for generalized inflammation, both the sed rate and the CRP.     Need to try to drink more water and repeat BMP in 1 week, well-hydrated and nonfasting.    Rec f/u with rheumatology; pls fax her these labs and our last note

## 2021-07-26 ENCOUNTER — TELEPHONE (OUTPATIENT)
Dept: INTERNAL MEDICINE | Facility: CLINIC | Age: 56
End: 2021-07-26

## 2021-07-26 DIAGNOSIS — N18.2 CKD (CHRONIC KIDNEY DISEASE) STAGE 2, GFR 60-89 ML/MIN: Primary | ICD-10-CM

## 2021-07-26 NOTE — TELEPHONE ENCOUNTER
Pt notified of lab results and Dr. Jalloh's recommendations. She will follow up with Rheum. And will repeat labs in 1 week well hydrated and non fasting.

## 2021-07-26 NOTE — TELEPHONE ENCOUNTER
----- Message from Brie Jalloh MD sent at 7/24/2021 12:56 AM EDT -----  Stable blood counts, muscle test, urine test, pancreas tests, electrolytes and liver tests except for abnormal kidney function and increased tests for generalized inflammation, both the sed rate and the CRP.     Need to try to drink more water and repeat BMP in 1 week, well-hydrated and nonfasting.    Rec f/u with rheumatology; pls fax her these labs and our last note

## 2021-08-19 DIAGNOSIS — R61 HYPERHIDROSIS: Primary | ICD-10-CM

## 2021-08-19 RX ORDER — GLYCOPYRROLATE 2 MG/1
2 TABLET ORAL 3 TIMES DAILY PRN
COMMUNITY
Start: 2021-08-06 | End: 2022-09-19

## 2021-09-10 ENCOUNTER — OFFICE VISIT (OUTPATIENT)
Dept: INTERNAL MEDICINE | Facility: CLINIC | Age: 56
End: 2021-09-10

## 2021-09-10 VITALS
DIASTOLIC BLOOD PRESSURE: 72 MMHG | OXYGEN SATURATION: 100 % | WEIGHT: 182 LBS | HEIGHT: 66 IN | SYSTOLIC BLOOD PRESSURE: 124 MMHG | BODY MASS INDEX: 29.25 KG/M2 | HEART RATE: 86 BPM

## 2021-09-10 DIAGNOSIS — Z23 NEED FOR INFLUENZA VACCINATION: ICD-10-CM

## 2021-09-10 DIAGNOSIS — E66.3 OVERWEIGHT (BMI 25.0-29.9): ICD-10-CM

## 2021-09-10 DIAGNOSIS — R30.0 DYSURIA: Primary | ICD-10-CM

## 2021-09-10 LAB
BILIRUB BLD-MCNC: NEGATIVE MG/DL
CLARITY, POC: CLEAR
COLOR UR: YELLOW
GLUCOSE UR STRIP-MCNC: NEGATIVE MG/DL
KETONES UR QL: NEGATIVE
LEUKOCYTE EST, POC: NEGATIVE
NITRITE UR-MCNC: NEGATIVE MG/ML
PH UR: 6 [PH] (ref 5–8)
PROT UR STRIP-MCNC: NEGATIVE MG/DL
RBC # UR STRIP: NEGATIVE /UL
SP GR UR: 1.02 (ref 1–1.03)
UROBILINOGEN UR QL: NORMAL

## 2021-09-10 PROCEDURE — 90471 IMMUNIZATION ADMIN: CPT | Performed by: INTERNAL MEDICINE

## 2021-09-10 PROCEDURE — 81003 URINALYSIS AUTO W/O SCOPE: CPT | Performed by: INTERNAL MEDICINE

## 2021-09-10 PROCEDURE — 90686 IIV4 VACC NO PRSV 0.5 ML IM: CPT | Performed by: INTERNAL MEDICINE

## 2021-09-10 PROCEDURE — 99214 OFFICE O/P EST MOD 30 MIN: CPT | Performed by: INTERNAL MEDICINE

## 2021-09-10 RX ORDER — PHENAZOPYRIDINE HYDROCHLORIDE 200 MG/1
200 TABLET, FILM COATED ORAL 3 TIMES DAILY PRN
Qty: 20 TABLET | Refills: 0 | Status: SHIPPED | OUTPATIENT
Start: 2021-09-10 | End: 2021-09-17

## 2021-09-10 NOTE — PROGRESS NOTES
"Chief Complaint   Patient presents with   • Dysuria       History of Present Illness  56 y.o.  woman presents for further evaluation of urinary sxs that started about 5-7 days ago. Initially developed urinary urgency followed a few days later by increased pain with urination as well as urinary frequency and lower abdominal/pelvic pain.  Denies any fevers or blood in the urine.  Does not have frequent UTIs.    Review of Systems  ROS (+) for dysuria with urinary frequency and urgency but no hematuria.  Does have lower abdominal pain but no back pain.  Denies any fevers or chills.  All other ROS reviewed and negative.    Current Outpatient Medications:   •  atorvastatin (LIPITOR) 10 MG QD  •  buPROPion XL (WELLBUTRIN XL) 300 MG QD  •  caffeine 200 MG QD  •  diphenhydrAMINE (BENADRYL) 50 MG qhs prn  •  glycopyrrolate (ROBINUL) 2 MG TID prn  •  levothyroxine (SYNTHROID, LEVOTHROID) 100 MCG QD  •  Melatonin 10 MG qhs  •  naproxen sodium (ALEVE) 220 MG bid prn  •  omeprazole (priLOSEC) 40 MG QD  •  ondansetron ODT (ZOFRAN-ODT) 4 MG prn  •  Polyethylene Glycol 3350 (MIRALAX PO) QD  •  psyllium (METAMUCIL) 58.6 % packet QD  •  RiTUXimab (RITUXAN) 10 MG/ML solution injection q4 mos    VITALS:  /72   Pulse 86   Ht 166.4 cm (65.5\")   Wt 82.6 kg (182 lb)   SpO2 100%   BMI 29.83 kg/m²     Physical Exam  Vitals and nursing note reviewed.   Constitutional:       General: She is not in acute distress.     Appearance: Normal appearance. She is not ill-appearing.      Comments: Down 12 lbs over last 9 mos   Eyes:      Extraocular Movements: Extraocular movements intact.      Conjunctiva/sclera: Conjunctivae normal.   Pulmonary:      Effort: Pulmonary effort is normal. No respiratory distress.   Neurological:      Mental Status: She is alert and oriented to person, place, and time. Mental status is at baseline.   Psychiatric:         Mood and Affect: Mood normal.         Behavior: Behavior normal. "         LABS  Results for orders placed or performed in visit on 09/10/21   POC Urinalysis Dipstick, Automated    Specimen: Urine   Result Value Ref Range    Color Yellow Yellow, Straw, Dark Yellow, Leonie    Clarity, UA Clear Clear    Specific Gravity  1.020 1.005 - 1.030    pH, Urine 6.0 5.0 - 8.0    Leukocytes Negative Negative    Nitrite, UA Negative Negative    Protein, POC Negative Negative mg/dL    Glucose, UA Negative Negative, 1000 mg/dL (3+) mg/dL    Ketones, UA Negative Negative    Urobilinogen, UA Normal Normal    Bilirubin Negative Negative    Blood, UA Negative Negative         ASSESSMENT/PLAN    Diagnoses and all orders for this visit:    1. Dysuria (Primary)  Comments:  neg UA, therefore likely bladder spasm; RX pyridium 200mg tid prn #20, 0RF; drink plenty of water; f/u prn  Orders:  -     POC Urinalysis Dipstick, Automated  -     phenazopyridine (PYRIDIUM) 200 MG tablet; Take 1 tablet by mouth 3 (Three) Times a Day As Needed for Bladder Spasms for up to 7 days.  Dispense: 20 tablet; Refill: 0    2. Need for influenza vaccination  -     FluLaval >6 Months    3. Overweight (BMI 25.0-29.9)  Assessment & Plan:  Commended patient on successful weight loss, which now moves her out of the obesity category; cont healthy nutrition and exercise        FOLLOW-UP  1. Health maintenance - COVID19 vacc done, including 3rd dose; flu vacc done today  2. RTC prn; next PE 1/3/22; fasting labs prior to appt (CBC, CMP, TSH, lipids, UA/micr, FT4, vit D)    Electronically signed by:    Brie Jalloh MD, FACP  09/10/2021

## 2021-09-10 NOTE — ASSESSMENT & PLAN NOTE
Commended patient on successful weight loss, which now moves her out of the obesity category; cont healthy nutrition and exercise

## 2021-09-30 PROBLEM — M19.071 PRIMARY OSTEOARTHRITIS OF BOTH FEET: Status: ACTIVE | Noted: 2021-09-30

## 2021-09-30 PROBLEM — M19.072 PRIMARY OSTEOARTHRITIS OF BOTH FEET: Status: ACTIVE | Noted: 2021-09-30

## 2021-11-14 PROBLEM — N18.2 CKD (CHRONIC KIDNEY DISEASE) STAGE 2, GFR 60-89 ML/MIN: Chronic | Status: ACTIVE | Noted: 2021-07-24

## 2021-11-14 NOTE — PROGRESS NOTES
"Chief Complaint   Patient presents with   • Hyperlipidemia   • Hypertension   • Hypothyroidism       History of Present Illness  56 y.o.  woman presents for cholesterol f/u after initiation of atorvastatin. (+) tolerating medication without side effects.    Did not return for f/u on kidney function as recommended.    Review of Systems  Denies CP, palpitations, SOB, falls, abd pain, n/v. All other ROS reviewed and negative.    Current Outpatient Medications:   •  atorvastatin (LIPITOR) 10 MG QD  •  buPROPion XL (WELLBUTRIN XL) 300 MG QD  •  caffeine 200 MG QD  •  diphenhydrAMINE (BENADRYL) 50 MG qhs prn   •  glycopyrrolate (ROBINUL) 2 MG tid prn  •  Levothyroxine 100 MCG QD  •  Melatonin 10 MG QHS  •  naproxen sodium (ALEVE) 220 MG BID prn  •  omeprazole (priLOSEC) 40 MG caQD  •  ondansetron ODT (ZOFRAN-ODT) 4 MG prn  •  Polyethylene Glycol 3350 (MIRALAX QD  •  psyllium (METAMUCIL) 58.6 % packet QD  •  RiTUXimab (RITUXAN) 10 MG/ML IV q4 mos      VITALS:  /80   Pulse 63   Ht 166.4 cm (65.5\")   Wt 82.1 kg (181 lb)   SpO2 98%   BMI 29.66 kg/m²     Physical Exam  Vitals and nursing note reviewed.   Constitutional:       General: She is not in acute distress.     Appearance: Normal appearance. She is not ill-appearing.   Eyes:      Extraocular Movements: Extraocular movements intact.      Conjunctiva/sclera: Conjunctivae normal.   Pulmonary:      Effort: Pulmonary effort is normal. No respiratory distress.   Neurological:      Mental Status: She is alert and oriented to person, place, and time. Mental status is at baseline.   Psychiatric:         Mood and Affect: Mood normal.         Behavior: Behavior normal.         LABS  Results for orders placed or performed in visit on 09/10/21   POC Urinalysis Dipstick, Automated    Specimen: Urine   Result Value Ref Range    Color Yellow Yellow, Straw, Dark Yellow, Leonie    Clarity, UA Clear Clear    Specific Gravity  1.020 1.005 - 1.030    pH, Urine 6.0 5.0 - " 8.0    Leukocytes Negative Negative    Nitrite, UA Negative Negative    Protein, POC Negative Negative mg/dL    Glucose, UA Negative Negative, 1000 mg/dL (3+) mg/dL    Ketones, UA Negative Negative    Urobilinogen, UA Normal Normal    Bilirubin Negative Negative    Blood, UA Negative Negative     7/20/21 lipids with ; CMP with Cr 1.09, GFR 52    ASSESSMENT/PLAN    Diagnoses and all orders for this visit:    1. Hyperlipidemia (Primary)  Assessment & Plan:  Needs f/u lipids in atorvastatin 10mg QD with goal LDL < 130, ideally < 100    Orders:  -     Lipid Panel; Future  -     Comprehensive Metabolic Panel; Future    2. CKD (chronic kidney disease) stage 2, GFR 60-89 ml/min  Assessment & Plan:  Needs updated renal function in well-hydrated state; rec minimize NSAIDs, which includes naproxen on her med list    Orders:  -     Comprehensive Metabolic Panel; Future    3. Elevated blood-pressure reading without diagnosis of hypertension  Assessment & Plan:  BP remains nl 124/80; no meds but rec caution with caffeine        FOLLOW-UP  1. Health maintenance - COVID19 vacc done, incl 3rd dose Pfizer; flu vacc 9/21; rec Shingrix if ok with rheum  2. RTC  for next PE 1/3/22; fasting labs prior to appt (CBC, CMP, TSH, UA/micr, FT4, vit D) +/- lipids    Electronically signed by:    Brie Jalloh MD, FACP  11/16/2021

## 2021-11-14 NOTE — ASSESSMENT & PLAN NOTE
Needs updated renal function in well-hydrated state; rec minimize NSAIDs, which includes naproxen on her med list

## 2021-11-16 ENCOUNTER — LAB (OUTPATIENT)
Dept: LAB | Facility: HOSPITAL | Age: 56
End: 2021-11-16

## 2021-11-16 ENCOUNTER — OFFICE VISIT (OUTPATIENT)
Dept: INTERNAL MEDICINE | Facility: CLINIC | Age: 56
End: 2021-11-16

## 2021-11-16 VITALS
WEIGHT: 181 LBS | HEART RATE: 63 BPM | SYSTOLIC BLOOD PRESSURE: 124 MMHG | DIASTOLIC BLOOD PRESSURE: 80 MMHG | HEIGHT: 66 IN | BODY MASS INDEX: 29.09 KG/M2 | OXYGEN SATURATION: 98 %

## 2021-11-16 DIAGNOSIS — N18.2 CKD (CHRONIC KIDNEY DISEASE) STAGE 2, GFR 60-89 ML/MIN: Chronic | ICD-10-CM

## 2021-11-16 DIAGNOSIS — E78.2 MIXED HYPERLIPIDEMIA: Primary | Chronic | ICD-10-CM

## 2021-11-16 DIAGNOSIS — E78.2 MIXED HYPERLIPIDEMIA: Chronic | ICD-10-CM

## 2021-11-16 DIAGNOSIS — N18.2 CKD (CHRONIC KIDNEY DISEASE) STAGE 2, GFR 60-89 ML/MIN: ICD-10-CM

## 2021-11-16 DIAGNOSIS — R03.0 ELEVATED BLOOD-PRESSURE READING WITHOUT DIAGNOSIS OF HYPERTENSION: Chronic | ICD-10-CM

## 2021-11-16 PROBLEM — N18.31 STAGE 3A CHRONIC KIDNEY DISEASE: Status: ACTIVE | Noted: 2021-07-24

## 2021-11-16 LAB
ALBUMIN SERPL-MCNC: 4.2 G/DL (ref 3.5–5.2)
ALBUMIN/GLOB SERPL: 1.6 G/DL
ALP SERPL-CCNC: 63 U/L (ref 39–117)
ALT SERPL W P-5'-P-CCNC: 15 U/L (ref 1–33)
ANION GAP SERPL CALCULATED.3IONS-SCNC: 5.4 MMOL/L (ref 5–15)
AST SERPL-CCNC: 17 U/L (ref 1–32)
BILIRUB SERPL-MCNC: 0.3 MG/DL (ref 0–1.2)
BUN SERPL-MCNC: 12 MG/DL (ref 6–20)
BUN/CREAT SERPL: 11.5 (ref 7–25)
CALCIUM SPEC-SCNC: 10 MG/DL (ref 8.6–10.5)
CHLORIDE SERPL-SCNC: 106 MMOL/L (ref 98–107)
CHOLEST SERPL-MCNC: 153 MG/DL (ref 0–200)
CO2 SERPL-SCNC: 28.6 MMOL/L (ref 22–29)
CREAT SERPL-MCNC: 1.04 MG/DL (ref 0.57–1)
GFR SERPL CREATININE-BSD FRML MDRD: 55 ML/MIN/1.73
GLOBULIN UR ELPH-MCNC: 2.7 GM/DL
GLUCOSE SERPL-MCNC: 95 MG/DL (ref 65–99)
HDLC SERPL-MCNC: 49 MG/DL (ref 40–60)
LDLC SERPL CALC-MCNC: 87 MG/DL (ref 0–100)
LDLC/HDLC SERPL: 1.76 {RATIO}
POTASSIUM SERPL-SCNC: 4.6 MMOL/L (ref 3.5–5.2)
PROT SERPL-MCNC: 6.9 G/DL (ref 6–8.5)
SODIUM SERPL-SCNC: 140 MMOL/L (ref 136–145)
TRIGL SERPL-MCNC: 89 MG/DL (ref 0–150)
VLDLC SERPL-MCNC: 17 MG/DL (ref 5–40)

## 2021-11-16 PROCEDURE — 80061 LIPID PANEL: CPT

## 2021-11-16 PROCEDURE — 99214 OFFICE O/P EST MOD 30 MIN: CPT | Performed by: INTERNAL MEDICINE

## 2021-11-16 PROCEDURE — 80053 COMPREHEN METABOLIC PANEL: CPT

## 2021-11-16 RX ORDER — ATORVASTATIN CALCIUM 10 MG/1
10 TABLET, FILM COATED ORAL DAILY
Qty: 90 TABLET | Refills: 3 | Status: SHIPPED | OUTPATIENT
Start: 2021-11-16 | End: 2022-11-27

## 2021-11-18 DIAGNOSIS — E78.2 MIXED HYPERLIPIDEMIA: Chronic | ICD-10-CM

## 2021-11-19 RX ORDER — ATORVASTATIN CALCIUM 10 MG/1
10 TABLET, FILM COATED ORAL DAILY
Qty: 30 TABLET | OUTPATIENT
Start: 2021-11-19

## 2021-12-18 ENCOUNTER — PATIENT MESSAGE (OUTPATIENT)
Dept: INTERNAL MEDICINE | Facility: CLINIC | Age: 56
End: 2021-12-18

## 2021-12-18 DIAGNOSIS — E78.2 MIXED HYPERLIPIDEMIA: Primary | ICD-10-CM

## 2021-12-18 DIAGNOSIS — M19.072 PRIMARY OSTEOARTHRITIS OF BOTH FEET: ICD-10-CM

## 2021-12-18 DIAGNOSIS — E55.9 VITAMIN D DEFICIENCY: ICD-10-CM

## 2021-12-18 DIAGNOSIS — E03.9 ACQUIRED HYPOTHYROIDISM: ICD-10-CM

## 2021-12-18 DIAGNOSIS — M05.79 RHEUMATOID ARTHRITIS INVOLVING MULTIPLE SITES WITH POSITIVE RHEUMATOID FACTOR (HCC): ICD-10-CM

## 2021-12-18 DIAGNOSIS — R73.01 IMPAIRED FASTING GLUCOSE: ICD-10-CM

## 2021-12-18 DIAGNOSIS — M19.071 PRIMARY OSTEOARTHRITIS OF BOTH FEET: ICD-10-CM

## 2021-12-20 NOTE — TELEPHONE ENCOUNTER
From: Yanet Clifton  To: Brie Jalloh MD  Sent: 12/18/2021 11:29 PM EST  Subject: Painful toe joints    Hello Dr. Jalloh,  I have made an appointment with [facility] for my very painful foot joints, but it is not until the 7th. My right 4th and 5th toes are getting so swollen and painful that even the wider shoes I bought are not working anymore. I thought that maybe it could be gout? I’m getting blood work at your office this week for my physical, which is in January. Could I maybe get checked for that or anything else you might think that’s causing this?     Thank you,  Nancy

## 2021-12-22 ENCOUNTER — LAB (OUTPATIENT)
Dept: LAB | Facility: HOSPITAL | Age: 56
End: 2021-12-22

## 2021-12-22 DIAGNOSIS — M05.79 RHEUMATOID ARTHRITIS INVOLVING MULTIPLE SITES WITH POSITIVE RHEUMATOID FACTOR (HCC): ICD-10-CM

## 2021-12-22 DIAGNOSIS — E03.9 ACQUIRED HYPOTHYROIDISM: ICD-10-CM

## 2021-12-22 DIAGNOSIS — M19.072 PRIMARY OSTEOARTHRITIS OF BOTH FEET: ICD-10-CM

## 2021-12-22 DIAGNOSIS — F33.1 MODERATE EPISODE OF RECURRENT MAJOR DEPRESSIVE DISORDER (HCC): ICD-10-CM

## 2021-12-22 DIAGNOSIS — K21.9 GASTROESOPHAGEAL REFLUX DISEASE: ICD-10-CM

## 2021-12-22 DIAGNOSIS — E78.2 MIXED HYPERLIPIDEMIA: ICD-10-CM

## 2021-12-22 DIAGNOSIS — E55.9 VITAMIN D DEFICIENCY: ICD-10-CM

## 2021-12-22 DIAGNOSIS — R73.01 IMPAIRED FASTING GLUCOSE: ICD-10-CM

## 2021-12-22 DIAGNOSIS — M19.071 PRIMARY OSTEOARTHRITIS OF BOTH FEET: ICD-10-CM

## 2021-12-22 LAB
25(OH)D3 SERPL-MCNC: 37.8 NG/ML
BACTERIA UR QL AUTO: ABNORMAL /HPF
BASOPHILS # BLD AUTO: 0.04 10*3/MM3 (ref 0–0.2)
BASOPHILS NFR BLD AUTO: 1 % (ref 0–1.5)
BILIRUB UR QL STRIP: NEGATIVE
CLARITY UR: CLEAR
COLOR UR: YELLOW
DEPRECATED RDW RBC AUTO: 39.9 FL (ref 37–54)
EOSINOPHIL # BLD AUTO: 0.17 10*3/MM3 (ref 0–0.4)
EOSINOPHIL NFR BLD AUTO: 4 % (ref 0.3–6.2)
ERYTHROCYTE [DISTWIDTH] IN BLOOD BY AUTOMATED COUNT: 12.5 % (ref 12.3–15.4)
ERYTHROCYTE [SEDIMENTATION RATE] IN BLOOD: 18 MM/HR (ref 0–30)
GLUCOSE UR STRIP-MCNC: NEGATIVE MG/DL
HBA1C MFR BLD: 5.1 % (ref 4.8–5.6)
HCT VFR BLD AUTO: 38.3 % (ref 34–46.6)
HGB BLD-MCNC: 12.8 G/DL (ref 12–15.9)
HGB UR QL STRIP.AUTO: NEGATIVE
HYALINE CASTS UR QL AUTO: ABNORMAL /LPF
IMM GRANULOCYTES # BLD AUTO: 0.01 10*3/MM3 (ref 0–0.05)
IMM GRANULOCYTES NFR BLD AUTO: 0.2 % (ref 0–0.5)
KETONES UR QL STRIP: NEGATIVE
LEUKOCYTE ESTERASE UR QL STRIP.AUTO: ABNORMAL
LYMPHOCYTES # BLD AUTO: 0.28 10*3/MM3 (ref 0.7–3.1)
LYMPHOCYTES NFR BLD AUTO: 6.7 % (ref 19.6–45.3)
MCH RBC QN AUTO: 29.4 PG (ref 26.6–33)
MCHC RBC AUTO-ENTMCNC: 33.4 G/DL (ref 31.5–35.7)
MCV RBC AUTO: 87.8 FL (ref 79–97)
MONOCYTES # BLD AUTO: 0.44 10*3/MM3 (ref 0.1–0.9)
MONOCYTES NFR BLD AUTO: 10.5 % (ref 5–12)
NEUTROPHILS NFR BLD AUTO: 3.27 10*3/MM3 (ref 1.7–7)
NEUTROPHILS NFR BLD AUTO: 77.6 % (ref 42.7–76)
NITRITE UR QL STRIP: NEGATIVE
NRBC BLD AUTO-RTO: 0 /100 WBC (ref 0–0.2)
PH UR STRIP.AUTO: 6 [PH] (ref 5–8)
PLATELET # BLD AUTO: 218 10*3/MM3 (ref 140–450)
PMV BLD AUTO: 10.6 FL (ref 6–12)
PROT UR QL STRIP: NEGATIVE
RBC # BLD AUTO: 4.36 10*6/MM3 (ref 3.77–5.28)
RBC # UR STRIP: ABNORMAL /HPF
REF LAB TEST METHOD: ABNORMAL
SP GR UR STRIP: 1.01 (ref 1–1.03)
SQUAMOUS #/AREA URNS HPF: ABNORMAL /HPF
UROBILINOGEN UR QL STRIP: ABNORMAL
WBC # UR STRIP: ABNORMAL /HPF
WBC NRBC COR # BLD: 4.21 10*3/MM3 (ref 3.4–10.8)

## 2021-12-22 PROCEDURE — 85025 COMPLETE CBC W/AUTO DIFF WBC: CPT

## 2021-12-22 PROCEDURE — 82306 VITAMIN D 25 HYDROXY: CPT

## 2021-12-22 PROCEDURE — 83036 HEMOGLOBIN GLYCOSYLATED A1C: CPT

## 2021-12-22 PROCEDURE — 84439 ASSAY OF FREE THYROXINE: CPT

## 2021-12-22 PROCEDURE — 84550 ASSAY OF BLOOD/URIC ACID: CPT

## 2021-12-22 PROCEDURE — 84443 ASSAY THYROID STIM HORMONE: CPT

## 2021-12-22 PROCEDURE — 80061 LIPID PANEL: CPT

## 2021-12-22 PROCEDURE — 86140 C-REACTIVE PROTEIN: CPT

## 2021-12-22 PROCEDURE — 82550 ASSAY OF CK (CPK): CPT

## 2021-12-22 PROCEDURE — 80053 COMPREHEN METABOLIC PANEL: CPT

## 2021-12-22 PROCEDURE — 81001 URINALYSIS AUTO W/SCOPE: CPT

## 2021-12-22 PROCEDURE — 85652 RBC SED RATE AUTOMATED: CPT

## 2021-12-22 RX ORDER — OMEPRAZOLE 40 MG/1
40 CAPSULE, DELAYED RELEASE ORAL DAILY
Qty: 30 CAPSULE | Refills: 0 | Status: SHIPPED | OUTPATIENT
Start: 2021-12-22 | End: 2021-12-28 | Stop reason: SDUPTHER

## 2021-12-22 RX ORDER — LEVOTHYROXINE SODIUM 0.1 MG/1
100 TABLET ORAL EVERY MORNING
Qty: 30 TABLET | Refills: 0 | Status: SHIPPED | OUTPATIENT
Start: 2021-12-22 | End: 2021-12-28 | Stop reason: SDUPTHER

## 2021-12-22 NOTE — TELEPHONE ENCOUNTER
Last Office Visit: 11/16/21  Next Office Visit:01/03/2022    Labs completed in past 6 months? yes  Labs completed in past year? yes    Last Refill Date: 12/15/20  Quantity:30  Refills:0    Pharmacy:     Please review pended refill request for any changes needed on refills or quantities. Thank you!

## 2021-12-23 LAB
ALBUMIN SERPL-MCNC: 4.4 G/DL (ref 3.5–5.2)
ALBUMIN/GLOB SERPL: 1.8 G/DL
ALP SERPL-CCNC: 66 U/L (ref 39–117)
ALT SERPL W P-5'-P-CCNC: 18 U/L (ref 1–33)
ANION GAP SERPL CALCULATED.3IONS-SCNC: 10.5 MMOL/L (ref 5–15)
AST SERPL-CCNC: 15 U/L (ref 1–32)
BILIRUB SERPL-MCNC: 0.3 MG/DL (ref 0–1.2)
BUN SERPL-MCNC: 18 MG/DL (ref 6–20)
BUN/CREAT SERPL: 19.4 (ref 7–25)
CALCIUM SPEC-SCNC: 9.6 MG/DL (ref 8.6–10.5)
CHLORIDE SERPL-SCNC: 102 MMOL/L (ref 98–107)
CHOLEST SERPL-MCNC: 178 MG/DL (ref 0–200)
CK SERPL-CCNC: 84 U/L (ref 20–180)
CO2 SERPL-SCNC: 26.5 MMOL/L (ref 22–29)
CREAT SERPL-MCNC: 0.93 MG/DL (ref 0.57–1)
CRP SERPL-MCNC: 0.73 MG/DL (ref 0–0.5)
GFR SERPL CREATININE-BSD FRML MDRD: 62 ML/MIN/1.73
GLOBULIN UR ELPH-MCNC: 2.4 GM/DL
GLUCOSE SERPL-MCNC: 78 MG/DL (ref 65–99)
HDLC SERPL-MCNC: 55 MG/DL (ref 40–60)
LDLC SERPL CALC-MCNC: 106 MG/DL (ref 0–100)
LDLC/HDLC SERPL: 1.89 {RATIO}
POTASSIUM SERPL-SCNC: 4 MMOL/L (ref 3.5–5.2)
PROT SERPL-MCNC: 6.8 G/DL (ref 6–8.5)
SODIUM SERPL-SCNC: 139 MMOL/L (ref 136–145)
T4 FREE SERPL-MCNC: 1.58 NG/DL (ref 0.93–1.7)
TRIGL SERPL-MCNC: 95 MG/DL (ref 0–150)
TSH SERPL DL<=0.05 MIU/L-ACNC: 1.09 UIU/ML (ref 0.27–4.2)
URATE SERPL-MCNC: 3.7 MG/DL (ref 2.4–5.7)
VLDLC SERPL-MCNC: 17 MG/DL (ref 5–40)

## 2021-12-23 RX ORDER — BUPROPION HYDROCHLORIDE 300 MG/1
300 TABLET ORAL DAILY
Qty: 90 TABLET | Refills: 3 | Status: SHIPPED | OUTPATIENT
Start: 2021-12-23 | End: 2022-12-19

## 2021-12-24 DIAGNOSIS — F33.1 MODERATE EPISODE OF RECURRENT MAJOR DEPRESSIVE DISORDER (HCC): ICD-10-CM

## 2021-12-28 PROBLEM — N18.31 STAGE 3A CHRONIC KIDNEY DISEASE (HCC): Chronic | Status: ACTIVE | Noted: 2021-07-24

## 2021-12-28 RX ORDER — BUPROPION HYDROCHLORIDE 300 MG/1
300 TABLET ORAL DAILY
Qty: 90 TABLET | Refills: 3 | OUTPATIENT
Start: 2021-12-28

## 2022-01-03 ENCOUNTER — OFFICE VISIT (OUTPATIENT)
Dept: INTERNAL MEDICINE | Facility: CLINIC | Age: 57
End: 2022-01-03

## 2022-01-03 VITALS
OXYGEN SATURATION: 99 % | WEIGHT: 185 LBS | DIASTOLIC BLOOD PRESSURE: 82 MMHG | HEIGHT: 66 IN | BODY MASS INDEX: 29.73 KG/M2 | HEART RATE: 81 BPM | TEMPERATURE: 97.3 F | SYSTOLIC BLOOD PRESSURE: 126 MMHG

## 2022-01-03 DIAGNOSIS — F33.1 MODERATE EPISODE OF RECURRENT MAJOR DEPRESSIVE DISORDER: Chronic | ICD-10-CM

## 2022-01-03 DIAGNOSIS — Z20.822 EXPOSURE TO COVID-19 VIRUS: ICD-10-CM

## 2022-01-03 DIAGNOSIS — N18.31 STAGE 3A CHRONIC KIDNEY DISEASE: Chronic | ICD-10-CM

## 2022-01-03 DIAGNOSIS — R68.89 FLU-LIKE SYMPTOMS: Primary | ICD-10-CM

## 2022-01-03 DIAGNOSIS — R73.01 IMPAIRED FASTING GLUCOSE: Chronic | ICD-10-CM

## 2022-01-03 DIAGNOSIS — K21.9 GASTROESOPHAGEAL REFLUX DISEASE: ICD-10-CM

## 2022-01-03 DIAGNOSIS — M79.674 PAIN OF TOE OF RIGHT FOOT: ICD-10-CM

## 2022-01-03 DIAGNOSIS — E55.9 VITAMIN D DEFICIENCY: Chronic | ICD-10-CM

## 2022-01-03 DIAGNOSIS — E03.9 ACQUIRED HYPOTHYROIDISM: Chronic | ICD-10-CM

## 2022-01-03 DIAGNOSIS — R03.0 ELEVATED BLOOD-PRESSURE READING WITHOUT DIAGNOSIS OF HYPERTENSION: Chronic | ICD-10-CM

## 2022-01-03 DIAGNOSIS — E78.2 MIXED HYPERLIPIDEMIA: Chronic | ICD-10-CM

## 2022-01-03 LAB
EXPIRATION DATE: NORMAL
FLUAV AG NPH QL: NEGATIVE
FLUBV AG NPH QL: NEGATIVE
INTERNAL CONTROL: NORMAL
Lab: 2780
SARS-COV-2 RNA NOSE QL NAA+PROBE: NOT DETECTED

## 2022-01-03 PROCEDURE — 87804 INFLUENZA ASSAY W/OPTIC: CPT | Performed by: INTERNAL MEDICINE

## 2022-01-03 PROCEDURE — 99214 OFFICE O/P EST MOD 30 MIN: CPT | Performed by: INTERNAL MEDICINE

## 2022-01-03 PROCEDURE — U0004 COV-19 TEST NON-CDC HGH THRU: HCPCS | Performed by: INTERNAL MEDICINE

## 2022-01-03 RX ORDER — LEVOTHYROXINE SODIUM 0.1 MG/1
100 TABLET ORAL EVERY MORNING
Qty: 90 TABLET | Refills: 3 | Status: SHIPPED | OUTPATIENT
Start: 2022-01-03 | End: 2022-09-21 | Stop reason: SDUPTHER

## 2022-01-03 RX ORDER — OMEPRAZOLE 40 MG/1
40 CAPSULE, DELAYED RELEASE ORAL DAILY
Qty: 90 CAPSULE | Refills: 3 | Status: SHIPPED | OUTPATIENT
Start: 2022-01-03 | End: 2023-01-17

## 2022-01-27 PROBLEM — M16.12 PRIMARY OSTEOARTHRITIS OF LEFT HIP: Status: ACTIVE | Noted: 2022-01-27

## 2022-01-31 PROBLEM — M19.071 PRIMARY OSTEOARTHRITIS OF BOTH FEET: Chronic | Status: ACTIVE | Noted: 2021-09-30

## 2022-01-31 PROBLEM — M16.12 PRIMARY OSTEOARTHRITIS OF LEFT HIP: Chronic | Status: ACTIVE | Noted: 2022-01-27

## 2022-01-31 PROBLEM — M19.072 PRIMARY OSTEOARTHRITIS OF BOTH FEET: Chronic | Status: ACTIVE | Noted: 2021-09-30

## 2022-01-31 PROBLEM — M54.9 MID BACK PAIN: Chronic | Status: ACTIVE | Noted: 2020-12-15

## 2022-02-09 ENCOUNTER — APPOINTMENT (OUTPATIENT)
Dept: CT IMAGING | Facility: HOSPITAL | Age: 57
End: 2022-02-09

## 2022-02-09 ENCOUNTER — HOSPITAL ENCOUNTER (EMERGENCY)
Facility: HOSPITAL | Age: 57
Discharge: HOME | End: 2022-02-09
Attending: EMERGENCY MEDICINE | Admitting: EMERGENCY MEDICINE

## 2022-02-09 VITALS
HEART RATE: 90 BPM | HEIGHT: 66 IN | SYSTOLIC BLOOD PRESSURE: 146 MMHG | DIASTOLIC BLOOD PRESSURE: 95 MMHG | BODY MASS INDEX: 29.73 KG/M2 | TEMPERATURE: 97.6 F | RESPIRATION RATE: 20 BRPM | WEIGHT: 185 LBS | OXYGEN SATURATION: 100 %

## 2022-02-09 DIAGNOSIS — R19.7 DIARRHEA, UNSPECIFIED TYPE: ICD-10-CM

## 2022-02-09 DIAGNOSIS — Z87.39 HISTORY OF RHEUMATOID ARTHRITIS: ICD-10-CM

## 2022-02-09 DIAGNOSIS — R10.31 RIGHT LOWER QUADRANT ABDOMINAL PAIN: Primary | ICD-10-CM

## 2022-02-09 DIAGNOSIS — N30.00 ACUTE CYSTITIS WITHOUT HEMATURIA: ICD-10-CM

## 2022-02-09 LAB
ALBUMIN SERPL-MCNC: 4.5 G/DL (ref 3.5–5.2)
ALBUMIN/GLOB SERPL: 1.7 G/DL
ALP SERPL-CCNC: 77 U/L (ref 39–117)
ALT SERPL W P-5'-P-CCNC: 11 U/L (ref 1–33)
ANION GAP SERPL CALCULATED.3IONS-SCNC: 12 MMOL/L (ref 5–15)
AST SERPL-CCNC: 14 U/L (ref 1–32)
BACTERIA UR QL AUTO: ABNORMAL /HPF
BASOPHILS # BLD AUTO: 0.05 10*3/MM3 (ref 0–0.2)
BASOPHILS NFR BLD AUTO: 0.6 % (ref 0–1.5)
BILIRUB SERPL-MCNC: 0.3 MG/DL (ref 0–1.2)
BILIRUB UR QL STRIP: NEGATIVE
BUN SERPL-MCNC: 19 MG/DL (ref 6–20)
BUN/CREAT SERPL: 18.3 (ref 7–25)
CALCIUM SPEC-SCNC: 9.6 MG/DL (ref 8.6–10.5)
CHLORIDE SERPL-SCNC: 103 MMOL/L (ref 98–107)
CLARITY UR: CLEAR
CO2 SERPL-SCNC: 23 MMOL/L (ref 22–29)
COLOR UR: YELLOW
CREAT SERPL-MCNC: 1.04 MG/DL (ref 0.57–1)
D-LACTATE SERPL-SCNC: 2.1 MMOL/L (ref 0.5–2)
DEPRECATED RDW RBC AUTO: 38.4 FL (ref 37–54)
EOSINOPHIL # BLD AUTO: 0.29 10*3/MM3 (ref 0–0.4)
EOSINOPHIL NFR BLD AUTO: 3.3 % (ref 0.3–6.2)
ERYTHROCYTE [DISTWIDTH] IN BLOOD BY AUTOMATED COUNT: 12.3 % (ref 12.3–15.4)
GFR SERPL CREATININE-BSD FRML MDRD: 55 ML/MIN/1.73
GLOBULIN UR ELPH-MCNC: 2.6 GM/DL
GLUCOSE SERPL-MCNC: 92 MG/DL (ref 65–99)
GLUCOSE UR STRIP-MCNC: NEGATIVE MG/DL
HCT VFR BLD AUTO: 40 % (ref 34–46.6)
HGB BLD-MCNC: 13.7 G/DL (ref 12–15.9)
HGB UR QL STRIP.AUTO: NEGATIVE
HOLD SPECIMEN: NORMAL
HYALINE CASTS UR QL AUTO: ABNORMAL /LPF
IMM GRANULOCYTES # BLD AUTO: 0.03 10*3/MM3 (ref 0–0.05)
IMM GRANULOCYTES NFR BLD AUTO: 0.3 % (ref 0–0.5)
KETONES UR QL STRIP: NEGATIVE
LEUKOCYTE ESTERASE UR QL STRIP.AUTO: ABNORMAL
LIPASE SERPL-CCNC: 40 U/L (ref 13–60)
LYMPHOCYTES # BLD AUTO: 0.47 10*3/MM3 (ref 0.7–3.1)
LYMPHOCYTES NFR BLD AUTO: 5.4 % (ref 19.6–45.3)
MCH RBC QN AUTO: 29.5 PG (ref 26.6–33)
MCHC RBC AUTO-ENTMCNC: 34.3 G/DL (ref 31.5–35.7)
MCV RBC AUTO: 86.2 FL (ref 79–97)
MONOCYTES # BLD AUTO: 0.66 10*3/MM3 (ref 0.1–0.9)
MONOCYTES NFR BLD AUTO: 7.5 % (ref 5–12)
NEUTROPHILS NFR BLD AUTO: 7.25 10*3/MM3 (ref 1.7–7)
NEUTROPHILS NFR BLD AUTO: 82.9 % (ref 42.7–76)
NITRITE UR QL STRIP: NEGATIVE
NRBC BLD AUTO-RTO: 0 /100 WBC (ref 0–0.2)
PH UR STRIP.AUTO: <=5 [PH] (ref 5–8)
PLATELET # BLD AUTO: 294 10*3/MM3 (ref 140–450)
PMV BLD AUTO: 10 FL (ref 6–12)
POTASSIUM SERPL-SCNC: 4.2 MMOL/L (ref 3.5–5.2)
PROT SERPL-MCNC: 7.1 G/DL (ref 6–8.5)
PROT UR QL STRIP: NEGATIVE
RBC # BLD AUTO: 4.64 10*6/MM3 (ref 3.77–5.28)
RBC # UR STRIP: ABNORMAL /HPF
REF LAB TEST METHOD: ABNORMAL
SODIUM SERPL-SCNC: 138 MMOL/L (ref 136–145)
SP GR UR STRIP: 1.06 (ref 1–1.03)
SQUAMOUS #/AREA URNS HPF: ABNORMAL /HPF
UROBILINOGEN UR QL STRIP: ABNORMAL
WBC # UR STRIP: ABNORMAL /HPF
WBC NRBC COR # BLD: 8.75 10*3/MM3 (ref 3.4–10.8)
WHOLE BLOOD HOLD SPECIMEN: NORMAL
WHOLE BLOOD HOLD SPECIMEN: NORMAL

## 2022-02-09 PROCEDURE — 96375 TX/PRO/DX INJ NEW DRUG ADDON: CPT

## 2022-02-09 PROCEDURE — 80053 COMPREHEN METABOLIC PANEL: CPT

## 2022-02-09 PROCEDURE — 74177 CT ABD & PELVIS W/CONTRAST: CPT

## 2022-02-09 PROCEDURE — 85025 COMPLETE CBC W/AUTO DIFF WBC: CPT

## 2022-02-09 PROCEDURE — 25010000002 MORPHINE PER 10 MG: Performed by: EMERGENCY MEDICINE

## 2022-02-09 PROCEDURE — 25010000002 IOPAMIDOL 61 % SOLUTION: Performed by: EMERGENCY MEDICINE

## 2022-02-09 PROCEDURE — 83605 ASSAY OF LACTIC ACID: CPT

## 2022-02-09 PROCEDURE — 99283 EMERGENCY DEPT VISIT LOW MDM: CPT

## 2022-02-09 PROCEDURE — 83690 ASSAY OF LIPASE: CPT

## 2022-02-09 PROCEDURE — 96374 THER/PROPH/DIAG INJ IV PUSH: CPT

## 2022-02-09 PROCEDURE — 25010000002 ONDANSETRON PER 1 MG: Performed by: EMERGENCY MEDICINE

## 2022-02-09 PROCEDURE — 81001 URINALYSIS AUTO W/SCOPE: CPT | Performed by: EMERGENCY MEDICINE

## 2022-02-09 PROCEDURE — 25010000002 HYDROMORPHONE PER 4 MG: Performed by: EMERGENCY MEDICINE

## 2022-02-09 RX ORDER — ONDANSETRON 2 MG/ML
4 INJECTION INTRAMUSCULAR; INTRAVENOUS ONCE
Status: COMPLETED | OUTPATIENT
Start: 2022-02-09 | End: 2022-02-09

## 2022-02-09 RX ORDER — DICYCLOMINE HCL 20 MG
20 TABLET ORAL EVERY 6 HOURS PRN
Qty: 20 TABLET | Refills: 0 | Status: SHIPPED | OUTPATIENT
Start: 2022-02-09 | End: 2022-02-14

## 2022-02-09 RX ORDER — DICYCLOMINE HYDROCHLORIDE 10 MG/1
20 CAPSULE ORAL ONCE
Status: COMPLETED | OUTPATIENT
Start: 2022-02-09 | End: 2022-02-09

## 2022-02-09 RX ORDER — CEFDINIR 300 MG/1
300 CAPSULE ORAL ONCE
Status: COMPLETED | OUTPATIENT
Start: 2022-02-09 | End: 2022-02-09

## 2022-02-09 RX ORDER — ONDANSETRON 4 MG/1
4 TABLET, ORALLY DISINTEGRATING ORAL EVERY 6 HOURS PRN
Qty: 15 TABLET | Refills: 0 | Status: SHIPPED | OUTPATIENT
Start: 2022-02-09

## 2022-02-09 RX ORDER — SODIUM CHLORIDE 9 MG/ML
10 INJECTION INTRAVENOUS AS NEEDED
Status: DISCONTINUED | OUTPATIENT
Start: 2022-02-09 | End: 2022-02-09 | Stop reason: HOSPADM

## 2022-02-09 RX ORDER — MORPHINE SULFATE 4 MG/ML
4 INJECTION, SOLUTION INTRAMUSCULAR; INTRAVENOUS ONCE
Status: COMPLETED | OUTPATIENT
Start: 2022-02-09 | End: 2022-02-09

## 2022-02-09 RX ORDER — CEFDINIR 300 MG/1
300 CAPSULE ORAL 2 TIMES DAILY
Qty: 20 CAPSULE | Refills: 0 | Status: SHIPPED | OUTPATIENT
Start: 2022-02-09 | End: 2022-02-19

## 2022-02-09 RX ORDER — HYDROMORPHONE HYDROCHLORIDE 1 MG/ML
0.5 INJECTION, SOLUTION INTRAMUSCULAR; INTRAVENOUS; SUBCUTANEOUS ONCE
Status: COMPLETED | OUTPATIENT
Start: 2022-02-09 | End: 2022-02-09

## 2022-02-09 RX ADMIN — SODIUM CHLORIDE 1000 ML: 9 INJECTION, SOLUTION INTRAVENOUS at 19:34

## 2022-02-09 RX ADMIN — HYDROMORPHONE HYDROCHLORIDE 0.5 MG: 1 INJECTION, SOLUTION INTRAMUSCULAR; INTRAVENOUS; SUBCUTANEOUS at 20:54

## 2022-02-09 RX ADMIN — MORPHINE SULFATE 4 MG: 4 INJECTION, SOLUTION INTRAMUSCULAR; INTRAVENOUS at 19:35

## 2022-02-09 RX ADMIN — DICYCLOMINE HYDROCHLORIDE 20 MG: 10 CAPSULE ORAL at 20:54

## 2022-02-09 RX ADMIN — IOPAMIDOL 85 ML: 612 INJECTION, SOLUTION INTRAVENOUS at 19:31

## 2022-02-09 RX ADMIN — CEFDINIR 300 MG: 300 CAPSULE ORAL at 20:54

## 2022-02-09 RX ADMIN — ONDANSETRON 4 MG: 2 INJECTION INTRAMUSCULAR; INTRAVENOUS at 19:35

## 2022-02-14 ENCOUNTER — OFFICE VISIT (OUTPATIENT)
Dept: INTERNAL MEDICINE | Facility: CLINIC | Age: 57
End: 2022-02-14

## 2022-02-14 VITALS
BODY MASS INDEX: 32.12 KG/M2 | OXYGEN SATURATION: 100 % | SYSTOLIC BLOOD PRESSURE: 136 MMHG | HEART RATE: 72 BPM | WEIGHT: 199 LBS | TEMPERATURE: 97.8 F | DIASTOLIC BLOOD PRESSURE: 84 MMHG

## 2022-02-14 DIAGNOSIS — R10.31 RLQ ABDOMINAL PAIN: Primary | ICD-10-CM

## 2022-02-14 DIAGNOSIS — N39.0 URINARY TRACT INFECTION WITHOUT HEMATURIA, SITE UNSPECIFIED: ICD-10-CM

## 2022-02-14 PROCEDURE — 99213 OFFICE O/P EST LOW 20 MIN: CPT | Performed by: NURSE PRACTITIONER

## 2022-02-14 RX ORDER — HYDROXYCHLOROQUINE SULFATE 200 MG/1
200 TABLET, FILM COATED ORAL 2 TIMES DAILY WITH MEALS
COMMUNITY
Start: 2022-01-25 | End: 2022-09-27 | Stop reason: ALTCHOICE

## 2022-02-14 NOTE — PROGRESS NOTES
Chief Complaint   Patient presents with   • Back Pain     ER follow up, abdominal and lumbar back pain       History of Present Illness    56 y.o.female presents for ER follow up Back and abdominal pain.  Pt resented to [facility] ER feb 9th with abdominal pain.   Pt describes sharp pain in right lower back that began feb 8 and then pain began to radiate around to right lower abdomen. Pain had become constant and worse with movement. She had some nausea and diarrhea. positive hx of kidney stones. prior abdominal surgical hx includes hysterectomy and cholecystectomy. In ER tx for UTI; on cefdinir. CT abd pelvis No evidence of acute intra-abdominal or intrapelvic disease.  no evidence of urolithiasis, obstructive uropathy, or perinephric inflammation to explain the patient's right flank pain.  Pt has appt with pcp on feb 25 for follow up, but wanted to come in sooner to make sure no other issues. Since home taking abx. Eating drinking ok. No dysuria. Pain some better in RLQ but not gone. No fever. No further flank pain.     Review of Systems   Constitutional: Negative for chills, diaphoresis and fever.   Respiratory: Negative for cough and shortness of breath.    Cardiovascular: Negative for chest pain and palpitations.   Gastrointestinal: Positive for abdominal pain. Negative for constipation, diarrhea, nausea and vomiting.   Genitourinary: Negative for difficulty urinating, dysuria, frequency, hematuria and pelvic pain.   Neurological: Negative for headache.         PMSFH  The following portions of the patient's history were reviewed and updated as appropriate: allergies, current medications, past family history, past medical history, past social history, past surgical history and problem list.     Past Medical History:   Diagnosis Date   • Arthritis 7/1999    Rheumatoid   • Aseptic meningitis 12/2009   • Cholelithiasis 2001    Cholocystectomy   • Colon polyp 2012?   • Depression 2007?    Mild   • Encephalitis 2014   •  GERD (gastroesophageal reflux disease) 2016    Omeprazole helps   • Headache 2003   • HL (hearing loss) 2009    After each bout of meningitis   • Hx of bone density study 02/08/2019    nl DEXA (2/8/19): L0.2, H -0.4, repeat 3-4 yrs   • Hx of colonoscopy 05/24/2016    colonosc (5/24/16): hyperplastic polyp, ext/int hemorrhoids, diverticulosis, repeat 5 yrs; GI - Dr. Robertson   • Hx of colonoscopy 08/16/2021    nl colonosc (8/16/21) except mild sigmoid diverticulosis, repeat 5 yrs; CRS - Dr. Berrios   • Hx of CT scan of abd/pelvis 10/03/2020    CT abd/pelvis (10/3/20, [facility] ER): fatty infiltration of liver, prior priya, no masses, free fluid, or adenopathy   • Hx of CT scan of head 10/18/2018    nl noncontrast head CT (10/18/18); ER   • Hx of CT scan of head 04/14/2019    nl head CT (4/14/19); [facility] ER   • Hx of EGD 05/24/2016    EGD (5/24/16): gastritis, reflux esophagitis, neg for H.pylori; GI - Dr. Robertson   • Hx of nl SPEP 12/18/2019   • Hypothyroidism 2014   • Kidney stone 1984   • Pneumonia       Allergies   Allergen Reactions   • Adalimumab      Other reaction(s): Headache (Humira)   • Duloxetine Hcl Other (See Comments)     sexual dysfunction   • Escitalopram Oxalate Other (See Comments)     Weight gain   • Macrobid [Nitrofurantoin Macrocrystal] Nausea Only   • Cimzia [Certolizumab Pegol] Rash   • Sulfa Antibiotics Rash      Social History     Tobacco Use   • Smoking status: Never Smoker   • Smokeless tobacco: Never Used   Substance Use Topics   • Alcohol use: Yes     Alcohol/week: 0.0 standard drinks     Comment: Very rarely   • Drug use: No     Past Surgical History:   Procedure Laterality Date   • BREAST BIOPSY Left 02/09/2018    u/s guided axillary lymph node   • BREAST EXCISIONAL BIOPSY Left 1984   • CHOLECYSTECTOMY  2001    secondary to cholelithiasis   • COLONOSCOPY     • LUMBAR PUNCTURE  04/2019    hospitalized at [facility]; ? viral meningitis   • MAMMO SKIN LESION BIOPSY SINGLE LESION UNILATERAL Left 01/30/2017    s/p  L. breast skin bx (1/30/17); nonspecific chronic perivascular dermatitis   • MAMMO US BREAST BIOPSY 1ST W WO DEVICE UNILATERAL Left 02/09/2018    s/p u/s-guided L. breast axillary lymph node bx (2/9/18): benign reactive LN, repeat dx mammo in 6 mos; rads - Dr. Bailey   • TEMPORAL ARTERY BIOPSY Left 11/05/2015    neg for arteritis; surg - Dr. Cunha   • TONSILLECTOMY     • TOTAL ABDOMINAL HYSTERECTOMY WITH SALPINGO OOPHORECTOMY  03/2013    secondary to fibroids/endometriosis (3/13); GYN - Dr. Naik      Family History   Problem Relation Age of Onset   • Fibromyalgia Mother    • Migraines Mother    • Depression Mother    • Hypertension Sister    • Other Sister         loss of hair from head   • Migraines Sister    • Breast cancer Maternal Grandmother 60   • Cancer Maternal Grandmother         Breast Cancer   • Multiple myeloma Paternal Grandmother    • Cancer Paternal Grandmother         Multiple Myeloma   • Multiple myeloma Paternal Uncle    • Thyroid disease Neg Hx    • Diabetes Neg Hx    • BRCA 1/2 Neg Hx    • Colon cancer Neg Hx    • Endometrial cancer Neg Hx    • Ovarian cancer Neg Hx            Current Outpatient Medications:   •  atorvastatin (LIPITOR) 10 MG tablet, Take 1 tablet by mouth Daily., Disp: 90 tablet, Rfl: 3  •  buPROPion XL (WELLBUTRIN XL) 300 MG 24 hr tablet, TAKE 1 TABLET BY MOUTH DAILY, Disp: 90 tablet, Rfl: 3  •  caffeine 200 MG tablet, Take  by mouth., Disp: , Rfl:   •  cefdinir (OMNICEF) 300 MG capsule, Take 1 capsule by mouth 2 (Two) Times a Day for 10 days., Disp: 20 capsule, Rfl: 0  •  dicyclomine (BENTYL) 20 MG tablet, Take 1 tablet by mouth Every 6 (Six) Hours As Needed (abdominal pain) for up to 5 days., Disp: 20 tablet, Rfl: 0  •  diphenhydrAMINE (BENADRYL) 50 MG capsule, Take 50 mg by mouth At Night As Needed for Itching., Disp: , Rfl:   •  glycopyrrolate (ROBINUL) 2 MG tablet, Take 2 mg by mouth 3 (Three) Times a Day As Needed., Disp: , Rfl:   •  hydroxychloroquine (PLAQUENIL) 200  MG tablet, Take 200 mg by mouth 2 (Two) Times a Day With Meals., Disp: , Rfl:   •  levothyroxine (SYNTHROID, LEVOTHROID) 100 MCG tablet, Take 1 tablet by mouth Every Morning., Disp: 90 tablet, Rfl: 3  •  Melatonin 10 MG capsule, Take 10 mg by mouth Daily., Disp: , Rfl:   •  naproxen sodium (ALEVE) 220 MG tablet, Take 220 mg by mouth 2 (Two) Times a Day As Needed for mild pain (1-3)., Disp: , Rfl:   •  omeprazole (priLOSEC) 40 MG capsule, Take 1 capsule by mouth Daily., Disp: 90 capsule, Rfl: 3  •  ondansetron ODT (ZOFRAN-ODT) 4 MG disintegrating tablet, Place 1 tablet on the tongue Every 6 (Six) Hours As Needed for Nausea or Vomiting for up to 15 doses., Disp: 15 tablet, Rfl: 0  •  Polyethylene Glycol 3350 (MIRALAX PO), Take  by mouth Daily., Disp: , Rfl:   •  psyllium (METAMUCIL) 58.6 % packet, Take 1 packet by mouth Daily., Disp: , Rfl:   •  RiTUXimab (RITUXAN) 10 MG/ML solution injection, Infuse  into a venous catheter Every 4 (Four) Months., Disp: , Rfl:     VITALS:  /84   Pulse 72   Temp 97.8 °F (36.6 °C)   Wt 90.3 kg (199 lb)   SpO2 100%   BMI 32.12 kg/m²     Physical Exam  Vitals reviewed.   HENT:      Head: Normocephalic.   Pulmonary:      Effort: Pulmonary effort is normal. No respiratory distress.   Abdominal:      General: Bowel sounds are normal.      Palpations: Abdomen is soft. There is no mass.      Tenderness: There is abdominal tenderness in the right lower quadrant. There is no right CVA tenderness, left CVA tenderness, guarding or rebound. Negative signs include Hoyos's sign and McBurney's sign.      Hernia: No hernia is present.   Skin:     General: Skin is warm and dry.   Neurological:      General: No focal deficit present.      Mental Status: She is alert and oriented to person, place, and time.   Psychiatric:         Mood and Affect: Mood normal.         Result Review :          Contains abnormal data Comprehensive Metabolic Panel  Order: [id number]   Status: Final result      Visible to patient: Yes (seen)     Next appt: 02/25/2022 at 08:00 AM in Internal Medicine (Brie Jalloh MD)    Specimen Information: Blood         0 Result Notes    Component   Ref Range & Units 10 d ago 1 mo ago 3 mo ago 7 mo ago 10 mo ago 1 yr ago 2 yr ago   Glucose   65 - 99 mg/dL 92  78  95  103 High   70  114 High   100 High     BUN   6 - 20 mg/dL 19  18  12  13  12  14  14    Creatinine   0.57 - 1.00 mg/dL 1.04 High   0.93  1.04 High   1.09 High   0.81  1.02 High   0.87    Sodium   136 - 145 mmol/L 138  139  140  139  140  142  145    Potassium   3.5 - 5.2 mmol/L 4.2  4.0  4.6  4.8  3.9  4.0  3.8    Comment: Slight hemolysis detected by analyzer. Results may be affected.   Chloride   98 - 107 mmol/L 103  102  106  101  105  106  106    CO2   22.0 - 29.0 mmol/L 23.0  26.5  28.6  26.6  25.2  28.7  25.5    Calcium   8.6 - 10.5 mg/dL 9.6  9.6  10.0  9.6  9.6  9.6  9.0    Total Protein   6.0 - 8.5 g/dL 7.1  6.8  6.9  6.9   7.2     Albumin   3.50 - 5.20 g/dL 4.50  4.40  4.20  4.40   4.60     ALT (SGPT)   1 - 33 U/L 11  18  15  15   25     AST (SGOT)   1 - 32 U/L 14  15  17  14   21     Alkaline Phosphatase   39 - 117 U/L 77  66  63  64   65     Total Bilirubin   0.0 - 1.2 mg/dL 0.3  0.3  0.3  0.4   0.4     eGFR Non African Amer   >60 mL/min/1.73 55 Low   62  55 Low   52 Low   73  56 Low   68    Globulin   gm/dL 2.6  2.4  2.7  2.5   2.6     Comment: Calculated Result   A/G Ratio   g/dL 1.7  1.8  1.6  1.8   1.8     BUN/Creatinine Ratio   7.0 - 25.0 18.3  19.4  11.5  11.9  14.8  13.7  16.1    Anion Gap   5.0 - 15.0 mmol/L 12.0  10.5  5.4  11.4  9.8  7.3  13.5    Resulting Agency  [facility]  [facility]  [facility]  [facility]  [facility]  [facility]  [facility]          Assessment and Plan    Diagnoses and all orders for this visit:    1. RLQ abdominal pain (Primary)    2. Urinary tract infection without hematuria, site unspecified    cont abx till completed; drink more water. If fever or worsening symptoms contact out  office or seek emergency care.  Keep follow up with pcp.    I discussed the patients findings and my recommendations with patient.  Patient was encouraged to keep me informed of any acute changes, lack of improvement, or any new concerning symptoms.  Patient voiced understanding of all instructions and denied further questions.      Follow Up   Return if symptoms worsen or fail to improve.      Electronically signed by:    CHERYL Chery  02/14/2022

## 2022-04-21 ENCOUNTER — TELEPHONE (OUTPATIENT)
Dept: INTERNAL MEDICINE | Facility: CLINIC | Age: 57
End: 2022-04-21

## 2022-04-21 NOTE — TELEPHONE ENCOUNTER
Caller: Yanet Clifton    Relationship to patient: Self    Best call back number:     [phone]     Date of exposure:     UNKNOWN    Date of positive COVID19 test:     4/21/22    Date if possible COVID19 exposure:     UNKNOWN    COVID19 symptoms:     BAD HEADACHE  SCRATCHY THROAT  COUGH  CONGESTION  ACHES    Date of initial quarantine:     4/21/22    Additional information or concerns:     PATIENT STATED SINCE SHE IS IMMUNOCOMPROMISED SHOULD SHE GET THE MONOCLONAL ANTIBODIES    What is the patients preferred pharmacy:     [place], [place]    TELEPHONE CONTACT:    [phone]    PLEASE CONTACT PATIENT WITH RECOMMENDATIONS AND NEXT STEPS    DR STOVER

## 2022-08-01 ENCOUNTER — TELEPHONE (OUTPATIENT)
Dept: INTERNAL MEDICINE | Facility: CLINIC | Age: 57
End: 2022-08-01

## 2022-08-01 NOTE — TELEPHONE ENCOUNTER
----- Message from Brie Jalloh MD sent at 7/28/2022  2:26 PM EDT -----  Regarding: test results   Received copy of laboratories recently obtained by rheumatology.    Vitamin D is borderline at 29.5 with goal >30.  Since this is expected to decrease in the upcoming fall/winter months, recommend OTC 1000 units QD.  Only other abnormality is that she is now borderline anemic; blood counts were normal in February 2022.  Recommend follow-up CBC in 1 month for follow-up

## 2022-08-02 NOTE — TELEPHONE ENCOUNTER
Pt notified and verbalized understanding. She will start vit d 1000 IUs in the fall and states that this was bloodwork after her first infusion and may have affected her blood counts.

## 2022-09-09 ENCOUNTER — PATIENT MESSAGE (OUTPATIENT)
Dept: INTERNAL MEDICINE | Facility: CLINIC | Age: 57
End: 2022-09-09

## 2022-09-09 DIAGNOSIS — R73.01 IMPAIRED FASTING GLUCOSE: ICD-10-CM

## 2022-09-09 DIAGNOSIS — D64.9 BORDERLINE ANEMIA: ICD-10-CM

## 2022-09-09 DIAGNOSIS — E55.9 VITAMIN D DEFICIENCY: Primary | ICD-10-CM

## 2022-09-09 NOTE — TELEPHONE ENCOUNTER
From: Yanet Clifton  To: Brie Jalloh MD  Sent: 9/9/2022 7:43 AM EDT  Subject: Lab order    Hello Dr Jalloh,  I wondered if there is a lab order at the office to get some blood work done. I was a little anemic with my last tests and you wanted me to retest. I’ve not been feeling well for several weeks, so could you maybe check more than just iron levels please?     Thank you,  Nancy Clifton

## 2022-09-18 NOTE — PROGRESS NOTES
"Chief Complaint   Patient presents with   • Impaired Fasting Glucose   • Hypertension   • Fatigue   • brain fog       History of Present Illness  57 y.o.  woman presents for further evaluation of not feeling well in general as well as f/u on anemia and bord vitamin D deficiency.  Reports a chest pressure discomfort that can last hours to days and occurs more days than not, ongoing for at least 2 months.  She notes an occasional cough caused by a \"catch\" in her throat.  Chest pressure occurs at rest as well as with exercise.  She has not stopped her walking exercise and the chest pressure does not diminish her ability to continue with physical activity.  She does not have change in exertional capacity d/t the chest pressure.  No associated shortness of breath, wheezing, lightheadedness, sweating, or palpitations. Admits to sometimes not feeling like she is getting deep enough breath. Sleeping 6-7 hrs nightly.    Last 2 rituxan shots 6/22 and 7/22.    Review of Systems  ROS (+) for chest tightness with nonproductive cough and sometimes not able to get enough air; however no exertional limitations, palpitations, lightheadedness, abd pain, n/v, diaphoresis.  All other ROS reviewed and negative.    Current Outpatient Medications:   •  atorvastatin (LIPITOR) 10 MG QD  •  buPROPion XL (WELLBUTRIN XL) 300 MG QD  •  caffeine 200 MG QD  •  diphenhydrAMINE (BENADRYL) 50 MG QD prn  •  glycopyrrolate (ROBINUL) 2 MG 2 tid prn  •  hydroxychloroquine (PLAQUENIL) 200 MG BID  •  Levothyroxine 100 MCG QD  •  Melatonin 10 MG QHS  •  naproxen sodium (ALEVE) 220 MG bid prn  •  omeprazole (priLOSEC) 40 MG QD  •  ondansetron ODT (ZOFRAN-ODT) 4 MG prn  •  Polyethylene Glycol 3350 (MIRALAX PO) QD  •  psyllium (METAMUCIL) 58.6 % packet AD  •  RiTUXimab (RITUXAN) 10 MG/ML q4 mos     VITALS:  /86   Pulse 73   Ht 167.6 cm (66\")   Wt 90.3 kg (199 lb)   SpO2 95%   BMI 32.12 kg/m²     Physical Exam  Vitals and nursing note " reviewed.   Constitutional:       General: She is not in acute distress.     Appearance: Normal appearance. She is not ill-appearing.   Eyes:      Extraocular Movements: Extraocular movements intact.      Conjunctiva/sclera: Conjunctivae normal.   Pulmonary:      Effort: Pulmonary effort is normal. No respiratory distress.   Lymphadenopathy:      Cervical: No cervical adenopathy.   Neurological:      Mental Status: She is alert and oriented to person, place, and time. Mental status is at baseline.   Psychiatric:         Mood and Affect: Mood normal.         Behavior: Behavior normal.         LABS  Results for orders placed or performed in visit on 09/19/22   POC Glycosylated Hemoglobin (Hb A1C)    Specimen: Blood   Result Value Ref Range    Hemoglobin A1C 5.2 %    Lot Number [id number]     Expiration Date 05/11/24 7/25/22 CBC bord with H&H 11.7/35.0  Vitamin D bord at 29.5  stable CMP (Cr 0.96, GFR > 60), CRP 1.2    Results for orders placed or performed during the hospital encounter of 02/09/22   Comprehensive Metabolic Panel    Specimen: Blood   Result Value Ref Range    Glucose 92 65 - 99 mg/dL    BUN 19 6 - 20 mg/dL    Creatinine 1.04 (H) 0.57 - 1.00 mg/dL    Sodium 138 136 - 145 mmol/L    Potassium 4.2 3.5 - 5.2 mmol/L    Chloride 103 98 - 107 mmol/L    CO2 23.0 22.0 - 29.0 mmol/L    Calcium 9.6 8.6 - 10.5 mg/dL    Total Protein 7.1 6.0 - 8.5 g/dL    Albumin 4.50 3.50 - 5.20 g/dL    ALT (SGPT) 11 1 - 33 U/L    AST (SGOT) 14 1 - 32 U/L    Alkaline Phosphatase 77 39 - 117 U/L    Total Bilirubin 0.3 0.0 - 1.2 mg/dL    eGFR Non African Amer 55 (L) >60 mL/min/1.73    Globulin 2.6 gm/dL    A/G Ratio 1.7 g/dL    BUN/Creatinine Ratio 18.3 7.0 - 25.0    Anion Gap 12.0 5.0 - 15.0 mmol/L   Lipase    Specimen: Blood   Result Value Ref Range    Lipase 40 13 - 60 U/L   Urinalysis With Microscopic If Indicated (No Culture) - Urine, Clean Catch    Specimen: Urine, Clean Catch   Result Value Ref Range    Color, UA Yellow  Yellow, Straw    Appearance, UA Clear Clear    pH, UA <=5.0 5.0 - 8.0    Specific Gravity, UA 1.058 (H) 1.001 - 1.030    Glucose, UA Negative Negative    Ketones, UA Negative Negative    Bilirubin, UA Negative Negative    Blood, UA Negative Negative    Protein, UA Negative Negative    Leuk Esterase, UA Small (1+) (A) Negative    Nitrite, UA Negative Negative    Urobilinogen, UA 0.2 E.U./dL 0.2 - 1.0 E.U./dL   Lactic Acid, Plasma    Specimen: Blood   Result Value Ref Range    Lactate 2.1 (C) 0.5 - 2.0 mmol/L   CBC Auto Differential    Specimen: Blood   Result Value Ref Range    WBC 8.75 3.40 - 10.80 10*3/mm3    RBC 4.64 3.77 - 5.28 10*6/mm3    Hemoglobin 13.7 12.0 - 15.9 g/dL    Hematocrit 40.0 34.0 - 46.6 %    MCV 86.2 79.0 - 97.0 fL    MCH 29.5 26.6 - 33.0 pg    MCHC 34.3 31.5 - 35.7 g/dL    RDW 12.3 12.3 - 15.4 %    RDW-SD 38.4 37.0 - 54.0 fl    MPV 10.0 6.0 - 12.0 fL    Platelets 294 140 - 450 10*3/mm3    Neutrophil % 82.9 (H) 42.7 - 76.0 %    Lymphocyte % 5.4 (L) 19.6 - 45.3 %    Monocyte % 7.5 5.0 - 12.0 %    Eosinophil % 3.3 0.3 - 6.2 %    Basophil % 0.6 0.0 - 1.5 %    Immature Grans % 0.3 0.0 - 0.5 %    Neutrophils, Absolute 7.25 (H) 1.70 - 7.00 10*3/mm3    Lymphocytes, Absolute 0.47 (L) 0.70 - 3.10 10*3/mm3    Monocytes, Absolute 0.66 0.10 - 0.90 10*3/mm3    Eosinophils, Absolute 0.29 0.00 - 0.40 10*3/mm3    Basophils, Absolute 0.05 0.00 - 0.20 10*3/mm3    Immature Grans, Absolute 0.03 0.00 - 0.05 10*3/mm3    nRBC 0.0 0.0 - 0.2 /100 WBC   Urinalysis, Microscopic Only - Urine, Clean Catch    Specimen: Urine, Clean Catch   Result Value Ref Range    RBC, UA None Seen None Seen, 0-2 /HPF    WBC, UA 6-12 (A) None Seen, 0-2 /HPF    Bacteria, UA Trace None Seen, Trace /HPF    Squamous Epithelial Cells, UA 0-2 None Seen, 0-2 /HPF    Hyaline Casts, UA 0-6 0 - 6 /LPF    Methodology Automated Microscopy        ASSESSMENT/PLAN    Diagnoses and all orders for this visit:    1. Chest tightness (Primary)  Comments:  x 2  mos; check CXR and labs; less likely cardiac; trial advair 100/50 1 puff BID #1, 0RF and prn alb #1, 0RF; reviewed correct use inhaler; ddx GI; f/u 3 wks  Orders:  -     XR Chest PA & Lateral; Future  -     Fluticasone-Salmeterol (ADVAIR/WIXELA) 100-50 MCG/ACT DISKUS; Inhale 1 puff 2 (Two) Times a Day. Gargle and spit after puffs  Dispense: 60 each; Refill: 0  -     albuterol sulfate HFA (Ventolin HFA) 108 (90 Base) MCG/ACT inhaler; Inhale 2 puffs Every 6 (Six) Hours As Needed for Wheezing or Shortness of Air.  Dispense: 18 g; Refill: 0    2. Cough  Comments:  x 2 mos; lung exam unremarkable; could be sequelae from COVID19 infxn in 4/22; check CXR  Orders:  -     XR Chest PA & Lateral; Future  -     Fluticasone-Salmeterol (ADVAIR/WIXELA) 100-50 MCG/ACT DISKUS; Inhale 1 puff 2 (Two) Times a Day. Gargle and spit after puffs  Dispense: 60 each; Refill: 0  -     albuterol sulfate HFA (Ventolin HFA) 108 (90 Base) MCG/ACT inhaler; Inhale 2 puffs Every 6 (Six) Hours As Needed for Wheezing or Shortness of Air.  Dispense: 18 g; Refill: 0    3. Iron deficiency anemia, unspecified iron deficiency anemia type  Assessment & Plan:  F/u CBC, decreased on rheum labs 7/22 compared to her baseline; symptomatic with not feeling well; note last colonosc 8/21 w/ Dr. Berrios    Orders:  -     Iron Profile; Future  -     Ferritin; Future    4. Stage 3a chronic kidney disease (HCC)  Assessment & Plan:  Renal function improved on 7/22 rheum labs; f/u renal function    Orders:  -     Comprehensive Metabolic Panel; Future  -     Urinalysis With Microscopic - Urine, Clean Catch; Future    5. Impaired fasting glucose  Assessment & Plan:  BG control normal with a1C 5.2    Orders:  -     POC Glycosylated Hemoglobin (Hb A1C)    6. Vitamin D deficiency  Assessment & Plan:  F/u vit D level on no current vit D supplementation    Orders:  -     Vitamin D 25 Hydroxy; Future    7. Hypothyroidism  Assessment & Plan:  F/u TFTs with complaints of not feeling;  on levothyroxine 100mcg QD    Orders:  -     TSH; Future  -     T4, Free; Future    8. Elevated lactic acid level  Comments:  f/u lactate level  Orders:  -     Lactic Acid, Plasma; Future    9. Fatigue, unspecified type  Assessment & Plan:  F/u B12, folate, CBC, TFTs, vit D    Orders:  -     Vitamin B12; Future  -     Folate; Future    10. Hyperlipidemia  Assessment & Plan:  F/u lipids with goal LDL < 130, ideally< 100; on atorvastatin 10mg QD    Orders:  -     Lipid Panel; Future    11. Need for influenza vaccination  -     FluLaval/Fluarix/Fluzone >6 Months    - cont'd chest tighttness: ddx includes cardiac (but she has no decr in exertional ability and sxs occur the same both at rest and with activity), respiratory (will check CXR but no other resp sxs), GI (reviewed GERD, esoph spasm/dysmotility), musculosk (unlikely, not reproducible), and anxiety; also note possibility of post-COVID19 syndrome (had COVID19 infection in 4/22)    Time Documentation    Counseled patient  I spent 38 minutes face to face and 7 minutes non-face to face on today's office visit. Time was spent reviewing patient's previous notes, lab results, test results, vitals, and/or other records as well as examining the patient, providing counseling, coordinating care, answering questions, discussing evaluation and treatment plans, and writing this note.      Total time: 45 minutes      FOLLOW-UP  1. Health maintenance - COVID19 vacc done, incl 3rd dose booster; rec flu vaccine (GIVEN TODAY) and proceeding with new COVID19 vacc  2. RTC for overdue PE - f.u in 3 wks re: chest tightness, advair as well as updated PE (will be doing labs today on the way out the door)    Electronically signed by:    Brie Jalloh MD, FACP  09/19/2022

## 2022-09-18 NOTE — ASSESSMENT & PLAN NOTE
F/u CBC, decreased on rheum labs 7/22 compared to her baseline; symptomatic with not feeling well; note last colonosc 8/21 w/ Dr. Berrios

## 2022-09-19 ENCOUNTER — LAB (OUTPATIENT)
Dept: LAB | Facility: HOSPITAL | Age: 57
End: 2022-09-19

## 2022-09-19 ENCOUNTER — OFFICE VISIT (OUTPATIENT)
Dept: INTERNAL MEDICINE | Facility: CLINIC | Age: 57
End: 2022-09-19

## 2022-09-19 VITALS
HEIGHT: 66 IN | SYSTOLIC BLOOD PRESSURE: 134 MMHG | HEART RATE: 73 BPM | OXYGEN SATURATION: 95 % | BODY MASS INDEX: 31.98 KG/M2 | DIASTOLIC BLOOD PRESSURE: 86 MMHG | WEIGHT: 199 LBS

## 2022-09-19 DIAGNOSIS — Z23 NEED FOR INFLUENZA VACCINATION: ICD-10-CM

## 2022-09-19 DIAGNOSIS — E55.9 VITAMIN D DEFICIENCY: ICD-10-CM

## 2022-09-19 DIAGNOSIS — D64.9 BORDERLINE ANEMIA: ICD-10-CM

## 2022-09-19 DIAGNOSIS — R07.89 CHEST TIGHTNESS: Primary | ICD-10-CM

## 2022-09-19 DIAGNOSIS — R73.01 IMPAIRED FASTING GLUCOSE: ICD-10-CM

## 2022-09-19 DIAGNOSIS — R79.89 ELEVATED LACTIC ACID LEVEL: ICD-10-CM

## 2022-09-19 DIAGNOSIS — E78.2 MIXED HYPERLIPIDEMIA: ICD-10-CM

## 2022-09-19 DIAGNOSIS — E55.9 VITAMIN D DEFICIENCY: Chronic | ICD-10-CM

## 2022-09-19 DIAGNOSIS — E03.9 ACQUIRED HYPOTHYROIDISM: Chronic | ICD-10-CM

## 2022-09-19 DIAGNOSIS — N18.31 STAGE 3A CHRONIC KIDNEY DISEASE: Chronic | ICD-10-CM

## 2022-09-19 DIAGNOSIS — R53.83 FATIGUE, UNSPECIFIED TYPE: Chronic | ICD-10-CM

## 2022-09-19 DIAGNOSIS — R73.01 IMPAIRED FASTING GLUCOSE: Chronic | ICD-10-CM

## 2022-09-19 DIAGNOSIS — D50.9 IRON DEFICIENCY ANEMIA, UNSPECIFIED IRON DEFICIENCY ANEMIA TYPE: Chronic | ICD-10-CM

## 2022-09-19 DIAGNOSIS — R05.9 COUGH: ICD-10-CM

## 2022-09-19 DIAGNOSIS — N18.31 STAGE 3A CHRONIC KIDNEY DISEASE: ICD-10-CM

## 2022-09-19 PROBLEM — U07.1 COVID-19 VIRUS INFECTION: Status: ACTIVE | Noted: 2022-09-19

## 2022-09-19 LAB
25(OH)D3 SERPL-MCNC: 39.9 NG/ML (ref 30–100)
ALBUMIN SERPL-MCNC: 4.4 G/DL (ref 3.5–5.2)
ALBUMIN/GLOB SERPL: 2.4 G/DL
ALP SERPL-CCNC: 56 U/L (ref 39–117)
ALT SERPL W P-5'-P-CCNC: 15 U/L (ref 1–33)
ANION GAP SERPL CALCULATED.3IONS-SCNC: 10 MMOL/L (ref 5–15)
AST SERPL-CCNC: 18 U/L (ref 1–32)
BACTERIA UR QL AUTO: ABNORMAL /HPF
BASOPHILS # BLD AUTO: 0.03 10*3/MM3 (ref 0–0.2)
BASOPHILS NFR BLD AUTO: 0.8 % (ref 0–1.5)
BILIRUB SERPL-MCNC: 0.4 MG/DL (ref 0–1.2)
BILIRUB UR QL STRIP: NEGATIVE
BUN SERPL-MCNC: 14 MG/DL (ref 6–20)
BUN/CREAT SERPL: 17.5 (ref 7–25)
CALCIUM SPEC-SCNC: 9.8 MG/DL (ref 8.6–10.5)
CHLORIDE SERPL-SCNC: 107 MMOL/L (ref 98–107)
CHOLEST SERPL-MCNC: 202 MG/DL (ref 0–200)
CLARITY UR: ABNORMAL
CO2 SERPL-SCNC: 24 MMOL/L (ref 22–29)
COD CRY URNS QL: ABNORMAL /HPF
COLOR UR: YELLOW
CREAT SERPL-MCNC: 0.8 MG/DL (ref 0.57–1)
D-LACTATE SERPL-SCNC: 1.1 MMOL/L (ref 0.5–2)
DEPRECATED RDW RBC AUTO: 38.2 FL (ref 37–54)
EGFRCR SERPLBLD CKD-EPI 2021: 86.1 ML/MIN/1.73
EOSINOPHIL # BLD AUTO: 0.26 10*3/MM3 (ref 0–0.4)
EOSINOPHIL NFR BLD AUTO: 7.1 % (ref 0.3–6.2)
ERYTHROCYTE [DISTWIDTH] IN BLOOD BY AUTOMATED COUNT: 12.3 % (ref 12.3–15.4)
EXPIRATION DATE: NORMAL
FERRITIN SERPL-MCNC: 35.8 NG/ML (ref 13–150)
FOLATE SERPL-MCNC: 16.9 NG/ML (ref 4.78–24.2)
GLOBULIN UR ELPH-MCNC: 1.8 GM/DL
GLUCOSE SERPL-MCNC: 93 MG/DL (ref 65–99)
GLUCOSE UR STRIP-MCNC: NEGATIVE MG/DL
HBA1C MFR BLD: 5.2 %
HBA1C MFR BLD: 5.4 % (ref 4.8–5.6)
HCT VFR BLD AUTO: 36.5 % (ref 34–46.6)
HDLC SERPL-MCNC: 42 MG/DL (ref 40–60)
HGB BLD-MCNC: 12.3 G/DL (ref 12–15.9)
HGB UR QL STRIP.AUTO: ABNORMAL
HYALINE CASTS UR QL AUTO: ABNORMAL /LPF
IMM GRANULOCYTES # BLD AUTO: 0.01 10*3/MM3 (ref 0–0.05)
IMM GRANULOCYTES NFR BLD AUTO: 0.3 % (ref 0–0.5)
IRON 24H UR-MRATE: 62 MCG/DL (ref 37–145)
IRON SATN MFR SERPL: 16 % (ref 20–50)
KETONES UR QL STRIP: NEGATIVE
LDLC SERPL CALC-MCNC: 138 MG/DL (ref 0–100)
LDLC/HDLC SERPL: 3.23 {RATIO}
LEUKOCYTE ESTERASE UR QL STRIP.AUTO: ABNORMAL
LYMPHOCYTES # BLD AUTO: 0.42 10*3/MM3 (ref 0.7–3.1)
LYMPHOCYTES NFR BLD AUTO: 11.5 % (ref 19.6–45.3)
Lab: NORMAL
MCH RBC QN AUTO: 28.7 PG (ref 26.6–33)
MCHC RBC AUTO-ENTMCNC: 33.7 G/DL (ref 31.5–35.7)
MCV RBC AUTO: 85.1 FL (ref 79–97)
MONOCYTES # BLD AUTO: 0.46 10*3/MM3 (ref 0.1–0.9)
MONOCYTES NFR BLD AUTO: 12.6 % (ref 5–12)
NEUTROPHILS NFR BLD AUTO: 2.47 10*3/MM3 (ref 1.7–7)
NEUTROPHILS NFR BLD AUTO: 67.7 % (ref 42.7–76)
NITRITE UR QL STRIP: POSITIVE
NRBC BLD AUTO-RTO: 0 /100 WBC (ref 0–0.2)
PH UR STRIP.AUTO: 5.5 [PH] (ref 5–8)
PLATELET # BLD AUTO: 225 10*3/MM3 (ref 140–450)
PMV BLD AUTO: 10.8 FL (ref 6–12)
POTASSIUM SERPL-SCNC: 4 MMOL/L (ref 3.5–5.2)
PROT SERPL-MCNC: 6.2 G/DL (ref 6–8.5)
PROT UR QL STRIP: ABNORMAL
RBC # BLD AUTO: 4.29 10*6/MM3 (ref 3.77–5.28)
RBC # UR STRIP: ABNORMAL /HPF
REF LAB TEST METHOD: ABNORMAL
SODIUM SERPL-SCNC: 141 MMOL/L (ref 136–145)
SP GR UR STRIP: 1.03 (ref 1–1.03)
SQUAMOUS #/AREA URNS HPF: ABNORMAL /HPF
T4 FREE SERPL-MCNC: 1.58 NG/DL (ref 0.93–1.7)
TIBC SERPL-MCNC: 383 MCG/DL (ref 298–536)
TRANSFERRIN SERPL-MCNC: 257 MG/DL (ref 200–360)
TRIGL SERPL-MCNC: 122 MG/DL (ref 0–150)
TSH SERPL DL<=0.05 MIU/L-ACNC: 0.02 UIU/ML (ref 0.27–4.2)
UROBILINOGEN UR QL STRIP: ABNORMAL
VIT B12 BLD-MCNC: 329 PG/ML (ref 211–946)
VLDLC SERPL-MCNC: 22 MG/DL (ref 5–40)
WBC # UR STRIP: ABNORMAL /HPF
WBC NRBC COR # BLD: 3.65 10*3/MM3 (ref 3.4–10.8)

## 2022-09-19 PROCEDURE — 83036 HEMOGLOBIN GLYCOSYLATED A1C: CPT | Performed by: INTERNAL MEDICINE

## 2022-09-19 PROCEDURE — 82746 ASSAY OF FOLIC ACID SERUM: CPT

## 2022-09-19 PROCEDURE — 99215 OFFICE O/P EST HI 40 MIN: CPT | Performed by: INTERNAL MEDICINE

## 2022-09-19 PROCEDURE — 81001 URINALYSIS AUTO W/SCOPE: CPT

## 2022-09-19 PROCEDURE — 82607 VITAMIN B-12: CPT

## 2022-09-19 PROCEDURE — 90686 IIV4 VACC NO PRSV 0.5 ML IM: CPT | Performed by: INTERNAL MEDICINE

## 2022-09-19 PROCEDURE — 83540 ASSAY OF IRON: CPT

## 2022-09-19 PROCEDURE — 80050 GENERAL HEALTH PANEL: CPT

## 2022-09-19 PROCEDURE — 83036 HEMOGLOBIN GLYCOSYLATED A1C: CPT

## 2022-09-19 PROCEDURE — 82728 ASSAY OF FERRITIN: CPT

## 2022-09-19 PROCEDURE — 80061 LIPID PANEL: CPT

## 2022-09-19 PROCEDURE — 84439 ASSAY OF FREE THYROXINE: CPT

## 2022-09-19 PROCEDURE — 84466 ASSAY OF TRANSFERRIN: CPT

## 2022-09-19 PROCEDURE — 90471 IMMUNIZATION ADMIN: CPT | Performed by: INTERNAL MEDICINE

## 2022-09-19 PROCEDURE — 82306 VITAMIN D 25 HYDROXY: CPT

## 2022-09-19 PROCEDURE — 83605 ASSAY OF LACTIC ACID: CPT

## 2022-09-19 RX ORDER — FLUTICASONE PROPIONATE AND SALMETEROL 100; 50 UG/1; UG/1
1 POWDER RESPIRATORY (INHALATION)
Qty: 60 EACH | Refills: 0 | Status: SHIPPED | OUTPATIENT
Start: 2022-09-19

## 2022-09-19 RX ORDER — ALBUTEROL SULFATE 90 UG/1
2 AEROSOL, METERED RESPIRATORY (INHALATION) EVERY 6 HOURS PRN
Qty: 18 G | Refills: 0 | Status: SHIPPED | OUTPATIENT
Start: 2022-09-19

## 2022-09-19 NOTE — PROGRESS NOTES
Immunization  Immunization performed in left deltoid Im by Roma Armendariz MA. Patient tolerated the procedure well without complications.  09/19/22   Roma Armendariz MA

## 2022-09-20 ENCOUNTER — HOSPITAL ENCOUNTER (OUTPATIENT)
Dept: GENERAL RADIOLOGY | Facility: HOSPITAL | Age: 57
Discharge: HOME | End: 2022-09-20
Admitting: INTERNAL MEDICINE

## 2022-09-20 DIAGNOSIS — R05.9 COUGH: ICD-10-CM

## 2022-09-20 DIAGNOSIS — R07.89 CHEST TIGHTNESS: ICD-10-CM

## 2022-09-20 PROCEDURE — 71046 X-RAY EXAM CHEST 2 VIEWS: CPT

## 2022-09-21 ENCOUNTER — TELEPHONE (OUTPATIENT)
Dept: INTERNAL MEDICINE | Facility: CLINIC | Age: 57
End: 2022-09-21

## 2022-09-21 DIAGNOSIS — E78.2 MIXED HYPERLIPIDEMIA: Primary | ICD-10-CM

## 2022-09-21 DIAGNOSIS — E03.9 ACQUIRED HYPOTHYROIDISM: Chronic | ICD-10-CM

## 2022-09-21 RX ORDER — LEVOTHYROXINE SODIUM 88 UG/1
88 TABLET ORAL EVERY MORNING
Qty: 30 TABLET | Refills: 3 | Status: SHIPPED | OUTPATIENT
Start: 2022-09-21 | End: 2023-02-05 | Stop reason: SDUPTHER

## 2022-09-21 NOTE — TELEPHONE ENCOUNTER
Patient viewed lab results on [email or web address]. I let her know that once Dr Jalloh has viewed the labs we can give her a call to discuss. She verbalized understanding.

## 2022-09-21 NOTE — TELEPHONE ENCOUNTER
Caller: Yanet Clifton    Relationship: Self    Best call back number: [phone]    When was the test performed: 09/19/2022    Additional notes:   PATIENT WOULD LIKE FOR A CALL BACK REGARDING THE RESULTS OF LABS TAKEN 09/19/2022

## 2022-09-21 NOTE — TELEPHONE ENCOUNTER
PT IS RETURNING SOMEONE'S CALL TO THE OFFICE - COULD NOT FIND AN ENCOUNTER BUT PT BELIEVES THE CALL MAY BE DUE TO TEST RESULTS.     PT WOULD LIKE SOMEONE TO CALL HER BACK REGARDING TEST RESULTS. BEST CALL BACK NUMBER [phone]

## 2022-09-22 NOTE — TELEPHONE ENCOUNTER
Labs overall normal except abnormal cholesterol profile, thyroid hormone level too high, and contaminated urine (not a clean catch sample)    Blood counts back to normal - not anemic and iron stores are normal.    Liver/kidney function, vitamin levels, electrolytes, sugar test are all normal.    1. Decr levothyroxine to 88mcg QD #30, 3RF (escribed) and should check thyroid tests the week prior to PE in December  2. Continue atorvastatin and decrease saturated fats in your foods. Also rec repeating cholesterol profile for your physical - if still elevated, will plan to incr atorvastatin dose at that time  3. Lipids and TFTs escribed - fasting labs wk prior to PE appt

## 2022-09-28 DIAGNOSIS — M05.79 RHEUMATOID ARTHRITIS INVOLVING MULTIPLE SITES WITH POSITIVE RHEUMATOID FACTOR: Primary | ICD-10-CM

## 2022-11-25 DIAGNOSIS — E78.2 MIXED HYPERLIPIDEMIA: Chronic | ICD-10-CM

## 2022-11-27 DIAGNOSIS — E78.2 MIXED HYPERLIPIDEMIA: Chronic | ICD-10-CM

## 2022-11-27 RX ORDER — ATORVASTATIN CALCIUM 10 MG/1
10 TABLET, FILM COATED ORAL DAILY
Qty: 30 TABLET | Refills: 0 | Status: SHIPPED | OUTPATIENT
Start: 2022-11-27 | End: 2022-12-29

## 2022-11-27 RX ORDER — ATORVASTATIN CALCIUM 10 MG/1
10 TABLET, FILM COATED ORAL DAILY
Qty: 90 TABLET | OUTPATIENT
Start: 2022-11-27

## 2022-11-27 NOTE — TELEPHONE ENCOUNTER
Last Office Visit: 9/19/22  Next Office Visit:12/8/22      Last Refill Date:11/16/21  Quantity:90  Refills:3    Pharmacy:     Please review pended refill request for any changes needed on refills or quantities. Thank you!

## 2022-12-19 DIAGNOSIS — F33.1 MODERATE EPISODE OF RECURRENT MAJOR DEPRESSIVE DISORDER: ICD-10-CM

## 2022-12-19 RX ORDER — BUPROPION HYDROCHLORIDE 300 MG/1
300 TABLET ORAL DAILY
Qty: 30 TABLET | Refills: 0 | Status: SHIPPED | OUTPATIENT
Start: 2022-12-19 | End: 2023-01-17

## 2022-12-20 NOTE — PROGRESS NOTES
Chief Complaint   Patient presents with   • Left wrist pain       History of Present Illness  51 y.o.  woman presents for further eval of left wrist pain.  HPI started Wednesday while walking dog.  Neighbor had trimmed edge too well, ankle twisted on sidewalk edge, and patient fell, catching herself with her hands.  Has baseline hand swelling due to RA but increased swelling and decreased ROM left wrist, brenna on the thumb side.  Notes left hand/wrist is her good side and usually has more ROM than her right wrist.  Is not able to play piano due to pain and swelling.  Denies numbness/tingling.  Has been taking 2 aleve at a time for pain relief.      Review of Systems  Notes remote RX from Dr. Saravia for tyl #3 for breakthrough RA pain.  Has not had any stronger pain meds for current RA flare, noting Dr. Espinoza won't give her any meds.  Also rheum won't return calls to start on her on RA meds.  Has consultation pending with Dr. Rodriguez in 3/17.  All other ROS reviewed and negative.    [facility]  The following portions of the patient's history were reviewed and updated as appropriate: allergies, current medications, past family history, past medical history, past social history, past surgical history and problem list.      Current Outpatient Prescriptions:   •  acetaminophen-codeine (TYLENOL with CODEINE #3) 300-30 MG per tablet, Take 1-2 tablets by mouth Every 6 (Six) Hours As Needed for moderate pain (4-6)., Disp: 20 tablet, Rfl: 0  •  cetirizine (zyrTEC) 10 MG tablet, Take 10 mg by mouth Daily., Disp: , Rfl:   •  DULoxetine (CYMBALTA) 60 MG capsule, take 1 capsule by mouth at bedtime, Disp: 30 capsule, Rfl: 4  •  ibuprofen (ADVIL,MOTRIN) 200 MG tablet, Take 200 mg by mouth At Night As Needed for mild pain (1-3)., Disp: , Rfl:   •  levothyroxine (SYNTHROID, LEVOTHROID) 75 MCG tablet, Take 1 tablet by mouth Every Morning., Disp: 90 tablet, Rfl: 1  •  naproxen sodium (ALEVE) 220 MG tablet, Take 220 mg by mouth 2  LVM to call office back x2   "(Two) Times a Day As Needed for mild pain (1-3)., Disp: , Rfl:   •  traZODone (DESYREL) 50 MG tablet, take 1 tablet by mouth at bedtime, Disp: 30 tablet, Rfl: 5    Visit Vitals   • /84   • Ht 66\" (167.6 cm)   • Wt 212 lb (96.2 kg)   • LMP  (Approximate)   • BMI 34.22 kg/m2       Physical Exam   Constitutional: She is oriented to person, place, and time. She appears well-developed and well-nourished.   Cardiovascular: Normal rate, regular rhythm and normal heart sounds.    Pulmonary/Chest: Effort normal and breath sounds normal.   Abdominal: Soft. Bowel sounds are normal.   Musculoskeletal: She exhibits edema (finger are swollen bilaterally, most prominent today at left index finger, erythematous, warm, decreased ROM), tenderness (bilat wrists limited ROM, minimal at the right wrist; radial left wrist tender to palpation with swelling, no erythema, no increased warmth) and deformity (MCPs diffusely swollen, warm, mildly erythematous L>R).   Neurological: She is alert and oriented to person, place, and time.   Psychiatric: She has a normal mood and affect. Her behavior is normal.   Nursing note and vitals reviewed.      LABS  1/30/17 mammo and u/s benign except L. 1.2cm mass at 11 o'clock and focal skin thickening (rec f/u u/s in 6 mos)  SKIN FROM LEFT BREAST AT 7 O'CLOCK, PUNCH BIOPSY: (1/30/17)  Nonspecific chronic perivascular dermatitis.  No evidence of neoplasm.       ASSESSMENT/PLAN  Problem List Items Addressed This Visit     None      Visit Diagnoses     Left wrist pain    -  Primary    secondary to fall; RADIAL aspect; xray to r/o fx; ice, elevation, cont prn NSAIDs, and tyl #3 1-2 q6 prn #20, 0RF for breakthrough pain    Relevant Medications    acetaminophen-codeine (TYLENOL with CODEINE #3) 300-30 MG per tablet    Other Relevant Orders    XR Wrist 3+ View Left    Fall        fall precautions    Breast skin changes        benign bx, not skin CA; persistent and advised steroid cream as needed    "       FOLLOW-UP  1. Health maintenance - flu vaccine 11/16  2. RTC prn; next PE due after 11/22/17    Electronically signed by:    Brie Jalloh MD  02/07/2017

## 2022-12-29 DIAGNOSIS — E78.2 MIXED HYPERLIPIDEMIA: Chronic | ICD-10-CM

## 2022-12-29 RX ORDER — ATORVASTATIN CALCIUM 10 MG/1
10 TABLET, FILM COATED ORAL DAILY
Qty: 90 TABLET | OUTPATIENT
Start: 2022-12-29

## 2022-12-29 RX ORDER — ATORVASTATIN CALCIUM 10 MG/1
10 TABLET, FILM COATED ORAL DAILY
Qty: 30 TABLET | Refills: 1 | Status: SHIPPED | OUTPATIENT
Start: 2022-12-29 | End: 2023-02-05 | Stop reason: SDUPTHER

## 2023-01-17 DIAGNOSIS — K21.9 GASTROESOPHAGEAL REFLUX DISEASE: ICD-10-CM

## 2023-01-17 DIAGNOSIS — F33.1 MODERATE EPISODE OF RECURRENT MAJOR DEPRESSIVE DISORDER: ICD-10-CM

## 2023-01-17 RX ORDER — OMEPRAZOLE 40 MG/1
40 CAPSULE, DELAYED RELEASE ORAL DAILY
Qty: 90 CAPSULE | OUTPATIENT
Start: 2023-01-17

## 2023-01-17 RX ORDER — OMEPRAZOLE 40 MG/1
40 CAPSULE, DELAYED RELEASE ORAL DAILY
Qty: 30 CAPSULE | Refills: 0 | Status: SHIPPED | OUTPATIENT
Start: 2023-01-17 | End: 2023-02-05 | Stop reason: SDUPTHER

## 2023-01-17 RX ORDER — BUPROPION HYDROCHLORIDE 300 MG/1
300 TABLET ORAL DAILY
Qty: 30 TABLET | Refills: 0 | Status: SHIPPED | OUTPATIENT
Start: 2023-01-17 | End: 2023-02-05 | Stop reason: SDUPTHER

## 2023-01-18 DIAGNOSIS — E03.9 ACQUIRED HYPOTHYROIDISM: Chronic | ICD-10-CM

## 2023-01-18 RX ORDER — LEVOTHYROXINE SODIUM 0.1 MG/1
100 TABLET ORAL EVERY MORNING
Qty: 90 TABLET | Refills: 3 | OUTPATIENT
Start: 2023-01-18

## 2023-02-03 ENCOUNTER — LAB (OUTPATIENT)
Dept: LAB | Facility: HOSPITAL | Age: 58
End: 2023-02-03
Payer: COMMERCIAL

## 2023-02-03 DIAGNOSIS — E03.9 ACQUIRED HYPOTHYROIDISM: Chronic | ICD-10-CM

## 2023-02-03 DIAGNOSIS — E78.2 MIXED HYPERLIPIDEMIA: ICD-10-CM

## 2023-02-03 PROCEDURE — 80061 LIPID PANEL: CPT

## 2023-02-03 PROCEDURE — 84443 ASSAY THYROID STIM HORMONE: CPT

## 2023-02-03 PROCEDURE — 84439 ASSAY OF FREE THYROXINE: CPT

## 2023-02-04 LAB
CHOLEST SERPL-MCNC: 162 MG/DL (ref 0–200)
HDLC SERPL-MCNC: 44 MG/DL (ref 40–60)
LDLC SERPL CALC-MCNC: 97 MG/DL (ref 0–100)
LDLC/HDLC SERPL: 2.15 {RATIO}
T4 FREE SERPL-MCNC: 1.72 NG/DL (ref 0.93–1.7)
TRIGL SERPL-MCNC: 118 MG/DL (ref 0–150)
TSH SERPL DL<=0.05 MIU/L-ACNC: 0.8 UIU/ML (ref 0.27–4.2)
VLDLC SERPL-MCNC: 21 MG/DL (ref 5–40)

## 2023-02-06 NOTE — ASSESSMENT & PLAN NOTE
Renal function back to normal in 9/22 (Cr 0.8, GFR 86); follow clinically and cont to drink enough water daily

## 2023-02-06 NOTE — ASSESSMENT & PLAN NOTE
Health maintenance - COVID19 vacc done, incl 3rd dose booster, but strongly recommended new COVID19 vacc brenna in immunocompromised state ( GIVEN TODAY); flu vacc 9/22; Prevnar 9/15; PVX 11/16; Td 12/20 (next Td due 2030), HAV done; rec Shingrix - counseling given; mammo overdue (last 5/1/21); no further Paps s/p USMAN/BSO; nl DEXA ordered (last 2/19); colonosc 8/21, repeat 2026 per Dr. Berrios; rec updated eye exam (last 2020 per pt); dental exam q6 mos; (+) seat belt use

## 2023-02-06 NOTE — ASSESSMENT & PLAN NOTE
TFTs remain borderline with elevated FT4; decr levothyroxine to 75mcg QD #30, 2RF but note patient has complaints of persistent fatigue; repeat TFTs in 6-8 wks

## 2023-02-06 NOTE — ASSESSMENT & PLAN NOTE
BP remains, elevated every visit for the last year, persistent on repeat; no meds currently but discussed likely exacerbation from daily caffeine pills; rec start home monitoring with goal < 130/80; f/u in 2 mos - will plan to start med at that time if indicated

## 2023-02-06 NOTE — ASSESSMENT & PLAN NOTE
Reports component of seasonal affective disorder; counseled patient regarding multimodal approach with healthy nutrition, healthy sleep, regular physical activity, social activities, counseling, as well as medication options and goals; patient wants to increase bupropion XL 150mg 3 QD #270, 3RF - advised can take 4-6 wks to reach steady-state; would not advise going up/down dose depending on season of the year

## 2023-02-06 NOTE — PROGRESS NOTES
"Chief Complaint   Patient presents with   • Annual Exam   • Hyperlipidemia       History of Present Illness  57 y.o.  woman presents for long overdue phys examination and f/u on cholesterol, sugar, thyroid, and BP. Takes caffeine tab QAM for energy;states she can't do without.    Not checking BPs.    Interested in increasing dose of bupropion. Reports seasonal affective disorder.     Has persistent cough, sometimes with chest tightness, no wheezing. Did not complete trial of advair, but uses prn albuterol.    Reports specific point of tenderness at the middle of the spine, tender to palpation, no assoc'd with SOB. No known injuries. Ongoing for 2 years, unchanged, located under the bra area. No radiation of pain.    Review of Systems  Denies headaches, visual changes, CP, palpitations, SOB, abd pain, n/v/d, difficulty with urination, numbness/tingling, falls, lightheadedness, hearing changes, rashes.    ROS (+) for mid-back point tend x 2 yrs per patient as noted.    ROS (+) for persistent cough, has occ congestion, has occ chest tightness. Denies hemoptysis, SOB.    ROS (+) for feeling blue as noted, partially weather-related.    Denies vaginal discharge or bleeding (no periods) or breast concerns.   All other ROS reviewed and negative.      Current Outpatient Medications:   •  albuterol sulfate HFA (Ventolin HFA) 108 (90 Base) MCG/ACT inhaler prn  •  atorvastatin (LIPITOR) 10 MG QD  •  buPROPion XL (WELLBUTRIN XL) 300 MG QD  •  caffeine 200 MG QD  •  Fluticasone-Salmeterol (ADVAIR/WIXELA) 100-50 MCG/ACT 1 puff BID  •  levothyroxine 88 MCG QD  •  naproxen sodium (ALEVE) 220 MG prn  •  omeprazole (priLOSEC) 40 MG QD  •  ondansetron ODT (ZOFRAN-ODT) 4 MG prn  •  Polyethylene Glycol 3350 (MIRALAX PO) QD  •  psyllium (METAMUCIL) 58.6 % packet QD  •  riTUXimab (RITUXAN) 500 MG/50ML q4 mos      VITALS:  /92   Pulse 76   Ht 167.6 cm (66\")   Wt 89.4 kg (197 lb)   SpO2 100%   BMI 31.80 kg/m²     Physical " Exam  Vitals and nursing note reviewed.   Constitutional:       General: She is not in acute distress.     Appearance: Normal appearance. She is well-developed.   HENT:      Head: Normocephalic.      Right Ear: Ear canal and external ear normal.      Left Ear: Ear canal and external ear normal.      Nose: Nose normal.   Eyes:      Extraocular Movements: Extraocular movements intact.      Conjunctiva/sclera: Conjunctivae normal.      Pupils: Pupils are equal, round, and reactive to light.   Neck:      Vascular: No carotid bruit (bilaterally).   Cardiovascular:      Rate and Rhythm: Normal rate and regular rhythm.      Heart sounds: Normal heart sounds.   Pulmonary:      Effort: Pulmonary effort is normal. No respiratory distress.      Breath sounds: Normal breath sounds. No wheezing or rhonchi.   Abdominal:      General: Bowel sounds are normal. There is no distension.      Palpations: Abdomen is soft. There is no mass.      Tenderness: There is no abdominal tenderness.   Genitourinary:     Comments: Chaperone declined by patient.    Breast exam unremarkable without masses, skin changes, nipple discharge, or axillary adenopathy.    Musculoskeletal:         General: Tenderness (point tenderness with palpation of the T6-7 vertebra without extension to the paraspinal vertebral muscles; trunk FROM) present.      Cervical back: Normal range of motion and neck supple.   Lymphadenopathy:      Cervical: No cervical adenopathy.   Skin:     General: Skin is warm and dry.      Findings: No rash.   Neurological:      Mental Status: She is alert and oriented to person, place, and time.      Cranial Nerves: No cranial nerve deficit.      Deep Tendon Reflexes: Reflexes normal.   Psychiatric:         Mood and Affect: Mood normal.         Behavior: Behavior normal.         LABS  Results for orders placed or performed in visit on 02/03/23   Lipid Panel    Specimen: Blood   Result Value Ref Range    Total Cholesterol 162 0 - 200 mg/dL     Triglycerides 118 0 - 150 mg/dL    HDL Cholesterol 44 40 - 60 mg/dL    LDL Cholesterol  97 0 - 100 mg/dL    VLDL Cholesterol 21 5 - 40 mg/dL    LDL/HDL Ratio 2.15    TSH    Specimen: Blood   Result Value Ref Range    TSH 0.795 0.270 - 4.200 uIU/mL   T4, Free    Specimen: Blood   Result Value Ref Range    Free T4 1.72 (H) 0.93 - 1.70 ng/dL     9/19/22 Stable CBC, CMP (FG 93), A1C 5.4, ferritin 35.8, B12, folate, vit D 39.9, Fe panel except worsened lipids, low TSH 0.016 (nl FT4), contam UA      ECG 12 Lead    Date/Time: 2/7/2023 9:00 AM  Performed by: Brie Jalloh MD  Authorized by: Brie Jalloh MD   Comparison: compared with previous ECG from 12/15/2020  Similar to previous ECG  Rhythm: sinus rhythm  Rate: normal  BPM: 71  Conduction: conduction normal  ST Segments: ST segments normal  T Waves: T waves normal  QRS axis: normal  Clinical impression comment: stable EKG                ASSESSMENT/PLAN    Diagnoses and all orders for this visit:    1. PE (physical exam), routine (Primary)  Assessment & Plan:  Health maintenance - COVID19 vacc done, incl 3rd dose booster, but strongly recommended new COVID19 vacc brenna in immunocompromised state ( GIVEN TODAY); flu vacc 9/22; Prevnar 9/15; PVX 11/16; Td 12/20 (next Td due 2030), HAV done; rec Shingrix - counseling given; mammo overdue (last 5/1/21); no further Paps s/p USMAN/BSO; nl DEXA ordered (last 2/19); colonosc 8/21, repeat 2026 per Dr. Berrios; rec updated eye exam (last 2020 per pt); dental exam q6 mos; (+) seat belt use      2. Hyperlipidemia  Assessment & Plan:  Lipids improved with LDL 97; cont atorvastatin 10mg QD #90, 3RF    Orders:  -     atorvastatin (LIPITOR) 10 MG tablet; Take 1 tablet by mouth Daily.  Dispense: 90 tablet; Refill: 3  -     ECG 12 Lead    3. Impaired fasting glucose  Assessment & Plan:  Last A1C good at 5.4 (in 9/22)      4. Hypothyroidism  Assessment & Plan:  TFTs remain borderline with elevated FT4; decr levothyroxine to 75mcg QD #30, 2RF  but note patient has complaints of persistent fatigue; repeat TFTs in 6-8 wks    Orders:  -     levothyroxine (SYNTHROID, LEVOTHROID) 75 MCG tablet; Take 1 tablet by mouth Every Morning.  Dispense: 30 tablet; Refill: 2  -     TSH; Future  -     T4, Free; Future    5. Hypertension  Assessment & Plan:  BP remains, elevated every visit for the last year, persistent on repeat; no meds currently but discussed likely exacerbation from daily caffeine pills; rec start home monitoring with goal < 130/80; f/u in 2 mos - will plan to start med at that time if indicated      6. Stage 3a chronic kidney disease (HCC)  Assessment & Plan:  Renal function back to normal in 9/22 (Cr 0.8, GFR 86); follow clinically and cont to drink enough water daily      7. Depression  Assessment & Plan:  Reports component of seasonal affective disorder; counseled patient regarding multimodal approach with healthy nutrition, healthy sleep, regular physical activity, social activities, counseling, as well as medication options and goals; patient wants to increase bupropion XL 150mg 3 QD #270, 3RF - advised can take 4-6 wks to reach steady-state; would not advise going up/down dose depending on season of the year    Orders:  -     buPROPion XL (WELLBUTRIN XL) 150 MG 24 hr tablet; Take 3 tablets by mouth Daily.  Dispense: 270 tablet; Refill: 3    8. Gastroesophageal reflux disease  Assessment & Plan:  Feels stable on omeprazole 40mg QD #90, 3RF    Orders:  -     omeprazole (priLOSEC) 40 MG capsule; Take 1 capsule by mouth Daily.  Dispense: 90 capsule; Refill: 3    9. Menopause  -     DEXA Bone Density Axial; Future    10. Chronic cough  Comments:  ongoing for > 5 mos; nl CXR 9/22; proceed w/th 3-4 wk trial of Advair 100/50 BID, gargle/spit after puffs (no RX); if no better, needs f/u    11. Moderate persistent asthma without complication  Assessment & Plan:  Unable to update PFTs due to COVID19 pandemic restrictions; note persistent cough, occ  productive and occ with chest tightness; nl CXR 9/22; advised 3-4wk empiric trial of advair 100/50 to treat asthma/bronchospastic disease; ok to cont prn albuterol; if no better, then consider further w/u with pulm consultation with PFTs vs CT Chest      12. Mid back pain  Assessment & Plan:  Point tenderness around the T6-7 vertebra, no extension to the paraspinal thoracic vertebral muscles; no assoc'd rash; check T-spine xray for further eval, consider likely d/t degen changes    Orders:  -     XR spine thoracic 3 vw; Future    13. Need for vaccination  -     COVID-19 Bivalent Booster (Pfizer) 12+yrs      FOLLOW-UP  1. F/u TFTs in 6-8 wks with decrease of levothyroxine to 75mcg QD  2. RTC 2 mos for BP check      Electronically signed by:    Brie Jalloh MD, FACP  02/07/2023

## 2023-02-07 ENCOUNTER — HOSPITAL ENCOUNTER (OUTPATIENT)
Dept: GENERAL RADIOLOGY | Facility: HOSPITAL | Age: 58
Discharge: HOME | End: 2023-02-07
Admitting: INTERNAL MEDICINE
Payer: COMMERCIAL

## 2023-02-07 ENCOUNTER — OFFICE VISIT (OUTPATIENT)
Dept: INTERNAL MEDICINE | Facility: CLINIC | Age: 58
End: 2023-02-07
Payer: COMMERCIAL

## 2023-02-07 VITALS
HEIGHT: 66 IN | BODY MASS INDEX: 31.66 KG/M2 | SYSTOLIC BLOOD PRESSURE: 148 MMHG | HEART RATE: 76 BPM | OXYGEN SATURATION: 100 % | DIASTOLIC BLOOD PRESSURE: 92 MMHG | WEIGHT: 197 LBS

## 2023-02-07 DIAGNOSIS — J45.40 MODERATE PERSISTENT ASTHMA WITHOUT COMPLICATION: Chronic | ICD-10-CM

## 2023-02-07 DIAGNOSIS — Z78.0 MENOPAUSE: Chronic | ICD-10-CM

## 2023-02-07 DIAGNOSIS — R05.3 CHRONIC COUGH: ICD-10-CM

## 2023-02-07 DIAGNOSIS — F33.1 MODERATE EPISODE OF RECURRENT MAJOR DEPRESSIVE DISORDER: ICD-10-CM

## 2023-02-07 DIAGNOSIS — Z23 NEED FOR VACCINATION: ICD-10-CM

## 2023-02-07 DIAGNOSIS — E78.2 MIXED HYPERLIPIDEMIA: Chronic | ICD-10-CM

## 2023-02-07 DIAGNOSIS — K21.9 GASTROESOPHAGEAL REFLUX DISEASE: ICD-10-CM

## 2023-02-07 DIAGNOSIS — R73.01 IMPAIRED FASTING GLUCOSE: Chronic | ICD-10-CM

## 2023-02-07 DIAGNOSIS — N18.31 STAGE 3A CHRONIC KIDNEY DISEASE: Chronic | ICD-10-CM

## 2023-02-07 DIAGNOSIS — E03.9 ACQUIRED HYPOTHYROIDISM: Chronic | ICD-10-CM

## 2023-02-07 DIAGNOSIS — G89.29 MID BACK PAIN, CHRONIC: ICD-10-CM

## 2023-02-07 DIAGNOSIS — I10 PRIMARY HYPERTENSION: ICD-10-CM

## 2023-02-07 DIAGNOSIS — M54.9 MID BACK PAIN: Chronic | ICD-10-CM

## 2023-02-07 DIAGNOSIS — Z00.00 PE (PHYSICAL EXAM), ROUTINE: Primary | Chronic | ICD-10-CM

## 2023-02-07 DIAGNOSIS — M54.9 MID BACK PAIN, CHRONIC: ICD-10-CM

## 2023-02-07 PROCEDURE — 99396 PREV VISIT EST AGE 40-64: CPT | Performed by: INTERNAL MEDICINE

## 2023-02-07 PROCEDURE — 93000 ELECTROCARDIOGRAM COMPLETE: CPT | Performed by: INTERNAL MEDICINE

## 2023-02-07 PROCEDURE — 0124A PR ADM SARSCOV2 30MCG/0.3ML BST: CPT | Performed by: INTERNAL MEDICINE

## 2023-02-07 PROCEDURE — 91312 COVID-19 (PFIZER) BIVALENT BOOSTER 12+YRS: CPT | Performed by: INTERNAL MEDICINE

## 2023-02-07 PROCEDURE — 72072 X-RAY EXAM THORAC SPINE 3VWS: CPT

## 2023-02-07 PROCEDURE — 99214 OFFICE O/P EST MOD 30 MIN: CPT | Performed by: INTERNAL MEDICINE

## 2023-02-07 RX ORDER — LEVOTHYROXINE SODIUM 0.07 MG/1
75 TABLET ORAL EVERY MORNING
Qty: 30 TABLET | Refills: 2 | Status: SHIPPED | OUTPATIENT
Start: 2023-02-07

## 2023-02-07 RX ORDER — OMEPRAZOLE 40 MG/1
40 CAPSULE, DELAYED RELEASE ORAL DAILY
Qty: 90 CAPSULE | Refills: 3 | Status: SHIPPED | OUTPATIENT
Start: 2023-02-07

## 2023-02-07 RX ORDER — ATORVASTATIN CALCIUM 10 MG/1
10 TABLET, FILM COATED ORAL DAILY
Qty: 90 TABLET | Refills: 3 | Status: SHIPPED | OUTPATIENT
Start: 2023-02-07

## 2023-02-07 RX ORDER — BUPROPION HYDROCHLORIDE 150 MG/1
450 TABLET ORAL DAILY
Qty: 270 TABLET | Refills: 3 | Status: SHIPPED | OUTPATIENT
Start: 2023-02-07

## 2023-02-07 NOTE — PROGRESS NOTES
Immunization  Immunization performed in right deltoid by Basilia Mead MA. Patient tolerated the procedure well without complications.  02/07/23   Basilia Mead MA

## 2023-02-08 ENCOUNTER — TELEPHONE (OUTPATIENT)
Dept: INTERNAL MEDICINE | Facility: CLINIC | Age: 58
End: 2023-02-08
Payer: COMMERCIAL

## 2023-02-08 ENCOUNTER — TRANSCRIBE ORDERS (OUTPATIENT)
Dept: ADMINISTRATIVE | Facility: HOSPITAL | Age: 58
End: 2023-02-08
Payer: COMMERCIAL

## 2023-02-08 DIAGNOSIS — Z12.31 VISIT FOR SCREENING MAMMOGRAM: Primary | ICD-10-CM

## 2023-02-08 NOTE — ASSESSMENT & PLAN NOTE
Unable to update PFTs due to COVID19 pandemic restrictions; note persistent cough, occ productive and occ with chest tightness; nl CXR 9/22; advised 3-4wk empiric trial of advair 100/50 to treat asthma/bronchospastic disease; ok to cont prn albuterol; if no better, then consider further w/u with pulm consultation with PFTs vs CT Chest

## 2023-02-08 NOTE — ASSESSMENT & PLAN NOTE
Point tenderness around the T6-7 vertebra, no extension to the paraspinal thoracic vertebral muscles; no assoc'd rash; check T-spine xray for further eval, consider likely d/t degen changes

## 2023-02-08 NOTE — TELEPHONE ENCOUNTER
----- Message from Brie Jalloh MD sent at 2/8/2023  1:00 AM EST -----  Back xray shows mod arthritis changes and bone spurs; alignment is normal

## 2023-02-17 DIAGNOSIS — F33.1 MODERATE EPISODE OF RECURRENT MAJOR DEPRESSIVE DISORDER: ICD-10-CM

## 2023-02-17 RX ORDER — BUPROPION HYDROCHLORIDE 300 MG/1
300 TABLET ORAL DAILY
Qty: 30 TABLET | Refills: 0 | OUTPATIENT
Start: 2023-02-17

## 2023-02-20 DIAGNOSIS — E03.9 ACQUIRED HYPOTHYROIDISM: Chronic | ICD-10-CM

## 2023-02-20 RX ORDER — LEVOTHYROXINE SODIUM 88 UG/1
88 TABLET ORAL EVERY MORNING
Qty: 30 TABLET | Refills: 3 | OUTPATIENT
Start: 2023-02-20

## 2023-03-03 ENCOUNTER — OFFICE VISIT (OUTPATIENT)
Dept: INTERNAL MEDICINE | Facility: CLINIC | Age: 58
End: 2023-03-03
Payer: COMMERCIAL

## 2023-03-03 VITALS
DIASTOLIC BLOOD PRESSURE: 90 MMHG | BODY MASS INDEX: 31.5 KG/M2 | WEIGHT: 196 LBS | OXYGEN SATURATION: 98 % | HEART RATE: 93 BPM | SYSTOLIC BLOOD PRESSURE: 136 MMHG | HEIGHT: 66 IN

## 2023-03-03 DIAGNOSIS — R09.81 NASAL CONGESTION: ICD-10-CM

## 2023-03-03 DIAGNOSIS — D84.9 IMMUNOSUPPRESSION: ICD-10-CM

## 2023-03-03 DIAGNOSIS — J01.00 ACUTE NON-RECURRENT MAXILLARY SINUSITIS: Primary | ICD-10-CM

## 2023-03-03 DIAGNOSIS — M05.79 RHEUMATOID ARTHRITIS INVOLVING MULTIPLE SITES WITH POSITIVE RHEUMATOID FACTOR: Chronic | ICD-10-CM

## 2023-03-03 LAB
EXPIRATION DATE: NORMAL
FLUAV AG UPPER RESP QL IA.RAPID: NOT DETECTED
FLUBV AG UPPER RESP QL IA.RAPID: NOT DETECTED
INTERNAL CONTROL: NORMAL
Lab: NORMAL
SARS-COV-2 AG UPPER RESP QL IA.RAPID: NOT DETECTED

## 2023-03-03 PROCEDURE — 87428 SARSCOV & INF VIR A&B AG IA: CPT | Performed by: NURSE PRACTITIONER

## 2023-03-03 PROCEDURE — U0004 COV-19 TEST NON-CDC HGH THRU: HCPCS | Performed by: NURSE PRACTITIONER

## 2023-03-03 PROCEDURE — 99213 OFFICE O/P EST LOW 20 MIN: CPT | Performed by: NURSE PRACTITIONER

## 2023-03-03 RX ORDER — AMOXICILLIN 875 MG/1
875 TABLET, COATED ORAL 2 TIMES DAILY
Qty: 10 TABLET | Refills: 0 | Status: SHIPPED | OUTPATIENT
Start: 2023-03-03 | End: 2023-03-08

## 2023-03-03 NOTE — PROGRESS NOTES
Office Note     Name: Yanet Clifton    : 1965     MRN: [id number]     Chief Complaint  Headache, Facial Pain, and Cough    Subjective     History of Present Illness:  Yanet Clifton is a 57 y.o. female who presents today for concerns of headache and facial pain for about 3 weeks.    Patient regularly sees Dr. Jalloh for chronic conditions    Patient is here today for facial pain/sinus pressure and associated headache for about 3 weeks.  Overall she feels that her face hurts.  It is even tender to her touch.  Over the last 3 weeks it has continued to worsen and especially over the last few days.  No fevers.  But she does describe her discharge as a green milky color.  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  She has tried Sudafed and Aleve without significant relief of symptoms.  Patient does report that she has rheumatoid arthritis and is on rituximab so this causes immunosuppression.  Overall she does not feel well    Review of Systems   Constitutional: Negative for chills, fatigue and fever.   HENT: Positive for sinus pain. Negative for sore throat.    Eyes: Negative for visual disturbance.   Respiratory: Positive for cough. Negative for shortness of breath.    Cardiovascular: Negative for chest pain.   Gastrointestinal: Negative for abdominal pain.   Skin: Negative for color change.   Allergic/Immunologic: Negative for immunocompromised state.   Neurological: Positive for headaches.   Psychiatric/Behavioral: Negative for behavioral problems.       Past Medical History:   Diagnosis Date   • Aseptic meningitis 2009   • Encephalitis    • HL (hearing loss)     After each bout of meningitis   • Hx of bone density study 2019    nl DEXA (19): L0.2, H -0.4, repeat 3-4 yrs   • Hx of chest x-ray 2022    nl CXR (22)   • Hx of colonoscopy 2016    colonosc (16):  hyperplastic polyp, ext/int hemorrhoids, diverticulosis, repeat 5 yrs; GI - Dr. Robertson   • Hx of colonoscopy 08/16/2021    nl colonosc (8/16/21) except mild sigmoid diverticulosis, repeat 5 yrs; CRS - Dr. Berrios   • Hx of CT scan of abd/pelvis 10/03/2020    CT abd/pelvis (10/3/20, [facility] ER): fatty infiltration of liver, prior priya, no masses, free fluid, or adenopathy   • Hx of CT scan of head 10/18/2018    nl noncontrast head CT (10/18/18); ER   • Hx of CT scan of head 04/14/2019    nl head CT (4/14/19); [facility] ER   • Hx of EGD 05/24/2016    EGD (5/24/16): gastritis, reflux esophagitis, neg for H.pylori; GI - Dr. Robertson   • Hx of nl SPEP 12/18/2019       Past Surgical History:   Procedure Laterality Date   • BREAST BIOPSY Left 02/09/2018    u/s guided axillary lymph node   • BREAST EXCISIONAL BIOPSY Left 1984   • CHOLECYSTECTOMY  2001    secondary to cholelithiasis   • COLONOSCOPY     • LUMBAR PUNCTURE  04/2019    hospitalized at [facility]; ? viral meningitis   • MAMMO SKIN LESION BIOPSY SINGLE LESION UNILATERAL Left 01/30/2017    s/p L. breast skin bx (1/30/17); nonspecific chronic perivascular dermatitis   • MAMMO US BREAST BIOPSY 1ST W WO DEVICE UNILATERAL Left 02/09/2018    s/p u/s-guided L. breast axillary lymph node bx (2/9/18): benign reactive LN, repeat dx mammo in 6 mos; rads - Dr. Bailey   • TEMPORAL ARTERY BIOPSY Left 11/05/2015    neg for arteritis; surg - Dr. Cunha   • TONSILLECTOMY     • TOTAL ABDOMINAL HYSTERECTOMY WITH SALPINGO OOPHORECTOMY  03/2013    secondary to fibroids/endometriosis (3/13); GYN - Dr. Naik       Social History     Socioeconomic History   • Marital status:    Tobacco Use   • Smoking status: Never   • Smokeless tobacco: Never   Substance and Sexual Activity   • Alcohol use: Yes     Alcohol/week: 0.0 standard drinks     Comment: Very rarely   • Drug use: No   • Sexual activity: Yes     Partners: Male     Birth control/protection: Post-menopausal         Current Outpatient  "Medications:   •  albuterol sulfate HFA (Ventolin HFA) 108 (90 Base) MCG/ACT inhaler, Inhale 2 puffs Every 6 (Six) Hours As Needed for Wheezing or Shortness of Air., Disp: 18 g, Rfl: 0  •  atorvastatin (LIPITOR) 10 MG tablet, Take 1 tablet by mouth Daily., Disp: 90 tablet, Rfl: 3  •  buPROPion XL (WELLBUTRIN XL) 150 MG 24 hr tablet, Take 3 tablets by mouth Daily., Disp: 270 tablet, Rfl: 3  •  caffeine 200 MG tablet, Take  by mouth., Disp: , Rfl:   •  Fluticasone-Salmeterol (ADVAIR/WIXELA) 100-50 MCG/ACT DISKUS, Inhale 1 puff 2 (Two) Times a Day. Gargle and spit after puffs, Disp: 60 each, Rfl: 0  •  levothyroxine (SYNTHROID, LEVOTHROID) 75 MCG tablet, Take 1 tablet by mouth Every Morning., Disp: 30 tablet, Rfl: 2  •  naproxen sodium (ALEVE) 220 MG tablet, Take 1 tablet by mouth 2 (Two) Times a Day As Needed for Mild Pain., Disp: , Rfl:   •  omeprazole (priLOSEC) 40 MG capsule, Take 1 capsule by mouth Daily., Disp: 90 capsule, Rfl: 3  •  ondansetron ODT (ZOFRAN-ODT) 4 MG disintegrating tablet, Place 1 tablet on the tongue Every 6 (Six) Hours As Needed for Nausea or Vomiting for up to 15 doses., Disp: 15 tablet, Rfl: 0  •  riTUXimab (RITUXAN) 500 MG/50ML solution injection, Infuse 100 mL into a venous catheter Every 4 (Four) Months., Disp: , Rfl:   •  amoxicillin (AMOXIL) 875 MG tablet, Take 1 tablet by mouth 2 (Two) Times a Day for 5 days., Disp: 10 tablet, Rfl: 0    Objective     Vital Signs  /90   Pulse 93   Ht 167.6 cm (66\")   Wt 88.9 kg (196 lb)   SpO2 98%   BMI 31.64 kg/m²   Estimated body mass index is 31.64 kg/m² as calculated from the following:    Height as of this encounter: 167.6 cm (66\").    Weight as of this encounter: 88.9 kg (196 lb).    BMI is >= 30 and <35. (Class 1 Obesity). The following options were offered after discussion;: Address at next visit           Physical Exam  Vitals and nursing note reviewed.   Constitutional:       General: She is awake.      Appearance: Normal appearance. " She is well-groomed.   HENT:      Head: Normocephalic and atraumatic.      Right Ear: Hearing, tympanic membrane, ear canal and external ear normal.      Left Ear: Hearing, tympanic membrane, ear canal and external ear normal.      Nose: Congestion present. No laceration.      Right Sinus: Maxillary sinus tenderness present. No frontal sinus tenderness.      Left Sinus: Maxillary sinus tenderness present. No frontal sinus tenderness.      Comments: Mucosa with erythema and swelling  Purulent drainage noted   Eyes:      Extraocular Movements: Extraocular movements intact.      Pupils: Pupils are equal, round, and reactive to light.   Cardiovascular:      Rate and Rhythm: Normal rate and regular rhythm.      Pulses: Normal pulses.      Heart sounds: Normal heart sounds.   Pulmonary:      Effort: Pulmonary effort is normal.      Breath sounds: Normal breath sounds.   Musculoskeletal:         General: Normal range of motion.   Lymphadenopathy:      Cervical: No cervical adenopathy.   Skin:     General: Skin is warm and dry.   Neurological:      Mental Status: She is alert and oriented to person, place, and time.   Psychiatric:         Mood and Affect: Mood normal.         Behavior: Behavior normal. Behavior is cooperative.          Lab Review:   Latest Reference Range & Units 03/03/23 09:33   SARS Antigen Not Detected, Presumptive Negative  Not Detected   Expiration Date  03/16/2024   Lot Number  [id number]   Influenza A Antigen MARVIN Not Detected  Not Detected   Influenza B Antigen MARVIN Not Detected  Not Detected            Assessment and Plan     Diagnoses and all orders for this visit:    1. Acute non-recurrent maxillary sinusitis (Primary)  -     amoxicillin (AMOXIL) 875 MG tablet; Take 1 tablet by mouth 2 (Two) Times a Day for 5 days.  Dispense: 10 tablet; Refill: 0    2. Nasal congestion  -     POCT SARS-CoV-2 Antigen MARVIN + Flu  -     COVID-19 PCR, [facility], NP SWAB IN [facility] VIRAL TRANSPORT MEDIA/ORAL SWISH 24-30 HR  TAT - Swab, Nasopharynx    3. Rheumatoid arthritis involving multiple sites with positive rheumatoid factor (HCC)    4. Immunosuppression (HCC)    Plan  Discussed with patient that based on her symptoms and physical assessment we will move forward with antibiotic therapy.  We will send in amoxicillin twice daily for 5 days to assist with symptoms.  Please take full course of medication.  Patient does not get vaginal yeast infections with antibiotics.  She was highly encouraged to continue with nasal flushing as well as Breathe Right strips to assist with symptoms.  Continue with warm compresses to the face, nasal hygiene.  Go to ER if any condition worsens or severe.  Keep upcoming appointment with Dr. Jalloh as scheduled    Follow Up  Return for As scheduled with Dr. Jalloh.    CHERYL Ga    Part of this note may be an electronic transcription/translation of spoken language to printed text using the Dragon Dictation System.

## 2023-03-04 LAB — SARS-COV-2 RNA NOSE QL NAA+PROBE: NOT DETECTED

## 2023-03-06 ENCOUNTER — TELEPHONE (OUTPATIENT)
Dept: INTERNAL MEDICINE | Facility: CLINIC | Age: 58
End: 2023-03-06
Payer: COMMERCIAL

## 2023-03-06 NOTE — TELEPHONE ENCOUNTER
----- Message from CHERYL Ga sent at 3/5/2023  9:05 AM EST -----  COVID PCR/send out resulted as not detected/negative

## 2023-03-09 DIAGNOSIS — K21.9 GASTROESOPHAGEAL REFLUX DISEASE: ICD-10-CM

## 2023-03-09 RX ORDER — OMEPRAZOLE 40 MG/1
40 CAPSULE, DELAYED RELEASE ORAL DAILY
Qty: 30 CAPSULE | OUTPATIENT
Start: 2023-03-09

## 2023-04-04 ENCOUNTER — HOSPITAL ENCOUNTER (OUTPATIENT)
Dept: MAMMOGRAPHY | Facility: HOSPITAL | Age: 58
Discharge: HOME | End: 2023-04-04
Payer: COMMERCIAL

## 2023-04-04 ENCOUNTER — HOSPITAL ENCOUNTER (OUTPATIENT)
Dept: BONE DENSITY | Facility: HOSPITAL | Age: 58
Discharge: HOME | End: 2023-04-04
Payer: COMMERCIAL

## 2023-04-04 DIAGNOSIS — Z78.0 MENOPAUSE: Chronic | ICD-10-CM

## 2023-04-04 DIAGNOSIS — Z12.31 VISIT FOR SCREENING MAMMOGRAM: ICD-10-CM

## 2023-04-04 PROCEDURE — 77067 SCR MAMMO BI INCL CAD: CPT

## 2023-04-04 PROCEDURE — 77080 DXA BONE DENSITY AXIAL: CPT

## 2023-04-04 PROCEDURE — 77063 BREAST TOMOSYNTHESIS BI: CPT | Performed by: RADIOLOGY

## 2023-04-04 PROCEDURE — 77067 SCR MAMMO BI INCL CAD: CPT | Performed by: RADIOLOGY

## 2023-04-04 PROCEDURE — 77063 BREAST TOMOSYNTHESIS BI: CPT

## 2023-04-23 NOTE — PROGRESS NOTES
"Chief Complaint   Patient presents with   • Hypertension       History of Present Illness  57 y.o.  woman presents for BP and thyroid f/u. Was seen at [facility] 2 days ago for URI. Rx'd zpak and prometh DM syrup.  She states she was diagnosed with pneumonia.  No chest x-ray was obtained.  Cough is improved.  Not having any fevers.  Reports home COVID test were negative.  Also taking Sudafed.    Reports home BPs 120s/80s-90s.    Review of Systems  ROS (+) for decreased cough and chest congestion.  Denies shortness of breath or fevers.  Denies chest pain or headaches.  All other ROS reviewed and negative.    Current Outpatient Medications:   •  albuterol sulfate HFA (Ventolin HFA) 108 (90 Base) MCG/ACT inhaler prn  •  atorvastatin (LIPITOR) 10 MG QD  •  azithromycin (Zithromax) 250 MG AD  •  buPROPion XL (WELLBUTRIN XL) 150 MG 3 QD  •  caffeine 200 MG QD  •  Fluticasone-Salmeterol (ADVAIR/WIXELA) 100-50 MCG/ACT DISKUS 1 puff BID  •  hydroxychloroquine (PLAQUENIL) 200 MG BID  •  levothyroxine 75 MCG QD  •  naproxen sodium (ALEVE) 220 MG bid prn  •  omeprazole (priLOSEC) 40 MG QD  •  ondansetron ODT (ZOFRAN-ODT) 4 MG prn  •  promethazine-dextromethorphan 6.25-15 MG/5ML syrup prn  •  riTUXimab (RITUXAN) 500 MG/50ML solution q4 mos      VITALS:  /82   Pulse 67   Ht 167.6 cm (66\")   Wt 88.5 kg (195 lb)   SpO2 100%   BMI 31.47 kg/m²   Repeat BP left arm 114/76    Physical Exam  Vitals and nursing note reviewed.   Constitutional:       General: She is not in acute distress.     Appearance: Normal appearance. She is not ill-appearing.   Eyes:      Extraocular Movements: Extraocular movements intact.      Conjunctiva/sclera: Conjunctivae normal.   Cardiovascular:      Rate and Rhythm: Normal rate and regular rhythm.   Pulmonary:      Effort: Pulmonary effort is normal. No respiratory distress.      Breath sounds: Normal breath sounds. No wheezing, rhonchi or rales.   Neurological:      Mental Status: She is alert " and oriented to person, place, and time. Mental status is at baseline.   Psychiatric:         Mood and Affect: Mood normal.         Behavior: Behavior normal.         LABS  Results for orders placed or performed in visit on 03/03/23   COVID-19 PCR, [facility], NP SWAB IN [facility] VIRAL TRANSPORT MEDIA/ORAL SWISH 24-30 HR TAT - Swab, Nasopharynx    Specimen: Nasopharynx; Swab   Result Value Ref Range    SARS-CoV-2 FILI Not Detected Not Detected   POCT SARS-CoV-2 Antigen MARVIN + Flu    Specimen: Swab   Result Value Ref Range    SARS Antigen Not Detected Not Detected, Presumptive Negative    Influenza A Antigen MARVIN Not Detected Not Detected    Influenza B Antigen MARVIN Not Detected Not Detected    Internal Control Passed Passed    Lot Number [id number]     Expiration Date 03/16/2024          ASSESSMENT/PLAN    Diagnoses and all orders for this visit:    1. Hypertension (Primary)  Assessment & Plan:  BP stable today 128/82, even better on repeat; no meds currently; cont home monitoring with goal < 130/80      2. Hypothyroidism  Assessment & Plan:  Overdue for f/u TFTs on levothyroxine 75mcg QD      3. Bronchitis  Comments:  vs pneumonia; lung exam unremarkable; improving with zpak; finish abx and cont mucinex DM prn      FOLLOW-UP  1. Health maintenance - COVID19 vacc done, including new COVID19 vacc  2. TFTs on the way out the door  3. RTC prn; next PE scheduled 3/12/24; fasting labs the week prior to appt (CBC, CMP, TSH, lipids, UA/micro,  FT4, vit D, CPK, A1C)      Electronically signed by:    Brie Jalloh MD, FACP  04/25/2023

## 2023-04-25 ENCOUNTER — LAB (OUTPATIENT)
Dept: LAB | Facility: HOSPITAL | Age: 58
End: 2023-04-25
Payer: COMMERCIAL

## 2023-04-25 ENCOUNTER — OFFICE VISIT (OUTPATIENT)
Dept: INTERNAL MEDICINE | Facility: CLINIC | Age: 58
End: 2023-04-25
Payer: COMMERCIAL

## 2023-04-25 VITALS
HEIGHT: 66 IN | DIASTOLIC BLOOD PRESSURE: 82 MMHG | HEART RATE: 67 BPM | SYSTOLIC BLOOD PRESSURE: 128 MMHG | BODY MASS INDEX: 31.34 KG/M2 | OXYGEN SATURATION: 100 % | WEIGHT: 195 LBS

## 2023-04-25 DIAGNOSIS — J40 BRONCHITIS: ICD-10-CM

## 2023-04-25 DIAGNOSIS — E03.9 ACQUIRED HYPOTHYROIDISM: Chronic | ICD-10-CM

## 2023-04-25 DIAGNOSIS — I10 PRIMARY HYPERTENSION: Primary | Chronic | ICD-10-CM

## 2023-04-25 LAB
T4 FREE SERPL-MCNC: 1.27 NG/DL (ref 0.93–1.7)
TSH SERPL DL<=0.05 MIU/L-ACNC: 1.21 UIU/ML (ref 0.27–4.2)

## 2023-04-25 PROCEDURE — 99214 OFFICE O/P EST MOD 30 MIN: CPT | Performed by: INTERNAL MEDICINE

## 2023-04-25 PROCEDURE — 84439 ASSAY OF FREE THYROXINE: CPT

## 2023-04-25 PROCEDURE — 84443 ASSAY THYROID STIM HORMONE: CPT

## 2023-04-25 NOTE — ASSESSMENT & PLAN NOTE
BP stable today 128/82, even better on repeat; no meds currently; cont home monitoring with goal < 130/80

## 2023-04-27 RX ORDER — LEVOTHYROXINE SODIUM 0.07 MG/1
75 TABLET ORAL EVERY MORNING
Qty: 90 TABLET | Refills: 3 | Status: SHIPPED | OUTPATIENT
Start: 2023-04-27

## 2023-05-11 DIAGNOSIS — E03.9 ACQUIRED HYPOTHYROIDISM: Chronic | ICD-10-CM

## 2023-05-11 RX ORDER — LEVOTHYROXINE SODIUM 0.07 MG/1
75 TABLET ORAL EVERY MORNING
Qty: 30 TABLET | OUTPATIENT
Start: 2023-05-11

## 2023-05-30 ENCOUNTER — PATIENT MESSAGE (OUTPATIENT)
Dept: INTERNAL MEDICINE | Facility: CLINIC | Age: 58
End: 2023-05-30

## 2023-05-30 DIAGNOSIS — R05.3 CHRONIC COUGH: Primary | ICD-10-CM

## 2023-05-31 NOTE — TELEPHONE ENCOUNTER
From: Yanet Clifton  To: Brie Jalloh  Sent: 5/30/2023 5:25 PM EDT  Subject: Referral to pulmonologist request    Hi Dr Jalloh,   I was hoping that I could get a referral to a pulmonologist. I’ve had this chronic bad cough for almost a year now, and since April, have been coughing up green phlegm. It seems to be getting worse, and I’m having more frequent (at least twice a day) coughing fits. I’ve tried the inhaler and that doesn’t seem to have any effect.     Thanks so much,  Nancy Clifton

## 2023-06-01 NOTE — TELEPHONE ENCOUNTER
I already escribed chest Xray. Please schedule her a f/u visit for her cough.Thanks!  - per Dr Jalloh

## 2023-06-02 ENCOUNTER — HOSPITAL ENCOUNTER (OUTPATIENT)
Dept: GENERAL RADIOLOGY | Facility: HOSPITAL | Age: 58
Discharge: HOME | End: 2023-06-02

## 2023-06-02 DIAGNOSIS — R05.3 CHRONIC COUGH: ICD-10-CM

## 2023-06-02 PROCEDURE — 71046 X-RAY EXAM CHEST 2 VIEWS: CPT

## 2023-06-05 ENCOUNTER — TELEPHONE (OUTPATIENT)
Dept: INTERNAL MEDICINE | Facility: CLINIC | Age: 58
End: 2023-06-05
Payer: COMMERCIAL

## 2023-06-05 NOTE — TELEPHONE ENCOUNTER
----- Message from Brie Jalloh MD sent at 6/4/2023  8:05 PM EDT -----  CXR is normal - no pneumonia, masses, or other abnormalities. She wanted f/u on cough but appt isn't until 6/22/23 - does she want earlier appt?

## 2023-06-21 PROBLEM — R05.3 CHRONIC COUGH: Status: ACTIVE | Noted: 2023-06-21

## 2023-08-15 ENCOUNTER — OFFICE VISIT (OUTPATIENT)
Dept: PULMONOLOGY | Facility: CLINIC | Age: 58
End: 2023-08-15
Payer: COMMERCIAL

## 2023-08-15 VITALS
SYSTOLIC BLOOD PRESSURE: 126 MMHG | OXYGEN SATURATION: 96 % | HEIGHT: 66 IN | HEART RATE: 85 BPM | TEMPERATURE: 97.3 F | BODY MASS INDEX: 31.53 KG/M2 | WEIGHT: 196.2 LBS | DIASTOLIC BLOOD PRESSURE: 82 MMHG

## 2023-08-15 DIAGNOSIS — J45.41 MODERATE PERSISTENT ASTHMA WITH ACUTE EXACERBATION: Primary | ICD-10-CM

## 2023-08-15 DIAGNOSIS — R05.3 CHRONIC COUGH: ICD-10-CM

## 2023-08-15 PROBLEM — N18.31 STAGE 3A CHRONIC KIDNEY DISEASE: Chronic | Status: RESOLVED | Noted: 2021-07-24 | Resolved: 2023-08-15

## 2023-08-15 RX ORDER — FLUTICASONE PROPIONATE AND SALMETEROL 113; 14 UG/1; UG/1
1 POWDER, METERED RESPIRATORY (INHALATION) DAILY
Qty: 28 EACH | Refills: 5 | Status: SHIPPED | OUTPATIENT
Start: 2023-08-15

## 2023-08-15 RX ORDER — CEPHALEXIN 500 MG/1
500 CAPSULE ORAL 3 TIMES DAILY
Qty: 21 CAPSULE | Refills: 0 | Status: SHIPPED | OUTPATIENT
Start: 2023-08-15

## 2023-08-15 RX ORDER — ALBUTEROL SULFATE 2.5 MG/3ML
2.5 SOLUTION RESPIRATORY (INHALATION) 4 TIMES DAILY PRN
Qty: 120 EACH | Refills: 5 | Status: SHIPPED | OUTPATIENT
Start: 2023-08-15

## 2023-08-15 RX ORDER — PREDNISONE 10 MG/1
TABLET ORAL
Qty: 42 TABLET | Refills: 0 | Status: SHIPPED | OUTPATIENT
Start: 2023-08-15

## 2023-08-15 NOTE — LETTER
August 15, 2023       No Recipients    Patient: Yanet Clifton   YOB: 1965   Date of Visit: 8/15/2023     Dear Dr. Marie Recipients:    Thank you for referring Yanet Clifton to me for evaluation. Below are the relevant portions of my assessment and plan of care.    If you have questions, please do not hesitate to call me. I look forward to following Yanet along with you.         Sincerely,        Griselda Gore MD        CC:   No Recipients      Progress Notes:  No notes on file

## 2023-08-15 NOTE — PROGRESS NOTES
Yanet Clifton is a 58 y.o. female here for evaluation of   Chief Complaint   Patient presents with    Cough     F/u       Problem list:  Chronic cough  Moderate persistent asthma  Rheumatoid arthritis on Rituxan  Sjogren syndrome  GERD  Hypertension  Dyslipidemia  Hypothyroid secondary to Hashimoto's  History of aseptic meningitis, autoimmune  History of aseptic meningitis, autoimmune  Vitamin D deficiency  Diverticulosis  Osteoarthritis  Nephrolithiasis  Breast biopsy, 2018, benign  Temporal artery biopsy, left, 2015  Total abdominal hysterectomy with BSO, 2013  Cholecystectomy  Colonoscopy with hyperplastic polyp, hemorrhoids, 2016  Cystoscopy with laser lithotripsy  Tonsillectomy  Allergy to sulfa  No tobacco    History of Present Illness    58-year-old woman, non-smoker with rheumatoid arthritis on Rituxan, presenting with chronic cough productive of green mucoid secretions.  Cough started after she contracted COVID 19 April 2022.  She is vaccinated and she did take an antiviral.  She has had documented wheezing.  Inhalers were not helpful.  Then in April 2023 she had an acute respiratory illness with fever aches and worsening cough.  She received azithromycin and transiently improved but then worsened.  Since that episode she has had persistent greenish mucoid secretions and coughing spasms.  Her cough has become more relentless.  CT scan of the chest was done and negative for interstitial lung disease or bronchiectasis.  She denies noxious fume exposure.  She does have a history of reflux but is asymptomatic on omeprazole.  She has a history of allergic rhinitis but currently is not having significant drainage.  She did not follow-up with any allergies medication helpful    Review of Systems   Constitutional:  Positive for fatigue.   HENT:  Positive for congestion and voice change.    Respiratory:  Positive for cough, wheezing and stridor.    Musculoskeletal:  Positive for arthralgias, neck pain and neck  stiffness.   Allergic/Immunologic: Positive for immunocompromised state.       Current Outpatient Medications:     albuterol sulfate HFA (Ventolin HFA) 108 (90 Base) MCG/ACT inhaler, Inhale 2 puffs Every 6 (Six) Hours As Needed for Wheezing or Shortness of Air., Disp: 18 g, Rfl: 0    atorvastatin (LIPITOR) 10 MG tablet, Take 1 tablet by mouth Daily., Disp: 90 tablet, Rfl: 3    buPROPion XL (WELLBUTRIN XL) 150 MG 24 hr tablet, Take 3 tablets by mouth Daily., Disp: 270 tablet, Rfl: 3    caffeine 200 MG tablet, Take  by mouth., Disp: , Rfl:     hydroxychloroquine (PLAQUENIL) 200 MG tablet, TAKE 1 TABLET BY MOUTH EVERY MORNING AND 1 TABLET BY MOUTH AT BEDTIME, Disp: , Rfl:     levothyroxine (SYNTHROID, LEVOTHROID) 75 MCG tablet, Take 1 tablet by mouth Every Morning., Disp: 90 tablet, Rfl: 3    naproxen sodium (ALEVE) 220 MG tablet, Take 1 tablet by mouth 2 (Two) Times a Day As Needed for Mild Pain., Disp: , Rfl:     omeprazole (priLOSEC) 40 MG capsule, Take 1 capsule by mouth Daily., Disp: 90 capsule, Rfl: 3    ondansetron ODT (ZOFRAN-ODT) 4 MG disintegrating tablet, Place 1 tablet on the tongue Every 6 (Six) Hours As Needed for Nausea or Vomiting for up to 15 doses., Disp: 15 tablet, Rfl: 0    riTUXimab (RITUXAN) 500 MG/50ML solution injection, Infuse 100 mL into a venous catheter Every 4 (Four) Months., Disp: , Rfl:     albuterol (PROVENTIL) (2.5 MG/3ML) 0.083% nebulizer solution, Take 2.5 mg by nebulization 4 (Four) Times a Day As Needed for Wheezing., Disp: 120 each, Rfl: 5    cephalexin (Keflex) 500 MG capsule, Take 1 capsule by mouth 3 (Three) Times a Day., Disp: 21 capsule, Rfl: 0    Fluticasone-Salmeterol 113-14 MCG/ACT aerosol powder , Inhale 1 puff Daily., Disp: 28 each, Rfl: 5    predniSONE (DELTASONE) 10 MG tablet, Take 4 tabs daily x 3 days, then take 3 tabs daily x 3 days, then take 2 tabs daily x 7 days, then take 1 tab daily x 7days, Disp: 42 tablet, Rfl: 0    Past Medical History:   Diagnosis Date     Aseptic meningitis 12/2009    Encephalitis 2014    HL (hearing loss) 2009    After each bout of meningitis    Hx of bone density study 02/08/2019    nl DEXA (2/8/19): L0.2, H -0.4, repeat 3-4 yrs    Hx of bone density study 04/04/2023    DEXA (4/4/23): L/H -0.3, repeat 3 yrs    Hx of chest x-ray 09/20/2022    nl CXR (9/20/22)    Hx of colonoscopy 05/24/2016    colonosc (5/24/16): hyperplastic polyp, ext/int hemorrhoids, diverticulosis, repeat 5 yrs; GI - Dr. Robertson    Hx of colonoscopy 08/16/2021    nl colonosc (8/16/21) except mild sigmoid diverticulosis, repeat 5 yrs; CRS - Dr. Berrios    Hx of CT scan of abd/pelvis 10/03/2020    CT abd/pelvis (10/3/20, [facility] ER): fatty infiltration of liver, prior priya, no masses, free fluid, or adenopathy    Hx of CT scan of chest 07/13/2023    nl CT chest (7/13/23)    Hx of CT scan of head 10/18/2018    nl noncontrast head CT (10/18/18); ER    Hx of CT scan of head 04/14/2019    nl head CT (4/14/19); [facility] ER    Hx of EGD 05/24/2016    EGD (5/24/16): gastritis, reflux esophagitis, neg for H.pylori; GI - Dr. Robertson    Hx of nl SPEP 12/18/2019    Nephrolithiasis     RA (rheumatoid arthritis)     Sjogren syndrome, unspecified      Past Surgical History:   Procedure Laterality Date    BREAST BIOPSY Left 02/09/2018    u/s guided axillary lymph node    BREAST EXCISIONAL BIOPSY Left 1984    CHOLECYSTECTOMY  2001    secondary to cholelithiasis    COLONOSCOPY      CYSTOSCOPY W/ LASER LITHOTRIPSY      ENDOSCOPY      LUMBAR PUNCTURE  04/2019    hospitalized at [facility]; ? viral meningitis    MAMMO SKIN LESION BIOPSY SINGLE LESION UNILATERAL Left 01/30/2017    s/p L. breast skin bx (1/30/17); nonspecific chronic perivascular dermatitis    MAMMO US BREAST BIOPSY 1ST W WO DEVICE UNILATERAL Left 02/09/2018    s/p u/s-guided L. breast axillary lymph node bx (2/9/18): benign reactive LN, repeat dx mammo in 6 mos; rads - Dr. Bailey    TEMPORAL ARTERY BIOPSY Left 11/05/2015    neg for arteritis; surg -   "Page    TONSILLECTOMY      TOTAL ABDOMINAL HYSTERECTOMY WITH SALPINGO OOPHORECTOMY  03/2013    secondary to fibroids/endometriosis (3/13); GYN - Dr. Naik     Social History     Socioeconomic History    Marital status:     Number of children: 2   Tobacco Use    Smoking status: Never     Passive exposure: Never    Smokeless tobacco: Never   Substance and Sexual Activity    Alcohol use: Yes     Alcohol/week: 0.0 standard drinks     Comment: Very rarely    Drug use: No    Sexual activity: Yes     Partners: Male     Birth control/protection: Post-menopausal     Family History   Problem Relation Age of Onset    Fibromyalgia Mother     Migraines Mother     Depression Mother     Hypertension Sister     Other Sister         loss of hair from head    Migraines Sister     Asthma Sister     Breast cancer Maternal Grandmother 60    Cancer Maternal Grandmother         Breast Cancer    Multiple myeloma Paternal Grandmother     Cancer Paternal Grandmother         Multiple Myeloma    Multiple myeloma Paternal Uncle     Thyroid disease Neg Hx     Diabetes Neg Hx     BRCA 1/2 Neg Hx     Colon cancer Neg Hx     Endometrial cancer Neg Hx     Ovarian cancer Neg Hx      Blood pressure 126/82, pulse 85, temperature 97.3 øF (36.3 øC), temperature source Infrared, height 167.6 cm (66\"), weight 89 kg (196 lb 3.2 oz), SpO2 96 %, not currently breastfeeding.    Physical Exam  Constitutional:       Appearance: She is not toxic-appearing.   HENT:      Head: Normocephalic and atraumatic.      Nose:      Comments: Nasal mucosal erythema, no polyps     Mouth/Throat:      Mouth: Mucous membranes are dry.   Eyes:      Pupils: Pupils are equal, round, and reactive to light.   Cardiovascular:      Rate and Rhythm: Normal rate and regular rhythm.      Heart sounds: No murmur heard.  Pulmonary:      Effort: Pulmonary effort is normal.      Breath sounds: Wheezing and rhonchi present.   Abdominal:      Palpations: Abdomen is soft. "   Musculoskeletal:         General: Swelling and deformity present.   Lymphadenopathy:      Cervical: No cervical adenopathy.   Skin:     General: Skin is warm and dry.   Neurological:      Mental Status: She is alert and oriented to person, place, and time.         PFTs:  August 15, 2023, FVC 2.65 L, 76%, FEV1 1.85 L, 68%, ratio 70%, total lung capacity 4.79 L, 94%, diffusion capacity 17, 71%      Radiology:    Findings:  The central tracheobronchial tree is clear. The lungs are clear. There is no pleural effusion.     The heart size is normal, with evidence of mild calcified coronary artery disease. The great vessels are normal in caliber. No abnormally enlarged lymph nodes are identified.     Partial evaluation of the upper abdomen demonstrates change of cholecystectomy.     No aggressive osseous lesions are identified.     IMPRESSION:  Impression:  No acute cardiopulmonary process.           Electronically Signed: Merrill Hull    7/14/2023 12:41 PM EDT     She also has significant calcified lymphadenopathy    Lab:    Diagnoses and all orders for this visit:    1. Moderate persistent asthma with acute exacerbation (Primary)  -     Home Nebulizer  -     Home Nebulizer Accessories  -     Pulmonary Function Test    2. Chronic cough    Other orders  -     albuterol (PROVENTIL) (2.5 MG/3ML) 0.083% nebulizer solution; Take 2.5 mg by nebulization 4 (Four) Times a Day As Needed for Wheezing.  Dispense: 120 each; Refill: 5  -     Fluticasone-Salmeterol 113-14 MCG/ACT aerosol powder ; Inhale 1 puff Daily.  Dispense: 28 each; Refill: 5  -     predniSONE (DELTASONE) 10 MG tablet; Take 4 tabs daily x 3 days, then take 3 tabs daily x 3 days, then take 2 tabs daily x 7 days, then take 1 tab daily x 7days  Dispense: 42 tablet; Refill: 0  -     cephalexin (Keflex) 500 MG capsule; Take 1 capsule by mouth 3 (Three) Times a Day.  Dispense: 21 capsule; Refill: 0        Discussion:     58-year-old woman, non-smoker presenting with  a chronic cough productive of mucoid secretions.  She has been wheezing only nation and mild obstruction on pulmonary function test.  This certainly could be cough variant asthma.  Seem to be precipitated by COVID infection.  She does not have any evidence of interstitial lung disease or fibrosis.  She does not have significant postnasal drip on examination.  She does have a history of reflux but is asymptomatic on omeprazole.    Keflex 500 mg twice daily for 7 days  Prednisone taper over 3 weeks  Arrange a home nebulizer with albuterol to use at least twice daily for mucociliary clearance and wheezing and then up to 4 times daily for symptoms    Restart fluticasone salmeterol 1 puff daily    Follow-up in 3 weeks with spirometry    Griselda Gore MD

## 2023-09-06 ENCOUNTER — OFFICE VISIT (OUTPATIENT)
Dept: PULMONOLOGY | Facility: CLINIC | Age: 58
End: 2023-09-06
Payer: COMMERCIAL

## 2023-09-06 VITALS
OXYGEN SATURATION: 100 % | BODY MASS INDEX: 31.02 KG/M2 | HEART RATE: 80 BPM | WEIGHT: 193 LBS | HEIGHT: 66 IN | SYSTOLIC BLOOD PRESSURE: 122 MMHG | TEMPERATURE: 97.9 F | DIASTOLIC BLOOD PRESSURE: 80 MMHG

## 2023-09-06 DIAGNOSIS — R05.3 CHRONIC COUGH: ICD-10-CM

## 2023-09-06 DIAGNOSIS — J45.40 MODERATE PERSISTENT ASTHMA, UNSPECIFIED WHETHER COMPLICATED: Chronic | ICD-10-CM

## 2023-09-06 DIAGNOSIS — K21.9 GASTROESOPHAGEAL REFLUX DISEASE, UNSPECIFIED WHETHER ESOPHAGITIS PRESENT: Chronic | ICD-10-CM

## 2023-09-06 DIAGNOSIS — E03.9 ACQUIRED HYPOTHYROIDISM: Chronic | ICD-10-CM

## 2023-09-06 DIAGNOSIS — J30.9 ALLERGIC RHINITIS, UNSPECIFIED SEASONALITY, UNSPECIFIED TRIGGER: Primary | ICD-10-CM

## 2023-09-06 LAB
BASOPHILS # BLD AUTO: 0.02 10*3/MM3 (ref 0–0.2)
BASOPHILS NFR BLD AUTO: 0.5 % (ref 0–1.5)
DEPRECATED RDW RBC AUTO: 40.1 FL (ref 37–54)
EOSINOPHIL # BLD AUTO: 0.23 10*3/MM3 (ref 0–0.4)
EOSINOPHIL NFR BLD AUTO: 5.8 % (ref 0.3–6.2)
ERYTHROCYTE [DISTWIDTH] IN BLOOD BY AUTOMATED COUNT: 12.2 % (ref 12.3–15.4)
HCT VFR BLD AUTO: 37.4 % (ref 34–46.6)
HGB BLD-MCNC: 12.5 G/DL (ref 12–15.9)
IMM GRANULOCYTES # BLD AUTO: 0.01 10*3/MM3 (ref 0–0.05)
IMM GRANULOCYTES NFR BLD AUTO: 0.3 % (ref 0–0.5)
LYMPHOCYTES # BLD AUTO: 0.39 10*3/MM3 (ref 0.7–3.1)
LYMPHOCYTES NFR BLD AUTO: 9.9 % (ref 19.6–45.3)
MCH RBC QN AUTO: 30.5 PG (ref 26.6–33)
MCHC RBC AUTO-ENTMCNC: 33.4 G/DL (ref 31.5–35.7)
MCV RBC AUTO: 91.2 FL (ref 79–97)
MONOCYTES # BLD AUTO: 0.37 10*3/MM3 (ref 0.1–0.9)
MONOCYTES NFR BLD AUTO: 9.4 % (ref 5–12)
NEUTROPHILS NFR BLD AUTO: 2.93 10*3/MM3 (ref 1.7–7)
NEUTROPHILS NFR BLD AUTO: 74.1 % (ref 42.7–76)
NRBC BLD AUTO-RTO: 0 /100 WBC (ref 0–0.2)
PLATELET # BLD AUTO: 204 10*3/MM3 (ref 140–450)
PMV BLD AUTO: 10.9 FL (ref 6–12)
RBC # BLD AUTO: 4.1 10*6/MM3 (ref 3.77–5.28)
T4 FREE SERPL-MCNC: 1.4 NG/DL (ref 0.93–1.7)
TSH SERPL DL<=0.05 MIU/L-ACNC: 1.52 UIU/ML (ref 0.27–4.2)
WBC NRBC COR # BLD: 3.95 10*3/MM3 (ref 3.4–10.8)

## 2023-09-06 PROCEDURE — 84443 ASSAY THYROID STIM HORMONE: CPT | Performed by: NURSE PRACTITIONER

## 2023-09-06 PROCEDURE — 85025 COMPLETE CBC W/AUTO DIFF WBC: CPT | Performed by: NURSE PRACTITIONER

## 2023-09-06 PROCEDURE — 83516 IMMUNOASSAY NONANTIBODY: CPT | Performed by: NURSE PRACTITIONER

## 2023-09-06 PROCEDURE — 86003 ALLG SPEC IGE CRUDE XTRC EA: CPT | Performed by: NURSE PRACTITIONER

## 2023-09-06 PROCEDURE — 86602 ANTINOMYCES ANTIBODY: CPT | Performed by: NURSE PRACTITIONER

## 2023-09-06 PROCEDURE — 86037 ANCA TITER EACH ANTIBODY: CPT | Performed by: NURSE PRACTITIONER

## 2023-09-06 PROCEDURE — 86609 BACTERIUM ANTIBODY: CPT | Performed by: NURSE PRACTITIONER

## 2023-09-06 PROCEDURE — 86038 ANTINUCLEAR ANTIBODIES: CPT | Performed by: NURSE PRACTITIONER

## 2023-09-06 PROCEDURE — 86331 IMMUNODIFFUSION OUCHTERLONY: CPT | Performed by: NURSE PRACTITIONER

## 2023-09-06 PROCEDURE — 84439 ASSAY OF FREE THYROXINE: CPT | Performed by: NURSE PRACTITIONER

## 2023-09-06 PROCEDURE — 86606 ASPERGILLUS ANTIBODY: CPT | Performed by: NURSE PRACTITIONER

## 2023-09-06 PROCEDURE — 82785 ASSAY OF IGE: CPT | Performed by: NURSE PRACTITIONER

## 2023-09-06 PROCEDURE — 86671 FUNGUS NES ANTIBODY: CPT | Performed by: NURSE PRACTITIONER

## 2023-09-06 NOTE — PROGRESS NOTES
[facility] Pulmonary Follow up    CHIEF COMPLAINT    Cough    HISTORY OF PRESENT ILLNESS    Yanet Clifton is a 58 y.o.female here today for follow-up.  She was last seen in the office by Dr. Gore about a month ago.  She was started on antibiotics and a 3-week prednisone taper.  She states that she felt better while on the antibiotics and steroids but she has been off the prednisone for about 3 weeks.  She states the cough has returned.  She also does not feel well today in the office.    She states she is coughing up thick white sputum.  She denies any hemoptysis.  She denies any fever, chills or night sweats.  She denies any chest pain or palpitations.  She denies any lower extremity edema or calf tenderness.    She is using her inhaler daily.  She also uses albuterol nebulizers twice a day.  She states that the albuterol does make her jittery.  She does not notice a significant improvement when using the inhaler or the nebulizers.    She denies any postnasal drainage or allergy symptoms currently.    She denies any reflux symptoms.  She does take omeprazole daily.    She states that her thyroid has been abnormal and feels like this could be contributing to some of her symptoms as well.    She continues to follow with rheumatology for her rheumatoid arthritis.  She does receive Rituxan infusions.  She has not started on her Plaquenil yet due to her recurrent infections recently.    She is a lifetime non-smoker.    Patient Active Problem List   Diagnosis    Rash of entire body    Allergic rhinitis    Anemia    Asthma    Constipation    Depression    Dermatitis    Diarrhea    Hypertension    Elevated LFTs    Fibrocystic breast changes    Gastroesophageal reflux disease    Hyperlipidemia    Hypothyroidism    Insomnia    Headache    Meningitis    Fatigue    Common migraine with intractable migraine    Nausea and vomiting    Papules    Rheumatoid arthritis    Sjogren's syndrome    Hyperhidrosis    Vitamin D  deficiency    PE (physical exam), routine    Menopause    Diverticulosis of colon    Hemorrhoids    Mallet finger of right finger(s)    Impaired fasting glucose    Mid back pain    Overweight (BMI 25.0-29.9)    Osteoarthritis of both feet    Osteoarthritis of left hip    COVID-19 virus infection    Chronic cough       Allergies   Allergen Reactions    Adalimumab      Other reaction(s): Headache (Humira)    Duloxetine Hcl Other (See Comments)     sexual dysfunction    Escitalopram Oxalate Other (See Comments)     Weight gain    Macrobid [Nitrofurantoin Macrocrystal] Nausea Only    Cimzia [Certolizumab Pegol] Rash    Sulfa Antibiotics Rash       Current Outpatient Medications:     albuterol (PROVENTIL) (2.5 MG/3ML) 0.083% nebulizer solution, Take 2.5 mg by nebulization 4 (Four) Times a Day As Needed for Wheezing., Disp: 120 each, Rfl: 5    albuterol sulfate HFA (Ventolin HFA) 108 (90 Base) MCG/ACT inhaler, Inhale 2 puffs Every 6 (Six) Hours As Needed for Wheezing or Shortness of Air., Disp: 18 g, Rfl: 0    atorvastatin (LIPITOR) 10 MG tablet, Take 1 tablet by mouth Daily., Disp: 90 tablet, Rfl: 3    buPROPion XL (WELLBUTRIN XL) 150 MG 24 hr tablet, Take 3 tablets by mouth Daily., Disp: 270 tablet, Rfl: 3    caffeine 200 MG tablet, Take  by mouth., Disp: , Rfl:     Fluticasone-Salmeterol 113-14 MCG/ACT aerosol powder , Inhale 1 puff Daily., Disp: 28 each, Rfl: 5    levothyroxine (SYNTHROID, LEVOTHROID) 75 MCG tablet, Take 1 tablet by mouth Every Morning., Disp: 90 tablet, Rfl: 3    naproxen sodium (ALEVE) 220 MG tablet, Take 1 tablet by mouth 2 (Two) Times a Day As Needed for Mild Pain., Disp: , Rfl:     omeprazole (priLOSEC) 40 MG capsule, Take 1 capsule by mouth Daily., Disp: 90 capsule, Rfl: 3    ondansetron ODT (ZOFRAN-ODT) 4 MG disintegrating tablet, Place 1 tablet on the tongue Every 6 (Six) Hours As Needed for Nausea or Vomiting for up to 15 doses., Disp: 15 tablet, Rfl: 0    riTUXimab (RITUXAN) 500 MG/50ML  "solution injection, Infuse 100 mL into a venous catheter Every 4 (Four) Months., Disp: , Rfl:     hydroxychloroquine (PLAQUENIL) 200 MG tablet, TAKE 1 TABLET BY MOUTH EVERY MORNING AND 1 TABLET BY MOUTH AT BEDTIME (Patient not taking: Reported on 9/6/2023), Disp: , Rfl:   MEDICATION LIST AND ALLERGIES REVIEWED.    Social History     Tobacco Use    Smoking status: Never     Passive exposure: Never    Smokeless tobacco: Never   Substance Use Topics    Alcohol use: Yes     Alcohol/week: 0.0 standard drinks     Comment: Very rarely    Drug use: No       FAMILY AND SOCIAL HISTORY REVIEWED.    Review of Systems   Constitutional:  Positive for fatigue. Negative for activity change, appetite change, fever and unexpected weight change.   HENT:  Negative for congestion, postnasal drip, rhinorrhea, sinus pressure, sore throat and voice change.    Eyes:  Negative for visual disturbance.   Respiratory:  Positive for cough and shortness of breath. Negative for chest tightness and wheezing.    Cardiovascular:  Negative for chest pain, palpitations and leg swelling.   Gastrointestinal:  Negative for abdominal distention, abdominal pain, nausea and vomiting.   Endocrine: Negative for cold intolerance and heat intolerance.   Genitourinary:  Negative for difficulty urinating and urgency.   Musculoskeletal:  Negative for arthralgias, back pain and neck pain.   Skin:  Negative for color change and pallor.   Allergic/Immunologic: Negative for environmental allergies and food allergies.   Neurological:  Negative for dizziness, syncope, weakness and light-headedness.   Hematological:  Negative for adenopathy. Does not bruise/bleed easily.   Psychiatric/Behavioral:  Negative for agitation and behavioral problems.  .    /80   Pulse 80   Temp 97.9 °F (36.6 °C)   Ht 167.6 cm (66\")   Wt 87.5 kg (193 lb)   SpO2 100% Comment: resting, room air  BMI 31.15 kg/m²     Immunization History   Administered Date(s) Administered    COVID-19 " (PFIZER) BIVALENT 12+YRS 02/07/2023    COVID-19 (PFIZER) Purple Cap Monovalent 02/25/2021, 03/19/2021, 08/19/2021    FluMist 2-49yrs 09/19/2014    Fluzone >6mos 12/15/2020, 09/10/2021, 09/19/2022    Fluzone Quad >6mos (Multi-dose) 11/22/2016, 11/28/2017    Hepatitis A 11/29/2018, 12/03/2019    Influenza, Unspecified 11/01/2018, 09/19/2022    PPD Test 02/26/2008    Pneumococcal Conjugate 13-Valent (PCV13) 09/29/2015    Pneumococcal Polysaccharide (PPSV23) 09/23/2008, 11/21/2011, 11/22/2016    Pneumococcal, Unspecified 01/01/2017    TD Preservative Free (Tenivac) 12/15/2020    Tdap 02/03/2011    flucelvax quad pfs =>4 YRS 11/29/2018, 12/03/2019       Physical Exam  Vitals and nursing note reviewed.   Constitutional:       Appearance: She is well-developed. She is not diaphoretic.   HENT:      Head: Normocephalic and atraumatic.   Eyes:      Pupils: Pupils are equal, round, and reactive to light.   Neck:      Thyroid: No thyromegaly.   Cardiovascular:      Rate and Rhythm: Normal rate and regular rhythm.      Heart sounds: Normal heart sounds. No murmur heard.    No friction rub. No gallop.   Pulmonary:      Effort: Pulmonary effort is normal. No respiratory distress.      Breath sounds: Normal breath sounds. No wheezing or rales.   Chest:      Chest wall: No tenderness.   Abdominal:      General: Bowel sounds are normal.      Palpations: Abdomen is soft.      Tenderness: There is no abdominal tenderness.   Musculoskeletal:         General: No swelling. Normal range of motion.      Cervical back: Normal range of motion and neck supple.   Lymphadenopathy:      Cervical: No cervical adenopathy.   Skin:     General: Skin is warm and dry.      Capillary Refill: Capillary refill takes less than 2 seconds.   Neurological:      Mental Status: She is alert and oriented to person, place, and time.   Psychiatric:         Mood and Affect: Mood normal.         Behavior: Behavior normal.         RESULTS    PFTS in the office today,  "read by me, FVC 2.78 80% predicted, FEV1 2.03 75% predicted, FEV1/FVC 73% predicted, no obstruction, cannot rule out restriction.    PROBLEM LIST    Problem List Items Addressed This Visit          Allergies and Adverse Reactions    Allergic rhinitis - Primary (Chronic)    Overview     Impression: 05/23/2014 - rec resuming nasal steroid spray to get some anti-inflammatory effect; also consider that it would improve his nose lesions (\"ulcer\");          Relevant Orders    Hypersensitivity Pneumonitis Profile    CBC & Differential    SHAWN Screen    ANCA Panel    Aspergillus Antibodies    Aspergillus Fumigatus IgE    Allergens, Zone 8    IgE Level    Fungal Antibodies, Quantitative Double Immunodiffusion       Endocrine and Metabolic    Hypothyroidism (Chronic)    Overview     4/25/23 euthyroid TSH 1.210, cont levothyroxine 75mcg QD; 2/3/23 bord TSH 0.795, nl FT4 1.72; decr levothyroxine 75mcg QD;  9/19/22 bord hyperthyroid w/ TSH 0.016, FT4 1.58; decr levothyroxine 88mcg QD; 12/22/21 euthyroid TSH 1.09; on levothyroxine 100mcg QD; 4/13/21 euthyroid TSH 0.496; 12/10/20 euthyroid TSH 2.67; cont levothyroxine 100mcg QD; Impression: 12/3/19 euthyroid TSH 2.73 on levothyroxine 100mcg QD; 12/4/18 TFTs nl but at lower end of normal, patient requesting incr dose, therefore incr levothyroxine to 100mcg QD #30, 2RF and f/u TFTs in 8 wks; 02/02/2016 - with complaints of inability to lose weight, f/u TFTs  Impression: 09/29/2015 - euthyroid on current dose of levothyroxine  Impression: 07/22/2015 - nl TFTs (tasked)  Impression: 04/21/2015 - subclin hypothyroidism, therefore levothyroxine initiatied with plans to check TFTs in about 4 wks; discussed hypothyroidism could cause constipation, not the levothyroxine  Impression: 03/24/2015 - check TFTs and adjust levothyroxine as needed  Impression: 09/19/2014 - euthyroid on levothyroxine #90, 3RF;          Relevant Orders    T4, Free    TSH       Gastrointestinal Abdominal     " Gastroesophageal reflux disease (Chronic)    Overview     12/14/20 stable on omeprazole 40mg QD; Impression: 03/22/2016 - start omeprazole 40mg qam #30, 5RF; stay upright for at least 2-3 hours after eating, minimize food triggers; EGD requested;             Pulmonary and Pneumonias    Asthma (Chronic)    Overview     8/15/23 pulm/Dr. Gore - sj cough variant asthma with chronic cough productive of mucoid secretions, also with PND, resume fluticasone/salmeterol 1 puff QD; RX home nebs, RX keflex x 7d and prednisone x 3 wks; f/u in 3wks; PFTs - FVC 2.65 L, 76%, FEV1 1.85 L, 68%, ratio 70%, total lung capacity 4.79 L, 94%, diffusion capacity 17, 71%          Chronic cough    Overview     8/15/23 pulm/Dr. Gore - sj cough variant asthma with chronic cough productive of mucoid secretions, also with PND, resume fluticasone/salmeterol 1 puff QD; RX home nebs, RX keflex x 7d and prednisone x 3 wks; f/u in 3wks; 7/13/23/ nl CT chest; 6/2/23 nl CXR         Relevant Orders    Spirometry (Completed)         DISCUSSION    Ms. Clifton was here for follow-up.  She seems to be back to where she was when she saw Dr. Gore.  She has been off the prednisone for about 3 weeks.    We did review her PFTs in the office today and she has no obstruction her numbers are slightly improved when compared to her previous testing.  She will continue her nebulizers twice a day.  She does not feel like the inhaler is doing much benefit.  I did advise her she can stop the inhaler if she wanted to.    We will get some labs today to rule out any other causes of her cough.  She did have an elevated eosinophil count back in September 2022.  I will contact her with the lab results once I receive them.    She will continue omeprazole daily for GERD.  We also discussed strict reflux precautions.    I will go ahead and check her thyroid labs as well.  If they are abnormal I will refer her back to her PCP.    We did discuss holding  off on antibiotics and steroids at this time until her lab work come back.  She states she would like to avoid steroids if at all possible.  I did advise her to call me if her symptoms were to worsen.    We will have her follow-up with Dr. Gore in 2 to 3 months.    I personally spent a total of 32 minutes on patient visit today including chart review, face to face with the patient obtaining the history and physical exam, review of pertinent images and tests, counseling and discussion and/or coordination of care as described above, and documentation.  Total time excludes time spent on other separate services such as performing procedures or test interpretation, if applicable.        Clau Johnson, APRN  09/06/202311:58 EDT  Electronically signed     Please note that portions of this note were completed with a voice recognition program.        CC: Brie Jalloh MD

## 2023-09-08 LAB
ANA SER QL: NEGATIVE
C-ANCA TITR SER IF: NORMAL TITER
MYELOPEROXIDASE AB SER IA-ACNC: <0.2 UNITS (ref 0–0.9)
P-ANCA ATYPICAL TITR SER IF: NORMAL TITER
P-ANCA TITR SER IF: NORMAL TITER
PROTEINASE3 AB SER IA-ACNC: <0.2 UNITS (ref 0–0.9)

## 2023-09-10 LAB
A FUMIGATUS IGE QN: <0.1 KU/L
IGE SERPL-ACNC: 4 IU/ML (ref 6–495)

## 2023-09-12 ENCOUNTER — OFFICE VISIT (OUTPATIENT)
Dept: INTERNAL MEDICINE | Facility: CLINIC | Age: 58
End: 2023-09-12
Payer: COMMERCIAL

## 2023-09-12 VITALS
HEIGHT: 66 IN | WEIGHT: 190.3 LBS | SYSTOLIC BLOOD PRESSURE: 142 MMHG | BODY MASS INDEX: 30.58 KG/M2 | OXYGEN SATURATION: 98 % | HEART RATE: 97 BPM | DIASTOLIC BLOOD PRESSURE: 110 MMHG | RESPIRATION RATE: 18 BRPM

## 2023-09-12 DIAGNOSIS — G03.9 MENINGITIS: Primary | Chronic | ICD-10-CM

## 2023-09-12 DIAGNOSIS — I10 PRIMARY HYPERTENSION: Chronic | ICD-10-CM

## 2023-09-12 DIAGNOSIS — R05.3 CHRONIC COUGH: ICD-10-CM

## 2023-09-12 DIAGNOSIS — E03.9 ACQUIRED HYPOTHYROIDISM: Chronic | ICD-10-CM

## 2023-09-12 LAB
A ALTERNATA IGE QN: <0.1 KU/L
A FLAVUS AB SER QL ID: NEGATIVE
A FUMIGATUS AB SER QL ID: NEGATIVE
A FUMIGATUS IGE QN: <0.1 KU/L
A NIGER AB SER QL ID: NEGATIVE
AMER ROACH IGE QN: <0.1 KU/L
BAHIA GRASS IGE QN: <0.1 KU/L
BERMUDA GRASS IGE QN: <0.1 KU/L
BOXELDER IGE QN: <0.1 KU/L
C HERBARUM IGE QN: <0.1 KU/L
CAT DANDER IGE QN: <0.1 KU/L
CMN PIGWEED IGE QN: <0.1 KU/L
COMMON RAGWEED IGE QN: <0.1 KU/L
CONV CLASS DESCRIPTION: NORMAL
D FARINAE IGE QN: <0.1 KU/L
D PTERONYSS IGE QN: <0.1 KU/L
DOG DANDER IGE QN: <0.1 KU/L
ENGL PLANTAIN IGE QN: <0.1 KU/L
HAZELNUT POLN IGE QN: <0.1 KU/L
JOHNSON GRASS IGE QN: <0.1 KU/L
KENT BLUE GRASS IGE QN: <0.1 KU/L
LONDON PLANE IGE QN: <0.1 KU/L
M RACEMOSUS IGE QN: <0.1 KU/L
MT JUNIPER IGE QN: <0.1 KU/L
MUGWORT IGE QN: <0.1 KU/L
NETTLE IGE QN: <0.1 KU/L
P NOTATUM IGE QN: <0.1 KU/L
S BOTRYOSUM IGE QN: <0.1 KU/L
SHEEP SORREL IGE QN: <0.1 KU/L
SWEET GUM IGE QN: <0.1 KU/L
WHITE ELM IGE QN: <0.1 KU/L
WHITE HICKORY IGE QN: <0.1 KU/L
WHITE MULBERRY IGE QN: <0.1 KU/L
WHITE OAK IGE QN: <0.1 KU/L

## 2023-09-12 RX ORDER — PREDNISONE 5 MG/1
TABLET ORAL
Qty: 21 TABLET | Refills: 0 | Status: SHIPPED | OUTPATIENT
Start: 2023-09-12 | End: 2023-09-24

## 2023-09-13 LAB
A FUMIGATUS IGG SER QL: NEGATIVE
A PULLULANS IGG SER QL: NEGATIVE
LACEYELLA SACCHARI AB SER QL ID: NEGATIVE
PIGEON SERUM IGG QL: NEGATIVE
S RECTIVIRGULA IGG SER QL ID: NEGATIVE
T VULGARIS IGG SER QL: NEGATIVE

## 2023-09-13 NOTE — PROGRESS NOTES
Chief Complaint   Patient presents with    Hypothyroidism    Headache    Fatigue     PatientHPI  58 y.o.  woman presents for further evaluation of headache and neck stiffness, noting history of 3 episodes of a meningitis.  HPI started about 3 weeks ago with progressively worsening headaches, all over the top of the head.     Reports pulmonary visit on Wednesday due to increased cough and had multiple labs done, including evaluation for histoplasmosis.  In the past, she would take prednisone for potential angitis, but patient did not do that this time due to concern that she might have histoplasmosis.    She reports increased headache Friday night and then by Saturday 2 AM, had very severe headache all of the top of her head but significantly worsened headache.  Symptoms would worsen when she was lying down.  She states that she knew she should go to the ER but did not want to go during the night shift.  Somehow she ended up falling asleep and upon awakening at 730 the next morning, felt better; therefore, she did not get evaluated.    Today the headache is mild, and the neck stiffness is mild.  No troubles with speech, confusion, numbness/tingling, or weakness in either arms or legs.  Has not fallen.  Denies dizziness or visual changes.  Denies fevers or chills.    Notes recent course of steroids and notes s/e of getting rev-ed up.    Reports Dr. Mendoza had mentioned referring her to neuro immunology at , [facility]. due to recurrent meningitis..  The referral never went through, and she i should s longer followed by Dr. Mendoza.  She is requesting this referral.    Review of systems  ROS (+) for worsening cough as noted without significant shortness of breath.  ROS (+) for mild to severe whole head headache, associated with neck stiffness and fatigue., but no confusion, fevers, visual changes, falls, numbness or tingling, difficulty with speech, weakness in either arms or legs..  All other  "ROS reviewed and negative.    Current Outpatient Medications:     albuterol (PROVENTIL) (2.5 MG/3ML) 0.083% neb prn    albuterol sulfate HFA (Ventolin HFA) 108 (90 Base) MCG/ACT inhaler prn    atorvastatin (LIPITOR) 10 MG QD    buPROPion XL (WELLBUTRIN XL) 150 MG  QD    caffeine 200 MG QD    Fluticasone-Salmeterol 113-14 MCG/ACT 1 puff QD    hydroxychloroquine (PLAQUENIL) 200 MG QD    levothyroxine 75 MCG  QD    naproxen sodium (ALEVE) 220 MG prn    omeprazole (priLOSEC) 40 MG QD    ondansetron ODT (ZOFRAN-ODT) 4 MG  prn    riTUXimab (RITUXAN) 500 MG/50ML 100ml q4 mos    VITALS:  BP (!) 142/110   Pulse 97   Resp 18   Ht 167.6 cm (66\")   Wt 86.3 kg (190 lb 4.8 oz)   SpO2 98%   BMI 30.72 kg/m²     Physical Exam  Vitals and nursing note reviewed.   Constitutional:       General: She is not in acute distress.     Appearance: Normal appearance. She is not ill-appearing.   Eyes:      General:         Right eye: No discharge.         Left eye: No discharge.      Extraocular Movements: Extraocular movements intact.      Conjunctiva/sclera: Conjunctivae normal.      Pupils: Pupils are equal, round, and reactive to light.   Cardiovascular:      Rate and Rhythm: Normal rate and regular rhythm.      Heart sounds: Normal heart sounds.   Pulmonary:      Effort: Pulmonary effort is normal. No respiratory distress.      Breath sounds: No wheezing or rales.   Abdominal:      Palpations: Abdomen is soft.      Tenderness: There is no abdominal tenderness.   Musculoskeletal:      Cervical back: Neck supple. No rigidity (able to touch chin to chest).      Right lower leg: No edema.      Left lower leg: No edema.   Lymphadenopathy:      Cervical: No cervical adenopathy.   Neurological:      Mental Status: She is alert and oriented to person, place, and time. Mental status is at baseline.      Sensory: No sensory deficit.      Gait: Gait normal.      Comments: Forehead furrowing, nasolabial folds, tongue extension, palate elevation " symmetric; shoulder shrug intact; FTN and HTS intact bilaterally, symmetric; fast hand movements intact and symmetric; strength 5/5 BUE/BLE, sensation to light touch intact and symmetric     Psychiatric:         Mood and Affect: Mood normal.         Behavior: Behavior normal.       LABS  Results for orders placed or performed in visit on 09/06/23   SHAWN Screen    Specimen: Arm, Left; Blood   Result Value Ref Range    SHAWN Direct Negative Negative   ANCA Panel    Specimen: Arm, Left; Blood   Result Value Ref Range    ANTI-MPO ANTIBODIES <0.2 0.0 - 0.9 units    ANTI-PR3 ANTIBODIES <0.2 0.0 - 0.9 units    C-ANCA <1:20 Neg:<1:20 titer    P-ANCA <1:20 Neg:<1:20 titer    Atypical pANCA <1:20 Neg:<1:20 titer   Aspergillus Antibodies    Specimen: Arm, Left; Blood   Result Value Ref Range    Aspergillus fumigatus Negative Neg:<1:1    Aspergillus flavus Negative Neg:<1:1    Aspergillus niger Negative Neg:<1:1   Aspergillus Fumigatus IgE    Specimen: Arm, Left; Blood   Result Value Ref Range    Aspergillus fumigatus <0.10 Class 0 kU/L   IgE Level    Specimen: Arm, Left; Blood   Result Value Ref Range    IgE 4 (L) 6 - 495 IU/mL   T4, Free    Specimen: Arm, Left; Blood   Result Value Ref Range    Free T4 1.40 0.93 - 1.70 ng/dL   TSH    Specimen: Arm, Left; Blood   Result Value Ref Range    TSH 1.520 0.270 - 4.200 uIU/mL   CBC Auto Differential    Specimen: Arm, Left; Blood   Result Value Ref Range    WBC 3.95 3.40 - 10.80 10*3/mm3    RBC 4.10 3.77 - 5.28 10*6/mm3    Hemoglobin 12.5 12.0 - 15.9 g/dL    Hematocrit 37.4 34.0 - 46.6 %    MCV 91.2 79.0 - 97.0 fL    MCH 30.5 26.6 - 33.0 pg    MCHC 33.4 31.5 - 35.7 g/dL    RDW 12.2 (L) 12.3 - 15.4 %    RDW-SD 40.1 37.0 - 54.0 fl    MPV 10.9 6.0 - 12.0 fL    Platelets 204 140 - 450 10*3/mm3    Neutrophil % 74.1 42.7 - 76.0 %    Lymphocyte % 9.9 (L) 19.6 - 45.3 %    Monocyte % 9.4 5.0 - 12.0 %    Eosinophil % 5.8 0.3 - 6.2 %    Basophil % 0.5 0.0 - 1.5 %    Immature Grans % 0.3 0.0 - 0.5 %     Neutrophils, Absolute 2.93 1.70 - 7.00 10*3/mm3    Lymphocytes, Absolute 0.39 (L) 0.70 - 3.10 10*3/mm3    Monocytes, Absolute 0.37 0.10 - 0.90 10*3/mm3    Eosinophils, Absolute 0.23 0.00 - 0.40 10*3/mm3    Basophils, Absolute 0.02 0.00 - 0.20 10*3/mm3    Immature Grans, Absolute 0.01 0.00 - 0.05 10*3/mm3    nRBC 0.0 0.0 - 0.2 /100 WBC         ASSESSMENT/PLAN    Diagnoses and all orders for this visit:    1. Meningitis (Primary)  Assessment & Plan:  Recurrent aseptic meningitis, 4th episode, symptomatic with headache, neck stiffness, and fatigue; afebrile; currently sxs subsided from maximal pain; RX short pred taper 20mg x3d, then 10mg x3d, then 5mg x3d; pt aware to go to hospital if headaches worsened brenna with worsened neck stiffness, fevers, visual changes, confusion, falls; referral [facility]immun[facility] per patient request    Orders:  -     Ambulatory Referral to Neurology  -     predniSONE (DELTASONE) 5 MG tablet; 4 PO QD for 3 days, then 2 PO QD for 3 days, then 1 PO QD for 3 days, then stop  Dispense: 21 tablet; Refill: 0    2. Hypertension  Assessment & Plan:  BP elevated today, likely d/t aseptic meningitis with headache and neck stiffness; will f/u again at next visit      3. Chronic cough  Assessment & Plan:  Ongoing lab evaluation per annabel/CHERYL Tatum; SHAWN, ANCA,  aspergillus negative; allergen and hypersensitivity panels pending      4. Hypothyroidism  Assessment & Plan:  Euthyroid on leovthyroxine 75mcg QD        Time Documentation    Counseled patient  I spent  24 minutes  face to face and 27 minutes non-face to face on today's office visit. Time was spent reviewing patient's previous notes, lab results, test results, vitals, and/or other records as well as examining the patient, ordering tests/medicines/procedures, providing counseling, coordinating care, answering questions, discussing evaluation and treatment plans, and writing this note.     Total time: 51 minutes  (Level 5 40-54  minutes)    FOLLOW-UP  Health maintenance - rec flu vacc and RSV vacc soon when meningitis has resolved (get at pharmacy), and new COVID19 vacc when available  RTC prn - will be going to [facility] in 2024, therefore RTC 3/12/24 for [facility] to [facility] PE;fasting labs the week prior to appt (CBC, CMP, TSH, lipids, UA/micro, FT4, vit D, CPK, A1C)      Electronically signed by:    Brie Jalloh MD, FACP  09/12/2023

## 2023-09-13 NOTE — ASSESSMENT & PLAN NOTE
Ongoing lab evaluation per pulm/ZAKIA Johnson, CHERYL; SHAWN, ANCA,  aspergillus negative; allergen and hypersensitivity panels pending

## 2023-09-13 NOTE — ASSESSMENT & PLAN NOTE
Recurrent aseptic meningitis, 4th episode, symptomatic with headache, neck stiffness, and fatigue; afebrile; currently sxs subsided from maximal pain; RX short pred taper 20mg x3d, then 10mg x3d, then 5mg x3d; pt aware to go to hospital if headaches worsened brenna with worsened neck stiffness, fevers, visual changes, confusion, falls; referral [facility] per patient request

## 2023-09-13 NOTE — ASSESSMENT & PLAN NOTE
BP elevated today, likely d/t aseptic meningitis with headache and neck stiffness; will f/u again at next visit

## 2023-11-22 DIAGNOSIS — R05.9 COUGH: ICD-10-CM

## 2023-11-22 DIAGNOSIS — R07.89 CHEST TIGHTNESS: ICD-10-CM

## 2023-11-22 RX ORDER — ALBUTEROL SULFATE 90 UG/1
AEROSOL, METERED RESPIRATORY (INHALATION)
Qty: 8.5 G | OUTPATIENT
Start: 2023-11-22

## 2023-12-06 ENCOUNTER — TELEPHONE (OUTPATIENT)
Dept: INTERNAL MEDICINE | Facility: CLINIC | Age: 58
End: 2023-12-06
Payer: COMMERCIAL

## 2023-12-06 ENCOUNTER — PATIENT MESSAGE (OUTPATIENT)
Dept: INTERNAL MEDICINE | Facility: CLINIC | Age: 58
End: 2023-12-06
Payer: COMMERCIAL

## 2023-12-06 DIAGNOSIS — U07.1 COVID-19 VIRUS INFECTION: Primary | ICD-10-CM

## 2023-12-06 NOTE — TELEPHONE ENCOUNTER
"Caller: Yanet Clifton \"Nancy\"    Relationship to patient: Self    Best call back number: [phone]     Date of positive COVID19 test: 12.05.23    Date if possible COVID19 exposure: UNKNOWN    COVID19 symptoms: HEADACHE, SORE THROAT, BAD COUGH, TIRED, ACHES IN BACK AND RIBS, SLIGHT FEVER, MAYBE A BIT OF SHORTNESS OF AIR WHEN MOVING (NOT SURE)    Date of initial quarantine: 12.05.23    Additional information or concerns: THE SYMPTOMS STARTED ON MONDAY 12.04.23. SHE WENT TO [facility] BUT WAS NOT TESTED THEN SHE WAS NOT FEELING BETTER SO SHE TESTED HERSELF TWICE AND BOTH ARE POSITIVE. SHE WOULD LIKE LOLA CALLED IN. PLEASE LET HER KNOW.     What is the patients preferred pharmacy: [id number] [place] [address] [address]  AT [facility] [phone] [facility] [phone] FX     "

## 2023-12-06 NOTE — TELEPHONE ENCOUNTER
From: Yanet Clifton  To: Brie Jalloh  Sent: 12/6/2023 3:13 PM EST  Subject: Covid positive-request for Paxlovid    Fernando Jalloh,  I visited [facility] Monday and they diagnosed upper respiratory infection and prescribed Augmentin. I was feeling worse Tuesday, so tested at home for Covid and it was positive. Today test result is darker (positive) and feeling worse. Would you be able to prescribe Paxlovid for me please?    Thank you,  Nancy Clifton

## 2023-12-18 ENCOUNTER — OFFICE VISIT (OUTPATIENT)
Dept: PULMONOLOGY | Facility: CLINIC | Age: 58
End: 2023-12-18
Payer: COMMERCIAL

## 2023-12-18 VITALS
DIASTOLIC BLOOD PRESSURE: 76 MMHG | OXYGEN SATURATION: 97 % | TEMPERATURE: 97.3 F | HEIGHT: 66 IN | BODY MASS INDEX: 31.34 KG/M2 | WEIGHT: 195 LBS | SYSTOLIC BLOOD PRESSURE: 128 MMHG | HEART RATE: 91 BPM

## 2023-12-18 DIAGNOSIS — J45.41 MODERATE PERSISTENT ASTHMA WITH ACUTE EXACERBATION: Primary | ICD-10-CM

## 2023-12-18 DIAGNOSIS — J01.00 ACUTE NON-RECURRENT MAXILLARY SINUSITIS: ICD-10-CM

## 2023-12-18 RX ORDER — PREDNISONE 10 MG/1
TABLET ORAL
Qty: 31 TABLET | Refills: 0 | Status: SHIPPED | OUTPATIENT
Start: 2023-12-18

## 2023-12-18 RX ORDER — BUDESONIDE AND FORMOTEROL FUMARATE DIHYDRATE 160; 4.5 UG/1; UG/1
2 AEROSOL RESPIRATORY (INHALATION) 2 TIMES DAILY
Qty: 10.2 EACH | Refills: 3 | Status: SHIPPED | OUTPATIENT
Start: 2023-12-18

## 2023-12-18 RX ORDER — CEFDINIR 300 MG/1
300 CAPSULE ORAL 2 TIMES DAILY
Qty: 14 CAPSULE | Refills: 0 | Status: SHIPPED | OUTPATIENT
Start: 2023-12-18

## 2023-12-18 NOTE — LETTER
December 18, 2023       No Recipients    Patient: Yanet Clifton   YOB: 1965   Date of Visit: 12/18/2023     Dear Dr. Marie Recipients:    Thank you for referring Yanet Clifton to me for evaluation. Below are the relevant portions of my assessment and plan of care.    If you have questions, please do not hesitate to call me. I look forward to following Yanet along with you.         Sincerely,        Griselda Gore MD        CC:   No Recipients      Progress Notes:  Yanet Clifton is a 58 y.o. female here for evaluation of   Chief Complaint   Patient presents with   • Moderate persistent asthma with acute exacerbation     Follow up       Problem list:  Chronic cough  Moderate persistent asthma  Rheumatoid arthritis on Rituxan  Sjogren syndrome  GERD  Hypertension  Dyslipidemia  Hypothyroid secondary to Hashimoto's  History of aseptic meningitis, autoimmune  History of aseptic meningitis, autoimmune  Vitamin D deficiency  Diverticulosis  Osteoarthritis  Nephrolithiasis  Breast biopsy, 2018, benign  Temporal artery biopsy, left, 2015  Total abdominal hysterectomy with BSO, 2013  Cholecystectomy  Colonoscopy with hyperplastic polyp, hemorrhoids, 2016  Cystoscopy with laser lithotripsy  Tonsillectomy  Allergy to sulfa  No tobacco    History of Present Illness    58-year-old woman, non-smoker with rheumatoid arthritis on Rituxan, presenting with chronic cough productive of green mucoid secretions.  Cough started after she contracted COVID 19 April 2022.  She is vaccinated and she did take an antiviral.  She has had documented wheezing. CT scan of the chest was done and negative for interstitial lung disease or bronchiectasis.  She denies noxious fume exposure.  She does have a history of reflux but is asymptomatic on omeprazole.  She has a history of allergic rhinitis but currently is not having significant drainage.  Chronic cough unchanged with inhalers or nebulizer. She got COVID again 12/5  and took Paxlovid. Now cough productive green sputum. Has green sinus congestion.  Taking robitussin since [place]aNKSGIV[place].  Feels terrible.  Is not having fever.  Has terrible fatigue.  Persistent wheezing.    Review of Systems   Constitutional:  Positive for fatigue.   HENT:  Positive for congestion, sinus pressure and voice change.    Respiratory:  Positive for cough, chest tightness, shortness of breath and wheezing.    Cardiovascular:  Positive for chest pain.   Musculoskeletal:  Positive for arthralgias, neck pain and neck stiffness.   Allergic/Immunologic: Positive for immunocompromised state.         Current Outpatient Medications:   •  albuterol (PROVENTIL) (2.5 MG/3ML) 0.083% nebulizer solution, Take 2.5 mg by nebulization 4 (Four) Times a Day As Needed for Wheezing., Disp: 120 each, Rfl: 5  •  albuterol sulfate HFA (Ventolin HFA) 108 (90 Base) MCG/ACT inhaler, Inhale 2 puffs Every 6 (Six) Hours As Needed for Wheezing or Shortness of Air., Disp: 18 g, Rfl: 0  •  atorvastatin (LIPITOR) 10 MG tablet, Take 1 tablet by mouth Daily., Disp: 90 tablet, Rfl: 3  •  buPROPion XL (WELLBUTRIN XL) 150 MG 24 hr tablet, Take 3 tablets by mouth Daily., Disp: 270 tablet, Rfl: 3  •  caffeine 200 MG tablet, Take  by mouth., Disp: , Rfl:   •  hydroxychloroquine (PLAQUENIL) 200 MG tablet, , Disp: , Rfl:   •  levothyroxine (SYNTHROID, LEVOTHROID) 75 MCG tablet, Take 1 tablet by mouth Every Morning., Disp: 90 tablet, Rfl: 3  •  naproxen sodium (ALEVE) 220 MG tablet, Take 1 tablet by mouth 2 (Two) Times a Day As Needed for Mild Pain., Disp: , Rfl:   •  Nirmatrelvir & Ritonavir, 300mg/100mg, (PAXLOVID) 20 x 150 MG & 10 x 100MG tablet therapy pack tablet, Take 3 tablets by mouth 2 (Two) Times a Day., Disp: 30 tablet, Rfl: 0  •  omeprazole (priLOSEC) 40 MG capsule, Take 1 capsule by mouth Daily., Disp: 90 capsule, Rfl: 3  •  ondansetron ODT (ZOFRAN-ODT) 4 MG disintegrating tablet, Place 1 tablet on the tongue Every 6 (Six) Hours As  Needed for Nausea or Vomiting for up to 15 doses., Disp: 15 tablet, Rfl: 0  •  riTUXimab (RITUXAN) 500 MG/50ML solution injection, Infuse 100 mL into a venous catheter Every 4 (Four) Months., Disp: , Rfl:   •  budesonide-formoterol (SYMBICORT) 160-4.5 MCG/ACT inhaler, Inhale 2 puffs 2 (Two) Times a Day., Disp: 10.2 each, Rfl: 3  •  cefdinir (OMNICEF) 300 MG capsule, Take 1 capsule by mouth 2 (Two) Times a Day., Disp: 14 capsule, Rfl: 0  •  predniSONE (DELTASONE) 10 MG tablet, Take 4 tabs daily x 3 days, then take 3 tabs daily x 3 days, then take 2 tabs daily x 3 days, then take 1 tab daily x 3 days, Disp: 31 tablet, Rfl: 0    Past Medical History:   Diagnosis Date   • Aseptic meningitis 12/2009   • Encephalitis 2014   • HL (hearing loss) 2009    After each bout of meningitis   • Hx of bone density study 02/08/2019    nl DEXA (2/8/19): L0.2, H -0.4, repeat 3-4 yrs   • Hx of bone density study 04/04/2023    DEXA (4/4/23): L/H -0.3, repeat 3 yrs   • Hx of chest x-ray 09/20/2022    nl CXR (9/20/22)   • Hx of colonoscopy 05/24/2016    colonosc (5/24/16): hyperplastic polyp, ext/int hemorrhoids, diverticulosis, repeat 5 yrs; GI - Dr. Robertson   • Hx of colonoscopy 08/16/2021    nl colonosc (8/16/21) except mild sigmoid diverticulosis, repeat 5 yrs; CRS - Dr. Berrios   • Hx of CT scan of abd/pelvis 10/03/2020    CT abd/pelvis (10/3/20, [facility] ER): fatty infiltration of liver, prior priya, no masses, free fluid, or adenopathy   • Hx of CT scan of chest 07/13/2023    nl CT chest (7/13/23)   • Hx of CT scan of head 10/18/2018    nl noncontrast head CT (10/18/18); ER   • Hx of CT scan of head 04/14/2019    nl head CT (4/14/19); [facility] ER   • Hx of EGD 05/24/2016    EGD (5/24/16): gastritis, reflux esophagitis, neg for H.pylori; GI - Dr. Robertson   • Hx of nl SPEP 12/18/2019   • Hx of PFTs 08/15/2023    PFTs/pulm-/ Sofía - FVC 2.65 L, 76%, FEV1 1.85 L, 68%, ratio 70%, total lung capacity 4.79 L, 94%, diffusion capacity 17, 71%   •  Hx of PFTs 09/06/2023    nl PFTs (9/6/23): no obstruction; CHERYL Galindo   • Nephrolithiasis    • PE (physical exam), routine 11/22/2016     Past Surgical History:   Procedure Laterality Date   • BREAST BIOPSY Left 02/09/2018    u/s guided axillary lymph node   • BREAST EXCISIONAL BIOPSY Left 1984   • CHOLECYSTECTOMY  2001    secondary to cholelithiasis   • COLONOSCOPY     • CYSTOSCOPY W/ LASER LITHOTRIPSY     • ENDOSCOPY     • LUMBAR PUNCTURE  04/2019    hospitalized at [facility]; ? viral meningitis   • MAMMO SKIN LESION BIOPSY SINGLE LESION UNILATERAL Left 01/30/2017    s/p L. breast skin bx (1/30/17); nonspecific chronic perivascular dermatitis   • MAMMO US BREAST BIOPSY 1ST W WO DEVICE UNILATERAL Left 02/09/2018    s/p u/s-guided L. breast axillary lymph node bx (2/9/18): benign reactive LN, repeat dx mammo in 6 mos; rads - Dr. Bailey   • TEMPORAL ARTERY BIOPSY Left 11/05/2015    neg for arteritis; surg - Dr. Cunha   • TONSILLECTOMY     • TOTAL ABDOMINAL HYSTERECTOMY WITH SALPINGO OOPHORECTOMY  03/2013    secondary to fibroids/endometriosis (3/13); GYN - Dr. Naik     Social History     Socioeconomic History   • Marital status:    • Number of children: 2   Tobacco Use   • Smoking status: Never     Passive exposure: Never   • Smokeless tobacco: Never   Vaping Use   • Vaping Use: Never used   Substance and Sexual Activity   • Alcohol use: Yes     Alcohol/week: 0.0 standard drinks of alcohol     Comment: Very rarely   • Drug use: No   • Sexual activity: Yes     Partners: Male     Birth control/protection: Post-menopausal     Family History   Problem Relation Age of Onset   • Fibromyalgia Mother    • Migraines Mother    • Depression Mother    • Hypertension Sister    • Other Sister         loss of hair from head   • Migraines Sister    • Asthma Sister    • Breast cancer Maternal Grandmother 60   • Cancer Maternal Grandmother         Breast Cancer   • Multiple myeloma Paternal Grandmother    • Cancer  "Paternal Grandmother         Multiple Myeloma   • Multiple myeloma Paternal Uncle    • Thyroid disease Neg Hx    • Diabetes Neg Hx    • BRCA 1/2 Neg Hx    • Colon cancer Neg Hx    • Endometrial cancer Neg Hx    • Ovarian cancer Neg Hx      Blood pressure 128/76, pulse 91, temperature 97.3 °F (36.3 °C), temperature source Infrared, height 167.6 cm (65.98\"), weight 88.5 kg (195 lb), SpO2 97%, not currently breastfeeding.    Physical Exam  Constitutional:       Appearance: She is ill-appearing.   HENT:      Head: Normocephalic and atraumatic.      Nose: Congestion present.      Mouth/Throat:      Pharynx: Oropharynx is clear.   Eyes:      Conjunctiva/sclera: Conjunctivae normal.   Cardiovascular:      Rate and Rhythm: Normal rate and regular rhythm.      Heart sounds: Normal heart sounds.   Pulmonary:      Breath sounds: Rhonchi present. No wheezing.   Abdominal:      Palpations: Abdomen is soft.   Musculoskeletal:      Right lower leg: No edema.      Left lower leg: No edema.   Skin:     General: Skin is warm and dry.   Neurological:      Mental Status: She is oriented to person, place, and time.           PFTs:    December 18, 2023, FVC 2.41 L, 76%, FEV1 1.93 L, 80% ratio 80%, total lung capacity 3.69 L, 71%, diffusion 59, 52%, no obstruction, mild restriction, moderate diffusion impairment    August 15, 2023, FVC 2.65 L, 76%, FEV1 1.85 L, 68%, ratio 70%, total lung capacity 4.79 L, 94%, diffusion capacity 17, 71%      Radiology:    Findings:  The central tracheobronchial tree is clear. The lungs are clear. There is no pleural effusion.     The heart size is normal, with evidence of mild calcified coronary artery disease. The great vessels are normal in caliber. No abnormally enlarged lymph nodes are identified.     Partial evaluation of the upper abdomen demonstrates change of cholecystectomy.     No aggressive osseous lesions are identified.     IMPRESSION:  Impression:  No acute cardiopulmonary process.         "   Electronically Signed: Merrill Hull    7/14/2023 12:41 PM EDT     She also has significant calcified lymphadenopathy    Lab:    Diagnoses and all orders for this visit:    1. Moderate persistent asthma with acute exacerbation (Primary)    2. Acute non-recurrent maxillary sinusitis    Other orders  -     budesonide-formoterol (SYMBICORT) 160-4.5 MCG/ACT inhaler; Inhale 2 puffs 2 (Two) Times a Day.  Dispense: 10.2 each; Refill: 3  -     cefdinir (OMNICEF) 300 MG capsule; Take 1 capsule by mouth 2 (Two) Times a Day.  Dispense: 14 capsule; Refill: 0  -     predniSONE (DELTASONE) 10 MG tablet; Take 4 tabs daily x 3 days, then take 3 tabs daily x 3 days, then take 2 tabs daily x 3 days, then take 1 tab daily x 3 days  Dispense: 31 tablet; Refill: 0          Discussion:     58-year-old woman, non-smoker, initially evaluated for a chronic cough and some wheezing that I thought was cough variant asthma.  It seemed to be participated by COVID infection.  CT scan showed no bronchiectasis or interstitial lung disease.  She was on a nebulizer, fluticasone salmeterol inhaler 1 puff daily.  Her chronic cough had persisted.  She then got a second COVID infection recently December 5 and cough, wheezing, fatigue all worse.  She did take Paxlovid.  Clinically I suspect she has a sinus infection.  Her wheezing seems to be more laryngeal as I do not hear wheezing deep in her chest and her FEV1 is not obstructed today.    Sinus irrigation  Cefdnir 300 mg twice daily for 7 days  Prednisone taper  Change Breo to Symbicort  Instruct on spacer use  Use her nebulizer at least twice daily to loosen secretions  Follow-up in 4 weeks with a checkup          Griselda Gore MD

## 2023-12-18 NOTE — PROGRESS NOTES
Yanet Clifton is a 58 y.o. female here for evaluation of   Chief Complaint   Patient presents with    Moderate persistent asthma with acute exacerbation     Follow up       Problem list:  Chronic cough  Moderate persistent asthma  Rheumatoid arthritis on Rituxan  Sjogren syndrome  GERD  Hypertension  Dyslipidemia  Hypothyroid secondary to Hashimoto's  History of aseptic meningitis, autoimmune  History of aseptic meningitis, autoimmune  Vitamin D deficiency  Diverticulosis  Osteoarthritis  Nephrolithiasis  Breast biopsy, 2018, benign  Temporal artery biopsy, left, 2015  Total abdominal hysterectomy with BSO, 2013  Cholecystectomy  Colonoscopy with hyperplastic polyp, hemorrhoids, 2016  Cystoscopy with laser lithotripsy  Tonsillectomy  Allergy to sulfa  No tobacco    History of Present Illness    58-year-old woman, non-smoker with rheumatoid arthritis on Rituxan, presenting with chronic cough productive of green mucoid secretions.  Cough started after she contracted COVID 19 April 2022.  She is vaccinated and she did take an antiviral.  She has had documented wheezing. CT scan of the chest was done and negative for interstitial lung disease or bronchiectasis.  She denies noxious fume exposure.  She does have a history of reflux but is asymptomatic on omeprazole.  She has a history of allergic rhinitis but currently is not having significant drainage.  Chronic cough unchanged with inhalers or nebulizer. She got COVID again 12/5 and took Paxlovid. Now cough productive green sputum. Has green sinus congestion.  Taking robitussin since [place].  Feels terrible.  Is not having fever.  Has terrible fatigue.  Persistent wheezing.    Review of Systems   Constitutional:  Positive for fatigue.   HENT:  Positive for congestion, sinus pressure and voice change.    Respiratory:  Positive for cough, chest tightness, shortness of breath and wheezing.    Cardiovascular:  Positive for chest pain.   Musculoskeletal:  Positive  for arthralgias, neck pain and neck stiffness.   Allergic/Immunologic: Positive for immunocompromised state.         Current Outpatient Medications:     albuterol (PROVENTIL) (2.5 MG/3ML) 0.083% nebulizer solution, Take 2.5 mg by nebulization 4 (Four) Times a Day As Needed for Wheezing., Disp: 120 each, Rfl: 5    albuterol sulfate HFA (Ventolin HFA) 108 (90 Base) MCG/ACT inhaler, Inhale 2 puffs Every 6 (Six) Hours As Needed for Wheezing or Shortness of Air., Disp: 18 g, Rfl: 0    atorvastatin (LIPITOR) 10 MG tablet, Take 1 tablet by mouth Daily., Disp: 90 tablet, Rfl: 3    buPROPion XL (WELLBUTRIN XL) 150 MG 24 hr tablet, Take 3 tablets by mouth Daily., Disp: 270 tablet, Rfl: 3    caffeine 200 MG tablet, Take  by mouth., Disp: , Rfl:     hydroxychloroquine (PLAQUENIL) 200 MG tablet, , Disp: , Rfl:     levothyroxine (SYNTHROID, LEVOTHROID) 75 MCG tablet, Take 1 tablet by mouth Every Morning., Disp: 90 tablet, Rfl: 3    naproxen sodium (ALEVE) 220 MG tablet, Take 1 tablet by mouth 2 (Two) Times a Day As Needed for Mild Pain., Disp: , Rfl:     Nirmatrelvir & Ritonavir, 300mg/100mg, (PAXLOVID) 20 x 150 MG & 10 x 100MG tablet therapy pack tablet, Take 3 tablets by mouth 2 (Two) Times a Day., Disp: 30 tablet, Rfl: 0    omeprazole (priLOSEC) 40 MG capsule, Take 1 capsule by mouth Daily., Disp: 90 capsule, Rfl: 3    ondansetron ODT (ZOFRAN-ODT) 4 MG disintegrating tablet, Place 1 tablet on the tongue Every 6 (Six) Hours As Needed for Nausea or Vomiting for up to 15 doses., Disp: 15 tablet, Rfl: 0    riTUXimab (RITUXAN) 500 MG/50ML solution injection, Infuse 100 mL into a venous catheter Every 4 (Four) Months., Disp: , Rfl:     budesonide-formoterol (SYMBICORT) 160-4.5 MCG/ACT inhaler, Inhale 2 puffs 2 (Two) Times a Day., Disp: 10.2 each, Rfl: 3    cefdinir (OMNICEF) 300 MG capsule, Take 1 capsule by mouth 2 (Two) Times a Day., Disp: 14 capsule, Rfl: 0    predniSONE (DELTASONE) 10 MG tablet, Take 4 tabs daily x 3 days, then  take 3 tabs daily x 3 days, then take 2 tabs daily x 3 days, then take 1 tab daily x 3 days, Disp: 31 tablet, Rfl: 0    Past Medical History:   Diagnosis Date    Aseptic meningitis 12/2009    Encephalitis 2014    HL (hearing loss) 2009    After each bout of meningitis    Hx of bone density study 02/08/2019    nl DEXA (2/8/19): L0.2, H -0.4, repeat 3-4 yrs    Hx of bone density study 04/04/2023    DEXA (4/4/23): L/H -0.3, repeat 3 yrs    Hx of chest x-ray 09/20/2022    nl CXR (9/20/22)    Hx of colonoscopy 05/24/2016    colonosc (5/24/16): hyperplastic polyp, ext/int hemorrhoids, diverticulosis, repeat 5 yrs; GI - Dr. Robertson    Hx of colonoscopy 08/16/2021    nl colonosc (8/16/21) except mild sigmoid diverticulosis, repeat 5 yrs; CRS - Dr. Berrios    Hx of CT scan of abd/pelvis 10/03/2020    CT abd/pelvis (10/3/20, [facility] ER): fatty infiltration of liver, prior priya, no masses, free fluid, or adenopathy    Hx of CT scan of chest 07/13/2023    nl CT chest (7/13/23)    Hx of CT scan of head 10/18/2018    nl noncontrast head CT (10/18/18); ER    Hx of CT scan of head 04/14/2019    nl head CT (4/14/19); [facility] ER    Hx of EGD 05/24/2016    EGD (5/24/16): gastritis, reflux esophagitis, neg for H.pylori; GI - Dr. Robertson    Hx of nl SPEP 12/18/2019    Hx of PFTs 08/15/2023    PFTs/pulm-/ [facility] - FVC 2.65 L, 76%, FEV1 1.85 L, 68%, ratio 70%, total lung capacity 4.79 L, 94%, diffusion capacity 17, 71%    Hx of PFTs 09/06/2023    nl PFTs (9/6/23): no obstruction; pulm - CHERYL Tatum    Nephrolithiasis     PE (physical exam), routine 11/22/2016     Past Surgical History:   Procedure Laterality Date    BREAST BIOPSY Left 02/09/2018    u/s guided axillary lymph node    BREAST EXCISIONAL BIOPSY Left 1984    CHOLECYSTECTOMY  2001    secondary to cholelithiasis    COLONOSCOPY      CYSTOSCOPY W/ LASER LITHOTRIPSY      ENDOSCOPY      LUMBAR PUNCTURE  04/2019    hospitalized at [facility]; ? viral meningitis    MAMMO SKIN LESION BIOPSY  "SINGLE LESION UNILATERAL Left 01/30/2017    s/p L. breast skin bx (1/30/17); nonspecific chronic perivascular dermatitis    MAMMO US BREAST BIOPSY 1ST W WO DEVICE UNILATERAL Left 02/09/2018    s/p u/s-guided L. breast axillary lymph node bx (2/9/18): benign reactive LN, repeat dx mammo in 6 mos; rads - Dr. Bailey    TEMPORAL ARTERY BIOPSY Left 11/05/2015    neg for arteritis; surg - Dr. Cunha    TONSILLECTOMY      TOTAL ABDOMINAL HYSTERECTOMY WITH SALPINGO OOPHORECTOMY  03/2013    secondary to fibroids/endometriosis (3/13); GYN - Dr. Naik     Social History     Socioeconomic History    Marital status:     Number of children: 2   Tobacco Use    Smoking status: Never     Passive exposure: Never    Smokeless tobacco: Never   Vaping Use    Vaping Use: Never used   Substance and Sexual Activity    Alcohol use: Yes     Alcohol/week: 0.0 standard drinks of alcohol     Comment: Very rarely    Drug use: No    Sexual activity: Yes     Partners: Male     Birth control/protection: Post-menopausal     Family History   Problem Relation Age of Onset    Fibromyalgia Mother     Migraines Mother     Depression Mother     Hypertension Sister     Other Sister         loss of hair from head    Migraines Sister     Asthma Sister     Breast cancer Maternal Grandmother 60    Cancer Maternal Grandmother         Breast Cancer    Multiple myeloma Paternal Grandmother     Cancer Paternal Grandmother         Multiple Myeloma    Multiple myeloma Paternal Uncle     Thyroid disease Neg Hx     Diabetes Neg Hx     BRCA 1/2 Neg Hx     Colon cancer Neg Hx     Endometrial cancer Neg Hx     Ovarian cancer Neg Hx      Blood pressure 128/76, pulse 91, temperature 97.3 °F (36.3 °C), temperature source Infrared, height 167.6 cm (65.98\"), weight 88.5 kg (195 lb), SpO2 97%, not currently breastfeeding.    Physical Exam  Constitutional:       Appearance: She is ill-appearing.   HENT:      Head: Normocephalic and atraumatic.      Nose: Congestion " present.      Mouth/Throat:      Pharynx: Oropharynx is clear.   Eyes:      Conjunctiva/sclera: Conjunctivae normal.   Cardiovascular:      Rate and Rhythm: Normal rate and regular rhythm.      Heart sounds: Normal heart sounds.   Pulmonary:      Breath sounds: Rhonchi present. No wheezing.   Abdominal:      Palpations: Abdomen is soft.   Musculoskeletal:      Right lower leg: No edema.      Left lower leg: No edema.   Skin:     General: Skin is warm and dry.   Neurological:      Mental Status: She is oriented to person, place, and time.           PFTs:    December 18, 2023, FVC 2.41 L, 76%, FEV1 1.93 L, 80% ratio 80%, total lung capacity 3.69 L, 71%, diffusion 59, 52%, no obstruction, mild restriction, moderate diffusion impairment    August 15, 2023, FVC 2.65 L, 76%, FEV1 1.85 L, 68%, ratio 70%, total lung capacity 4.79 L, 94%, diffusion capacity 17, 71%      Radiology:    Findings:  The central tracheobronchial tree is clear. The lungs are clear. There is no pleural effusion.     The heart size is normal, with evidence of mild calcified coronary artery disease. The great vessels are normal in caliber. No abnormally enlarged lymph nodes are identified.     Partial evaluation of the upper abdomen demonstrates change of cholecystectomy.     No aggressive osseous lesions are identified.     IMPRESSION:  Impression:  No acute cardiopulmonary process.           Electronically Signed: Merrill Hull    7/14/2023 12:41 PM EDT     She also has significant calcified lymphadenopathy    Lab:    Diagnoses and all orders for this visit:    1. Moderate persistent asthma with acute exacerbation (Primary)    2. Acute non-recurrent maxillary sinusitis    Other orders  -     budesonide-formoterol (SYMBICORT) 160-4.5 MCG/ACT inhaler; Inhale 2 puffs 2 (Two) Times a Day.  Dispense: 10.2 each; Refill: 3  -     cefdinir (OMNICEF) 300 MG capsule; Take 1 capsule by mouth 2 (Two) Times a Day.  Dispense: 14 capsule; Refill: 0  -      predniSONE (DELTASONE) 10 MG tablet; Take 4 tabs daily x 3 days, then take 3 tabs daily x 3 days, then take 2 tabs daily x 3 days, then take 1 tab daily x 3 days  Dispense: 31 tablet; Refill: 0          Discussion:     58-year-old woman, non-smoker, initially evaluated for a chronic cough and some wheezing that I thought was cough variant asthma.  It seemed to be participated by COVID infection.  CT scan showed no bronchiectasis or interstitial lung disease.  She was on a nebulizer, fluticasone salmeterol inhaler 1 puff daily.  Her chronic cough had persisted.  She then got a second COVID infection recently December 5 and cough, wheezing, fatigue all worse.  She did take Paxlovid.  Clinically I suspect she has a sinus infection.  Her wheezing seems to be more laryngeal as I do not hear wheezing deep in her chest and her FEV1 is not obstructed today.    Sinus irrigation  Cefdnir 300 mg twice daily for 7 days  Prednisone taper  Change Breo to Symbicort  Instruct on spacer use  Use her nebulizer at least twice daily to loosen secretions  Follow-up in 4 weeks with a checkup          Griselda Gore MD

## 2024-01-16 ENCOUNTER — OFFICE VISIT (OUTPATIENT)
Dept: PULMONOLOGY | Facility: CLINIC | Age: 59
End: 2024-01-16
Payer: COMMERCIAL

## 2024-01-16 VITALS
WEIGHT: 200.1 LBS | OXYGEN SATURATION: 99 % | SYSTOLIC BLOOD PRESSURE: 140 MMHG | DIASTOLIC BLOOD PRESSURE: 86 MMHG | HEIGHT: 66 IN | HEART RATE: 78 BPM | TEMPERATURE: 98 F | BODY MASS INDEX: 32.16 KG/M2

## 2024-01-16 DIAGNOSIS — J30.9 ALLERGIC RHINITIS, UNSPECIFIED SEASONALITY, UNSPECIFIED TRIGGER: Chronic | ICD-10-CM

## 2024-01-16 DIAGNOSIS — Z23 IMMUNIZATION DUE: Primary | ICD-10-CM

## 2024-01-16 DIAGNOSIS — J45.41 MODERATE PERSISTENT ASTHMA WITH ACUTE EXACERBATION: ICD-10-CM

## 2024-01-16 PROBLEM — E66.3 OVERWEIGHT (BMI 25.0-29.9): Chronic | Status: ACTIVE | Noted: 2021-04-15

## 2024-01-16 NOTE — PROGRESS NOTES
[facility] Pulmonary Follow up    CHIEF COMPLAINT    No complaints    HISTORY OF PRESENT ILLNESS    Yanet Clifton is a 58 y.o.female here today for follow-up.  She was last seen in the office by Dr. Gore a month ago.  She had been diagnosed with COVID on December 5 and was given Paxlovid when she was seen in the office she had been wheezing and coughing up green secretions.  She was given a round of cefdinir and was advised to follow-up in 1 month.    She had been on Breo at her last appointment but was switched to Symbicort.  She has not used her inhalers since her last appointment.  She has not used her albuterol as well.    She states that her cough has completely resolved.  She denies any sputum production or hemoptysis.    She continues to follow closely with rheumatology for her RA and has a follow-up in a couple of months.  She is currently on Plaquenil and Rituxan.    She denies chest pain or palpitations.  She denies any lower extremity edema or calf tenderness.    She has not noticed any recent reflux symptoms.  She does take omeprazole daily.    She is receiving her influenza vaccination today.  Patient Active Problem List   Diagnosis    Rash of entire body    Allergic rhinitis    Anemia    Moderate persistent asthma with acute exacerbation    Constipation    Depression    Dermatitis    Diarrhea    Hypertension    Elevated LFTs    Fibrocystic breast changes    Gastroesophageal reflux disease    Hyperlipidemia    Hypothyroidism    Insomnia    Headache    Meningitis    Fatigue    Common migraine with intractable migraine    Nausea and vomiting    Papules    Rheumatoid arthritis    Sjogren's syndrome    Hyperhidrosis    Vitamin D deficiency    PE (physical exam), routine    Menopause    Diverticulosis of colon    Hemorrhoids    Mallet finger of right finger(s)    Impaired fasting glucose    Mid back pain    Overweight (BMI 25.0-29.9)    Osteoarthritis of both feet    Osteoarthritis of left hip     COVID-19 virus infection    Chronic cough    Acute non-recurrent maxillary sinusitis       Allergies   Allergen Reactions    Adalimumab      Other reaction(s): Headache (Humira)    Duloxetine Hcl Other (See Comments)     sexual dysfunction    Escitalopram Oxalate Other (See Comments)     Weight gain    Macrobid [Nitrofurantoin Macrocrystal] Nausea Only    Cimzia [Certolizumab Pegol] Rash    Sulfa Antibiotics Rash       Current Outpatient Medications:     albuterol (PROVENTIL) (2.5 MG/3ML) 0.083% nebulizer solution, Take 2.5 mg by nebulization 4 (Four) Times a Day As Needed for Wheezing., Disp: 120 each, Rfl: 5    albuterol sulfate HFA (Ventolin HFA) 108 (90 Base) MCG/ACT inhaler, Inhale 2 puffs Every 6 (Six) Hours As Needed for Wheezing or Shortness of Air., Disp: 18 g, Rfl: 0    atorvastatin (LIPITOR) 10 MG tablet, Take 1 tablet by mouth Daily., Disp: 90 tablet, Rfl: 3    budesonide-formoterol (SYMBICORT) 160-4.5 MCG/ACT inhaler, Inhale 2 puffs 2 (Two) Times a Day., Disp: 10.2 each, Rfl: 3    buPROPion XL (WELLBUTRIN XL) 150 MG 24 hr tablet, Take 3 tablets by mouth Daily., Disp: 270 tablet, Rfl: 3    caffeine 200 MG tablet, Take  by mouth., Disp: , Rfl:     hydroxychloroquine (PLAQUENIL) 200 MG tablet, , Disp: , Rfl:     levothyroxine (SYNTHROID, LEVOTHROID) 75 MCG tablet, Take 1 tablet by mouth Every Morning., Disp: 90 tablet, Rfl: 3    naproxen sodium (ALEVE) 220 MG tablet, Take 1 tablet by mouth 2 (Two) Times a Day As Needed for Mild Pain., Disp: , Rfl:     Nirmatrelvir & Ritonavir, 300mg/100mg, (PAXLOVID) 20 x 150 MG & 10 x 100MG tablet therapy pack tablet, Take 3 tablets by mouth 2 (Two) Times a Day., Disp: 30 tablet, Rfl: 0    omeprazole (priLOSEC) 40 MG capsule, Take 1 capsule by mouth Daily., Disp: 90 capsule, Rfl: 3    ondansetron ODT (ZOFRAN-ODT) 4 MG disintegrating tablet, Place 1 tablet on the tongue Every 6 (Six) Hours As Needed for Nausea or Vomiting for up to 15 doses., Disp: 15 tablet, Rfl: 0     "predniSONE (DELTASONE) 10 MG tablet, Take 4 tabs daily x 3 days, then take 3 tabs daily x 3 days, then take 2 tabs daily x 3 days, then take 1 tab daily x 3 days, Disp: 31 tablet, Rfl: 0    riTUXimab (RITUXAN) 500 MG/50ML solution injection, Infuse 100 mL into a venous catheter Every 4 (Four) Months., Disp: , Rfl:   MEDICATION LIST AND ALLERGIES REVIEWED.    Social History     Tobacco Use    Smoking status: Never     Passive exposure: Never    Smokeless tobacco: Never   Vaping Use    Vaping Use: Never used   Substance Use Topics    Alcohol use: Yes     Alcohol/week: 0.0 standard drinks of alcohol     Comment: Very rarely    Drug use: No       FAMILY AND SOCIAL HISTORY REVIEWED.    Review of Systems   Constitutional:  Negative for activity change, appetite change, fatigue, fever and unexpected weight change.   HENT:  Negative for congestion, postnasal drip, rhinorrhea, sinus pressure, sore throat and voice change.    Eyes:  Negative for visual disturbance.   Respiratory:  Negative for cough, chest tightness, shortness of breath and wheezing.    Cardiovascular:  Negative for chest pain, palpitations and leg swelling.   Gastrointestinal:  Negative for abdominal distention, abdominal pain, nausea and vomiting.   Endocrine: Negative for cold intolerance and heat intolerance.   Genitourinary:  Negative for difficulty urinating and urgency.   Musculoskeletal:  Negative for arthralgias, back pain and neck pain.   Skin:  Negative for color change and pallor.   Allergic/Immunologic: Negative for environmental allergies and food allergies.   Neurological:  Negative for dizziness, syncope, weakness and light-headedness.   Hematological:  Negative for adenopathy. Does not bruise/bleed easily.   Psychiatric/Behavioral:  Negative for agitation and behavioral problems.    .    /86 (BP Location: Left arm, Patient Position: Sitting, Cuff Size: Adult)   Pulse 78   Temp 98 °F (36.7 °C)   Ht 167.6 cm (65.98\")   Wt 90.8 kg (200 " lb 1.6 oz)   SpO2 99% Comment: Room air at rest  BMI 32.32 kg/m²     Immunization History   Administered Date(s) Administered    COVID-19 (PFIZER) BIVALENT 12+YRS 02/07/2023    COVID-19 (PFIZER) Purple Cap Monovalent 02/25/2021, 03/19/2021, 08/19/2021    FluMist 2-49yrs 09/19/2014    Fluzone (or Fluarix & Flulaval for VFC) >6mos 12/15/2020, 09/10/2021, 09/19/2022, 01/16/2024    Fluzone Quad >6mos (Multi-dose) 11/22/2016, 11/28/2017    Hepatitis A 11/29/2018, 12/03/2019    Influenza, Unspecified 11/01/2018, 09/19/2022    PPD Test 02/26/2008    Pneumococcal Conjugate 13-Valent (PCV13) 09/29/2015    Pneumococcal Polysaccharide (PPSV23) 09/23/2008, 11/21/2011, 11/22/2016    Pneumococcal, Unspecified 01/01/2017    TD Preservative Free (Tenivac) 12/15/2020    Tdap 02/03/2011    flucelvax quad pfs =>4 YRS 11/29/2018, 12/03/2019       Physical Exam  Vitals and nursing note reviewed.   Constitutional:       Appearance: She is well-developed. She is not diaphoretic.   HENT:      Head: Normocephalic and atraumatic.   Eyes:      Pupils: Pupils are equal, round, and reactive to light.   Neck:      Thyroid: No thyromegaly.   Cardiovascular:      Rate and Rhythm: Normal rate and regular rhythm.      Heart sounds: Normal heart sounds. No murmur heard.     No friction rub. No gallop.   Pulmonary:      Effort: Pulmonary effort is normal. No respiratory distress.      Breath sounds: Normal breath sounds. No wheezing or rales.   Chest:      Chest wall: No tenderness.   Abdominal:      General: Bowel sounds are normal.      Palpations: Abdomen is soft.      Tenderness: There is no abdominal tenderness.   Musculoskeletal:         General: No swelling. Normal range of motion.      Cervical back: Normal range of motion and neck supple.   Lymphadenopathy:      Cervical: No cervical adenopathy.   Skin:     General: Skin is warm and dry.      Capillary Refill: Capillary refill takes less than 2 seconds.   Neurological:      Mental Status:  "She is alert and oriented to person, place, and time.   Psychiatric:         Mood and Affect: Mood normal.         Behavior: Behavior normal.           RESULTS    PFTS in the office today, read by me, FVC 3.00 86% predicted, FEV1 2.11 78% predicted, FEV1/FVC 70% predicted, TLC 4.83 95% predicted, DLCO 74% predicted, mild obstruction, no restriction and reduced DLCO.    PROBLEM LIST    Problem List Items Addressed This Visit          Allergies and Adverse Reactions    Allergic rhinitis (Chronic)    Overview     Impression: 05/23/2014 - rec resuming nasal steroid spray to get some anti-inflammatory effect; also consider that it would improve his nose lesions (\"ulcer\");             Pulmonary and Pneumonias    Moderate persistent asthma with acute exacerbation    Overview     12/18/23 pulm/Dr. Gore - cough variant asthma, exac'd by asthma, recurrent recent COVID19 infxn 12/5/23; change Breo to symbicort 160/4.5 2 puffs BID; RX cefdinir and pred for sinusitis; f/u 4 wks; 8/15/23 pulm/Dr. Gore - poss cough variant asthma with chronic cough productive of mucoid secretions, also with PND, resume fluticasone/salmeterol 1 puff QD; RX home nebs, RX keflex x 7d and prednisone x 3 wks; f/u in 3wks; PFTs - FVC 2.65 L, 76%, FEV1 1.85 L, 68%, ratio 70%, total lung capacity 4.79 L, 94%, diffusion capacity 17, 71%           Other Visit Diagnoses       Immunization due    -  Primary    Relevant Orders    Fluzone >6 Months (9615-3119) (Completed)              DISCUSSION    Ms. Clifton was here for a follow-up.  She seems to have completely resolved since having the COVID a month ago.  She has not been using her Symbicort or her albuterol.  She felt like she has not needed it.    I did encourage her to resume the Symbicort or Breo which ever when she preferred if she were to start developing coughing or wheezing.  I also advised her she could use her albuterol as needed for shortness of breath or wheezing.    She will " continue to take over-the-counter medication as needed for her allergies.    She will receive her influenza vaccination today.    She has a follow-up in March and she may keep this appointment or she can push it out a little further if she would like.    I personally spent a total of 31 minutes on patient visit today including chart review, face to face with the patient obtaining the history and physical exam, review of pertinent images and tests, counseling and discussion and/or coordination of care as described above, and documentation.  Total time excludes time spent on other separate services such as performing procedures or test interpretation, if applicable.        Clau Johnson, APRN  01/16/202409:53 EST  Electronically signed     Please note that portions of this note were completed with a voice recognition program.        CC: Brie Jalloh MD

## 2024-01-18 ENCOUNTER — TELEPHONE (OUTPATIENT)
Dept: INTERNAL MEDICINE | Facility: CLINIC | Age: 59
End: 2024-01-18
Payer: COMMERCIAL

## 2024-01-18 NOTE — TELEPHONE ENCOUNTER
----- Message from Brie Jalloh MD sent at 1/16/2024  9:18 PM EST -----  Regarding: COVID19 vacc  Received pulmonary office note today - I reviewed and am glad that she got the flu vaccine.  Also recommend COVID19 vaccine in the next few weeks. I am aware that she had COVID19 infection 12/5/23 and took Paxlovid. Still recommend that she proceed with COVID19 vaccine if she is willing.  ----- Message -----  From: Lupe Gonzalez, KADE  Sent: 1/16/2024  10:47 AM EST  To: Brie Jalloh MD

## 2024-02-09 DIAGNOSIS — Z00.00 PE (PHYSICAL EXAM), ROUTINE: Primary | Chronic | ICD-10-CM

## 2024-02-09 DIAGNOSIS — E55.9 VITAMIN D DEFICIENCY: Chronic | ICD-10-CM

## 2024-02-09 DIAGNOSIS — R73.01 IMPAIRED FASTING GLUCOSE: Chronic | ICD-10-CM

## 2024-02-09 DIAGNOSIS — E78.2 MIXED HYPERLIPIDEMIA: Chronic | ICD-10-CM

## 2024-02-09 DIAGNOSIS — E03.9 ACQUIRED HYPOTHYROIDISM: Chronic | ICD-10-CM

## 2024-02-13 ENCOUNTER — OFFICE VISIT (OUTPATIENT)
Dept: INTERNAL MEDICINE | Facility: CLINIC | Age: 59
End: 2024-02-13
Payer: COMMERCIAL

## 2024-02-13 VITALS
WEIGHT: 198 LBS | RESPIRATION RATE: 16 BRPM | SYSTOLIC BLOOD PRESSURE: 150 MMHG | OXYGEN SATURATION: 97 % | BODY MASS INDEX: 31.82 KG/M2 | HEIGHT: 66 IN | DIASTOLIC BLOOD PRESSURE: 98 MMHG | HEART RATE: 88 BPM | TEMPERATURE: 97.4 F

## 2024-02-13 DIAGNOSIS — J01.00 ACUTE NON-RECURRENT MAXILLARY SINUSITIS: Primary | ICD-10-CM

## 2024-02-13 DIAGNOSIS — R03.0 ELEVATED BLOOD PRESSURE READING: ICD-10-CM

## 2024-02-13 DIAGNOSIS — R05.1 ACUTE COUGH: ICD-10-CM

## 2024-02-13 PROCEDURE — 99214 OFFICE O/P EST MOD 30 MIN: CPT | Performed by: NURSE PRACTITIONER

## 2024-02-13 PROCEDURE — 87428 SARSCOV & INF VIR A&B AG IA: CPT | Performed by: NURSE PRACTITIONER

## 2024-02-13 RX ORDER — PREDNISONE 10 MG/1
TABLET ORAL
Qty: 30 TABLET | Refills: 0 | Status: SHIPPED | OUTPATIENT
Start: 2024-02-13 | End: 2024-02-25

## 2024-02-13 RX ORDER — BENZONATATE 200 MG/1
200 CAPSULE ORAL 3 TIMES DAILY PRN
Qty: 20 CAPSULE | Refills: 0 | Status: SHIPPED | OUTPATIENT
Start: 2024-02-13 | End: 2024-02-20

## 2024-02-13 RX ORDER — AMOXICILLIN AND CLAVULANATE POTASSIUM 875; 125 MG/1; MG/1
1 TABLET, FILM COATED ORAL 2 TIMES DAILY
Qty: 14 TABLET | Refills: 0 | Status: SHIPPED | OUTPATIENT
Start: 2024-02-13 | End: 2024-02-20

## 2024-02-18 DIAGNOSIS — E78.2 MIXED HYPERLIPIDEMIA: Chronic | ICD-10-CM

## 2024-02-18 DIAGNOSIS — K21.9 GASTROESOPHAGEAL REFLUX DISEASE: ICD-10-CM

## 2024-02-18 DIAGNOSIS — F33.1 MODERATE EPISODE OF RECURRENT MAJOR DEPRESSIVE DISORDER: ICD-10-CM

## 2024-02-19 DIAGNOSIS — E78.2 MIXED HYPERLIPIDEMIA: Chronic | ICD-10-CM

## 2024-02-19 DIAGNOSIS — K21.9 GASTROESOPHAGEAL REFLUX DISEASE: ICD-10-CM

## 2024-02-19 RX ORDER — OMEPRAZOLE 40 MG/1
40 CAPSULE, DELAYED RELEASE ORAL DAILY
Qty: 34 CAPSULE | Refills: 0 | Status: SHIPPED | OUTPATIENT
Start: 2024-02-19

## 2024-02-19 RX ORDER — ATORVASTATIN CALCIUM 10 MG/1
10 TABLET, FILM COATED ORAL DAILY
Qty: 90 TABLET | OUTPATIENT
Start: 2024-02-19

## 2024-02-19 RX ORDER — ATORVASTATIN CALCIUM 10 MG/1
10 TABLET, FILM COATED ORAL DAILY
Qty: 34 TABLET | Refills: 0 | Status: SHIPPED | OUTPATIENT
Start: 2024-02-19

## 2024-02-19 RX ORDER — BUPROPION HYDROCHLORIDE 150 MG/1
450 TABLET ORAL DAILY
Qty: 102 TABLET | Refills: 0 | Status: SHIPPED | OUTPATIENT
Start: 2024-02-19

## 2024-02-19 RX ORDER — OMEPRAZOLE 40 MG/1
40 CAPSULE, DELAYED RELEASE ORAL DAILY
Qty: 90 CAPSULE | OUTPATIENT
Start: 2024-02-19

## 2024-03-07 ENCOUNTER — OFFICE VISIT (OUTPATIENT)
Dept: PULMONOLOGY | Facility: CLINIC | Age: 59
End: 2024-03-07
Payer: COMMERCIAL

## 2024-03-07 VITALS
OXYGEN SATURATION: 99 % | BODY MASS INDEX: 31.69 KG/M2 | SYSTOLIC BLOOD PRESSURE: 148 MMHG | WEIGHT: 197.19 LBS | DIASTOLIC BLOOD PRESSURE: 102 MMHG | TEMPERATURE: 97.5 F | HEIGHT: 66 IN | RESPIRATION RATE: 18 BRPM | HEART RATE: 79 BPM

## 2024-03-07 DIAGNOSIS — J45.20 MILD INTERMITTENT ASTHMA WITHOUT COMPLICATION: ICD-10-CM

## 2024-03-07 DIAGNOSIS — R05.3 CHRONIC COUGH: Primary | ICD-10-CM

## 2024-03-07 NOTE — PROGRESS NOTES
Yanet Clifton is a 58 y.o. female here for evaluation of   Chief Complaint   Patient presents with    Moderate Persistent Asthma w/ Acute Exacerbation     F/u       Problem list:  Chronic cough  Moderate persistent asthma  Rheumatoid arthritis on Rituxan  Sjogren syndrome  GERD  Hypertension  Dyslipidemia  Hypothyroid secondary to Hashimoto's  History of aseptic meningitis, autoimmune  History of aseptic meningitis, autoimmune  Vitamin D deficiency  Diverticulosis  Osteoarthritis  Nephrolithiasis  Breast biopsy, 2018, benign  Temporal artery biopsy, left, 2015  Total abdominal hysterectomy with BSO, 2013  Cholecystectomy  Colonoscopy with hyperplastic polyp, hemorrhoids, 2016  Cystoscopy with laser lithotripsy  Tonsillectomy  Allergy to sulfa  No tobacco    History of Present Illness    58-year-old woman, non-smoker with rheumatoid arthritis on Rituxan, Plaquenil(not taking), presenting with chronic cough productive of green mucoid secretions.  Cough started after she contracted COVID 19 April 2022.  She is vaccinated and she did take an antiviral.  She has had documented wheezing. CT scan of the chest was done and negative for interstitial lung disease or bronchiectasis.    She does have a history of reflux but is asymptomatic on omeprazole.  She has a history of allergic rhinitis but currently is not having significant drainage.  Chronic cough unchanged with inhalers or nebulizer. She got COVID again 12/5/23 and took Paxlovid. (End copied text)  She stopped using inhalers in December 2023 because she did not feel they were useful.  Cough did improve post Cefdnir.  She has a home nebulizer that she uses as needed.  February 13, 2024 she was seen at urgent treatment with upper respiratory infection, purulent sinus congestion.  She swab negative for flu and COVID.  She is vaccinated for flu.  She was given Augmentin and prednisone.  No wheezing was documented on physical exam. Remains off inhalers without wheezing.  After final antibiotics cough has finally resolved.       Review of Systems   Constitutional:  Negative for fever.   HENT:  Positive for postnasal drip.         Dry mouth   Eyes:         Dry eyes   Respiratory:  Negative for cough and wheezing.    Musculoskeletal:  Positive for arthralgias.         Current Outpatient Medications:     albuterol (PROVENTIL) (2.5 MG/3ML) 0.083% nebulizer solution, Take 2.5 mg by nebulization 4 (Four) Times a Day As Needed for Wheezing., Disp: 120 each, Rfl: 5    albuterol sulfate HFA (Ventolin HFA) 108 (90 Base) MCG/ACT inhaler, Inhale 2 puffs Every 6 (Six) Hours As Needed for Wheezing or Shortness of Air., Disp: 18 g, Rfl: 0    atorvastatin (LIPITOR) 10 MG tablet, TAKE 1 TABLET BY MOUTH DAILY, Disp: 34 tablet, Rfl: 0    budesonide-formoterol (SYMBICORT) 160-4.5 MCG/ACT inhaler, Inhale 2 puffs 2 (Two) Times a Day., Disp: 10.2 each, Rfl: 3    buPROPion XL (WELLBUTRIN XL) 150 MG 24 hr tablet, TAKE 3 TABLETS BY MOUTH DAILY, Disp: 102 tablet, Rfl: 0    caffeine 200 MG tablet, Take  by mouth., Disp: , Rfl:     hydroxychloroquine (PLAQUENIL) 200 MG tablet, , Disp: , Rfl:     levothyroxine (SYNTHROID, LEVOTHROID) 75 MCG tablet, Take 1 tablet by mouth Every Morning., Disp: 90 tablet, Rfl: 3    naproxen sodium (ALEVE) 220 MG tablet, Take 1 tablet by mouth 2 (Two) Times a Day As Needed for Mild Pain., Disp: , Rfl:     omeprazole (priLOSEC) 40 MG capsule, TAKE 1 CAPSULE BY MOUTH DAILY, Disp: 34 capsule, Rfl: 0    ondansetron ODT (ZOFRAN-ODT) 4 MG disintegrating tablet, Place 1 tablet on the tongue Every 6 (Six) Hours As Needed for Nausea or Vomiting for up to 15 doses., Disp: 15 tablet, Rfl: 0    riTUXimab (RITUXAN) 500 MG/50ML solution injection, Infuse 100 mL into a venous catheter Every 4 (Four) Months., Disp: , Rfl:     Past Medical History:   Diagnosis Date    Aseptic meningitis 12/2009    Asthma     Dyslipidemia     Encephalitis 2014    GERD (gastroesophageal reflux disease)     Hx of bone  density study 04/04/2023    DEXA (4/4/23): L/H -0.3, repeat 3 yrs    Hx of colonoscopy 05/24/2016    colonosc (5/24/16): hyperplastic polyp, ext/int hemorrhoids, diverticulosis, repeat 5 yrs; GI - Dr. Robertson    Hx of colonoscopy 08/16/2021    nl colonosc (8/16/21) except mild sigmoid diverticulosis, repeat 5 yrs; CRS - Dr. Berrios    Hx of EGD 05/24/2016    EGD (5/24/16): gastritis, reflux esophagitis, neg for H.pylori; GI - Dr. Robertson    Hx of nl SPEP 12/18/2019    Hypothyroid     Nephrolithiasis     Rheumatoid arthritis      Past Surgical History:   Procedure Laterality Date    BREAST BIOPSY Left 02/09/2018    u/s guided axillary lymph node    BREAST EXCISIONAL BIOPSY Left 1984    CHOLECYSTECTOMY  2001    secondary to cholelithiasis    COLONOSCOPY      CYSTOSCOPY W/ LASER LITHOTRIPSY      ENDOSCOPY      LUMBAR PUNCTURE  04/2019    hospitalized at [facility]; ? viral meningitis    MAMMO SKIN LESION BIOPSY SINGLE LESION UNILATERAL Left 01/30/2017    s/p L. breast skin bx (1/30/17); nonspecific chronic perivascular dermatitis    MAMMO US BREAST BIOPSY 1ST W WO DEVICE UNILATERAL Left 02/09/2018    s/p u/s-guided L. breast axillary lymph node bx (2/9/18): benign reactive LN, repeat dx mammo in 6 mos; rads - Dr. Bailey    TEMPORAL ARTERY BIOPSY Left 11/05/2015    neg for arteritis; surg - Dr. Cunha    TONSILLECTOMY      TOTAL ABDOMINAL HYSTERECTOMY WITH SALPINGO OOPHORECTOMY  03/2013    secondary to fibroids/endometriosis (3/13); GYN - Dr. Naik     Social History     Socioeconomic History    Marital status:     Number of children: 2   Tobacco Use    Smoking status: Never     Passive exposure: Never    Smokeless tobacco: Never   Vaping Use    Vaping status: Never Used   Substance and Sexual Activity    Alcohol use: Yes     Alcohol/week: 0.0 standard drinks of alcohol     Comment: Very rarely    Drug use: No    Sexual activity: Yes     Partners: Male     Birth control/protection: Post-menopausal     Family History  "  Problem Relation Age of Onset    Fibromyalgia Mother     Migraines Mother     Depression Mother     Hypertension Sister     Other Sister         loss of hair from head    Migraines Sister     Asthma Sister     Breast cancer Maternal Grandmother 60    Cancer Maternal Grandmother         Breast Cancer    Multiple myeloma Paternal Grandmother     Cancer Paternal Grandmother         Multiple Myeloma    Multiple myeloma Paternal Uncle     Thyroid disease Neg Hx     Diabetes Neg Hx     BRCA 1/2 Neg Hx     Colon cancer Neg Hx     Endometrial cancer Neg Hx     Ovarian cancer Neg Hx      Blood pressure (!) 148/102, pulse 79, temperature 97.5 °F (36.4 °C), resp. rate 18, height 167.6 cm (65.98\"), weight 89.4 kg (197 lb 3 oz), SpO2 99%, not currently breastfeeding.    Physical Exam  Constitutional:       General: She is not in acute distress.  HENT:      Head: Normocephalic and atraumatic.      Nose: No congestion.   Eyes:      Conjunctiva/sclera: Conjunctivae normal.   Cardiovascular:      Rate and Rhythm: Normal rate and regular rhythm.      Heart sounds: No murmur heard.  Pulmonary:      Effort: Pulmonary effort is normal.      Breath sounds: No wheezing or rhonchi.   Musculoskeletal:         General: No deformity.           PFTs:    January 17, 2024, FVC 3 L, 86%, FEV1 2.11 L, 78% ratio 70%, midexpiratory flows 51%, total lung capacity 4.83 L, 95%, diffusion capacity 17.9, 74%    December 18, 2023, FVC 2.41 L, 76%, FEV1 1.93 L, 80% ratio 80%, total lung capacity 3.69 L, 71%, diffusion 59, 52%, no obstruction, mild restriction, moderate diffusion impairment    August 15, 2023, FVC 2.65 L, 76%, FEV1 1.85 L, 68%, ratio 70%, total lung capacity 4.79 L, 94%, diffusion capacity 17, 71%      Radiology:    Findings: CT chest  The central tracheobronchial tree is clear. The lungs are clear. There is no pleural effusion.     The heart size is normal, with evidence of mild calcified coronary artery disease. The great vessels are " normal in caliber. No abnormally enlarged lymph nodes are identified.     Partial evaluation of the upper abdomen demonstrates change of cholecystectomy.     No aggressive osseous lesions are identified.     IMPRESSION:  Impression:  No acute cardiopulmonary process.           Electronically Signed: Merrill Hull    7/14/2023 12:41 PM EDT     She also has significant calcified lymphadenopathy    Lab:    September 6, 2023, hypersensitivity pneumonitis panel negative, ANCA panel negative, SHAWN negative, Aspergillus antibodies negative, WBC 3.95, hemoglobin 12.5, platelet count 204, 74% polys, 10% lymphocytes, 5.8% eosinophils, absolute eosinophil count 230.  Absolute eosinophil count was 290 in 2022, allergen zone 8 negative, IgE 4, free T41.40, TSH 1.52    Diagnoses and all orders for this visit:    1. Chronic cough (Primary)    2. Mild intermittent asthma without complication            Discussion:     58-year-old woman, non-smoker, initially evaluated for a chronic cough and some wheezing that I thought was cough variant asthma.  It seemed to be participated by COVID infection.  CT scan showed no bronchiectasis or interstitial lung disease.   She then got a second COVID infection  December 5 with worsening cough and wheezing.  She has a known history of rheumatoid arthritis and is on immunosuppressive therapy with Rituxan and Plaquenil. Finally her wheezing and cough have resolved.  I do clinically suspect that she had persistent infection from her immunocompromise state.  Last fall her absolute lymphocyte count was only 390.  Now with her infection resolved after 3 rounds of antibiotics and steroids she feels clinically improved and is having no wheezing or cough.  Asthma symptoms are now more intermittent.  She may have had bronchospasm related to infection only and not asthma.        Start ICS/LABA first sign of cough or tightness  Keep rescue inhaler on her person  F/u October  She is scheduled to have blood  work in May, I am curious to see if her absolute lymphocyte count has improved  If her absolute lymphocyte count remains less than thousand or certainly less than 500 she might need Bactrim double strength 1 tablet Monday Wednesday Friday for prophylaxis      Griselda Gore MD

## 2024-03-07 NOTE — LETTER
March 7, 2024       No Recipients    Patient: Yanet Clifton   YOB: 1965   Date of Visit: 3/7/2024     Dear Dr. Marie Recipients:    Thank you for referring Yanet Clifton to me for evaluation. Below are the relevant portions of my assessment and plan of care.    If you have questions, please do not hesitate to call me. I look forward to following Yanet along with you.         Sincerely,        Griselda Gore MD        CC:   No Recipients      Progress Notes:  Yanet Clifton is a 58 y.o. female here for evaluation of   Chief Complaint   Patient presents with   • Moderate Persistent Asthma w/ Acute Exacerbation     F/u       Problem list:  Chronic cough  Moderate persistent asthma  Rheumatoid arthritis on Rituxan  Sjogren syndrome  GERD  Hypertension  Dyslipidemia  Hypothyroid secondary to Hashimoto's  History of aseptic meningitis, autoimmune  History of aseptic meningitis, autoimmune  Vitamin D deficiency  Diverticulosis  Osteoarthritis  Nephrolithiasis  Breast biopsy, 2018, benign  Temporal artery biopsy, left, 2015  Total abdominal hysterectomy with BSO, 2013  Cholecystectomy  Colonoscopy with hyperplastic polyp, hemorrhoids, 2016  Cystoscopy with laser lithotripsy  Tonsillectomy  Allergy to sulfa  No tobacco    History of Present Illness    58-year-old woman, non-smoker with rheumatoid arthritis on Rituxan, Plaquenil(not taking), presenting with chronic cough productive of green mucoid secretions.  Cough started after she contracted COVID 19 April 2022.  She is vaccinated and she did take an antiviral.  She has had documented wheezing. CT scan of the chest was done and negative for interstitial lung disease or bronchiectasis.    She does have a history of reflux but is asymptomatic on omeprazole.  She has a history of allergic rhinitis but currently is not having significant drainage.  Chronic cough unchanged with inhalers or nebulizer. She got COVID again 12/5/23 and took Paxlovid. (End  copied text)  She stopped using inhalers in December 2023 because she did not feel they were useful.  Cough did improve post Cefdnir.  She has a home nebulizer that she uses as needed.  February 13, 2024 she was seen at urgent treatment with upper respiratory infection, purulent sinus congestion.  She swab negative for flu and COVID.  She is vaccinated for flu.  She was given Augmentin and prednisone.  No wheezing was documented on physical exam. Remains off inhalers without wheezing. After final antibiotics cough has finally resolved.       Review of Systems   Constitutional:  Negative for fever.   HENT:  Positive for postnasal drip.         Dry mouth   Eyes:         Dry eyes   Respiratory:  Negative for cough and wheezing.    Musculoskeletal:  Positive for arthralgias.         Current Outpatient Medications:   •  albuterol (PROVENTIL) (2.5 MG/3ML) 0.083% nebulizer solution, Take 2.5 mg by nebulization 4 (Four) Times a Day As Needed for Wheezing., Disp: 120 each, Rfl: 5  •  albuterol sulfate HFA (Ventolin HFA) 108 (90 Base) MCG/ACT inhaler, Inhale 2 puffs Every 6 (Six) Hours As Needed for Wheezing or Shortness of Air., Disp: 18 g, Rfl: 0  •  atorvastatin (LIPITOR) 10 MG tablet, TAKE 1 TABLET BY MOUTH DAILY, Disp: 34 tablet, Rfl: 0  •  budesonide-formoterol (SYMBICORT) 160-4.5 MCG/ACT inhaler, Inhale 2 puffs 2 (Two) Times a Day., Disp: 10.2 each, Rfl: 3  •  buPROPion XL (WELLBUTRIN XL) 150 MG 24 hr tablet, TAKE 3 TABLETS BY MOUTH DAILY, Disp: 102 tablet, Rfl: 0  •  caffeine 200 MG tablet, Take  by mouth., Disp: , Rfl:   •  hydroxychloroquine (PLAQUENIL) 200 MG tablet, , Disp: , Rfl:   •  levothyroxine (SYNTHROID, LEVOTHROID) 75 MCG tablet, Take 1 tablet by mouth Every Morning., Disp: 90 tablet, Rfl: 3  •  naproxen sodium (ALEVE) 220 MG tablet, Take 1 tablet by mouth 2 (Two) Times a Day As Needed for Mild Pain., Disp: , Rfl:   •  omeprazole (priLOSEC) 40 MG capsule, TAKE 1 CAPSULE BY MOUTH DAILY, Disp: 34 capsule,  Rfl: 0  •  ondansetron ODT (ZOFRAN-ODT) 4 MG disintegrating tablet, Place 1 tablet on the tongue Every 6 (Six) Hours As Needed for Nausea or Vomiting for up to 15 doses., Disp: 15 tablet, Rfl: 0  •  riTUXimab (RITUXAN) 500 MG/50ML solution injection, Infuse 100 mL into a venous catheter Every 4 (Four) Months., Disp: , Rfl:     Past Medical History:   Diagnosis Date   • Aseptic meningitis 12/2009   • Asthma    • Dyslipidemia    • Encephalitis 2014   • GERD (gastroesophageal reflux disease)    • Hx of bone density study 04/04/2023    DEXA (4/4/23): L/H -0.3, repeat 3 yrs   • Hx of colonoscopy 05/24/2016    colonosc (5/24/16): hyperplastic polyp, ext/int hemorrhoids, diverticulosis, repeat 5 yrs; GI - Dr. Robertson   • Hx of colonoscopy 08/16/2021    nl colonosc (8/16/21) except mild sigmoid diverticulosis, repeat 5 yrs; CRS - Dr. Berrios   • Hx of EGD 05/24/2016    EGD (5/24/16): gastritis, reflux esophagitis, neg for H.pylori; GI - Dr. Robertson   • Hx of nl SPEP 12/18/2019   • Hypothyroid    • Nephrolithiasis    • Rheumatoid arthritis      Past Surgical History:   Procedure Laterality Date   • BREAST BIOPSY Left 02/09/2018    u/s guided axillary lymph node   • BREAST EXCISIONAL BIOPSY Left 1984   • CHOLECYSTECTOMY  2001    secondary to cholelithiasis   • COLONOSCOPY     • CYSTOSCOPY W/ LASER LITHOTRIPSY     • ENDOSCOPY     • LUMBAR PUNCTURE  04/2019    hospitalized at [facility]; ? viral meningitis   • MAMMO SKIN LESION BIOPSY SINGLE LESION UNILATERAL Left 01/30/2017    s/p L. breast skin bx (1/30/17); nonspecific chronic perivascular dermatitis   • MAMMO US BREAST BIOPSY 1ST W WO DEVICE UNILATERAL Left 02/09/2018    s/p u/s-guided L. breast axillary lymph node bx (2/9/18): benign reactive LN, repeat dx mammo in 6 mos; rads - Dr. Bailey   • TEMPORAL ARTERY BIOPSY Left 11/05/2015    neg for arteritis; surg - Dr. Cunha   • TONSILLECTOMY     • TOTAL ABDOMINAL HYSTERECTOMY WITH SALPINGO OOPHORECTOMY  03/2013    secondary to  "fibroids/endometriosis (3/13); GYN - Dr. Naik     Social History     Socioeconomic History   • Marital status:    • Number of children: 2   Tobacco Use   • Smoking status: Never     Passive exposure: Never   • Smokeless tobacco: Never   Vaping Use   • Vaping status: Never Used   Substance and Sexual Activity   • Alcohol use: Yes     Alcohol/week: 0.0 standard drinks of alcohol     Comment: Very rarely   • Drug use: No   • Sexual activity: Yes     Partners: Male     Birth control/protection: Post-menopausal     Family History   Problem Relation Age of Onset   • Fibromyalgia Mother    • Migraines Mother    • Depression Mother    • Hypertension Sister    • Other Sister         loss of hair from head   • Migraines Sister    • Asthma Sister    • Breast cancer Maternal Grandmother 60   • Cancer Maternal Grandmother         Breast Cancer   • Multiple myeloma Paternal Grandmother    • Cancer Paternal Grandmother         Multiple Myeloma   • Multiple myeloma Paternal Uncle    • Thyroid disease Neg Hx    • Diabetes Neg Hx    • BRCA 1/2 Neg Hx    • Colon cancer Neg Hx    • Endometrial cancer Neg Hx    • Ovarian cancer Neg Hx      Blood pressure (!) 148/102, pulse 79, temperature 97.5 °F (36.4 °C), resp. rate 18, height 167.6 cm (65.98\"), weight 89.4 kg (197 lb 3 oz), SpO2 99%, not currently breastfeeding.    Physical Exam  Constitutional:       General: She is not in acute distress.  HENT:      Head: Normocephalic and atraumatic.      Nose: No congestion.   Eyes:      Conjunctiva/sclera: Conjunctivae normal.   Cardiovascular:      Rate and Rhythm: Normal rate and regular rhythm.      Heart sounds: No murmur heard.  Pulmonary:      Effort: Pulmonary effort is normal.      Breath sounds: No wheezing or rhonchi.   Musculoskeletal:         General: No deformity.           PFTs:    January 17, 2024, FVC 3 L, 86%, FEV1 2.11 L, 78% ratio 70%, midexpiratory flows 51%, total lung capacity 4.83 L, 95%, diffusion capacity " 17.9, 74%    December 18, 2023, FVC 2.41 L, 76%, FEV1 1.93 L, 80% ratio 80%, total lung capacity 3.69 L, 71%, diffusion 59, 52%, no obstruction, mild restriction, moderate diffusion impairment    August 15, 2023, FVC 2.65 L, 76%, FEV1 1.85 L, 68%, ratio 70%, total lung capacity 4.79 L, 94%, diffusion capacity 17, 71%      Radiology:    Findings: CT chest  The central tracheobronchial tree is clear. The lungs are clear. There is no pleural effusion.     The heart size is normal, with evidence of mild calcified coronary artery disease. The great vessels are normal in caliber. No abnormally enlarged lymph nodes are identified.     Partial evaluation of the upper abdomen demonstrates change of cholecystectomy.     No aggressive osseous lesions are identified.     IMPRESSION:  Impression:  No acute cardiopulmonary process.           Electronically Signed: Merrill Hull    7/14/2023 12:41 PM EDT     She also has significant calcified lymphadenopathy    Lab:    September 6, 2023, hypersensitivity pneumonitis panel negative, ANCA panel negative, SHAWN negative, Aspergillus antibodies negative, WBC 3.95, hemoglobin 12.5, platelet count 204, 74% polys, 10% lymphocytes, 5.8% eosinophils, absolute eosinophil count 230.  Absolute eosinophil count was 290 in 2022, allergen zone 8 negative, IgE 4, free T41.40, TSH 1.52    Diagnoses and all orders for this visit:    1. Chronic cough (Primary)    2. Mild intermittent asthma without complication            Discussion:     58-year-old woman, non-smoker, initially evaluated for a chronic cough and some wheezing that I thought was cough variant asthma.  It seemed to be participated by COVID infection.  CT scan showed no bronchiectasis or interstitial lung disease.   She then got a second COVID infection  December 5 with worsening cough and wheezing.  She has a known history of rheumatoid arthritis and is on immunosuppressive therapy with Rituxan and Plaquenil. Finally her wheezing and  cough have resolved.         Start ICS/LABA first sign of cough or tightness  Keep rescue inhaler on her person  F/u October      Griselda Gore MD

## 2024-03-19 DIAGNOSIS — E78.2 MIXED HYPERLIPIDEMIA: Chronic | ICD-10-CM

## 2024-03-19 RX ORDER — ATORVASTATIN CALCIUM 10 MG/1
10 TABLET, FILM COATED ORAL DAILY
Qty: 90 TABLET | OUTPATIENT
Start: 2024-03-19

## 2024-03-19 RX ORDER — ATORVASTATIN CALCIUM 10 MG/1
10 TABLET, FILM COATED ORAL DAILY
Qty: 60 TABLET | Refills: 0 | Status: SHIPPED | OUTPATIENT
Start: 2024-03-19

## 2024-03-27 ENCOUNTER — TRANSCRIBE ORDERS (OUTPATIENT)
Dept: ADMINISTRATIVE | Facility: HOSPITAL | Age: 59
End: 2024-03-27
Payer: COMMERCIAL

## 2024-03-27 ENCOUNTER — HOSPITAL ENCOUNTER (OUTPATIENT)
Dept: GENERAL RADIOLOGY | Facility: HOSPITAL | Age: 59
Discharge: HOME | End: 2024-03-27
Admitting: INTERNAL MEDICINE
Payer: COMMERCIAL

## 2024-03-27 DIAGNOSIS — M54.50 LOW BACK PAIN, UNSPECIFIED BACK PAIN LATERALITY, UNSPECIFIED CHRONICITY, UNSPECIFIED WHETHER SCIATICA PRESENT: Primary | ICD-10-CM

## 2024-03-27 DIAGNOSIS — M25.552 BILATERAL HIP PAIN: ICD-10-CM

## 2024-03-27 DIAGNOSIS — M25.551 BILATERAL HIP PAIN: ICD-10-CM

## 2024-03-27 PROCEDURE — 72202 X-RAY EXAM SI JOINTS 3/> VWS: CPT

## 2024-03-27 PROCEDURE — 73521 X-RAY EXAM HIPS BI 2 VIEWS: CPT

## 2024-04-12 DIAGNOSIS — E03.9 ACQUIRED HYPOTHYROIDISM: Chronic | ICD-10-CM

## 2024-04-12 DIAGNOSIS — K21.9 GASTROESOPHAGEAL REFLUX DISEASE: ICD-10-CM

## 2024-04-12 DIAGNOSIS — E78.2 MIXED HYPERLIPIDEMIA: Chronic | ICD-10-CM

## 2024-04-12 DIAGNOSIS — F33.1 MODERATE EPISODE OF RECURRENT MAJOR DEPRESSIVE DISORDER: ICD-10-CM

## 2024-04-12 RX ORDER — BUPROPION HYDROCHLORIDE 150 MG/1
450 TABLET ORAL DAILY
Qty: 102 TABLET | Refills: 0 | OUTPATIENT
Start: 2024-04-12

## 2024-04-12 RX ORDER — OMEPRAZOLE 40 MG/1
40 CAPSULE, DELAYED RELEASE ORAL DAILY
Qty: 35 CAPSULE | Refills: 0 | Status: SHIPPED | OUTPATIENT
Start: 2024-04-12

## 2024-04-12 RX ORDER — ATORVASTATIN CALCIUM 10 MG/1
10 TABLET, FILM COATED ORAL DAILY
Qty: 35 TABLET | Refills: 0 | Status: SHIPPED | OUTPATIENT
Start: 2024-04-12

## 2024-04-12 RX ORDER — LEVOTHYROXINE SODIUM 0.07 MG/1
75 TABLET ORAL EVERY MORNING
Qty: 35 TABLET | Refills: 0 | Status: SHIPPED | OUTPATIENT
Start: 2024-04-12

## 2024-04-12 RX ORDER — BUPROPION HYDROCHLORIDE 150 MG/1
450 TABLET ORAL DAILY
Qty: 102 TABLET | Refills: 0 | Status: SHIPPED | OUTPATIENT
Start: 2024-04-12

## 2024-05-13 ENCOUNTER — OFFICE VISIT (OUTPATIENT)
Dept: INTERNAL MEDICINE | Facility: CLINIC | Age: 59
End: 2024-05-13
Payer: COMMERCIAL

## 2024-05-13 ENCOUNTER — LAB (OUTPATIENT)
Dept: LAB | Facility: HOSPITAL | Age: 59
End: 2024-05-13
Payer: COMMERCIAL

## 2024-05-13 ENCOUNTER — HOSPITAL ENCOUNTER (OUTPATIENT)
Facility: HOSPITAL | Age: 59
Discharge: HOME | End: 2024-05-13
Payer: COMMERCIAL

## 2024-05-13 VITALS
BODY MASS INDEX: 30.44 KG/M2 | DIASTOLIC BLOOD PRESSURE: 110 MMHG | HEART RATE: 73 BPM | WEIGHT: 189.4 LBS | SYSTOLIC BLOOD PRESSURE: 144 MMHG | HEIGHT: 66 IN | OXYGEN SATURATION: 98 % | TEMPERATURE: 97.7 F

## 2024-05-13 DIAGNOSIS — R11.0 NAUSEA: ICD-10-CM

## 2024-05-13 DIAGNOSIS — R10.31 RLQ ABDOMINAL PAIN: ICD-10-CM

## 2024-05-13 DIAGNOSIS — I10 PRIMARY HYPERTENSION: Chronic | ICD-10-CM

## 2024-05-13 DIAGNOSIS — K52.9 COLITIS: ICD-10-CM

## 2024-05-13 DIAGNOSIS — R10.31 RLQ ABDOMINAL PAIN: Primary | ICD-10-CM

## 2024-05-13 LAB
ALBUMIN SERPL-MCNC: 4.6 G/DL (ref 3.5–5.2)
ALBUMIN/GLOB SERPL: 1.7 G/DL
ALP SERPL-CCNC: 59 U/L (ref 39–117)
ALT SERPL W P-5'-P-CCNC: 24 U/L (ref 1–33)
ANION GAP SERPL CALCULATED.3IONS-SCNC: 9 MMOL/L (ref 5–15)
AST SERPL-CCNC: 29 U/L (ref 1–32)
BACTERIA UR QL AUTO: ABNORMAL /HPF
BASOPHILS # BLD AUTO: 0.02 10*3/MM3 (ref 0–0.2)
BASOPHILS NFR BLD AUTO: 0.4 % (ref 0–1.5)
BILIRUB SERPL-MCNC: 0.5 MG/DL (ref 0–1.2)
BILIRUB UR QL STRIP: NEGATIVE
BUN SERPL-MCNC: 16 MG/DL (ref 6–20)
BUN/CREAT SERPL: 14.3 (ref 7–25)
CALCIUM SPEC-SCNC: 9.9 MG/DL (ref 8.6–10.5)
CHLORIDE SERPL-SCNC: 101 MMOL/L (ref 98–107)
CLARITY UR: ABNORMAL
CO2 SERPL-SCNC: 29 MMOL/L (ref 22–29)
COLOR UR: ABNORMAL
CREAT SERPL-MCNC: 1.12 MG/DL (ref 0.57–1)
DEPRECATED RDW RBC AUTO: 39.2 FL (ref 37–54)
EGFRCR SERPLBLD CKD-EPI 2021: 57.1 ML/MIN/1.73
EOSINOPHIL # BLD AUTO: 0.25 10*3/MM3 (ref 0–0.4)
EOSINOPHIL NFR BLD AUTO: 5.2 % (ref 0.3–6.2)
ERYTHROCYTE [DISTWIDTH] IN BLOOD BY AUTOMATED COUNT: 11.9 % (ref 12.3–15.4)
GLOBULIN UR ELPH-MCNC: 2.7 GM/DL
GLUCOSE SERPL-MCNC: 93 MG/DL (ref 65–99)
GLUCOSE UR STRIP-MCNC: NEGATIVE MG/DL
HCT VFR BLD AUTO: 41.4 % (ref 34–46.6)
HGB BLD-MCNC: 13.7 G/DL (ref 12–15.9)
HGB UR QL STRIP.AUTO: NEGATIVE
HYALINE CASTS UR QL AUTO: ABNORMAL /LPF
IMM GRANULOCYTES # BLD AUTO: 0.01 10*3/MM3 (ref 0–0.05)
IMM GRANULOCYTES NFR BLD AUTO: 0.2 % (ref 0–0.5)
KETONES UR QL STRIP: ABNORMAL
LEUKOCYTE ESTERASE UR QL STRIP.AUTO: ABNORMAL
LYMPHOCYTES # BLD AUTO: 0.47 10*3/MM3 (ref 0.7–3.1)
LYMPHOCYTES NFR BLD AUTO: 9.8 % (ref 19.6–45.3)
MCH RBC QN AUTO: 30 PG (ref 26.6–33)
MCHC RBC AUTO-ENTMCNC: 33.1 G/DL (ref 31.5–35.7)
MCV RBC AUTO: 90.8 FL (ref 79–97)
MONOCYTES # BLD AUTO: 0.65 10*3/MM3 (ref 0.1–0.9)
MONOCYTES NFR BLD AUTO: 13.6 % (ref 5–12)
NEUTROPHILS NFR BLD AUTO: 3.39 10*3/MM3 (ref 1.7–7)
NEUTROPHILS NFR BLD AUTO: 70.8 % (ref 42.7–76)
NITRITE UR QL STRIP: POSITIVE
NRBC BLD AUTO-RTO: 0 /100 WBC (ref 0–0.2)
PH UR STRIP.AUTO: 6 [PH] (ref 5–8)
PLATELET # BLD AUTO: 221 10*3/MM3 (ref 140–450)
PMV BLD AUTO: 11.1 FL (ref 6–12)
POTASSIUM SERPL-SCNC: 3.7 MMOL/L (ref 3.5–5.2)
PROT SERPL-MCNC: 7.3 G/DL (ref 6–8.5)
PROT UR QL STRIP: ABNORMAL
RBC # BLD AUTO: 4.56 10*6/MM3 (ref 3.77–5.28)
RBC # UR STRIP: ABNORMAL /HPF
REF LAB TEST METHOD: ABNORMAL
SODIUM SERPL-SCNC: 139 MMOL/L (ref 136–145)
SP GR UR STRIP: >1.03 (ref 1–1.03)
SQUAMOUS #/AREA URNS HPF: ABNORMAL /HPF
UROBILINOGEN UR QL STRIP: ABNORMAL
WBC # UR STRIP: ABNORMAL /HPF
WBC NRBC COR # BLD AUTO: 4.79 10*3/MM3 (ref 3.4–10.8)

## 2024-05-13 PROCEDURE — 80053 COMPREHEN METABOLIC PANEL: CPT

## 2024-05-13 PROCEDURE — 99214 OFFICE O/P EST MOD 30 MIN: CPT | Performed by: INTERNAL MEDICINE

## 2024-05-13 PROCEDURE — 81001 URINALYSIS AUTO W/SCOPE: CPT

## 2024-05-13 PROCEDURE — 74178 CT ABD&PLV WO CNTR FLWD CNTR: CPT

## 2024-05-13 PROCEDURE — 85025 COMPLETE CBC W/AUTO DIFF WBC: CPT

## 2024-05-13 PROCEDURE — 25510000001 IOPAMIDOL 61 % SOLUTION: Performed by: INTERNAL MEDICINE

## 2024-05-13 RX ORDER — LEVOFLOXACIN 500 MG/1
500 TABLET, FILM COATED ORAL DAILY
Qty: 7 TABLET | Refills: 0 | Status: SHIPPED | OUTPATIENT
Start: 2024-05-13 | End: 2024-05-20

## 2024-05-13 RX ORDER — NAPROXEN SODIUM 220 MG
220 TABLET ORAL AS NEEDED
COMMUNITY

## 2024-05-13 RX ADMIN — IOPAMIDOL 85 ML: 612 INJECTION, SOLUTION INTRAVENOUS at 16:03

## 2024-05-13 NOTE — ASSESSMENT & PLAN NOTE
BP elevated, attributed to current acute abd pain; will f/u in a few weeks with next PE; no meds currently

## 2024-05-13 NOTE — PROGRESS NOTES
"Chief Complaint   Patient presents with    Abdominal Pain    Diarrhea    Nausea       History of Present Illness  58 y.o.  female presents for further eval of RLQ abd pain assoc'd with fevers/chills, nausea., and diarrhea. HPI started Fri night out of the blue with diarrhea. Reports watery, nonbloody diarrhea 4-5x at nighttime, followed by dull RLQ abd pain. No assoc'd back pain. No known sick contacts. No urinary sxs.    On Saturday and Sunday, had cont'd diarrhea, 4-5x with cont'd dull  RLQ abd pain, but developed low grade temp around 100F, bodyaches, and chills/shivering.    Last BM was this morning, still with watery, nonbloody stool and unchanged RLQ abd pain, worsened with coughing and sneezing but not symptomatic with going over potholes this morning on the drive here.    Took some aleve which helped the bodyaches. Took TUMS last night, which did not help. Has not taken imodium.    Review of Systems  ROS (+) for mod-severe RLQ abd pain as noted. Also with nausea and diarrhea, fevers/chills. Denies back pain, dysuria, vaginal discharge.  ROS (+) for decreased appetite.  ROS (+) for cough, ongoing prior to current abd pain, attributed to allergies per patient. All other ROS reviewed and negative.      Current Outpatient Medications:     albuterol (PROVENTIL) (2.5 MG/3ML) 0.083% neb prn    albuterol sulfate HFA (Ventolin HFA) inhaler prn    atorvastatin (LIPITOR) 10 MG QD    budesonide-formoterol (SYMBICORT) 160-4.5 MCG/ACT inhaler 2 puffs BID    buPROPion  MG QD    caffeine 200 MG QDl:     hydroxychloroquine (PLAQUENIL) 200 MG QD    levothyroxine 75 MCG QD    naproxen sodium 220 MG prn    omeprazole 40 MG QD    ondansetron ODT 4 MG prn    riTUXimab (RITUXAN) 500 MG/50ML 100ml Q4 mos    VITALS:  BP (!) 144/110   Pulse 73   Temp 97.7 °F (36.5 °C)   Ht 167.6 cm (66\")   Wt 85.9 kg (189 lb 6.4 oz)   SpO2 98%   BMI 30.57 kg/m²     Physical Exam  Vitals and nursing note reviewed. "   Constitutional:       General: She is not in acute distress.     Appearance: Normal appearance. She is not ill-appearing.   Eyes:      Extraocular Movements: Extraocular movements intact.      Conjunctiva/sclera: Conjunctivae normal.   Cardiovascular:      Rate and Rhythm: Normal rate and regular rhythm.      Comments: Pulse 80-90s  Pulmonary:      Effort: Pulmonary effort is normal. No respiratory distress.      Breath sounds: Wheezing (minimal) present. No rhonchi or rales.      Comments: Occ coughing  Abdominal:      General: Abdomen is flat. There is no distension.      Palpations: Abdomen is soft.      Tenderness: There is abdominal tenderness (mod tenderness with light palpation just right lateral of suprapubic area). There is no right CVA tenderness, left CVA tenderness or rebound. Negative signs include Hoyos's sign and McBurney's sign.      Comments: Hypoactive BS, soft, mild guarding right side of suprapubic area, no rebound tenderness   Neurological:      Mental Status: She is alert. Mental status is at baseline.   Psychiatric:         Mood and Affect: Mood normal.         Behavior: Behavior normal.         LABS  Results for orders placed or performed in visit on 02/13/24   POCT SARS-CoV-2 Antigen MARVIN + Flu    Specimen: Swab   Result Value Ref Range    SARS Antigen Not Detected Not Detected, Presumptive Negative    Influenza A Antigen MARVIN Not Detected Not Detected    Influenza B Antigen MARVIN Not Detected Not Detected    Internal Control Passed Passed    Lot Number [id number]     Expiration Date 11/27/2024 7/13/23 CT chest - neg    10/3/20 CT abd pelvis - fatty liver, s/p priya, appendix not identified    ASSESSMENT/PLAN    Diagnoses and all orders for this visit:    1. RLQ abdominal pain (Primary)  Comments:  acute mod-severe RLQ abd pain w/ nausea, diarrhea, fevers; stat CT abd/pelvis to r/o appendencitis; labs on the way out the door; lots of H2O, bland diet, rest  Orders:  -     CBC &  Differential; Future  -     Comprehensive Metabolic Panel; Future  -     CT Chest With & Without Contrast Diagnostic; Future  -     Urinalysis With Microscopic - Urine, Clean Catch; Future    2. Nausea  Comments:  labs and CT abd/pelvis to r/o appendicitis; will RX zofran if not appendcitis    3. Hypertension  Assessment & Plan:  BP elevated, attributed to current acute abd pain; will f/u in a few weeks with next PE; no meds currently          FOLLOW-UP  Health maintenance - rec COVID19 vacc when feeling better, brenna since she is considered immunocompromised  RTC prn test results  Has PE scheduled 5/20/24; fasting labs prior to appt (CBC, CMP, TSH, lipids, UA/micr, FT4, vit D, CPK, A1C)    Electronically signed by:    Brie Jalloh MD, FACP  05/13/2024

## 2024-05-15 DIAGNOSIS — F33.1 MODERATE EPISODE OF RECURRENT MAJOR DEPRESSIVE DISORDER: ICD-10-CM

## 2024-05-15 RX ORDER — BUPROPION HYDROCHLORIDE 150 MG/1
450 TABLET ORAL DAILY
Qty: 102 TABLET | Refills: 0 | OUTPATIENT
Start: 2024-05-15

## 2024-05-15 RX ORDER — BUPROPION HYDROCHLORIDE 150 MG/1
450 TABLET ORAL DAILY
Qty: 102 TABLET | Refills: 0 | Status: CANCELLED | OUTPATIENT
Start: 2024-05-15

## 2024-05-17 ENCOUNTER — TELEPHONE (OUTPATIENT)
Dept: INTERNAL MEDICINE | Facility: CLINIC | Age: 59
End: 2024-05-17
Payer: COMMERCIAL

## 2024-05-17 DIAGNOSIS — F33.1 MODERATE EPISODE OF RECURRENT MAJOR DEPRESSIVE DISORDER: ICD-10-CM

## 2024-05-17 RX ORDER — BUPROPION HYDROCHLORIDE 150 MG/1
450 TABLET ORAL DAILY
Qty: 12 TABLET | Refills: 0 | Status: SHIPPED | OUTPATIENT
Start: 2024-05-17 | End: 2024-05-18 | Stop reason: SDUPTHER

## 2024-05-17 NOTE — TELEPHONE ENCOUNTER
"  Caller: Yanet Clifton \"Nancy\"    Relationship: Self    Best call back number: [phone]    Requested Prescriptions:     buPROPion XL (WELLBUTRIN XL) 150 MG 24 hr tablet          Pharmacy where request should be sent: [id number] [address]  [facility] [phone] [facility] [phone]      Last office visit with prescribing clinician: 5/13/2024   Last telemedicine visit with prescribing clinician: Visit date not found   Next office visit with prescribing clinician: 5/20/2024     Additional details provided by patient: PATIENT IS OUT OF MEDICATION AND IT IS THE WEEKEND; ALSO PATIENT HAS AN APPT ON MONDAY WITH DR STOVER    Does the patient have less than a 3 day supply:  [x] Yes  [] No      Elsy Polk Rep   05/17/24 09:40 EDT         "

## 2024-05-18 DIAGNOSIS — E78.2 MIXED HYPERLIPIDEMIA: Chronic | ICD-10-CM

## 2024-05-18 DIAGNOSIS — E03.9 ACQUIRED HYPOTHYROIDISM: Chronic | ICD-10-CM

## 2024-05-18 NOTE — ASSESSMENT & PLAN NOTE
Health maintenance -  flu vacc 1/24;  rec COVID19 vacc brenna considered immunocompromised, counseling given; Prevnar 9/15; PVX 11/16; Td 12/20 (next Tdap due 2030), HAV done; rec Shingrix - counseling given; mammo due (last 4/4/23); no further Paps s/p USMAN/BSO; nl DEXA 4/23, repeat 2026; colonosc 8/21, repeat 2026 per Dr. Berrios; eye exam done 2024 per pt; dental exam q6 mos; (+) seat belt use; fasting PE labs ordered

## 2024-05-18 NOTE — PROGRESS NOTES
"Chief Complaint   Patient presents with    Annual Exam    Hypertension    Hypothyroidism       History of Present Illness  58 y.o.  female presents for updated phys examination and f/u on BP, cholesterol, sugars, and thyroid as well as f/u on recent RLQ abd pain treated with 7d course of levaquin. Abd pain has resolved. Thinks she had diarrhea from levaquin.     Requesting PT for back pain and hip pain with RA. Notes mid back and low back pain and stiffness.     Review of Systems  Denies headaches, visual changes, CP, palpitations, SOB, cough, n/v/d, difficulty with urination, numbness/tingling, falls, mood changes, lightheadedness, hearing changes, rashes. ROS (+) for chronic back pain and hip pain.    All other ROS reviewed and negative.    Current Outpatient Medications:     albuterol (2.5 MG/3ML) 0.083% neb prn    albuterol sulfate HFA (Ventolin HFA) 108 (90 Base) MCG/ACT inhaler prn    atorvastatin 10 MG QD    budesonide-formoterol (Symbicort) 160-4.5 MCG/ACT 1 puff BID    buPROPion  MG D    caffeine 200 MG  QD    hydroxychloroquine (PLAQUENIL) 200 MG QD    levothyroxine 75 MCG QD    naproxen sodium (Aleve) 220 MG prn    omeprazole 40 MG QD    ondansetron ODT 4 MG prn    riTUXimab (RITUXAN) 500 MG/50ML Q4mos    VITALS:  /84   Pulse 86   Temp 97.6 °F (36.4 °C)   Ht 165.7 cm (65.25\")   Wt 86.8 kg (191 lb 6.4 oz)   SpO2 98%   BMI 31.61 kg/m²     Physical Exam  Vitals and nursing note reviewed.   Constitutional:       General: She is not in acute distress.     Appearance: Normal appearance. She is well-developed.   HENT:      Head: Normocephalic.      Right Ear: Tympanic membrane, ear canal and external ear normal.      Left Ear: Tympanic membrane, ear canal and external ear normal.      Nose: Nose normal.   Eyes:      Extraocular Movements: Extraocular movements intact.      Conjunctiva/sclera: Conjunctivae normal.      Pupils: Pupils are equal, round, and reactive to light.   Neck:      " Vascular: No carotid bruit (bilaterally).   Cardiovascular:      Rate and Rhythm: Normal rate and regular rhythm.      Heart sounds: Normal heart sounds.   Pulmonary:      Effort: Pulmonary effort is normal. No respiratory distress.      Breath sounds: Normal breath sounds. No wheezing, rhonchi or rales.   Abdominal:      General: Bowel sounds are normal. There is no distension.      Palpations: Abdomen is soft. There is no mass.      Tenderness: There is no abdominal tenderness.   Genitourinary:     Comments: Chaperone declined by patient.    Breast exam unremarkable without masses, skin changes, nipple discharge, or axillary adenopathy.      Musculoskeletal:      Cervical back: Normal range of motion and neck supple.      Right lower leg: No edema.      Left lower leg: No edema.   Lymphadenopathy:      Cervical: No cervical adenopathy.   Skin:     General: Skin is warm and dry.      Findings: No rash.   Neurological:      Mental Status: She is alert and oriented to person, place, and time.      Gait: Gait normal.   Psychiatric:         Mood and Affect: Mood normal.         Behavior: Behavior normal.         LABS  Results for orders placed or performed in visit on 05/13/24   Comprehensive Metabolic Panel    Specimen: Blood   Result Value Ref Range    Glucose 93 65 - 99 mg/dL    BUN 16 6 - 20 mg/dL    Creatinine 1.12 (H) 0.57 - 1.00 mg/dL    Sodium 139 136 - 145 mmol/L    Potassium 3.7 3.5 - 5.2 mmol/L    Chloride 101 98 - 107 mmol/L    CO2 29.0 22.0 - 29.0 mmol/L    Calcium 9.9 8.6 - 10.5 mg/dL    Total Protein 7.3 6.0 - 8.5 g/dL    Albumin 4.6 3.5 - 5.2 g/dL    ALT (SGPT) 24 1 - 33 U/L    AST (SGOT) 29 1 - 32 U/L    Alkaline Phosphatase 59 39 - 117 U/L    Total Bilirubin 0.5 0.0 - 1.2 mg/dL    Globulin 2.7 gm/dL    A/G Ratio 1.7 g/dL    BUN/Creatinine Ratio 14.3 7.0 - 25.0    Anion Gap 9.0 5.0 - 15.0 mmol/L    eGFR 57.1 (L) >60.0 mL/min/1.73   CBC Auto Differential    Specimen: Blood   Result Value Ref Range     WBC 4.79 3.40 - 10.80 10*3/mm3    RBC 4.56 3.77 - 5.28 10*6/mm3    Hemoglobin 13.7 12.0 - 15.9 g/dL    Hematocrit 41.4 34.0 - 46.6 %    MCV 90.8 79.0 - 97.0 fL    MCH 30.0 26.6 - 33.0 pg    MCHC 33.1 31.5 - 35.7 g/dL    RDW 11.9 (L) 12.3 - 15.4 %    RDW-SD 39.2 37.0 - 54.0 fl    MPV 11.1 6.0 - 12.0 fL    Platelets 221 140 - 450 10*3/mm3    Neutrophil % 70.8 42.7 - 76.0 %    Lymphocyte % 9.8 (L) 19.6 - 45.3 %    Monocyte % 13.6 (H) 5.0 - 12.0 %    Eosinophil % 5.2 0.3 - 6.2 %    Basophil % 0.4 0.0 - 1.5 %    Immature Grans % 0.2 0.0 - 0.5 %    Neutrophils, Absolute 3.39 1.70 - 7.00 10*3/mm3    Lymphocytes, Absolute 0.47 (L) 0.70 - 3.10 10*3/mm3    Monocytes, Absolute 0.65 0.10 - 0.90 10*3/mm3    Eosinophils, Absolute 0.25 0.00 - 0.40 10*3/mm3    Basophils, Absolute 0.02 0.00 - 0.20 10*3/mm3    Immature Grans, Absolute 0.01 0.00 - 0.05 10*3/mm3    nRBC 0.0 0.0 - 0.2 /100 WBC   Urinalysis without microscopic (no culture) - Urine, Clean Catch    Specimen: Urine, Clean Catch   Result Value Ref Range    Color, UA Dark Yellow (A) Yellow, Straw    Appearance, UA Cloudy (A) Clear    pH, UA 6.0 5.0 - 8.0    Specific Gravity, UA >1.030 (H) 1.005 - 1.030    Glucose, UA Negative Negative    Ketones, UA Trace (A) Negative    Bilirubin, UA Negative Negative    Blood, UA Negative Negative    Protein, UA 30 mg/dL (1+) (A) Negative    Leuk Esterase, UA Trace (A) Negative    Nitrite, UA Positive (A) Negative    Urobilinogen, UA 1.0 E.U./dL 0.2 - 1.0 E.U./dL   Urinalysis, Microscopic Only - Urine, Clean Catch    Specimen: Urine, Clean Catch   Result Value Ref Range    RBC, UA 0-2 None Seen, 0-2 /HPF    WBC, UA 0-2 None Seen, 0-2 /HPF    Bacteria, UA 4+ (A) None Seen /HPF    Squamous Epithelial Cells, UA 3-6 (A) None Seen, 0-2 /HPF    Hyaline Casts, UA None Seen None Seen /LPF    Methodology Automated Microscopy      9/19/22 A1C 5.4, Cr 0.8, GFR 86.1      ECG 12 Lead    Date/Time: 5/20/2024 10:22 AM  Performed by: Brie Jalloh,  MD    Authorized by: Brie Jalloh MD  Comparison: compared with previous ECG from 2/7/2023  Similar to previous ECG  Rhythm: sinus rhythm  Rate: normal  BPM: 84  Conduction: conduction normal  ST Segments: ST segments normal  T Waves: T waves normal  QRS axis: normal  Clinical impression comment: stable EKG            ASSESSMENT/PLAN    Diagnoses and all orders for this visit:    1. PE (physical exam), routine (Primary)  Assessment & Plan:  Health maintenance -  flu vacc 1/24;  rec COVID19 vacc brenna considered immunocompromised, counseling given; Prevnar 9/15; PVX 11/16; Td 12/20 (next Tdap due 2030), HAV done; rec Shingrix - counseling given; mammo due (last 4/4/23); no further Paps s/p USMAN/BSO; nl DEXA 4/23, repeat 2026; colonosc 8/21, repeat 2026 per Dr. Berrios; eye exam done 2024 per pt; dental exam q6 mos; (+) seat belt use; fasting PE labs ordered      2. Hypertension  Assessment & Plan:  BP bord/stable 130/84; no meds     Orders:  -     ECG 12 Lead    3. Hyperlipidemia  Assessment & Plan:   Needs updated lipids on atorvastatin 10mg QD - refill or adjust when results available      4. Impaired fasting glucose  Assessment & Plan:  Update a1C      5. Hypothyroidism  Assessment & Plan:  Needs updated TFTs on levothyroxine 75mcg QD - adjust/renew as needed when labs available      6. Vitamin D deficiency  Assessment & Plan:  Update vit D level; no current supplementation      7. Depression  Assessment & Plan:  Stable on bupropion XL 150mg 3 QD #270, 3RF    Orders:  -     buPROPion XL (WELLBUTRIN XL) 150 MG 24 hr tablet; Take 3 tablets by mouth Daily.  Dispense: 270 tablet; Refill: 3    8. Gastroesophageal reflux disease  Assessment & Plan:  Stable on omeprazole 40mg QD #90, 3RF    Orders:  -     omeprazole (priLOSEC) 40 MG capsule; Take 1 capsule by mouth Daily.  Dispense: 90 capsule; Refill: 3    9. Osteoarthritis of left hip  Assessment & Plan:  Referral to PT for mid/low back pain and bilat hip pain; reviewed  importance of HEP    Orders:  -     Ambulatory Referral to Physical Therapy for Evaluation & Treatment    10. Mid back pain  Assessment & Plan:  Referral to PT for mid/low back pain and bilat hip pain; reviewed importance of HEP    Orders:  -     Ambulatory Referral to Physical Therapy for Evaluation & Treatment    11. Chronic bilateral low back pain without sciatica  Assessment & Plan:  Referral to PT for mid/low back pain and bilat hip pain; reviewed importance of HEP    Orders:  -     Ambulatory Referral to Physical Therapy for Evaluation & Treatment    12. Colitis  Comments:  abd pain resolved s/p 7d course of levaquin; is drinking enough water, bland diet; consider earlier colonoscopy for f/u        FOLLOW-UP  Needs fasting PE labs  Renew atorvastatin and levothyroxine when lab results avilable  RTC 6 mos    Electronically signed by:    Brie Jalloh MD, FACP  05/20/2024

## 2024-05-19 RX ORDER — LEVOTHYROXINE SODIUM 0.07 MG/1
75 TABLET ORAL EVERY MORNING
Qty: 90 TABLET | OUTPATIENT
Start: 2024-05-19

## 2024-05-19 RX ORDER — ATORVASTATIN CALCIUM 10 MG/1
10 TABLET, FILM COATED ORAL DAILY
Qty: 90 TABLET | OUTPATIENT
Start: 2024-05-19

## 2024-05-20 ENCOUNTER — LAB (OUTPATIENT)
Dept: LAB | Facility: HOSPITAL | Age: 59
End: 2024-05-20
Payer: COMMERCIAL

## 2024-05-20 ENCOUNTER — OFFICE VISIT (OUTPATIENT)
Dept: INTERNAL MEDICINE | Facility: CLINIC | Age: 59
End: 2024-05-20
Payer: COMMERCIAL

## 2024-05-20 VITALS
SYSTOLIC BLOOD PRESSURE: 130 MMHG | TEMPERATURE: 97.6 F | DIASTOLIC BLOOD PRESSURE: 84 MMHG | HEART RATE: 86 BPM | HEIGHT: 65 IN | OXYGEN SATURATION: 98 % | WEIGHT: 191.4 LBS | BODY MASS INDEX: 31.89 KG/M2

## 2024-05-20 DIAGNOSIS — E78.2 MIXED HYPERLIPIDEMIA: Chronic | ICD-10-CM

## 2024-05-20 DIAGNOSIS — K21.9 GASTROESOPHAGEAL REFLUX DISEASE: ICD-10-CM

## 2024-05-20 DIAGNOSIS — Z00.00 PE (PHYSICAL EXAM), ROUTINE: Chronic | ICD-10-CM

## 2024-05-20 DIAGNOSIS — E55.9 VITAMIN D DEFICIENCY: Chronic | ICD-10-CM

## 2024-05-20 DIAGNOSIS — K52.9 COLITIS: ICD-10-CM

## 2024-05-20 DIAGNOSIS — F33.1 MODERATE EPISODE OF RECURRENT MAJOR DEPRESSIVE DISORDER: ICD-10-CM

## 2024-05-20 DIAGNOSIS — Z00.00 PE (PHYSICAL EXAM), ROUTINE: Primary | ICD-10-CM

## 2024-05-20 DIAGNOSIS — M16.12 PRIMARY OSTEOARTHRITIS OF LEFT HIP: Chronic | ICD-10-CM

## 2024-05-20 DIAGNOSIS — E03.9 ACQUIRED HYPOTHYROIDISM: Chronic | ICD-10-CM

## 2024-05-20 DIAGNOSIS — R73.01 IMPAIRED FASTING GLUCOSE: Chronic | ICD-10-CM

## 2024-05-20 DIAGNOSIS — G89.29 CHRONIC BILATERAL LOW BACK PAIN WITHOUT SCIATICA: ICD-10-CM

## 2024-05-20 DIAGNOSIS — M54.50 CHRONIC BILATERAL LOW BACK PAIN WITHOUT SCIATICA: ICD-10-CM

## 2024-05-20 DIAGNOSIS — I10 PRIMARY HYPERTENSION: Chronic | ICD-10-CM

## 2024-05-20 DIAGNOSIS — M54.9 MID BACK PAIN: Chronic | ICD-10-CM

## 2024-05-20 LAB
25(OH)D3 SERPL-MCNC: 32.2 NG/ML (ref 30–100)
ALBUMIN SERPL-MCNC: 4.7 G/DL (ref 3.5–5.2)
ALBUMIN/GLOB SERPL: 2.6 G/DL
ALP SERPL-CCNC: 51 U/L (ref 39–117)
ALT SERPL W P-5'-P-CCNC: 19 U/L (ref 1–33)
ANION GAP SERPL CALCULATED.3IONS-SCNC: 9 MMOL/L (ref 5–15)
AST SERPL-CCNC: 16 U/L (ref 1–32)
BILIRUB SERPL-MCNC: 0.3 MG/DL (ref 0–1.2)
BUN SERPL-MCNC: 15 MG/DL (ref 6–20)
BUN/CREAT SERPL: 14.3 (ref 7–25)
CALCIUM SPEC-SCNC: 9.1 MG/DL (ref 8.6–10.5)
CHLORIDE SERPL-SCNC: 107 MMOL/L (ref 98–107)
CHOLEST SERPL-MCNC: 153 MG/DL (ref 0–200)
CK SERPL-CCNC: 197 U/L (ref 20–180)
CO2 SERPL-SCNC: 26 MMOL/L (ref 22–29)
CREAT SERPL-MCNC: 1.05 MG/DL (ref 0.57–1)
EGFRCR SERPLBLD CKD-EPI 2021: 61.7 ML/MIN/1.73
GLOBULIN UR ELPH-MCNC: 1.8 GM/DL
GLUCOSE SERPL-MCNC: 88 MG/DL (ref 65–99)
HDLC SERPL-MCNC: 42 MG/DL (ref 40–60)
LDLC SERPL CALC-MCNC: 88 MG/DL (ref 0–100)
LDLC/HDLC SERPL: 2.04 {RATIO}
POTASSIUM SERPL-SCNC: 4.1 MMOL/L (ref 3.5–5.2)
PROT SERPL-MCNC: 6.5 G/DL (ref 6–8.5)
SODIUM SERPL-SCNC: 142 MMOL/L (ref 136–145)
T4 FREE SERPL-MCNC: 1.73 NG/DL (ref 0.93–1.7)
TRIGL SERPL-MCNC: 127 MG/DL (ref 0–150)
TSH SERPL DL<=0.05 MIU/L-ACNC: 1.64 UIU/ML (ref 0.27–4.2)
VLDLC SERPL-MCNC: 23 MG/DL (ref 5–40)

## 2024-05-20 PROCEDURE — 83036 HEMOGLOBIN GLYCOSYLATED A1C: CPT

## 2024-05-20 PROCEDURE — 80053 COMPREHEN METABOLIC PANEL: CPT

## 2024-05-20 PROCEDURE — 84443 ASSAY THYROID STIM HORMONE: CPT

## 2024-05-20 PROCEDURE — 82550 ASSAY OF CK (CPK): CPT

## 2024-05-20 PROCEDURE — 80061 LIPID PANEL: CPT

## 2024-05-20 PROCEDURE — 93000 ELECTROCARDIOGRAM COMPLETE: CPT | Performed by: INTERNAL MEDICINE

## 2024-05-20 PROCEDURE — 82306 VITAMIN D 25 HYDROXY: CPT

## 2024-05-20 PROCEDURE — 99396 PREV VISIT EST AGE 40-64: CPT | Performed by: INTERNAL MEDICINE

## 2024-05-20 PROCEDURE — 84439 ASSAY OF FREE THYROXINE: CPT

## 2024-05-20 RX ORDER — BUPROPION HYDROCHLORIDE 150 MG/1
450 TABLET ORAL DAILY
Qty: 270 TABLET | Refills: 3 | Status: SHIPPED | OUTPATIENT
Start: 2024-05-20

## 2024-05-20 RX ORDER — OMEPRAZOLE 40 MG/1
40 CAPSULE, DELAYED RELEASE ORAL DAILY
Qty: 90 CAPSULE | Refills: 3 | Status: SHIPPED | OUTPATIENT
Start: 2024-05-20

## 2024-05-21 ENCOUNTER — TELEPHONE (OUTPATIENT)
Dept: INTERNAL MEDICINE | Facility: CLINIC | Age: 59
End: 2024-05-21
Payer: COMMERCIAL

## 2024-05-21 LAB — HBA1C MFR BLD: 5.2 % (ref 4.8–5.6)

## 2024-06-10 DIAGNOSIS — E03.9 ACQUIRED HYPOTHYROIDISM: Chronic | ICD-10-CM

## 2024-06-10 RX ORDER — LEVOTHYROXINE SODIUM 0.07 MG/1
75 TABLET ORAL EVERY MORNING
Qty: 90 TABLET | Refills: 3 | Status: SHIPPED | OUTPATIENT
Start: 2024-06-10

## 2024-09-20 ENCOUNTER — TELEPHONE (OUTPATIENT)
Dept: INTERNAL MEDICINE | Facility: CLINIC | Age: 59
End: 2024-09-20
Payer: COMMERCIAL

## 2024-09-20 PROBLEM — N28.9 RENAL INSUFFICIENCY: Status: ACTIVE | Noted: 2024-09-20

## 2024-09-24 ENCOUNTER — HOSPITAL ENCOUNTER (OUTPATIENT)
Facility: HOSPITAL | Age: 59
Setting detail: OBSERVATION
Discharge: HOME | End: 2024-09-27
Attending: EMERGENCY MEDICINE | Admitting: INTERNAL MEDICINE
Payer: COMMERCIAL

## 2024-09-24 ENCOUNTER — APPOINTMENT (OUTPATIENT)
Dept: GENERAL RADIOLOGY | Facility: HOSPITAL | Age: 59
End: 2024-09-24
Payer: COMMERCIAL

## 2024-09-24 ENCOUNTER — TELEPHONE (OUTPATIENT)
Dept: INTERNAL MEDICINE | Facility: CLINIC | Age: 59
End: 2024-09-24
Payer: COMMERCIAL

## 2024-09-24 ENCOUNTER — APPOINTMENT (OUTPATIENT)
Dept: CT IMAGING | Facility: HOSPITAL | Age: 59
End: 2024-09-24
Payer: COMMERCIAL

## 2024-09-24 DIAGNOSIS — N28.9 RENAL INSUFFICIENCY: Primary | ICD-10-CM

## 2024-09-24 DIAGNOSIS — R07.9 ACUTE CHEST PAIN: ICD-10-CM

## 2024-09-24 DIAGNOSIS — I16.1 HYPERTENSIVE EMERGENCY: ICD-10-CM

## 2024-09-24 DIAGNOSIS — R51.9 NONINTRACTABLE EPISODIC HEADACHE, UNSPECIFIED HEADACHE TYPE: Chronic | ICD-10-CM

## 2024-09-24 DIAGNOSIS — I21.4 NSTEMI (NON-ST ELEVATED MYOCARDIAL INFARCTION): Primary | ICD-10-CM

## 2024-09-24 PROBLEM — D32.9 MENINGIOMA: Status: ACTIVE | Noted: 2024-09-24

## 2024-09-24 PROBLEM — J01.00 ACUTE NON-RECURRENT MAXILLARY SINUSITIS: Status: RESOLVED | Noted: 2023-12-18 | Resolved: 2024-09-24

## 2024-09-24 LAB
ALBUMIN SERPL-MCNC: 4.6 G/DL (ref 3.5–5.2)
ALBUMIN/GLOB SERPL: 2.1 G/DL
ALP SERPL-CCNC: 75 U/L (ref 39–117)
ALT SERPL W P-5'-P-CCNC: 15 U/L (ref 1–33)
ANION GAP SERPL CALCULATED.3IONS-SCNC: 12 MMOL/L (ref 5–15)
APTT PPP: 29.4 SECONDS (ref 60–90)
AST SERPL-CCNC: 21 U/L (ref 1–32)
BASOPHILS # BLD AUTO: 0.02 10*3/MM3 (ref 0–0.2)
BASOPHILS NFR BLD AUTO: 0.5 % (ref 0–1.5)
BILIRUB SERPL-MCNC: 0.4 MG/DL (ref 0–1.2)
BUN BLDA-MCNC: 18 MG/DL (ref 8–26)
BUN SERPL-MCNC: 18 MG/DL (ref 6–20)
BUN/CREAT SERPL: 18.8 (ref 7–25)
CA-I BLDA-SCNC: 1.2 MMOL/L (ref 1.2–1.32)
CALCIUM SPEC-SCNC: 9.7 MG/DL (ref 8.6–10.5)
CHLORIDE BLDA-SCNC: 105 MMOL/L (ref 98–109)
CHLORIDE SERPL-SCNC: 108 MMOL/L (ref 98–107)
CO2 BLDA-SCNC: 21 MMOL/L (ref 24–29)
CO2 SERPL-SCNC: 23 MMOL/L (ref 22–29)
CREAT BLDA-MCNC: 1.1 MG/DL (ref 0.6–1.3)
CREAT BLDA-MCNC: 1.1 MG/DL (ref 0.6–1.3)
CREAT SERPL-MCNC: 0.96 MG/DL (ref 0.57–1)
D-LACTATE SERPL-SCNC: 0.7 MMOL/L (ref 0.5–2)
DEPRECATED RDW RBC AUTO: 36.2 FL (ref 37–54)
EGFRCR SERPLBLD CKD-EPI 2021: 58 ML/MIN/1.73
EGFRCR SERPLBLD CKD-EPI 2021: 68.3 ML/MIN/1.73
EOSINOPHIL # BLD AUTO: 0.42 10*3/MM3 (ref 0–0.4)
EOSINOPHIL NFR BLD AUTO: 10 % (ref 0.3–6.2)
ERYTHROCYTE [DISTWIDTH] IN BLOOD BY AUTOMATED COUNT: 11.7 % (ref 12.3–15.4)
GEN 5 2HR TROPONIN T REFLEX: 54 NG/L
GLOBULIN UR ELPH-MCNC: 2.2 GM/DL
GLUCOSE BLDC GLUCOMTR-MCNC: 119 MG/DL (ref 70–130)
GLUCOSE BLDC GLUCOMTR-MCNC: 98 MG/DL (ref 70–130)
GLUCOSE SERPL-MCNC: 95 MG/DL (ref 65–99)
HCT VFR BLD AUTO: 36.7 % (ref 34–46.6)
HCT VFR BLDA CALC: 35 % (ref 38–51)
HGB BLD-MCNC: 12.8 G/DL (ref 12–15.9)
HGB BLDA-MCNC: 11.9 G/DL (ref 12–17)
HOLD SPECIMEN: NORMAL
IMM GRANULOCYTES # BLD AUTO: 0.01 10*3/MM3 (ref 0–0.05)
IMM GRANULOCYTES NFR BLD AUTO: 0.2 % (ref 0–0.5)
INR PPP: 1 (ref 0.89–1.12)
LIPASE SERPL-CCNC: 43 U/L (ref 13–60)
LYMPHOCYTES # BLD AUTO: 0.58 10*3/MM3 (ref 0.7–3.1)
LYMPHOCYTES NFR BLD AUTO: 13.8 % (ref 19.6–45.3)
MCH RBC QN AUTO: 30 PG (ref 26.6–33)
MCHC RBC AUTO-ENTMCNC: 34.9 G/DL (ref 31.5–35.7)
MCV RBC AUTO: 86.2 FL (ref 79–97)
MONOCYTES # BLD AUTO: 0.48 10*3/MM3 (ref 0.1–0.9)
MONOCYTES NFR BLD AUTO: 11.4 % (ref 5–12)
NEUTROPHILS NFR BLD AUTO: 2.7 10*3/MM3 (ref 1.7–7)
NEUTROPHILS NFR BLD AUTO: 64.1 % (ref 42.7–76)
NRBC BLD AUTO-RTO: 0 /100 WBC (ref 0–0.2)
NT-PROBNP SERPL-MCNC: 146.8 PG/ML (ref 0–900)
PLATELET # BLD AUTO: 207 10*3/MM3 (ref 140–450)
PMV BLD AUTO: 10.3 FL (ref 6–12)
POTASSIUM BLDA-SCNC: 3 MMOL/L (ref 3.5–4.9)
POTASSIUM SERPL-SCNC: 3.1 MMOL/L (ref 3.5–5.2)
PROT SERPL-MCNC: 6.8 G/DL (ref 6–8.5)
PROTHROMBIN TIME: 13.3 SECONDS (ref 12.2–14.5)
RBC # BLD AUTO: 4.26 10*6/MM3 (ref 3.77–5.28)
SODIUM BLD-SCNC: 143 MMOL/L (ref 138–146)
SODIUM SERPL-SCNC: 143 MMOL/L (ref 136–145)
TROPONIN T DELTA: -2 NG/L
TROPONIN T SERPL HS-MCNC: 56 NG/L
TSH SERPL DL<=0.05 MIU/L-ACNC: 0.42 UIU/ML (ref 0.27–4.2)
WBC NRBC COR # BLD AUTO: 4.21 10*3/MM3 (ref 3.4–10.8)
WHOLE BLOOD HOLD COAG: NORMAL
WHOLE BLOOD HOLD SPECIMEN: NORMAL

## 2024-09-24 PROCEDURE — 96367 TX/PROPH/DG ADDL SEQ IV INF: CPT

## 2024-09-24 PROCEDURE — 36415 COLL VENOUS BLD VENIPUNCTURE: CPT

## 2024-09-24 PROCEDURE — 96365 THER/PROPH/DIAG IV INF INIT: CPT

## 2024-09-24 PROCEDURE — 85520 HEPARIN ASSAY: CPT | Performed by: NURSE PRACTITIONER

## 2024-09-24 PROCEDURE — 25510000001 IOPAMIDOL PER 1 ML: Performed by: EMERGENCY MEDICINE

## 2024-09-24 PROCEDURE — 94799 UNLISTED PULMONARY SVC/PX: CPT

## 2024-09-24 PROCEDURE — 25010000002 MORPHINE PER 10 MG: Performed by: EMERGENCY MEDICINE

## 2024-09-24 PROCEDURE — 93005 ELECTROCARDIOGRAM TRACING: CPT | Performed by: EMERGENCY MEDICINE

## 2024-09-24 PROCEDURE — 96368 THER/DIAG CONCURRENT INF: CPT

## 2024-09-24 PROCEDURE — 85014 HEMATOCRIT: CPT

## 2024-09-24 PROCEDURE — G0378 HOSPITAL OBSERVATION PER HR: HCPCS

## 2024-09-24 PROCEDURE — 80050 GENERAL HEALTH PANEL: CPT | Performed by: EMERGENCY MEDICINE

## 2024-09-24 PROCEDURE — 93005 ELECTROCARDIOGRAM TRACING: CPT

## 2024-09-24 PROCEDURE — 83690 ASSAY OF LIPASE: CPT | Performed by: EMERGENCY MEDICINE

## 2024-09-24 PROCEDURE — 25810000003 SODIUM CHLORIDE 0.9 % SOLUTION: Performed by: NURSE PRACTITIONER

## 2024-09-24 PROCEDURE — 85730 THROMBOPLASTIN TIME PARTIAL: CPT | Performed by: NURSE PRACTITIONER

## 2024-09-24 PROCEDURE — 96375 TX/PRO/DX INJ NEW DRUG ADDON: CPT

## 2024-09-24 PROCEDURE — 80047 BASIC METABLC PNL IONIZED CA: CPT

## 2024-09-24 PROCEDURE — 99222 1ST HOSP IP/OBS MODERATE 55: CPT | Performed by: NURSE PRACTITIONER

## 2024-09-24 PROCEDURE — 83880 ASSAY OF NATRIURETIC PEPTIDE: CPT | Performed by: EMERGENCY MEDICINE

## 2024-09-24 PROCEDURE — 71275 CT ANGIOGRAPHY CHEST: CPT

## 2024-09-24 PROCEDURE — 84484 ASSAY OF TROPONIN QUANT: CPT | Performed by: EMERGENCY MEDICINE

## 2024-09-24 PROCEDURE — 25010000002 NITROGLYCERIN 200 MCG/ML SOLUTION: Performed by: NURSE PRACTITIONER

## 2024-09-24 PROCEDURE — 83605 ASSAY OF LACTIC ACID: CPT | Performed by: NURSE PRACTITIONER

## 2024-09-24 PROCEDURE — 99291 CRITICAL CARE FIRST HOUR: CPT

## 2024-09-24 PROCEDURE — 82948 REAGENT STRIP/BLOOD GLUCOSE: CPT

## 2024-09-24 PROCEDURE — 74177 CT ABD & PELVIS W/CONTRAST: CPT

## 2024-09-24 PROCEDURE — 96366 THER/PROPH/DIAG IV INF ADDON: CPT

## 2024-09-24 PROCEDURE — 63710000001 ONDANSETRON ODT 4 MG TABLET DISPERSIBLE: Performed by: NURSE PRACTITIONER

## 2024-09-24 PROCEDURE — 25010000002 LABETALOL 5 MG/ML SOLUTION: Performed by: NURSE PRACTITIONER

## 2024-09-24 PROCEDURE — 25010000002 ONDANSETRON PER 1 MG: Performed by: NURSE PRACTITIONER

## 2024-09-24 PROCEDURE — 96376 TX/PRO/DX INJ SAME DRUG ADON: CPT

## 2024-09-24 PROCEDURE — 70450 CT HEAD/BRAIN W/O DYE: CPT

## 2024-09-24 PROCEDURE — 25010000002 HEPARIN (PORCINE) 25000-0.45 UT/250ML-% SOLUTION: Performed by: NURSE PRACTITIONER

## 2024-09-24 PROCEDURE — 71045 X-RAY EXAM CHEST 1 VIEW: CPT

## 2024-09-24 PROCEDURE — 25010000002 HEPARIN (PORCINE) PER 1000 UNITS: Performed by: NURSE PRACTITIONER

## 2024-09-24 PROCEDURE — 85610 PROTHROMBIN TIME: CPT | Performed by: NURSE PRACTITIONER

## 2024-09-24 RX ORDER — LABETALOL HYDROCHLORIDE 5 MG/ML
10 INJECTION, SOLUTION INTRAVENOUS ONCE
Status: COMPLETED | OUTPATIENT
Start: 2024-09-24 | End: 2024-09-24

## 2024-09-24 RX ORDER — BUDESONIDE AND FORMOTEROL FUMARATE DIHYDRATE 160; 4.5 UG/1; UG/1
2 AEROSOL RESPIRATORY (INHALATION) 2 TIMES DAILY
Status: DISCONTINUED | OUTPATIENT
Start: 2024-09-24 | End: 2024-09-27 | Stop reason: HOSPADM

## 2024-09-24 RX ORDER — SODIUM CHLORIDE 0.9 % (FLUSH) 0.9 %
10 SYRINGE (ML) INJECTION AS NEEDED
Status: DISCONTINUED | OUTPATIENT
Start: 2024-09-24 | End: 2024-09-27 | Stop reason: HOSPADM

## 2024-09-24 RX ORDER — ATORVASTATIN CALCIUM 10 MG/1
10 TABLET, FILM COATED ORAL NIGHTLY
Status: DISCONTINUED | OUTPATIENT
Start: 2024-09-24 | End: 2024-09-27 | Stop reason: HOSPADM

## 2024-09-24 RX ORDER — HEPARIN SODIUM 1000 [USP'U]/ML
4000 INJECTION, SOLUTION INTRAVENOUS; SUBCUTANEOUS ONCE
Status: COMPLETED | OUTPATIENT
Start: 2024-09-24 | End: 2024-09-24

## 2024-09-24 RX ORDER — HEPARIN SODIUM 1000 [USP'U]/ML
4000 INJECTION, SOLUTION INTRAVENOUS; SUBCUTANEOUS AS NEEDED
Status: DISCONTINUED | OUTPATIENT
Start: 2024-09-24 | End: 2024-09-24

## 2024-09-24 RX ORDER — ONDANSETRON 2 MG/ML
4 INJECTION INTRAMUSCULAR; INTRAVENOUS EVERY 6 HOURS PRN
Status: DISCONTINUED | OUTPATIENT
Start: 2024-09-24 | End: 2024-09-27 | Stop reason: HOSPADM

## 2024-09-24 RX ORDER — POTASSIUM CHLORIDE 1.5 G/1.58G
40 POWDER, FOR SOLUTION ORAL EVERY 4 HOURS
Status: COMPLETED | OUTPATIENT
Start: 2024-09-24 | End: 2024-09-25

## 2024-09-24 RX ORDER — SODIUM CHLORIDE 9 MG/ML
40 INJECTION, SOLUTION INTRAVENOUS AS NEEDED
Status: DISCONTINUED | OUTPATIENT
Start: 2024-09-24 | End: 2024-09-27 | Stop reason: HOSPADM

## 2024-09-24 RX ORDER — ASPIRIN 325 MG
325 TABLET, DELAYED RELEASE (ENTERIC COATED) ORAL DAILY
Status: DISCONTINUED | OUTPATIENT
Start: 2024-09-25 | End: 2024-09-27 | Stop reason: HOSPADM

## 2024-09-24 RX ORDER — HEPARIN SODIUM 1000 [USP'U]/ML
2000 INJECTION, SOLUTION INTRAVENOUS; SUBCUTANEOUS AS NEEDED
Status: DISCONTINUED | OUTPATIENT
Start: 2024-09-24 | End: 2024-09-24

## 2024-09-24 RX ORDER — IOPAMIDOL 755 MG/ML
85 INJECTION, SOLUTION INTRAVASCULAR
Status: COMPLETED | OUTPATIENT
Start: 2024-09-24 | End: 2024-09-24

## 2024-09-24 RX ORDER — AMOXICILLIN 250 MG
2 CAPSULE ORAL 2 TIMES DAILY PRN
Status: DISCONTINUED | OUTPATIENT
Start: 2024-09-24 | End: 2024-09-27 | Stop reason: HOSPADM

## 2024-09-24 RX ORDER — METOPROLOL TARTRATE 25 MG/1
25 TABLET, FILM COATED ORAL EVERY 12 HOURS SCHEDULED
Status: DISCONTINUED | OUTPATIENT
Start: 2024-09-24 | End: 2024-09-27 | Stop reason: HOSPADM

## 2024-09-24 RX ORDER — POLYETHYLENE GLYCOL 3350 17 G/17G
17 POWDER, FOR SOLUTION ORAL DAILY PRN
Status: DISCONTINUED | OUTPATIENT
Start: 2024-09-24 | End: 2024-09-27 | Stop reason: HOSPADM

## 2024-09-24 RX ORDER — ASPIRIN 81 MG/1
324 TABLET, CHEWABLE ORAL ONCE
Status: COMPLETED | OUTPATIENT
Start: 2024-09-24 | End: 2024-09-24

## 2024-09-24 RX ORDER — MORPHINE SULFATE 4 MG/ML
4 INJECTION, SOLUTION INTRAMUSCULAR; INTRAVENOUS ONCE
Status: COMPLETED | OUTPATIENT
Start: 2024-09-24 | End: 2024-09-24

## 2024-09-24 RX ORDER — SODIUM CHLORIDE 0.9 % (FLUSH) 0.9 %
10 SYRINGE (ML) INJECTION EVERY 12 HOURS SCHEDULED
Status: DISCONTINUED | OUTPATIENT
Start: 2024-09-24 | End: 2024-09-27 | Stop reason: HOSPADM

## 2024-09-24 RX ORDER — PANTOPRAZOLE SODIUM 40 MG/1
40 TABLET, DELAYED RELEASE ORAL
Status: DISCONTINUED | OUTPATIENT
Start: 2024-09-25 | End: 2024-09-27 | Stop reason: HOSPADM

## 2024-09-24 RX ORDER — BUPROPION HYDROCHLORIDE 150 MG/1
450 TABLET ORAL DAILY
Status: DISCONTINUED | OUTPATIENT
Start: 2024-09-25 | End: 2024-09-27 | Stop reason: HOSPADM

## 2024-09-24 RX ORDER — NITROGLYCERIN 0.4 MG/1
0.4 TABLET SUBLINGUAL
Status: DISCONTINUED | OUTPATIENT
Start: 2024-09-24 | End: 2024-09-27 | Stop reason: HOSPADM

## 2024-09-24 RX ORDER — BISACODYL 5 MG/1
5 TABLET, DELAYED RELEASE ORAL DAILY PRN
Status: DISCONTINUED | OUTPATIENT
Start: 2024-09-24 | End: 2024-09-27 | Stop reason: HOSPADM

## 2024-09-24 RX ORDER — ACETAMINOPHEN 325 MG/1
650 TABLET ORAL EVERY 4 HOURS PRN
Status: DISCONTINUED | OUTPATIENT
Start: 2024-09-24 | End: 2024-09-27 | Stop reason: HOSPADM

## 2024-09-24 RX ORDER — ALBUTEROL SULFATE 0.83 MG/ML
2.5 SOLUTION RESPIRATORY (INHALATION) 4 TIMES DAILY PRN
Status: DISCONTINUED | OUTPATIENT
Start: 2024-09-24 | End: 2024-09-27 | Stop reason: HOSPADM

## 2024-09-24 RX ORDER — MONTELUKAST SODIUM 10 MG/1
10 TABLET ORAL EVERY EVENING
Status: DISCONTINUED | OUTPATIENT
Start: 2024-09-24 | End: 2024-09-27 | Stop reason: HOSPADM

## 2024-09-24 RX ORDER — ACETAMINOPHEN 160 MG/5ML
650 SOLUTION ORAL EVERY 4 HOURS PRN
Status: DISCONTINUED | OUTPATIENT
Start: 2024-09-24 | End: 2024-09-27 | Stop reason: HOSPADM

## 2024-09-24 RX ORDER — NITROGLYCERIN 20 MG/100ML
5-200 INJECTION INTRAVENOUS
Status: DISCONTINUED | OUTPATIENT
Start: 2024-09-24 | End: 2024-09-25

## 2024-09-24 RX ORDER — BISACODYL 10 MG
10 SUPPOSITORY, RECTAL RECTAL DAILY PRN
Status: DISCONTINUED | OUTPATIENT
Start: 2024-09-24 | End: 2024-09-27 | Stop reason: HOSPADM

## 2024-09-24 RX ORDER — ONDANSETRON 2 MG/ML
4 INJECTION INTRAMUSCULAR; INTRAVENOUS ONCE
Status: COMPLETED | OUTPATIENT
Start: 2024-09-24 | End: 2024-09-24

## 2024-09-24 RX ORDER — ONDANSETRON 4 MG/1
4 TABLET, ORALLY DISINTEGRATING ORAL EVERY 6 HOURS PRN
Status: DISCONTINUED | OUTPATIENT
Start: 2024-09-24 | End: 2024-09-27 | Stop reason: HOSPADM

## 2024-09-24 RX ORDER — HEPARIN SODIUM 10000 [USP'U]/100ML
11 INJECTION, SOLUTION INTRAVENOUS
Status: DISCONTINUED | OUTPATIENT
Start: 2024-09-24 | End: 2024-09-25

## 2024-09-24 RX ORDER — LEVOTHYROXINE SODIUM 75 UG/1
75 TABLET ORAL EVERY MORNING
Status: DISCONTINUED | OUTPATIENT
Start: 2024-09-25 | End: 2024-09-27 | Stop reason: HOSPADM

## 2024-09-24 RX ORDER — ACETAMINOPHEN 650 MG/1
650 SUPPOSITORY RECTAL EVERY 4 HOURS PRN
Status: DISCONTINUED | OUTPATIENT
Start: 2024-09-24 | End: 2024-09-27 | Stop reason: HOSPADM

## 2024-09-24 RX ADMIN — HEPARIN SODIUM 12 UNITS/KG/HR: 10000 INJECTION, SOLUTION INTRAVENOUS at 17:37

## 2024-09-24 RX ADMIN — MONTELUKAST 10 MG: 10 TABLET, FILM COATED ORAL at 20:35

## 2024-09-24 RX ADMIN — POTASSIUM CHLORIDE 40 MEQ: 1.5 POWDER, FOR SOLUTION ORAL at 23:54

## 2024-09-24 RX ADMIN — ACETAMINOPHEN 650 MG: 325 TABLET ORAL at 20:42

## 2024-09-24 RX ADMIN — LABETALOL HYDROCHLORIDE 10 MG: 5 INJECTION, SOLUTION INTRAVENOUS at 15:07

## 2024-09-24 RX ADMIN — ONDANSETRON 4 MG: 4 TABLET, ORALLY DISINTEGRATING ORAL at 23:53

## 2024-09-24 RX ADMIN — Medication 10 ML: at 21:02

## 2024-09-24 RX ADMIN — IOPAMIDOL 85 ML: 755 INJECTION, SOLUTION INTRAVENOUS at 16:18

## 2024-09-24 RX ADMIN — METOPROLOL TARTRATE 25 MG: 25 TABLET, FILM COATED ORAL at 20:35

## 2024-09-24 RX ADMIN — SODIUM CHLORIDE 1000 ML: 9 INJECTION, SOLUTION INTRAVENOUS at 15:06

## 2024-09-24 RX ADMIN — ATORVASTATIN CALCIUM 10 MG: 10 TABLET, FILM COATED ORAL at 20:35

## 2024-09-24 RX ADMIN — NITROGLYCERIN 5 MCG/MIN: 20 INJECTION INTRAVENOUS at 17:00

## 2024-09-24 RX ADMIN — ONDANSETRON 4 MG: 2 INJECTION INTRAMUSCULAR; INTRAVENOUS at 16:59

## 2024-09-24 RX ADMIN — ONDANSETRON 4 MG: 2 INJECTION INTRAMUSCULAR; INTRAVENOUS at 15:07

## 2024-09-24 RX ADMIN — ASPIRIN 81 MG 324 MG: 81 TABLET ORAL at 15:07

## 2024-09-24 RX ADMIN — HEPARIN SODIUM 4000 UNITS: 1000 INJECTION INTRAVENOUS; SUBCUTANEOUS at 17:36

## 2024-09-24 RX ADMIN — POTASSIUM CHLORIDE 40 MEQ: 1.5 POWDER, FOR SOLUTION ORAL at 20:35

## 2024-09-24 RX ADMIN — MORPHINE SULFATE 4 MG: 4 INJECTION, SOLUTION INTRAMUSCULAR; INTRAVENOUS at 17:00

## 2024-09-24 NOTE — CONSULTS
HEPARIN INFUSION  Yanet Clifton is a  59 y.o. female receiving heparin infusion.     Therapy for (VTE/Cardiac):   Cardiac   Patient Weight: 83.9 kg  Initial Bolus (Y/N):   Y  Any Bolus (Y/N):   Y        Signs or Symptoms of Bleeding: N/A    Cardiac or Other (Not VTE)   Initial Bolus: 60 units/kg (Max 4,000 units)  Initial rate: 12 units/kg/hr (Max 1,000 units/hr)   Anti Xa Rebolus Infusion Hold time Change infusion Dose (Units/kg/hr) Next Anti Xa or aPTT Level Due   < 0.11 50 Units/kg  (4000 Units Max) None Increase by  3 Units/kg/hr 6 hours   0.11- 0.19 25 Units/kg  (2000 Units Max) None Increase by  2 Units/kg/hr 6 hours   0.2 - 0.29 0 None Increase by  1 Units/kg/hr 6 hours   0.3 - 0.5 0 None No Change 6 hours (after 2 consecutive levels in range check qAM)   0.51 - 0.6 0 None Decrease by  1 Units/kg/hr 6 hours   0.61 - 0.8 0 30 Minutes Decrease by  2 Units/kg/hr 6 hours   0.81 - 1 0 60 Minutes Decrease by  3 Units/kg/hr 6 hours   >1 0 Hold  After Anti Xa less than 0.5 decrease previous rate by  4 Units/kg/hr  Every 2 hours until Anti Xa  less than 0.5 then when infusion restarts in 6 hours     Recommend Xa every 6 hours.     Results from last 7 days   Lab Units 09/24/24  1506 09/24/24  1458   INR   --  1.00   HEMOGLOBIN g/dL  --  12.8   HEMOGLOBIN, POC g/dL 11.9*  --    HEMATOCRIT %  --  36.7   HEMATOCRIT POC % 35*  --    PLATELETS 10*3/mm3  --  207          Date   Time   Anti-Xa Current Rate (Unit/kg/hr) Bolus   (Units) Rate Change   (Unit/kg/hr) New Rate (Unit/kg/hr) Next   Anti-Xa Comments  Pump Check Daily   9/24 1735 PTT 29.4 New 4000 +12 12 2300 Dw ED APRN, RN. Pump confirmed. Patient/spouse counseled                 Toby Riojas IV, PharmD, BCPS  9/24/2024  18:37 EDT

## 2024-09-24 NOTE — ED PROVIDER NOTES
Subjective   History of Present Illness  Patient presents the ER for nausea vomiting and chest tightness.  Started around 11:00 today radiate up into her chest and down both arms.  She also has a headache.  She is on immunosuppressants for autoimmune disease.  She is hypertensive on arrival.  She works as a [employer] teacher.  She denies any fevers.  She has a history of aseptic meningitis but she tells me this feels different.        Review of Systems    Past Medical History:   Diagnosis Date    Aseptic meningitis 12/2009    Encephalitis 2014    Hx of bone density study 04/04/2023    DEXA (4/4/23): L/H -0.3, repeat 3 yrs    Hx of colonoscopy 05/24/2016    colonosc (5/24/16): hyperplastic polyp, ext/int hemorrhoids, diverticulosis, repeat 5 yrs; GI - Dr. Robertson    Hx of colonoscopy 08/16/2021    nl colonosc (8/16/21) except mild sigmoid diverticulosis, repeat 5 yrs; CRS - Dr. Berrios    Hx of CT scan of abd/pelvis 05/13/2024    CT abd/pelvis (5/13/24):    Hx of EGD 05/24/2016    EGD (5/24/16): gastritis, reflux esophagitis, neg for H.pylori; GI - Dr. Robertson    Hx of nl SPEP 12/18/2019       Allergies   Allergen Reactions    Adalimumab      Other reaction(s): Headache (Humira)    Duloxetine Hcl Other (See Comments)     sexual dysfunction    Escitalopram Oxalate Other (See Comments)     Weight gain    Macrobid [Nitrofurantoin Macrocrystal] Nausea Only    Cimzia [Certolizumab Pegol] Rash    Sulfa Antibiotics Rash       Past Surgical History:   Procedure Laterality Date    BREAST BIOPSY Left 02/09/2018    u/s guided axillary lymph node    BREAST EXCISIONAL BIOPSY Left 1984    CHOLECYSTECTOMY  2001    secondary to cholelithiasis    CYSTOSCOPY W/ LASER LITHOTRIPSY      LUMBAR PUNCTURE  04/2019    hospitalized at [facility]; ? viral meningitis    MAMMO SKIN LESION BIOPSY SINGLE LESION UNILATERAL Left 01/30/2017    s/p L. breast skin bx (1/30/17); nonspecific chronic perivascular dermatitis    MAMMO US BREAST BIOPSY 1ST W WO DEVICE  UNILATERAL Left 02/09/2018    s/p u/s-guided L. breast axillary lymph node bx (2/9/18): benign reactive LN, repeat dx mammo in 6 mos; rads - Dr. Bailey    TEMPORAL ARTERY BIOPSY Left 11/05/2015    neg for arteritis; surg - Dr. Cunha    TONSILLECTOMY      TOTAL ABDOMINAL HYSTERECTOMY WITH SALPINGO OOPHORECTOMY  03/2013    secondary to fibroids/endometriosis (3/13); GYN - Dr. Naik       Family History   Problem Relation Age of Onset    Fibromyalgia Mother     Migraines Mother     Depression Mother     Hypertension Sister     Other Sister         loss of hair from head    Migraines Sister     Asthma Sister     Breast cancer Maternal Grandmother 60    Cancer Maternal Grandmother         Breast Cancer    Multiple myeloma Paternal Grandmother     Cancer Paternal Grandmother         Multiple Myeloma    Multiple myeloma Paternal Uncle     Thyroid disease Neg Hx     Diabetes Neg Hx     BRCA 1/2 Neg Hx     Colon cancer Neg Hx     Endometrial cancer Neg Hx     Ovarian cancer Neg Hx        Social History     Socioeconomic History    Marital status:     Number of children: 2   Tobacco Use    Smoking status: Never     Passive exposure: Never    Smokeless tobacco: Never   Vaping Use    Vaping status: Never Used   Substance and Sexual Activity    Alcohol use: Yes     Alcohol/week: 0.0 standard drinks of alcohol     Comment: Very rarely    Drug use: No    Sexual activity: Yes     Partners: Male     Birth control/protection: Post-menopausal           Objective   Physical Exam  Constitutional:       Appearance: She is well-developed.   HENT:      Head: Normocephalic and atraumatic.      Right Ear: External ear normal.      Left Ear: External ear normal.      Nose: Nose normal.   Eyes:      Conjunctiva/sclera: Conjunctivae normal.      Pupils: Pupils are equal, round, and reactive to light.   Cardiovascular:      Rate and Rhythm: Normal rate and regular rhythm.      Heart sounds: Normal heart sounds.   Pulmonary:       Effort: Pulmonary effort is normal.      Breath sounds: Normal breath sounds.   Abdominal:      General: Bowel sounds are normal.      Palpations: Abdomen is soft.   Musculoskeletal:         General: Normal range of motion.      Cervical back: Normal range of motion and neck supple.   Skin:     General: Skin is warm and dry.   Neurological:      Mental Status: She is alert and oriented to person, place, and time.   Psychiatric:         Behavior: Behavior normal.         Thought Content: Thought content normal.         Judgment: Judgment normal.         Critical Care    Performed by: Foreign Mckay APRN  Authorized by: Aakash Roth MD    Critical care provider statement:     Critical care time (minutes):  35    Critical care time was exclusive of:  Separately billable procedures and treating other patients    Critical care was necessary to treat or prevent imminent or life-threatening deterioration of the following conditions: nstemi.    Critical care was time spent personally by me on the following activities:  Ordering and performing treatments and interventions, ordering and review of laboratory studies, ordering and review of radiographic studies, pulse oximetry, re-evaluation of patient's condition, review of old charts, obtaining history from patient or surrogate, examination of patient, evaluation of patient's response to treatment, discussions with consultants and development of treatment plan with patient or surrogate             ED Course  ED Course as of 09/24/24 1756   Tue Sep 24, 2024   1419 EKG independently interpreted by myself with no acute process   [JM]   1422 Broad differential.  Patient is on immunosuppressants.  She is hypertensive with nausea.  We need to control her nausea as well as her blood pressure.  Most likely will need to get some imaging as well.  Reassuringly she denies any pleuritic chest pain.  She does have nausea.  She does have some tightness in her chest that does go  around her arms as well.  Most likely will get scans for further evaluation. [JM]   1426 Pleasant patient with broad differential including hypertension and headache.  Pain in her chest that radiates down both arms.  Along with nausea vomiting.  Will need to get multiple CAT scans. [JM]   1635 Awaiting second troponin and second EKG [JM]   1657 Pt pain free. I have paged cardiology to discuss. [JM]   1711 Spoke to cardiology Dr. Broderick.  He recommends treating as a non-STEMI.  Of also sent him a picture of her EKG. [JM]   1713 Anterior changes when compared to EKG from 2017 [JM]      ED Course User Index  [JM] Foreign Mckay APRN                HEART Score: 7                              Medical Decision Making  Problems Addressed:  Acute chest pain: complicated acute illness or injury  Hypertensive emergency: complicated acute illness or injury  NSTEMI (non-ST elevated myocardial infarction): complicated acute illness or injury    Amount and/or Complexity of Data Reviewed  External Data Reviewed: labs, radiology and ECG.  Labs: ordered.  Radiology: ordered. Decision-making details documented in ED Course.  ECG/medicine tests: ordered. Decision-making details documented in ED Course.    Risk  OTC drugs.  Prescription drug management.  Decision regarding hospitalization.        Final diagnoses:   NSTEMI (non-ST elevated myocardial infarction)   Acute chest pain   Hypertensive emergency       ED Disposition  ED Disposition       ED Disposition   Decision to Admit    Condition   --    Comment   Level of Care: Telemetry [5]   Diagnosis: NSTEMI (non-ST elevated myocardial infarction) [088232]   Admitting Physician: KERLEY, BRIAN JOSEPH [[id number]]   Attending Physician: KERLEY, BRIAN JOSEPH [[id number]]                 No follow-up provider specified.       Medication List      No changes were made to your prescriptions during this visit.            Foreign Mckay APRN  09/24/24 1715       Foreign Mckay APRN  09/24/24 1718        Foreign Mckay APRN  09/24/24 1748       Foreign Mckay APRN  09/24/24 1753

## 2024-09-24 NOTE — ED NOTES
Yanet Clifton    Nursing Report ED to Floor:  Mental status: a&ox4  Ambulatory status: up ad desean  Oxygen Therapy:  room air   Cardiac Rhythm: NSR  Admitted from: home /ed  Safety Concerns:  n/a  Social Issues: n/a  ED Room #:  18    ED Nurse Phone Extension - [phone] or may call [phone].      HPI:   Chief Complaint   Patient presents with    Chest Pain       Past Medical History:  Past Medical History:   Diagnosis Date    Aseptic meningitis 12/2009    Encephalitis 2014    Hx of bone density study 04/04/2023    DEXA (4/4/23): L/H -0.3, repeat 3 yrs    Hx of colonoscopy 05/24/2016    colonosc (5/24/16): hyperplastic polyp, ext/int hemorrhoids, diverticulosis, repeat 5 yrs; GI - Dr. Robertson    Hx of colonoscopy 08/16/2021    nl colonosc (8/16/21) except mild sigmoid diverticulosis, repeat 5 yrs; CRS - Dr. Berrios    Hx of CT scan of abd/pelvis 05/13/2024    CT abd/pelvis (5/13/24):    Hx of EGD 05/24/2016    EGD (5/24/16): gastritis, reflux esophagitis, neg for H.pylori; GI - Dr. Robertson    Hx of nl SPEP 12/18/2019        Past Surgical History:  Past Surgical History:   Procedure Laterality Date    BREAST BIOPSY Left 02/09/2018    u/s guided axillary lymph node    BREAST EXCISIONAL BIOPSY Left 1984    CHOLECYSTECTOMY  2001    secondary to cholelithiasis    CYSTOSCOPY W/ LASER LITHOTRIPSY      LUMBAR PUNCTURE  04/2019    hospitalized at [facility]; ? viral meningitis    MAMMO SKIN LESION BIOPSY SINGLE LESION UNILATERAL Left 01/30/2017    s/p L. breast skin bx (1/30/17); nonspecific chronic perivascular dermatitis    MAMMO US BREAST BIOPSY 1ST W WO DEVICE UNILATERAL Left 02/09/2018    s/p u/s-guided L. breast axillary lymph node bx (2/9/18): benign reactive LN, repeat dx mammo in 6 mos; rads - Dr. Bailey    TEMPORAL ARTERY BIOPSY Left 11/05/2015    neg for arteritis; surg - Dr. Cunha    TONSILLECTOMY      TOTAL ABDOMINAL HYSTERECTOMY WITH SALPINGO OOPHORECTOMY  03/2013    secondary to fibroids/endometriosis (3/13); GYN -   Gumaro        Admitting Doctor:   Brian Joseph Kerley, DO    Consulting Provider(s):  Consults       Date and Time Order Name Status Description    9/24/2024  5:19 PM Inpatient Cardiology Consult               Admitting Diagnosis:   The primary encounter diagnosis was NSTEMI (non-ST elevated myocardial infarction). Diagnoses of Acute chest pain and Hypertensive emergency were also pertinent to this visit.    Most Recent Vitals:   Vitals:    09/24/24 1720 09/24/24 1730 09/24/24 1740 09/24/24 1800   BP: (!) 176/106 (!) 173/138 (!) 168/110 154/98   BP Location:       Patient Position:       Pulse: 75 76 75 75   Resp:       Temp:       TempSrc:       SpO2: 97% 96% 97% 97%   Weight:       Height:           Active LDAs/IV Access:   Lines, Drains & Airways       Active LDAs       Name Placement date Placement time Site Days    Peripheral IV 09/24/24 1459 Right Antecubital 09/24/24  1459  Antecubital  less than 1                    Labs (abnormal labs have a star):   Labs Reviewed   TROPONIN - Abnormal; Notable for the following components:       Result Value    HS Troponin T 56 (*)     All other components within normal limits    Narrative:     High Sensitive Troponin T Reference Range:  <14.0 ng/L- Negative Female for AMI  <22.0 ng/L- Negative Male for AMI  >=14 - Abnormal Female indicating possible myocardial injury.  >=22 - Abnormal Male indicating possible myocardial injury.   Clinicians would have to utilize clinical acumen, EKG, Troponin, and serial changes to determine if it is an Acute Myocardial Infarction or myocardial injury due to an underlying chronic condition.        COMPREHENSIVE METABOLIC PANEL - Abnormal; Notable for the following components:    Potassium 3.1 (*)     Chloride 108 (*)     All other components within normal limits    Narrative:     GFR Normal >60  Chronic Kidney Disease <60  Kidney Failure <15     CBC WITH AUTO DIFFERENTIAL - Abnormal; Notable for the following components:    RDW 11.7 (*)      RDW-SD 36.2 (*)     Lymphocyte % 13.8 (*)     Eosinophil % 10.0 (*)     Lymphocytes, Absolute 0.58 (*)     Eosinophils, Absolute 0.42 (*)     All other components within normal limits   HIGH SENSITIVITIY TROPONIN T 2HR - Abnormal; Notable for the following components:    HS Troponin T 54 (*)     All other components within normal limits    Narrative:     High Sensitive Troponin T Reference Range:  <14.0 ng/L- Negative Female for AMI  <22.0 ng/L- Negative Male for AMI  >=14 - Abnormal Female indicating possible myocardial injury.  >=22 - Abnormal Male indicating possible myocardial injury.   Clinicians would have to utilize clinical acumen, EKG, Troponin, and serial changes to determine if it is an Acute Myocardial Infarction or myocardial injury due to an underlying chronic condition.        POCT CHEM 8 - Abnormal; Notable for the following components:    POC Potassium 3.0 (*)     Total CO2 21 (*)     Hemoglobin 11.9 (*)     Hematocrit 35 (*)     eGFR 58.0 (*)     All other components within normal limits   LIPASE - Normal   BNP (IN-HOUSE) - Normal    Narrative:     This assay is used as an aid in the diagnosis of individuals suspected of having heart failure. It can be used as an aid in the diagnosis of acute decompensated heart failure (ADHF) in patients presenting with signs and symptoms of ADHF to the emergency department (ED). In addition, NT-proBNP of <300 pg/mL indicates ADHF is not likely.    Age Range Result Interpretation  NT-proBNP Concentration (pg/mL:      <50             Positive            >450                   Gray                 300-450                    Negative             <300    50-75           Positive            >900                  Gray                300-900                  Negative            <300      >75             Positive            >1800                  Gray                300-1800                  Negative            <300   LACTIC ACID, PLASMA - Normal   POCT CREATININE -  Normal   RAINBOW DRAW    Narrative:     The following orders were created for panel order [place] Draw.  Procedure                               Abnormality         Status                     ---------                               -----------         ------                     Green Top (Gel)[[id number]]                                  Final result               Lavender Top[[id number]]                                     Final result               Gold Top - SST[[id number]]                                   Final result               Mike Top[[id number]]                                         Final result               Light Blue Top[[id number]]                                   Final result                 Please view results for these tests on the individual orders.   PROTIME-INR   APTT   CBC AND DIFFERENTIAL    Narrative:     The following orders were created for panel order CBC & Differential.  Procedure                               Abnormality         Status                     ---------                               -----------         ------                     CBC Auto Differential[[id number]]        Abnormal            Final result                 Please view results for these tests on the individual orders.   GREEN TOP   LAVENDER TOP   GOLD TOP - SST   GRAY TOP   LIGHT BLUE TOP       Meds Given in ED:   Medications   sodium chloride 0.9 % flush 10 mL (has no administration in time range)   nitroglycerin (NITROSTAT) SL tablet 0.4 mg (has no administration in time range)   nitroglycerin (TRIDIL) 200 mcg/ml infusion (30 mcg/min Intravenous Rate/Dose Change 9/24/24 1813)   heparin 27629 units/250 mL (100 units/mL) in 0.45 % NaCl infusion (12 Units/kg/hr × 83.9 kg Intravenous New Bag 9/24/24 7017)   Pharmacy to Dose Heparin (has no administration in time range)   aspirin chewable tablet 324 mg (324 mg Oral Given 9/24/24 1507)   labetalol (NORMODYNE,TRANDATE) injection 10 mg (10 mg Intravenous Given 9/24/24 1507)    ondansetron (ZOFRAN) injection 4 mg (4 mg Intravenous Given 9/24/24 1507)   sodium chloride 0.9 % bolus 1,000 mL (0 mL Intravenous Stopped 9/24/24 1647)   iopamidol (ISOVUE-370) 76 % injection 85 mL (85 mL Intravenous Given 9/24/24 1618)   Morphine sulfate (PF) injection 4 mg (4 mg Intravenous Given 9/24/24 1700)   ondansetron (ZOFRAN) injection 4 mg (4 mg Intravenous Given 9/24/24 1659)   heparin (porcine) injection 4,000 Units (4,000 Units Intravenous Given 9/24/24 1736)     heparin, 12 Units/kg/hr, Last Rate: 12 Units/kg/hr (09/24/24 1737)  nitroglycerin, 5-200 mcg/min, Last Rate: 30 mcg/min (09/24/24 1813)  Pharmacy to Dose Heparin,          Last NIH score:                                                          Dysphagia screening results:        Allen Coma Scale:  No data recorded     CIWA:        Restraint Type:            Isolation Status:  No active isolations

## 2024-09-25 ENCOUNTER — APPOINTMENT (OUTPATIENT)
Dept: CARDIOLOGY | Facility: HOSPITAL | Age: 59
End: 2024-09-25
Payer: COMMERCIAL

## 2024-09-25 LAB
ALBUMIN UR-MCNC: <1.2 MG/DL
ANION GAP SERPL CALCULATED.3IONS-SCNC: 10 MMOL/L (ref 5–15)
BASOPHILS # BLD AUTO: 0.02 10*3/MM3 (ref 0–0.2)
BASOPHILS NFR BLD AUTO: 0.6 % (ref 0–1.5)
BH CV ECHO MEAS - AO MAX PG: 7 MMHG
BH CV ECHO MEAS - AO MEAN PG: 4 MMHG
BH CV ECHO MEAS - AO ROOT DIAM: 2.9 CM
BH CV ECHO MEAS - AO V2 MAX: 132 CM/SEC
BH CV ECHO MEAS - AO V2 VTI: 28.4 CM
BH CV ECHO MEAS - AVA(I,D): 2.41 CM2
BH CV ECHO MEAS - EDV(CUBED): 97.3 ML
BH CV ECHO MEAS - EDV(MOD-SP2): 105 ML
BH CV ECHO MEAS - EDV(MOD-SP4): 106 ML
BH CV ECHO MEAS - EF(MOD-BP): 53.2 %
BH CV ECHO MEAS - EF(MOD-SP2): 55 %
BH CV ECHO MEAS - EF(MOD-SP4): 51.8 %
BH CV ECHO MEAS - ESV(CUBED): 27 ML
BH CV ECHO MEAS - ESV(MOD-SP2): 47.2 ML
BH CV ECHO MEAS - ESV(MOD-SP4): 51.1 ML
BH CV ECHO MEAS - FS: 34.8 %
BH CV ECHO MEAS - IVS/LVPW: 0.89 CM
BH CV ECHO MEAS - IVSD: 0.8 CM
BH CV ECHO MEAS - LA DIMENSION: 3.9 CM
BH CV ECHO MEAS - LAT PEAK E' VEL: 12 CM/SEC
BH CV ECHO MEAS - LV MASS(C)D: 127.7 GRAMS
BH CV ECHO MEAS - LV MAX PG: 4 MMHG
BH CV ECHO MEAS - LV MEAN PG: 2 MMHG
BH CV ECHO MEAS - LV V1 MAX: 100 CM/SEC
BH CV ECHO MEAS - LV V1 VTI: 21.8 CM
BH CV ECHO MEAS - LVIDD: 4.6 CM
BH CV ECHO MEAS - LVIDS: 3 CM
BH CV ECHO MEAS - LVOT AREA: 3.1 CM2
BH CV ECHO MEAS - LVOT DIAM: 2 CM
BH CV ECHO MEAS - LVPWD: 0.9 CM
BH CV ECHO MEAS - MED PEAK E' VEL: 9.5 CM/SEC
BH CV ECHO MEAS - MR MAX PG: 69.6 MMHG
BH CV ECHO MEAS - MR MAX VEL: 417 CM/SEC
BH CV ECHO MEAS - MV A MAX VEL: 82.8 CM/SEC
BH CV ECHO MEAS - MV DEC SLOPE: 373 CM/SEC2
BH CV ECHO MEAS - MV DEC TIME: 0.19 SEC
BH CV ECHO MEAS - MV E MAX VEL: 101 CM/SEC
BH CV ECHO MEAS - MV E/A: 1.22
BH CV ECHO MEAS - MV MAX PG: 4.9 MMHG
BH CV ECHO MEAS - MV MEAN PG: 2 MMHG
BH CV ECHO MEAS - MV P1/2T: 89.5 MSEC
BH CV ECHO MEAS - MV V2 VTI: 31.4 CM
BH CV ECHO MEAS - MVA(P1/2T): 2.46 CM2
BH CV ECHO MEAS - MVA(VTI): 2.18 CM2
BH CV ECHO MEAS - PA ACC TIME: 0.13 SEC
BH CV ECHO MEAS - PA V2 MAX: 81.8 CM/SEC
BH CV ECHO MEAS - PI END-D VEL: 97.9 CM/SEC
BH CV ECHO MEAS - RAP SYSTOLE: 3 MMHG
BH CV ECHO MEAS - RVSP: 25 MMHG
BH CV ECHO MEAS - SV(LVOT): 68.3 ML
BH CV ECHO MEAS - SV(MOD-SP2): 57.8 ML
BH CV ECHO MEAS - SV(MOD-SP4): 54.9 ML
BH CV ECHO MEAS - TAPSE (>1.6): 1.78 CM
BH CV ECHO MEAS - TR MAX PG: 22 MMHG
BH CV ECHO MEAS - TR MAX VEL: 228 CM/SEC
BH CV ECHO MEASUREMENTS AVERAGE E/E' RATIO: 9.4
BH CV REST NUCLEAR ISOTOPE DOSE: 29.8 MCI
BH CV STRESS BP STAGE 1: NORMAL
BH CV STRESS BP STAGE 3: NORMAL
BH CV STRESS COMMENTS STAGE 1: NORMAL
BH CV STRESS DOSE REGADENOSON STAGE 1: 0.4
BH CV STRESS DURATION MIN STAGE 1: 1
BH CV STRESS DURATION MIN STAGE 2: 1
BH CV STRESS DURATION MIN STAGE 3: 1
BH CV STRESS DURATION MIN STAGE 4: 1
BH CV STRESS DURATION SEC STAGE 1: 0
BH CV STRESS DURATION SEC STAGE 2: 0
BH CV STRESS DURATION SEC STAGE 3: 0
BH CV STRESS DURATION SEC STAGE 4: 0
BH CV STRESS HR STAGE 1: 82
BH CV STRESS HR STAGE 2: 87
BH CV STRESS HR STAGE 3: 83
BH CV STRESS HR STAGE 4: 82
BH CV STRESS NUCLEAR ISOTOPE DOSE: 30.1 MCI
BH CV STRESS O2 STAGE 1: 100
BH CV STRESS O2 STAGE 2: 100
BH CV STRESS O2 STAGE 3: 100
BH CV STRESS O2 STAGE 4: 99
BH CV STRESS PROTOCOL 1: NORMAL
BH CV STRESS RECOVERY BP: NORMAL MMHG
BH CV STRESS RECOVERY HR: 77 BPM
BH CV STRESS RECOVERY O2: 97 %
BH CV STRESS STAGE 1: 1
BH CV STRESS STAGE 2: 2
BH CV STRESS STAGE 3: 3
BH CV STRESS STAGE 4: 4
BH CV XLRA - RV BASE: 3.6 CM
BH CV XLRA - RV LENGTH: 7.6 CM
BH CV XLRA - RV MID: 3.2 CM
BH CV XLRA - TDI S': 10.3 CM/SEC
BUN SERPL-MCNC: 16 MG/DL (ref 6–20)
BUN/CREAT SERPL: 16.3 (ref 7–25)
CALCIUM SPEC-SCNC: 8.5 MG/DL (ref 8.6–10.5)
CHLORIDE SERPL-SCNC: 108 MMOL/L (ref 98–107)
CHOLEST SERPL-MCNC: 138 MG/DL (ref 0–200)
CO2 SERPL-SCNC: 24 MMOL/L (ref 22–29)
CREAT SERPL-MCNC: 0.98 MG/DL (ref 0.57–1)
CRP SERPL-MCNC: 1.08 MG/DL (ref 0.01–0.5)
DEPRECATED RDW RBC AUTO: 39.1 FL (ref 37–54)
EGFRCR SERPLBLD CKD-EPI 2021: 66.6 ML/MIN/1.73
EOSINOPHIL # BLD AUTO: 0.25 10*3/MM3 (ref 0–0.4)
EOSINOPHIL NFR BLD AUTO: 7.1 % (ref 0.3–6.2)
ERYTHROCYTE [DISTWIDTH] IN BLOOD BY AUTOMATED COUNT: 12 % (ref 12.3–15.4)
ERYTHROCYTE [SEDIMENTATION RATE] IN BLOOD: 13 MM/HR (ref 0–30)
GLUCOSE SERPL-MCNC: 82 MG/DL (ref 65–99)
HBA1C MFR BLD: 4.8 % (ref 4.8–5.6)
HCT VFR BLD AUTO: 31.9 % (ref 34–46.6)
HDLC SERPL-MCNC: 40 MG/DL (ref 40–60)
HGB BLD-MCNC: 10.7 G/DL (ref 12–15.9)
IMM GRANULOCYTES # BLD AUTO: 0.01 10*3/MM3 (ref 0–0.05)
IMM GRANULOCYTES NFR BLD AUTO: 0.3 % (ref 0–0.5)
LDLC SERPL CALC-MCNC: 78 MG/DL (ref 0–100)
LDLC/HDLC SERPL: 1.9 {RATIO}
LEFT ATRIUM VOLUME INDEX: 23.4 ML/M2
LV EF NUC BP: 69 %
LYMPHOCYTES # BLD AUTO: 0.77 10*3/MM3 (ref 0.7–3.1)
LYMPHOCYTES NFR BLD AUTO: 22 % (ref 19.6–45.3)
MAXIMAL PREDICTED HEART RATE: 161 BPM
MCH RBC QN AUTO: 30 PG (ref 26.6–33)
MCHC RBC AUTO-ENTMCNC: 33.5 G/DL (ref 31.5–35.7)
MCV RBC AUTO: 89.4 FL (ref 79–97)
MONOCYTES # BLD AUTO: 0.39 10*3/MM3 (ref 0.1–0.9)
MONOCYTES NFR BLD AUTO: 11.1 % (ref 5–12)
NEUTROPHILS NFR BLD AUTO: 2.06 10*3/MM3 (ref 1.7–7)
NEUTROPHILS NFR BLD AUTO: 58.9 % (ref 42.7–76)
NRBC BLD AUTO-RTO: 0 /100 WBC (ref 0–0.2)
PERCENT MAX PREDICTED HR: 54.04 %
PLATELET # BLD AUTO: 183 10*3/MM3 (ref 140–450)
PMV BLD AUTO: 10.9 FL (ref 6–12)
POTASSIUM SERPL-SCNC: 3.9 MMOL/L (ref 3.5–5.2)
POTASSIUM SERPL-SCNC: 4 MMOL/L (ref 3.5–5.2)
RBC # BLD AUTO: 3.57 10*6/MM3 (ref 3.77–5.28)
SODIUM SERPL-SCNC: 142 MMOL/L (ref 136–145)
STRESS BASELINE BP: NORMAL MMHG
STRESS BASELINE HR: 61 BPM
STRESS O2 SAT REST: 98 %
STRESS PERCENT HR: 64 %
STRESS POST ESTIMATED WORKLOAD: 1 METS
STRESS POST EXERCISE DUR MIN: 4 MIN
STRESS POST EXERCISE DUR SEC: 0 SEC
STRESS POST O2 SAT PEAK: 100 %
STRESS POST PEAK BP: NORMAL MMHG
STRESS POST PEAK HR: 87 BPM
STRESS TARGET HR: 137 BPM
TRIGL SERPL-MCNC: 111 MG/DL (ref 0–150)
TROPONIN T SERPL HS-MCNC: 62 NG/L
UFH PPP CHRO-ACNC: 0.47 IU/ML (ref 0.3–0.7)
UFH PPP CHRO-ACNC: 0.58 IU/ML (ref 0.3–0.7)
VLDLC SERPL-MCNC: 20 MG/DL (ref 5–40)
WBC NRBC COR # BLD AUTO: 3.5 10*3/MM3 (ref 3.4–10.8)

## 2024-09-25 PROCEDURE — 0 RUBIDIUM CHLORIDE: Performed by: INTERNAL MEDICINE

## 2024-09-25 PROCEDURE — 93018 CV STRESS TEST I&R ONLY: CPT | Performed by: INTERNAL MEDICINE

## 2024-09-25 PROCEDURE — G0378 HOSPITAL OBSERVATION PER HR: HCPCS

## 2024-09-25 PROCEDURE — 96376 TX/PRO/DX INJ SAME DRUG ADON: CPT

## 2024-09-25 PROCEDURE — 83529 ASAY OF INTERLEUKIN-6 (IL-6): CPT | Performed by: INTERNAL MEDICINE

## 2024-09-25 PROCEDURE — 83036 HEMOGLOBIN GLYCOSYLATED A1C: CPT | Performed by: NURSE PRACTITIONER

## 2024-09-25 PROCEDURE — 99204 OFFICE O/P NEW MOD 45 MIN: CPT | Performed by: INTERNAL MEDICINE

## 2024-09-25 PROCEDURE — 80061 LIPID PANEL: CPT | Performed by: NURSE PRACTITIONER

## 2024-09-25 PROCEDURE — A9555 RB82 RUBIDIUM: HCPCS | Performed by: INTERNAL MEDICINE

## 2024-09-25 PROCEDURE — 82043 UR ALBUMIN QUANTITATIVE: CPT | Performed by: INTERNAL MEDICINE

## 2024-09-25 PROCEDURE — 93017 CV STRESS TEST TRACING ONLY: CPT

## 2024-09-25 PROCEDURE — 78431 MYOCRD IMG PET RST&STRS CT: CPT | Performed by: INTERNAL MEDICINE

## 2024-09-25 PROCEDURE — 84132 ASSAY OF SERUM POTASSIUM: CPT | Performed by: STUDENT IN AN ORGANIZED HEALTH CARE EDUCATION/TRAINING PROGRAM

## 2024-09-25 PROCEDURE — 25010000002 REGADENOSON 0.4 MG/5ML SOLUTION: Performed by: INTERNAL MEDICINE

## 2024-09-25 PROCEDURE — 93306 TTE W/DOPPLER COMPLETE: CPT | Performed by: INTERNAL MEDICINE

## 2024-09-25 PROCEDURE — 99232 SBSQ HOSP IP/OBS MODERATE 35: CPT | Performed by: HOSPITALIST

## 2024-09-25 PROCEDURE — 85025 COMPLETE CBC W/AUTO DIFF WBC: CPT | Performed by: NURSE PRACTITIONER

## 2024-09-25 PROCEDURE — 80048 BASIC METABOLIC PNL TOTAL CA: CPT | Performed by: NURSE PRACTITIONER

## 2024-09-25 PROCEDURE — 94799 UNLISTED PULMONARY SVC/PX: CPT

## 2024-09-25 PROCEDURE — 85520 HEPARIN ASSAY: CPT

## 2024-09-25 PROCEDURE — 86141 C-REACTIVE PROTEIN HS: CPT | Performed by: INTERNAL MEDICINE

## 2024-09-25 PROCEDURE — 85652 RBC SED RATE AUTOMATED: CPT | Performed by: INTERNAL MEDICINE

## 2024-09-25 PROCEDURE — 78431 MYOCRD IMG PET RST&STRS CT: CPT

## 2024-09-25 PROCEDURE — 93306 TTE W/DOPPLER COMPLETE: CPT

## 2024-09-25 PROCEDURE — 25010000002 KETOROLAC TROMETHAMINE PER 15 MG: Performed by: HOSPITALIST

## 2024-09-25 PROCEDURE — 84484 ASSAY OF TROPONIN QUANT: CPT | Performed by: NURSE PRACTITIONER

## 2024-09-25 PROCEDURE — 96366 THER/PROPH/DIAG IV INF ADDON: CPT

## 2024-09-25 RX ORDER — CHLORTHALIDONE 25 MG/1
25 TABLET ORAL DAILY
Status: DISCONTINUED | OUTPATIENT
Start: 2024-09-25 | End: 2024-09-27 | Stop reason: HOSPADM

## 2024-09-25 RX ORDER — KETOROLAC TROMETHAMINE 15 MG/ML
15 INJECTION, SOLUTION INTRAMUSCULAR; INTRAVENOUS ONCE
Status: COMPLETED | OUTPATIENT
Start: 2024-09-25 | End: 2024-09-25

## 2024-09-25 RX ORDER — LOSARTAN POTASSIUM 50 MG/1
50 TABLET ORAL ONCE
Status: COMPLETED | OUTPATIENT
Start: 2024-09-25 | End: 2024-09-25

## 2024-09-25 RX ORDER — LOSARTAN POTASSIUM 50 MG/1
50 TABLET ORAL
Status: DISCONTINUED | OUTPATIENT
Start: 2024-09-25 | End: 2024-09-27 | Stop reason: HOSPADM

## 2024-09-25 RX ORDER — REGADENOSON 0.08 MG/ML
0.4 INJECTION, SOLUTION INTRAVENOUS ONCE
Status: COMPLETED | OUTPATIENT
Start: 2024-09-25 | End: 2024-09-25

## 2024-09-25 RX ORDER — CAFFEINE CITRATE 20 MG/ML
60 SOLUTION INTRAVENOUS ONCE
Status: COMPLETED | OUTPATIENT
Start: 2024-09-25 | End: 2024-09-25

## 2024-09-25 RX ADMIN — PANTOPRAZOLE SODIUM 40 MG: 40 TABLET, DELAYED RELEASE ORAL at 04:52

## 2024-09-25 RX ADMIN — ACETAMINOPHEN 650 MG: 325 TABLET ORAL at 08:52

## 2024-09-25 RX ADMIN — Medication 10 ML: at 21:22

## 2024-09-25 RX ADMIN — ACETAMINOPHEN 650 MG: 325 TABLET ORAL at 23:22

## 2024-09-25 RX ADMIN — ATORVASTATIN CALCIUM 10 MG: 10 TABLET, FILM COATED ORAL at 21:21

## 2024-09-25 RX ADMIN — KETOROLAC TROMETHAMINE 15 MG: 15 INJECTION, SOLUTION INTRAMUSCULAR; INTRAVENOUS at 12:02

## 2024-09-25 RX ADMIN — MONTELUKAST 10 MG: 10 TABLET, FILM COATED ORAL at 17:51

## 2024-09-25 RX ADMIN — RUBIDIUM CHLORIDE RB-82 1 DOSE: 150 INJECTION, SOLUTION INTRAVENOUS at 10:34

## 2024-09-25 RX ADMIN — RUBIDIUM CHLORIDE RB-82 1 DOSE: 150 INJECTION, SOLUTION INTRAVENOUS at 10:22

## 2024-09-25 RX ADMIN — METOPROLOL TARTRATE 25 MG: 25 TABLET, FILM COATED ORAL at 08:52

## 2024-09-25 RX ADMIN — BUPROPION HYDROCHLORIDE 450 MG: 150 TABLET, EXTENDED RELEASE ORAL at 08:53

## 2024-09-25 RX ADMIN — ACETAMINOPHEN 650 MG: 325 TABLET ORAL at 17:51

## 2024-09-25 RX ADMIN — LOSARTAN POTASSIUM 50 MG: 50 TABLET, FILM COATED ORAL at 18:13

## 2024-09-25 RX ADMIN — REGADENOSON 0.4 MG: 0.08 INJECTION, SOLUTION INTRAVENOUS at 10:31

## 2024-09-25 RX ADMIN — ASPIRIN 325 MG: 325 TABLET, COATED ORAL at 08:52

## 2024-09-25 RX ADMIN — LEVOTHYROXINE SODIUM 75 MCG: 0.07 TABLET ORAL at 04:52

## 2024-09-25 RX ADMIN — CAFFEINE CITRATE 60 MG: 20 INJECTION, SOLUTION INTRAVENOUS at 10:48

## 2024-09-25 RX ADMIN — Medication 10 ML: at 08:55

## 2024-09-25 RX ADMIN — METOPROLOL TARTRATE 25 MG: 25 TABLET, FILM COATED ORAL at 21:21

## 2024-09-25 RX ADMIN — POTASSIUM CHLORIDE 40 MEQ: 1.5 POWDER, FOR SOLUTION ORAL at 04:52

## 2024-09-25 NOTE — PROGRESS NOTES
HEPARIN INFUSION  Yanet Clifton is a  59 y.o. female receiving heparin infusion.     Therapy for (VTE/Cardiac):   Cardiac   Patient Weight: 83.9 kg  Initial Bolus (Y/N):   Y  Any Bolus (Y/N):   Y        Signs or Symptoms of Bleeding: N/A    Cardiac or Other (Not VTE)   Initial Bolus: 60 units/kg (Max 4,000 units)  Initial rate: 12 units/kg/hr (Max 1,000 units/hr)   Anti Xa Rebolus Infusion Hold time Change infusion Dose (Units/kg/hr) Next Anti Xa or aPTT Level Due   < 0.11 50 Units/kg  (4000 Units Max) None Increase by  3 Units/kg/hr 6 hours   0.11- 0.19 25 Units/kg  (2000 Units Max) None Increase by  2 Units/kg/hr 6 hours   0.2 - 0.29 0 None Increase by  1 Units/kg/hr 6 hours   0.3 - 0.5 0 None No Change 6 hours (after 2 consecutive levels in range check qAM)   0.51 - 0.6 0 None Decrease by  1 Units/kg/hr 6 hours   0.61 - 0.8 0 30 Minutes Decrease by  2 Units/kg/hr 6 hours   0.81 - 1 0 60 Minutes Decrease by  3 Units/kg/hr 6 hours   >1 0 Hold  After Anti Xa less than 0.5 decrease previous rate by  4 Units/kg/hr  Every 2 hours until Anti Xa  less than 0.5 then when infusion restarts in 6 hours     Recommend Xa every 6 hours.     Results from last 7 days   Lab Units 09/25/24  0539 09/24/24  1506 09/24/24  1458   INR   --   --  1.00   HEMOGLOBIN g/dL 10.7*  --  12.8   HEMOGLOBIN, POC g/dL  --  11.9*  --    HEMATOCRIT % 31.9*  --  36.7   HEMATOCRIT POC %  --  35*  --    PLATELETS 10*3/mm3 183  --  207          Date   Time   Anti-Xa Current Rate (Unit/kg/hr) Bolus   (Units) Rate Change   (Unit/kg/hr) New Rate (Unit/kg/hr) Next   Anti-Xa Comments  Pump Check Daily   9/24 1735 PTT 29.4 New 4000 +12 12 2300 Dw ED APRN, RN. Pump confirmed. Patient/spouse counseled    9/24 2327 0.58 12 -- -1 11 0600 DW RN   9/25 0539 0.47 11 -- -- 11 1300 Araseli 3242                                                                                                                                                                                                            Brian Burnham, [facility]  9/25/2024  07:27 EDT

## 2024-09-25 NOTE — CONSULTS
Date of Hospital Visit: 24  Encounter Provider: Ric Perry MD  Place of Service: [facility]  Patient Name: Yanet Clifton  :1965  Referral Provider: No ref. provider found  Primary Care Provider: Brie Jalloh MD    Chief complaint/Reason for Consultation: Positive troponin      History of Present Illness:  Patient is a 59 years old female who had a past family history of rheumatoid arthritis, Sjogren's with hyperlipidemia and nontreated blood pressure presented with chest discomfort numbness in the both arms and some weakness along with some dizziness.    Patient woke up a little tired and fatigue and has an episode of vomiting followed by chest pain numbness and it did not resolve for some period of time.  She presented to the emergency room was found to have a blood pressure of 240s over 140 at that time.  She has been seeing her primary care who have advised her that her blood pressure has started going high.    She does most of the activities around the home without any symptoms before.  She does not have any family history of coronary artery disease.  Her blood pressure runs in the family.  She denies any weight loss or any weight gain.  She is a  by profession.      Problem List:    CARDIAC  Coronary Artery Disease:   Troponin elevation     Myocardium:   Dyspnea     Valvular:   No known valvular disease     Electrical:   Normal sinus rhythm     Pericardium:   Normal     VASCULAR  Arterial  Cerebrovascular disease:    CT head normal with calcified meningioma    AAA:    CT abdomen no aneurysm     CARDIAC RISK FACTORS  Hypertension  Dyslipidemia  Obesity    NON-CARDIAC  Rheumatoid arthritis  Sjogren's syndrome  Hypothyroidism  Asthma with mild obstruction on PFTs    SURGERIES  Hysterectomy with salpingo-oophorectomy  Tonsillectomy  Temporal artery biopsy  Lithotripsy  Cholecystectomy  Breast biopsies            Medications Prior to Admission    Medication Sig Dispense Refill Last Dose    atorvastatin (LIPITOR) 10 MG tablet Take 1 tablet by mouth Daily. (Patient taking differently: Take 1 tablet by mouth Every Night.) 35 tablet 0 9/23/2024    buPROPion XL (WELLBUTRIN XL) 150 MG 24 hr tablet Take 3 tablets by mouth Daily. (Patient taking differently: Take 3 tablets by mouth Every Morning.) 270 tablet 3 9/24/2024    caffeine 200 MG tablet Take 1 tablet by mouth Daily. OTC   9/24/2024    levothyroxine (SYNTHROID, LEVOTHROID) 75 MCG tablet TAKE 1 TABLET BY MOUTH EVERY MORNING 90 tablet 3 9/24/2024    montelukast (SINGULAIR) 10 MG tablet Take 1 tablet by mouth Every Evening.   9/23/2024    naproxen sodium (Aleve) 220 MG tablet Take 1 tablet by mouth As Needed for Mild Pain, Fever or Headache. OTC   Past Month    omeprazole (priLOSEC) 40 MG capsule Take 1 capsule by mouth Daily. (Patient taking differently: Take 1 capsule by mouth every night at bedtime.) 90 capsule 3 9/23/2024    riTUXimab (RITUXAN) 500 MG/50ML solution injection Infuse 100 mL into a venous catheter Every 6 (Six) Months. Two 1 gram infusions spread out over 2 weeks   Past Month    albuterol (PROVENTIL) (2.5 MG/3ML) 0.083% nebulizer solution Take 2.5 mg by nebulization 4 (Four) Times a Day As Needed for Wheezing. 120 each 5 More than a month       Social History     Socioeconomic History    Marital status:     Number of children: 2   Tobacco Use    Smoking status: Never     Passive exposure: Never    Smokeless tobacco: Never   Vaping Use    Vaping status: Never Used   Substance and Sexual Activity    Alcohol use: Yes     Alcohol/week: 0.0 standard drinks of alcohol     Comment: Very rarely    Drug use: No    Sexual activity: Yes     Partners: Male     Birth control/protection: Post-menopausal       Family History   Problem Relation Age of Onset    Fibromyalgia Mother     Migraines Mother     Depression Mother     Hypertension Sister     Other Sister         loss of hair from head     "Migraines Sister     Asthma Sister     Breast cancer Maternal Grandmother 60    Cancer Maternal Grandmother         Breast Cancer    Multiple myeloma Paternal Grandmother     Cancer Paternal Grandmother         Multiple Myeloma    Multiple myeloma Paternal Uncle     Thyroid disease Neg Hx     Diabetes Neg Hx     BRCA 1/2 Neg Hx     Colon cancer Neg Hx     Endometrial cancer Neg Hx     Ovarian cancer Neg Hx        REVIEW OF SYSTEMS:   Review of Systems   Respiratory:  Positive for chest tightness.    Neurological:  Positive for numbness and headaches.             Objective:     Vitals:    09/25/24 0026 09/25/24 0418 09/25/24 0639 09/25/24 0852   BP: 121/77 127/78 134/77    BP Location: Left arm Left arm Left arm    Patient Position: Lying Lying Lying    Pulse: 69 66 63 73   Resp: 18 18 18    Temp: 98.5 °F (36.9 °C) 97.8 °F (36.6 °C) 98 °F (36.7 °C)    TempSrc: Oral Oral Oral    SpO2: 96% 96% 97%    Weight:       Height:           Body mass index is 29.86 kg/m².  Flowsheet Rows      Flowsheet Row First Filed Value   Admission Height 167.6 cm (66\") Documented at 09/24/2024 1401   Admission Weight 83.9 kg (185 lb) Documented at 09/24/2024 1401            Physical Exam     Constitutional:       General: Not in acute distress.     Appearance: Healthy appearance. Not in distress.     Neck:     JVP:Not elevated     Carotid artery: Normal    Pulmonary:      Effort: Pulmonary effort is normal.      Breath sounds: Normal breath sounds. No wheezing. No rhonchi. No rales.     Cardiovascular:      Normal rate. Regular rhythm. Normal S1. Normal S2.      Murmurs: There is no significant murmur.      No gallop. No click. No rub.     Abdominal:      General: Bowel sounds are normal.      Palpations: Abdomen is soft.      Tenderness: There is no abdominal tenderness.    Extremities:     Pulses:Normal radial and pedal pulses     Edema:no edema        Lab Review:                Results from last 7 days   Lab Units 09/25/24  0539 "   SODIUM mmol/L 142   POTASSIUM mmol/L 3.9   CHLORIDE mmol/L 108*   CO2 mmol/L 24.0   BUN mg/dL 16   CREATININE mg/dL 0.98   GLUCOSE mg/dL 82   CALCIUM mg/dL 8.5*     Results from last 7 days   Lab Units 09/25/24  0539 09/24/24  1659 09/24/24  1458   HSTROP T ng/L 62* 54* 56*     Results from last 7 days   Lab Units 09/25/24  0539   WBC 10*3/mm3 3.50   HEMOGLOBIN g/dL 10.7*   HEMATOCRIT % 31.9*   PLATELETS 10*3/mm3 183     Results from last 7 days   Lab Units 09/24/24  1458   INR  1.00   APTT seconds 29.4*         Results from last 7 days   Lab Units 09/25/24  0539   CHOLESTEROL mg/dL 138   TRIGLYCERIDES mg/dL 111   HDL CHOL mg/dL 40   LDL CHOL mg/dL 78           EKG: Normal sinus rhythm    CXR: Normal    HEART Score: 7 (9/24/2024  5:13 PM)         Assessment:   Hypertensive urgency  Hyperlipidemia  Type II release of troponin  Autoimmune disease  Hypothyroidism      Plan:   Patient has multiple risk factors for coronary artery disease.  I believe this event is mostly related to high blood pressure around 240s when she presented.  We will start her on antihypertensive medications.  She has a history of dry cough.  We will start with Cozaar 50 mg daily.  We will add chlorthalidone 12.5 mg daily.  We will see her blood pressure is for further addition of medications.  We will check her CRP, ESR and interleukin-6.  We will get echocardiogram and stress test to rule further evaluation of her cardiac problems.  Also check urine microalbumin urea  Further recommendations based on echo and stress test findings.

## 2024-09-25 NOTE — CASE MANAGEMENT/SOCIAL WORK
Discharge Planning Assessment  [facility]     Patient Name: Yanet Clifton  MRN: [id number]  Today's Date: 9/25/2024    Admit Date: 9/24/2024    Plan: Home   Discharge Needs Assessment       Row Name 09/25/24 1318       Living Environment    People in Home alone    Current Living Arrangements home    Potentially Unsafe Housing Conditions none    In the past 12 months has the electric, gas, oil, or water company threatened to shut off services in your home? No    Primary Care Provided by self    Provides Primary Care For no one, unable/limited ability to care for self    Family Caregiver if Needed none       Resource/Environmental Concerns    Resource/Environmental Concerns none       Transportation Needs    In the past 12 months, has lack of transportation kept you from medical appointments or from getting medications? no    In the past 12 months, has lack of transportation kept you from meetings, work, or from getting things needed for daily living? No       Transition Planning    Patient/Family Anticipates Transition to home with family    Patient/Family Anticipated Services at Transition none       Discharge Needs Assessment    Equipment Currently Used at Home none    Equipment Needed After Discharge none                   Discharge Plan       Row Name 09/25/24 7937       Plan    Plan Home    Patient/Family in Agreement with Plan yes    Plan Comments Spoke with patient at bedside. Patient lives with  in [facility]. She is independent with ADL. She doesn't use any DME and not current with home health services.  PCP is Brie Jalloh. She gets her filled at [facility]. Insurance is [employer]. Patient discharge plan is home with  to transport. CM will follow.    Final Discharge Disposition Code 01 - home or self-care                  Continued Care and Services - Admitted Since 9/24/2024    No active coordination exists for this encounter.       Expected Discharge Date and Time        Expected Discharge Date Expected Discharge Time    Sep 25, 2024            Demographic Summary       Row Name 09/25/24 1317       General Information    Admission Type observation    Preferred Language English                   Functional Status       Row Name 09/25/24 1317       Functional Status    Usual Activity Tolerance moderate    Current Activity Tolerance moderate       Functional Status, IADL    Medications independent    Meal Preparation independent    Housekeeping independent    Laundry independent    Shopping independent                   Psychosocial    No documentation.                  Abuse/Neglect    No documentation.                  Legal    No documentation.                  Substance Abuse    No documentation.                  Patient Forms    No documentation.                     Tiffanie Ruiz RN

## 2024-09-25 NOTE — PROGRESS NOTES
[facility] Medicine Services  PROGRESS NOTE    Patient Name: aYnet Clifton  : 1965  MRN: [id number]    Date of Admission: 2024  Primary Care Physician: Brie Jalloh MD    Subjective   Subjective     CC: Chest pain    HPI: No dyspnea or chest pain currently. No f/c. No n/v. Waiting on cardiology.     Objective   Objective     Vital Signs:   Temp:  [97.6 °F (36.4 °C)-98.5 °F (36.9 °C)] 98 °F (36.7 °C)  Heart Rate:  [] 63  Resp:  [18-20] 18  BP: (105-233)/() 134/77     Physical Exam:  NAD, alert and oriented  OP clear, dry MM  Neck supple  No LAD  RRR  CTAB  +BS, soft  DAVIS  Normal affect  No rashes    Results Reviewed:  LAB RESULTS:      Lab 24  0539 24  2327 24  1506 24  1458   WBC 3.50  --   --  4.21   HEMOGLOBIN 10.7*  --   --  12.8   HEMOGLOBIN, POC  --   --  11.9*  --    HEMATOCRIT 31.9*  --   --  36.7   HEMATOCRIT POC  --   --  35*  --    PLATELETS 183  --   --  207   NEUTROS ABS 2.06  --   --  2.70   IMMATURE GRANS (ABS) 0.01  --   --  0.01   LYMPHS ABS 0.77  --   --  0.58*   MONOS ABS 0.39  --   --  0.48   EOS ABS 0.25  --   --  0.42*   MCV 89.4  --   --  86.2   LACTATE  --   --   --  0.7   PROTIME  --   --   --  13.3   APTT  --   --   --  29.4*   HEPARIN ANTI-XA 0.47 0.58  --   --          Lab 24  0539 24  1659 24  1516 24  1506 24  1458   SODIUM 142  --   --   --  143   POTASSIUM 3.9  --   --   --  3.1*   CHLORIDE 108*  --   --   --  108*   CO2 24.0  --   --   --  23.0   ANION GAP 10.0  --   --   --  12.0   BUN 16  --   --   --  18   CREATININE 0.98  --  1.10 1.10 0.96   EGFR 66.6  --   --  58.0* 68.3   GLUCOSE 82  --   --   --  95   CALCIUM 8.5*  --   --   --  9.7   HEMOGLOBIN A1C 4.80  --   --   --   --    TSH  --  0.423  --   --   --          Lab 24  1458   TOTAL PROTEIN 6.8   ALBUMIN 4.6   GLOBULIN 2.2   ALT (SGPT) 15   AST (SGOT) 21   BILIRUBIN 0.4   ALK PHOS 75   LIPASE 43         Lab  09/25/24  0539 09/24/24  1659 09/24/24  1458   PROBNP  --   --  146.8   HSTROP T 62* 54* 56*   PROTIME  --   --  13.3   INR  --   --  1.00         Lab 09/25/24  0539   CHOLESTEROL 138   LDL CHOL 78   HDL CHOL 40   TRIGLYCERIDES 111             Brief Urine Lab Results  (Last result in the past 365 days)        Color   Clarity   Blood   Leuk Est   Nitrite   Protein   CREAT   Urine HCG        05/13/24 0856 Dark Yellow   Cloudy   Negative   Trace   Positive   30 mg/dL (1+)                   Microbiology Results Abnormal       None            CT Angiogram Chest    Result Date: 9/24/2024  CT ANGIOGRAM CHEST Date of Exam: 9/24/2024 4:09 PM EDT Indication: pe. cp.. Comparison: None available. Technique: CTA of the chest was performed after the uneventful intravenous administration of 85 mL Isovue-370. Reconstructed coronal and sagittal images were also obtained. In addition, a 3-D volume rendered image was created for interpretation. Automated exposure control and iterative reconstruction methods were used. Findings: Pulmonary arteries / cardiovascular: No intraluminal filling defect to suggest pulmonary embolus. No pericardial effusion. Normal caliber thoracic aorta. Lymph nodes and mediastinum: Calcified mediastinal and hilar lymph nodes consistent with healed granulomatous disease. No suspicious lymphadenopathy or mediastinal mass. Lungs and airways: Central airways are patent. No suspicious pulmonary nodules or masses. No focal consolidation. Pleura: No pleural effusion or pneumothorax.  Bones and soft tissues: No acute or suspicious osseous abnormality. Degenerative changes of the thoracic spine. Soft tissues are within normal limits. Upper abdomen: See report from same day CT abdomen pelvis for findings below the diaphragm.     Impression: Impression: No evidence of pulmonary embolism or other acute process in the chest. Electronically Signed: Luis Rojas MD  9/24/2024 4:39 PM EDT  Workstation ID: [id number]    CT  Abdomen Pelvis With Contrast    Result Date: 9/24/2024  CT ABDOMEN PELVIS W CONTRAST Date of Exam: 9/24/2024 4:09 PM EDT Indication: nv. upper abd pain.. Comparison: CT abdomen and pelvis 5/13/2024. Technique: Axial CT images were obtained of the abdomen and pelvis following the uneventful intravenous administration of 85 cc Isovue-370. Reconstructed coronal and sagittal images were also obtained. Automated exposure control and iterative construction methods were used. Findings: Small esophageal hiatal hernia. Heart size is normal. Lung bases are clear. Cholecystectomy. No abnormal biliary dilation. Liver, spleen, pancreas, adrenals and right kidney are normal. Left renal cyst. The large and small bowel do not appear thickened, dilated or inflamed. The gastric wall appears slightly generally more thickened than on the 5/13/2024 examination. Lipoma within the left gluteal musculature measures 5.5 cm. Urinary bladder and rectum are normal. Hysterectomy. No acute or suspicious osseous abnormalities.     Impression: Impression: 1. The gastric wall appears generally more thickened than on the 5/13/2020 for comparison. This could be related to underdistention, although gastric inflammation could be considered. 2. No acute findings are seen elsewhere within the abdomen or pelvis. Electronically Signed: Marilyn Maldonado MD  9/24/2024 4:31 PM EDT  Workstation ID: [id number]    CT Head Without Contrast    Result Date: 9/24/2024  CT HEAD WO CONTRAST Date of Exam: 9/24/2024 4:09 PM EDT Indication: ha. htn.. Comparison: 10/18/2018 Technique: Axial CT images were obtained of the head without contrast administration.  Automated exposure control and iterative construction methods were used. Findings: The ventricles and CSF containing spaces are normal in size and configuration. No significant intra or extra-axial mass lesions, fluid collections, or mass effect are seen. No focal areas of low-attenuation or acute intracranial  hemorrhage. There is a tiny dural based calcification identified anterior to the right temporal lobe measuring 2 mm in diameter which may represent a very small meningioma. It has increased in size compared to the study of 2018. There is no associated mass effect or edema. The  paranasal sinuses and mastoid air cells are clear. Orbital structures are unremarkable.     Impression: Impression: 1. 2 mm dural based calcification anterior to the right temporal lobe in the middle cranial fossa which likely represents a very small meningioma. This is an incidental finding. It has increased in size compared with the previous CT. 2. No acute intracranial findings. Electronically Signed: Foreign Richard MD  9/24/2024 4:27 PM EDT  Workstation ID: [id number]    XR Chest 1 View    Result Date: 9/24/2024  XR CHEST 1 VW Date of Exam: 9/24/2024 2:27 PM EDT Indication: Chest Pain Triage Protocol Comparison: 6/2/2023 Findings: The heart size is normal and the lungs appear clear     Impression: Impression: Stable chest, no active disease. Electronically Signed: Foreign Richard MD  9/24/2024 2:38 PM EDT  Workstation ID: [id number]         Current medications:  Scheduled Meds:aspirin, 325 mg, Oral, Daily  atorvastatin, 10 mg, Oral, Nightly  budesonide-formoterol, 2 puff, Inhalation, BID  buPROPion XL, 450 mg, Oral, Daily  levothyroxine, 75 mcg, Oral, QAM  metoprolol tartrate, 25 mg, Oral, Q12H  montelukast, 10 mg, Oral, Q PM  pantoprazole, 40 mg, Oral, Q AM  pharmacy consult - [place], , Does not apply, Daily  [Held by provider] riTUXimab, 1,000 mg, Intravenous, Q4 Months  sodium chloride, 10 mL, Intravenous, Q12H      Continuous Infusions:heparin, 11 Units/kg/hr, Last Rate: 11 Units/kg/hr (09/25/24 8766)  nitroglycerin, 5-200 mcg/min, Last Rate: Stopped (09/24/24 3086)  Pharmacy to Dose Heparin,       PRN Meds:.  acetaminophen **OR** acetaminophen **OR** acetaminophen    albuterol    senna-docusate sodium **AND** polyethylene glycol **AND**  bisacodyl **AND** bisacodyl    Calcium Replacement - Follow Nurse / BPA Driven Protocol    Magnesium Cardiology Dose Replacement - Follow Nurse / BPA Driven Protocol    nitroglycerin    ondansetron ODT **OR** ondansetron    Pharmacy to Dose Heparin    Phosphorus Replacement - Follow Nurse / BPA Driven Protocol    Potassium Replacement - Follow Nurse / BPA Driven Protocol    sodium chloride    sodium chloride    sodium chloride    Assessment & Plan   Assessment & Plan     Active Hospital Problems    Diagnosis  POA    **NSTEMI (non-ST elevated myocardial infarction) [I21.4]  Yes    Rheumatoid arthritis [M06.9]  Yes    Sjogren's syndrome [M35.00]  Yes    Hypertension [I10]  Yes    Hypothyroidism [E03.9]  Yes    Hyperlipidemia [E78.2]  Yes      Resolved Hospital Problems   No resolved problems to display.        Brief Hospital Course to date:  Yanet Clifton is a 59 y.o. female here with angina, possible NSTEMI    Angina  Possible NSTEMI  -on heparin/NTG  -ASA  -cardiology consulted    HTN  -CTM    HL  -statin    Hypothyroidism  -synthroid    History of RA  Sjogrens  -on rituxan    Expected Discharge Location and Transportation: Home  Expected Discharge   Expected Discharge Date: 9/25/2024; Expected Discharge Time:      VTE Prophylaxis:  Pharmacologic & mechanical VTE prophylaxis orders are present.         AM-PAC 6 Clicks Score (PT): 24 (09/24/24 2011)    CODE STATUS:   Code Status and Medical Interventions: CPR (Attempt to Resuscitate); Full Support   Ordered at: 09/24/24 3890     Code Status (Patient has no pulse and is not breathing):    CPR (Attempt to Resuscitate)     Medical Interventions (Patient has pulse or is breathing):    Full Support       Trae Gonzalez MD  09/25/24

## 2024-09-25 NOTE — PLAN OF CARE
Goal Outcome Evaluation:  Plan of Care Reviewed With: patient        Progress: no change  Outcome Evaluation: A&Ox4. RA. SR on tele. nitro gtt stopped around 0000. No c/o chest pain during shift. Heparin gtt @11units/kg/hr. K+ replacement given. PRN meds given for headache and nausea. Pt NPO after midnight for possible procedure in the morning. Pt slept between care.

## 2024-09-25 NOTE — PLAN OF CARE
Goal Outcome Evaluation:  Plan of Care Reviewed With: patient        Progress: no change  Outcome Evaluation: Pt remains on RA with no signs of SOA. Pt remains NSR on the monitor. BP up and down today with a headache. BP meds added and orn tylenol and toradol given. Heparin gtt turned off.

## 2024-09-26 PROBLEM — I21.4 NSTEMI (NON-ST ELEVATED MYOCARDIAL INFARCTION): Status: RESOLVED | Noted: 2024-09-24 | Resolved: 2024-09-26

## 2024-09-26 LAB
CRP SERPL-MCNC: 0.68 MG/DL (ref 0.01–0.5)
IL6 SERPL-MCNC: 3.2 PG/ML (ref 0–13)
QT INTERVAL: 350 MS
QT INTERVAL: 378 MS
QTC INTERVAL: 439 MS
QTC INTERVAL: 444 MS

## 2024-09-26 PROCEDURE — 86141 C-REACTIVE PROTEIN HS: CPT | Performed by: INTERNAL MEDICINE

## 2024-09-26 PROCEDURE — G0378 HOSPITAL OBSERVATION PER HR: HCPCS

## 2024-09-26 PROCEDURE — 99214 OFFICE O/P EST MOD 30 MIN: CPT | Performed by: INTERNAL MEDICINE

## 2024-09-26 PROCEDURE — 99232 SBSQ HOSP IP/OBS MODERATE 35: CPT | Performed by: HOSPITALIST

## 2024-09-26 PROCEDURE — 94640 AIRWAY INHALATION TREATMENT: CPT

## 2024-09-26 PROCEDURE — 63710000001 ONDANSETRON ODT 4 MG TABLET DISPERSIBLE: Performed by: NURSE PRACTITIONER

## 2024-09-26 PROCEDURE — 94664 DEMO&/EVAL PT USE INHALER: CPT

## 2024-09-26 RX ORDER — HYDROXYZINE HYDROCHLORIDE 25 MG/1
25 TABLET, FILM COATED ORAL 3 TIMES DAILY PRN
Status: DISCONTINUED | OUTPATIENT
Start: 2024-09-26 | End: 2024-09-27 | Stop reason: HOSPADM

## 2024-09-26 RX ORDER — SPIRONOLACTONE 25 MG/1
25 TABLET ORAL DAILY
Status: DISCONTINUED | OUTPATIENT
Start: 2024-09-26 | End: 2024-09-27 | Stop reason: HOSPADM

## 2024-09-26 RX ADMIN — LOSARTAN POTASSIUM 50 MG: 50 TABLET, FILM COATED ORAL at 08:15

## 2024-09-26 RX ADMIN — METOPROLOL TARTRATE 25 MG: 25 TABLET, FILM COATED ORAL at 19:57

## 2024-09-26 RX ADMIN — MONTELUKAST 10 MG: 10 TABLET, FILM COATED ORAL at 18:06

## 2024-09-26 RX ADMIN — Medication 10 ML: at 08:15

## 2024-09-26 RX ADMIN — PANTOPRAZOLE SODIUM 40 MG: 40 TABLET, DELAYED RELEASE ORAL at 06:06

## 2024-09-26 RX ADMIN — Medication 10 ML: at 19:59

## 2024-09-26 RX ADMIN — ATORVASTATIN CALCIUM 10 MG: 10 TABLET, FILM COATED ORAL at 19:57

## 2024-09-26 RX ADMIN — LEVOTHYROXINE SODIUM 75 MCG: 0.07 TABLET ORAL at 06:06

## 2024-09-26 RX ADMIN — ACETAMINOPHEN 650 MG: 325 TABLET ORAL at 08:15

## 2024-09-26 RX ADMIN — BUPROPION HYDROCHLORIDE 450 MG: 150 TABLET, EXTENDED RELEASE ORAL at 08:15

## 2024-09-26 RX ADMIN — ACETAMINOPHEN 650 MG: 325 TABLET ORAL at 14:48

## 2024-09-26 RX ADMIN — ONDANSETRON 4 MG: 4 TABLET, ORALLY DISINTEGRATING ORAL at 08:22

## 2024-09-26 RX ADMIN — BUDESONIDE AND FORMOTEROL FUMARATE DIHYDRATE 2 PUFF: 160; 4.5 AEROSOL RESPIRATORY (INHALATION) at 10:03

## 2024-09-26 RX ADMIN — SPIRONOLACTONE 25 MG: 25 TABLET, FILM COATED ORAL at 09:47

## 2024-09-26 RX ADMIN — METOPROLOL TARTRATE 25 MG: 25 TABLET, FILM COATED ORAL at 08:15

## 2024-09-26 RX ADMIN — CHLORTHALIDONE 25 MG: 25 TABLET ORAL at 08:16

## 2024-09-26 RX ADMIN — ASPIRIN 325 MG: 325 TABLET, COATED ORAL at 08:15

## 2024-09-26 NOTE — PROGRESS NOTES
"[facility]  IP Progress Note    PROBLEM LIST:  CARDIAC   Coronary Artery Disease:   Troponin elevation   Stress PET, 9/25/2024: Normal     Myocardium:   Echo, 9/25/2024: LVEF 55%     Valvular:   No known valvular disease     Electrical:   Normal sinus rhythm     Pericardium:   Normal     VASCULAR   Arterial   Cerebrovascular disease:   2024 CT head normal with calcified meningioma     AAA:   2024 CT abdomen no aneurysm     CARDIAC RISK FACTORS   Hypertension   Dyslipidemia   Obesity     NON-CARDIAC   Rheumatoid arthritis   Sjogren's syndrome   Hypothyroidism   Asthma with mild obstruction on PFTs     SURGERIES   Hysterectomy with salpingo-oophorectomy   Tonsillectomy   Temporal artery biopsy   Lithotripsy   Cholecystectomy   Breast biopsies       HOSPITAL COURSE:  Patient admitted with hypertensive urgency and blood pressure still is fluctuating.  Patient has normal stress and echocardiogram.  We have started patient on Cozaar and hydrochlorothiazide but still has fluctuating her blood pressure.      CHIEF COMPLAINTS:  Fluctuating in blood pressure with mild nausea      Subjective   Patient again had high blood pressure today      Objective     Blood pressure (!) 194/113, pulse 71, temperature 97.9 °F (36.6 °C), temperature source Oral, resp. rate 16, height 167.6 cm (65.98\"), weight 83.9 kg (184 lb 15.5 oz), SpO2 97%, not currently breastfeeding.   No intake or output data in the 24 hours ending 09/26/24 0849    PHYSICAL EXAM:  Constitutional:       General: Not in acute distress.     Appearance: Healthy appearance. Not in distress.     Neck:     JVP:Not elevated     Carotid artery: Normal    Pulmonary:      Effort: Pulmonary effort is normal.      Breath sounds: Normal breath sounds. No wheezing. No rhonchi. No rales.     Cardiovascular:      Normal rate. Regular rhythm. Normal S1. Normal S2.      Murmurs: There is no significant murmur.      No gallop. No click. No rub. "     Abdominal:      General: Bowel sounds are normal.      Palpations: Abdomen is soft.      Tenderness: There is no abdominal tenderness.    Extremities:     Pulses:Normal radial and pedal pulses     Edema:no edema    MEDICATIONS:    aspirin, 325 mg, Oral, Daily  atorvastatin, 10 mg, Oral, Nightly  budesonide-formoterol, 2 puff, Inhalation, BID  buPROPion XL, 450 mg, Oral, Daily  chlorthalidone, 25 mg, Oral, Daily  levothyroxine, 75 mcg, Oral, QAM  losartan, 50 mg, Oral, Q24H  metoprolol tartrate, 25 mg, Oral, Q12H  montelukast, 10 mg, Oral, Q PM  pantoprazole, 40 mg, Oral, Q AM  pharmacy consult - [place], , Does not apply, Daily  [Held by provider] riTUXimab, 1,000 mg, Intravenous, Q4 Months  sodium chloride, 10 mL, Intravenous, Q12H  spironolactone, 25 mg, Oral, Daily          Results from last 7 days   Lab Units 09/25/24  0539   WBC 10*3/mm3 3.50   HEMOGLOBIN g/dL 10.7*   HEMATOCRIT % 31.9*   PLATELETS 10*3/mm3 183     Results from last 7 days   Lab Units 09/25/24  0928 09/25/24  0539 09/24/24  1506 09/24/24  1458   SODIUM mmol/L  --  142  --  143   POTASSIUM mmol/L 4.0 3.9  --  3.1*   CHLORIDE mmol/L  --  108*  --  108*   CO2 mmol/L  --  24.0  --  23.0   BUN mg/dL  --  16  --  18   CREATININE mg/dL  --  0.98   < > 0.96   CALCIUM mg/dL  --  8.5*  --  9.7   BILIRUBIN mg/dL  --   --   --  0.4   ALK PHOS U/L  --   --   --  75   ALT (SGPT) U/L  --   --   --  15   AST (SGOT) U/L  --   --   --  21   GLUCOSE mg/dL  --  82  --  95    < > = values in this interval not displayed.     Results from last 7 days   Lab Units 09/24/24  1458   INR  1.00     Lab Results   Component Value Date    TROPONINT 62 (C) 09/25/2024     Results from last 7 days   Lab Units 09/24/24  1659   TSH uIU/mL 0.423     Results from last 7 days   Lab Units 09/25/24  0539   CHOLESTEROL mg/dL 138   TRIGLYCERIDES mg/dL 111   HDL CHOL mg/dL 40   LDL CHOL mg/dL 78         Iron   Date Value Ref Range Status   09/19/2022 62 37 - 145 mcg/dL Final     Ferritin    Date Value Ref Range Status   09/19/2022 35.80 13.00 - 150.00 ng/mL Final     Iron Saturation (TSAT)   Date Value Ref Range Status   09/19/2022 16 (L) 20 - 50 % Final     TIBC   Date Value Ref Range Status   09/19/2022 383 298 - 536 mcg/dL Final      Hemoglobin A1C   Date Value Ref Range Status   09/25/2024 4.80 4.80 - 5.60 % Final     Magnesium   Date Value Ref Range Status   05/09/2014 1.8 1.3 - 2.7 mg/dL Final        RESULT REVIEW:    I reviewed the patient's new clinical results.    Tele: Sinus Rythym      ASSESSMENT:     Hypertensive urgency  Hyperlipidemia  Autoimmune disease    PLAN:     We will go ahead and start spironolactone in the afternoon and see how she responds.  Will check high-sensitivity CRP for inflammatory causes.  She should be able to go home in the morning.

## 2024-09-26 NOTE — PLAN OF CARE
Goal Outcome Evaluation:  Plan of Care Reviewed With: patient           Outcome Evaluation: VSS. Patient remains on RA and NSR on tele. No complaints of SOB. PRN Tylenol given for headache. Continue plan of care.

## 2024-09-26 NOTE — PLAN OF CARE
Goal Outcome Evaluation:  Plan of Care Reviewed With: patient        Progress: no change  Outcome Evaluation: Pt remains on RA with no signs of SOA. Pt remains NSR on the monitor. Pt's BP was high this morning. Spironolactone added and BP has been stable throughout the day. Pt complianed of a headache a couple times today with prn meds given.  Pt complained of nausea this morning when BP was up. Prn zofran given. No other nausea noted.

## 2024-09-26 NOTE — PROGRESS NOTES
[facility] Medicine Services  PROGRESS NOTE    Patient Name: Yanet Clifton  : 1965  MRN: [id number]    Date of Admission: 2024  Primary Care Physician: Brie Jalloh MD    Subjective   Subjective     CC: Chest pain    HPI: No dyspnea or chest pain. Elevated BP this AM. Anxious about that, understandably.     Objective   Objective     Vital Signs:   Temp:  [97.4 °F (36.3 °C)-98.4 °F (36.9 °C)] 97.9 °F (36.6 °C)  Heart Rate:  [55-79] 71  Resp:  [16-19] 16  BP: (112-194)/() 194/113     Physical Exam:  NAD, alert and oriented  OP clear, dry MM  Neck supple  No LAD  RRR  CTAB  +BS, soft  DAVIS  Normal affect  No rashes  No change from     Results Reviewed:  LAB RESULTS:      Lab 24  0539 24  2327 24  1506 24  1458   WBC 3.50  --   --  4.21   HEMOGLOBIN 10.7*  --   --  12.8   HEMOGLOBIN, POC  --   --  11.9*  --    HEMATOCRIT 31.9*  --   --  36.7   HEMATOCRIT POC  --   --  35*  --    PLATELETS 183  --   --  207   NEUTROS ABS 2.06  --   --  2.70   IMMATURE GRANS (ABS) 0.01  --   --  0.01   LYMPHS ABS 0.77  --   --  0.58*   MONOS ABS 0.39  --   --  0.48   EOS ABS 0.25  --   --  0.42*   MCV 89.4  --   --  86.2   SED RATE 13  --   --   --    LACTATE  --   --   --  0.7   PROTIME  --   --   --  13.3   APTT  --   --   --  29.4*   HEPARIN ANTI-XA 0.47 0.58  --   --          Lab 24  0928 24  0539 24  1659 24  1516 24  1506 24  1458   SODIUM  --  142  --   --   --  143   POTASSIUM 4.0 3.9  --   --   --  3.1*   CHLORIDE  --  108*  --   --   --  108*   CO2  --  24.0  --   --   --  23.0   ANION GAP  --  10.0  --   --   --  12.0   BUN  --  16  --   --   --  18   CREATININE  --  0.98  --  1.10 1.10 0.96   EGFR  --  66.6  --   --  58.0* 68.3   GLUCOSE  --  82  --   --   --  95   CALCIUM  --  8.5*  --   --   --  9.7   HEMOGLOBIN A1C  --  4.80  --   --   --   --    TSH  --   --  0.423  --   --   --          Lab 24  6762    TOTAL PROTEIN 6.8   ALBUMIN 4.6   GLOBULIN 2.2   ALT (SGPT) 15   AST (SGOT) 21   BILIRUBIN 0.4   ALK PHOS 75   LIPASE 43         Lab 09/25/24  0539 09/24/24  1659 09/24/24  1458   PROBNP  --   --  146.8   HSTROP T 62* 54* 56*   PROTIME  --   --  13.3   INR  --   --  1.00         Lab 09/25/24  0539   CHOLESTEROL 138   LDL CHOL 78   HDL CHOL 40   TRIGLYCERIDES 111             Brief Urine Lab Results  (Last result in the past 365 days)        Color   Clarity   Blood   Leuk Est   Nitrite   Protein   CREAT   Urine HCG        05/13/24 0856 Dark Yellow   Cloudy   Negative   Trace   Positive   30 mg/dL (1+)                   Microbiology Results Abnormal       None            Adult Transthoracic Echo Complete W/ Cont if Necessary Per Protocol    Result Date: 9/25/2024    Left ventricular systolic function is normal. Calculated left ventricular EF = 53.2% Left ventricular ejection fraction appears to be 51 - 55%.   There is mild calcification of the aortic valve.   Estimated right ventricular systolic pressure from tricuspid regurgitation is normal (<35 mmHg).     Stress Test With Pet Myocardial Perfusion    Result Date: 9/25/2024    No chest discomfort/pain reported.  Patient did report a headach at baseline, that intensified, and some nause.  IV caffeine given during recovery, and symptoms improved.   No significant ST or T wave changes identified.   Myocardial perfusion imaging indicates a normal myocardial perfusion study with no evidence of ischemia. Impressions are consistent with a low risk study. Mild calcification was seen.   Left ventricular ejection fraction is normal (Calculated EF = 69%).   Rest EF = 68% Stress EF = 69%.   Findings consistent with a normal ECG stress test.     CT Angiogram Chest    Result Date: 9/24/2024  CT ANGIOGRAM CHEST Date of Exam: 9/24/2024 4:09 PM EDT Indication: pe. cp.. Comparison: None available. Technique: CTA of the chest was performed after the uneventful intravenous  administration of 85 mL Isovue-370. Reconstructed coronal and sagittal images were also obtained. In addition, a 3-D volume rendered image was created for interpretation. Automated exposure control and iterative reconstruction methods were used. Findings: Pulmonary arteries / cardiovascular: No intraluminal filling defect to suggest pulmonary embolus. No pericardial effusion. Normal caliber thoracic aorta. Lymph nodes and mediastinum: Calcified mediastinal and hilar lymph nodes consistent with healed granulomatous disease. No suspicious lymphadenopathy or mediastinal mass. Lungs and airways: Central airways are patent. No suspicious pulmonary nodules or masses. No focal consolidation. Pleura: No pleural effusion or pneumothorax.  Bones and soft tissues: No acute or suspicious osseous abnormality. Degenerative changes of the thoracic spine. Soft tissues are within normal limits. Upper abdomen: See report from same day CT abdomen pelvis for findings below the diaphragm.     Impression: Impression: No evidence of pulmonary embolism or other acute process in the chest. Electronically Signed: Luis Rojas MD  9/24/2024 4:39 PM EDT  Workstation ID: [id number]    CT Abdomen Pelvis With Contrast    Result Date: 9/24/2024  CT ABDOMEN PELVIS W CONTRAST Date of Exam: 9/24/2024 4:09 PM EDT Indication: nv. upper abd pain.. Comparison: CT abdomen and pelvis 5/13/2024. Technique: Axial CT images were obtained of the abdomen and pelvis following the uneventful intravenous administration of 85 cc Isovue-370. Reconstructed coronal and sagittal images were also obtained. Automated exposure control and iterative construction methods were used. Findings: Small esophageal hiatal hernia. Heart size is normal. Lung bases are clear. Cholecystectomy. No abnormal biliary dilation. Liver, spleen, pancreas, adrenals and right kidney are normal. Left renal cyst. The large and small bowel do not appear thickened, dilated or inflamed. The gastric  wall appears slightly generally more thickened than on the 5/13/2024 examination. Lipoma within the left gluteal musculature measures 5.5 cm. Urinary bladder and rectum are normal. Hysterectomy. No acute or suspicious osseous abnormalities.     Impression: Impression: 1. The gastric wall appears generally more thickened than on the 5/13/2020 for comparison. This could be related to underdistention, although gastric inflammation could be considered. 2. No acute findings are seen elsewhere within the abdomen or pelvis. Electronically Signed: Marilyn Maldonado MD  9/24/2024 4:31 PM EDT  Workstation ID: [id number]    CT Head Without Contrast    Result Date: 9/24/2024  CT HEAD WO CONTRAST Date of Exam: 9/24/2024 4:09 PM EDT Indication: ha. htn.. Comparison: 10/18/2018 Technique: Axial CT images were obtained of the head without contrast administration.  Automated exposure control and iterative construction methods were used. Findings: The ventricles and CSF containing spaces are normal in size and configuration. No significant intra or extra-axial mass lesions, fluid collections, or mass effect are seen. No focal areas of low-attenuation or acute intracranial hemorrhage. There is a tiny dural based calcification identified anterior to the right temporal lobe measuring 2 mm in diameter which may represent a very small meningioma. It has increased in size compared to the study of 2018. There is no associated mass effect or edema. The  paranasal sinuses and mastoid air cells are clear. Orbital structures are unremarkable.     Impression: Impression: 1. 2 mm dural based calcification anterior to the right temporal lobe in the middle cranial fossa which likely represents a very small meningioma. This is an incidental finding. It has increased in size compared with the previous CT. 2. No acute intracranial findings. Electronically Signed: Foreign Richard MD  9/24/2024 4:27 PM EDT  Workstation ID: [id number]    XR Chest 1 View    Result  Date: 9/24/2024  XR CHEST 1 VW Date of Exam: 9/24/2024 2:27 PM EDT Indication: Chest Pain Triage Protocol Comparison: 6/2/2023 Findings: The heart size is normal and the lungs appear clear     Impression: Impression: Stable chest, no active disease. Electronically Signed: Foreign Richard MD  9/24/2024 2:38 PM EDT  Workstation ID: [id number]     Results for orders placed during the hospital encounter of 09/24/24    Adult Transthoracic Echo Complete W/ Cont if Necessary Per Protocol    Interpretation Summary    Left ventricular systolic function is normal. Calculated left ventricular EF = 53.2% Left ventricular ejection fraction appears to be 51 - 55%.    There is mild calcification of the aortic valve.    Estimated right ventricular systolic pressure from tricuspid regurgitation is normal (<35 mmHg).      Current medications:  Scheduled Meds:aspirin, 325 mg, Oral, Daily  atorvastatin, 10 mg, Oral, Nightly  budesonide-formoterol, 2 puff, Inhalation, BID  buPROPion XL, 450 mg, Oral, Daily  chlorthalidone, 25 mg, Oral, Daily  levothyroxine, 75 mcg, Oral, QAM  losartan, 50 mg, Oral, Q24H  metoprolol tartrate, 25 mg, Oral, Q12H  montelukast, 10 mg, Oral, Q PM  pantoprazole, 40 mg, Oral, Q AM  pharmacy consult - [place], , Does not apply, Daily  [Held by provider] riTUXimab, 1,000 mg, Intravenous, Q4 Months  sodium chloride, 10 mL, Intravenous, Q12H      Continuous Infusions:     PRN Meds:.  acetaminophen **OR** acetaminophen **OR** acetaminophen    albuterol    senna-docusate sodium **AND** polyethylene glycol **AND** bisacodyl **AND** bisacodyl    Calcium Replacement - Follow Nurse / BPA Driven Protocol    Magnesium Cardiology Dose Replacement - Follow Nurse / BPA Driven Protocol    nitroglycerin    ondansetron ODT **OR** ondansetron    Phosphorus Replacement - Follow Nurse / BPA Driven Protocol    Potassium Replacement - Follow Nurse / BPA Driven Protocol    sodium chloride    sodium chloride    sodium chloride    Assessment &  Plan   Assessment & Plan     Active Hospital Problems    Diagnosis  POA    Rheumatoid arthritis [M06.9]  Yes    Sjogren's syndrome [M35.00]  Yes    Hypertension [I10]  Yes    Hypothyroidism [E03.9]  Yes    Hyperlipidemia [E78.2]  Yes      Resolved Hospital Problems    Diagnosis Date Resolved POA    **NSTEMI (non-ST elevated myocardial infarction) [I21.4] 09/26/2024 Yes        Brief Hospital Course to date:  Yanet Clifton is a 59 y.o. female here with angina, possible NSTEMI    Chest pain  HTN urgency  -elevated troponin due to severe HTN  -acceptable stress test/low risk study  -ECHO with normal EF  -cardiology titrating BP medications, anticipating DC 9/27    Hypothyroidism  -synthroid    History of RA  Sjogrens  -on rituxan    Expected Discharge Location and Transportation: Home  Expected Discharge   Expected Discharge Date: 9/27/2024; Expected Discharge Time:      VTE Prophylaxis:  Mechanical VTE prophylaxis orders are present.         AM-PAC 6 Clicks Score (PT): 24 (09/24/24 2011)    CODE STATUS:   Code Status and Medical Interventions: CPR (Attempt to Resuscitate); Full Support   Ordered at: 09/24/24 9685     Code Status (Patient has no pulse and is not breathing):    CPR (Attempt to Resuscitate)     Medical Interventions (Patient has pulse or is breathing):    Full Support       Trae Gonzalez MD  09/26/24

## 2024-09-27 ENCOUNTER — READMISSION MANAGEMENT (OUTPATIENT)
Dept: CALL CENTER | Facility: HOSPITAL | Age: 59
End: 2024-09-27
Payer: COMMERCIAL

## 2024-09-27 ENCOUNTER — TELEPHONE (OUTPATIENT)
Dept: CARDIOLOGY | Facility: CLINIC | Age: 59
End: 2024-09-27
Payer: COMMERCIAL

## 2024-09-27 VITALS
SYSTOLIC BLOOD PRESSURE: 139 MMHG | HEART RATE: 67 BPM | WEIGHT: 184.97 LBS | RESPIRATION RATE: 16 BRPM | HEIGHT: 66 IN | TEMPERATURE: 98.9 F | BODY MASS INDEX: 29.73 KG/M2 | OXYGEN SATURATION: 95 % | DIASTOLIC BLOOD PRESSURE: 78 MMHG

## 2024-09-27 PROCEDURE — 99214 OFFICE O/P EST MOD 30 MIN: CPT | Performed by: INTERNAL MEDICINE

## 2024-09-27 PROCEDURE — G0378 HOSPITAL OBSERVATION PER HR: HCPCS

## 2024-09-27 PROCEDURE — 99239 HOSP IP/OBS DSCHRG MGMT >30: CPT | Performed by: NURSE PRACTITIONER

## 2024-09-27 PROCEDURE — 94799 UNLISTED PULMONARY SVC/PX: CPT

## 2024-09-27 PROCEDURE — 94664 DEMO&/EVAL PT USE INHALER: CPT

## 2024-09-27 PROCEDURE — 94761 N-INVAS EAR/PLS OXIMETRY MLT: CPT

## 2024-09-27 RX ORDER — CHLORTHALIDONE 25 MG/1
25 TABLET ORAL DAILY
Qty: 30 TABLET | Refills: 0 | Status: SHIPPED | OUTPATIENT
Start: 2024-09-28 | End: 2024-09-27

## 2024-09-27 RX ORDER — SPIRONOLACTONE 25 MG/1
25 TABLET ORAL DAILY
Qty: 30 TABLET | Refills: 0 | Status: SHIPPED | OUTPATIENT
Start: 2024-09-28

## 2024-09-27 RX ORDER — HYDROXYZINE HYDROCHLORIDE 25 MG/1
25 TABLET, FILM COATED ORAL 3 TIMES DAILY PRN
Qty: 20 TABLET | Refills: 0 | Status: SHIPPED | OUTPATIENT
Start: 2024-09-27 | End: 2024-09-27

## 2024-09-27 RX ORDER — ASPIRIN 325 MG
325 TABLET, DELAYED RELEASE (ENTERIC COATED) ORAL DAILY
Qty: 30 TABLET | Refills: 0 | Status: SHIPPED | OUTPATIENT
Start: 2024-09-28 | End: 2024-09-27

## 2024-09-27 RX ORDER — SPIRONOLACTONE 25 MG/1
25 TABLET ORAL DAILY
Qty: 30 TABLET | Refills: 0 | Status: SHIPPED | OUTPATIENT
Start: 2024-09-28 | End: 2024-09-27

## 2024-09-27 RX ORDER — NIFEDIPINE 30 MG/1
30 TABLET, EXTENDED RELEASE ORAL DAILY PRN
Qty: 30 TABLET | Refills: 0 | Status: SHIPPED | OUTPATIENT
Start: 2024-09-27

## 2024-09-27 RX ORDER — BUDESONIDE AND FORMOTEROL FUMARATE DIHYDRATE 160; 4.5 UG/1; UG/1
2 AEROSOL RESPIRATORY (INHALATION) 2 TIMES DAILY
Qty: 10.2 G | Refills: 3 | Status: SHIPPED | OUTPATIENT
Start: 2024-09-27

## 2024-09-27 RX ORDER — BUTALBITAL, ACETAMINOPHEN AND CAFFEINE 50; 325; 40 MG/1; MG/1; MG/1
1 TABLET ORAL EVERY 6 HOURS PRN
Qty: 4 TABLET | Refills: 0 | Status: SHIPPED | OUTPATIENT
Start: 2024-09-27 | End: 2024-09-27

## 2024-09-27 RX ORDER — ACETAMINOPHEN 325 MG/1
650 TABLET ORAL EVERY 6 HOURS PRN
Start: 2024-09-27 | End: 2024-09-27

## 2024-09-27 RX ORDER — ACETAMINOPHEN 325 MG/1
650 TABLET ORAL EVERY 6 HOURS PRN
Start: 2024-09-27

## 2024-09-27 RX ORDER — LOSARTAN POTASSIUM 50 MG/1
50 TABLET ORAL
Qty: 30 TABLET | Refills: 0 | Status: SHIPPED | OUTPATIENT
Start: 2024-09-28 | End: 2024-09-27

## 2024-09-27 RX ORDER — HYDROXYZINE HYDROCHLORIDE 25 MG/1
25 TABLET, FILM COATED ORAL 3 TIMES DAILY PRN
Qty: 20 TABLET | Refills: 0 | Status: SHIPPED | OUTPATIENT
Start: 2024-09-27

## 2024-09-27 RX ORDER — NIFEDIPINE 30 MG/1
30 TABLET, EXTENDED RELEASE ORAL DAILY PRN
Qty: 30 TABLET | Refills: 0 | Status: SHIPPED | OUTPATIENT
Start: 2024-09-27 | End: 2024-09-27

## 2024-09-27 RX ORDER — ASPIRIN 325 MG
325 TABLET, DELAYED RELEASE (ENTERIC COATED) ORAL DAILY
Qty: 30 TABLET | Refills: 0 | Status: SHIPPED | OUTPATIENT
Start: 2024-09-28

## 2024-09-27 RX ORDER — METOPROLOL TARTRATE 25 MG/1
25 TABLET, FILM COATED ORAL EVERY 12 HOURS SCHEDULED
Qty: 60 TABLET | Refills: 0 | Status: SHIPPED | OUTPATIENT
Start: 2024-09-27 | End: 2024-09-27

## 2024-09-27 RX ORDER — BUDESONIDE AND FORMOTEROL FUMARATE DIHYDRATE 160; 4.5 UG/1; UG/1
2 AEROSOL RESPIRATORY (INHALATION) 2 TIMES DAILY
Qty: 10.2 EACH | Refills: 3 | Status: SHIPPED | OUTPATIENT
Start: 2024-09-27 | End: 2024-09-27

## 2024-09-27 RX ORDER — CHLORTHALIDONE 25 MG/1
25 TABLET ORAL DAILY
Qty: 30 TABLET | Refills: 0 | Status: SHIPPED | OUTPATIENT
Start: 2024-09-28

## 2024-09-27 RX ORDER — NIFEDIPINE 10 MG/1
10 CAPSULE ORAL ONCE
Status: COMPLETED | OUTPATIENT
Start: 2024-09-27 | End: 2024-09-27

## 2024-09-27 RX ORDER — BUTALBITAL, ACETAMINOPHEN AND CAFFEINE 50; 325; 40 MG/1; MG/1; MG/1
1 TABLET ORAL EVERY 4 HOURS PRN
Status: DISCONTINUED | OUTPATIENT
Start: 2024-09-27 | End: 2024-09-27 | Stop reason: HOSPADM

## 2024-09-27 RX ORDER — METOPROLOL TARTRATE 25 MG/1
25 TABLET, FILM COATED ORAL EVERY 12 HOURS SCHEDULED
Qty: 60 TABLET | Refills: 0 | Status: SHIPPED | OUTPATIENT
Start: 2024-09-27

## 2024-09-27 RX ORDER — BUTALBITAL, ACETAMINOPHEN AND CAFFEINE 50; 325; 40 MG/1; MG/1; MG/1
1 TABLET ORAL EVERY 6 HOURS PRN
Qty: 4 TABLET | Refills: 0 | Status: SHIPPED | OUTPATIENT
Start: 2024-09-27

## 2024-09-27 RX ORDER — LOSARTAN POTASSIUM 50 MG/1
50 TABLET ORAL
Qty: 30 TABLET | Refills: 0 | Status: SHIPPED | OUTPATIENT
Start: 2024-09-28

## 2024-09-27 RX ADMIN — ACETAMINOPHEN 650 MG: 325 TABLET ORAL at 05:35

## 2024-09-27 RX ADMIN — BUDESONIDE AND FORMOTEROL FUMARATE DIHYDRATE 2 PUFF: 160; 4.5 AEROSOL RESPIRATORY (INHALATION) at 08:35

## 2024-09-27 RX ADMIN — SPIRONOLACTONE 25 MG: 25 TABLET, FILM COATED ORAL at 08:40

## 2024-09-27 RX ADMIN — CHLORTHALIDONE 25 MG: 25 TABLET ORAL at 08:40

## 2024-09-27 RX ADMIN — LOSARTAN POTASSIUM 50 MG: 50 TABLET, FILM COATED ORAL at 08:40

## 2024-09-27 RX ADMIN — PANTOPRAZOLE SODIUM 40 MG: 40 TABLET, DELAYED RELEASE ORAL at 05:32

## 2024-09-27 RX ADMIN — BUTALBITAL, ACETAMINOPHEN, AND CAFFEINE 1 TABLET: 50; 325; 40 TABLET ORAL at 09:53

## 2024-09-27 RX ADMIN — ASPIRIN 325 MG: 325 TABLET, COATED ORAL at 08:40

## 2024-09-27 RX ADMIN — ACETAMINOPHEN 650 MG: 325 TABLET ORAL at 00:15

## 2024-09-27 RX ADMIN — Medication 10 ML: at 08:41

## 2024-09-27 RX ADMIN — NIFEDIPINE 10 MG: 10 CAPSULE ORAL at 10:18

## 2024-09-27 RX ADMIN — LEVOTHYROXINE SODIUM 75 MCG: 0.07 TABLET ORAL at 05:32

## 2024-09-27 RX ADMIN — METOPROLOL TARTRATE 25 MG: 25 TABLET, FILM COATED ORAL at 08:40

## 2024-09-27 RX ADMIN — BUPROPION HYDROCHLORIDE 450 MG: 150 TABLET, EXTENDED RELEASE ORAL at 08:40

## 2024-09-27 NOTE — CASE MANAGEMENT/SOCIAL WORK
Continued Stay Note  [facility]     Patient Name: Yanet Clifton  MRN: [id number]  Today's Date: 9/27/2024    Admit Date: 9/24/2024    Plan: Home   Discharge Plan       Row Name 09/27/24 1226       Plan    Plan Home    Patient/Family in Agreement with Plan yes    Plan Comments Spoke with patient at bedside. Patient may discharge home today. She denies any discharge needs.  can transport home. CM will follow for any discharge needs.    Final Discharge Disposition Code 01 - home or self-care                   Discharge Codes    No documentation.                 Expected Discharge Date and Time       Expected Discharge Date Expected Discharge Time    Sep 27, 2024               Tiffanie Ruiz RN

## 2024-09-27 NOTE — PROGRESS NOTES
"[facility] at [facility]  IP Progress Note    PROBLEM LIST:  CARDIAC   Coronary Artery Disease:   Troponin elevation   Stress PET, 9/25/2024: Normal     Myocardium:   Echo, 9/25/2024: LVEF 55%     Valvular:   No known valvular disease     Electrical:   Normal sinus rhythm     Pericardium:   Normal     VASCULAR   Arterial   Cerebrovascular disease:   2024 CT head normal with calcified meningioma     AAA:   2024 CT abdomen no aneurysm     CARDIAC RISK FACTORS   Hypertension   Dyslipidemia   Obesity     NON-CARDIAC   Rheumatoid arthritis   Sjogren's syndrome   Hypothyroidism   Asthma with mild obstruction on PFTs     SURGERIES   Hysterectomy with salpingo-oophorectomy   Tonsillectomy   Temporal artery biopsy   Lithotripsy   Cholecystectomy   Breast biopsies       HOSPITAL COURSE:  Patient was admitted with hypertensive urgency and her blood pressure has been under better control now.      CHIEF COMPLAINTS:  Chest discomfort and headaches      Subjective   Patient is feeling much better with slight headaches      Objective     Blood pressure 151/100, pulse 62, temperature 98.8 °F (37.1 °C), temperature source Oral, resp. rate 18, height 167.6 cm (65.98\"), weight 83.9 kg (184 lb 15.5 oz), SpO2 94%, not currently breastfeeding.   No intake or output data in the 24 hours ending 09/27/24 0806    PHYSICAL EXAM:  Constitutional:       General: Not in acute distress.     Appearance: Healthy appearance. Not in distress.     Neck:     JVP:Not elevated     Carotid artery: Normal    Pulmonary:      Effort: Pulmonary effort is normal.      Breath sounds: Normal breath sounds. No wheezing. No rhonchi. No rales.     Cardiovascular:      Normal rate. Regular rhythm. Normal S1. Normal S2.      Murmurs: There is no significant murmur.      No gallop. No click. No rub.     Abdominal:      General: Bowel sounds are normal.      Palpations: Abdomen is soft.      Tenderness: There is no abdominal " tenderness.    Extremities:     Pulses:Normal radial and pedal pulses     Edema:no edema    MEDICATIONS:    aspirin, 325 mg, Oral, Daily  atorvastatin, 10 mg, Oral, Nightly  budesonide-formoterol, 2 puff, Inhalation, BID  buPROPion XL, 450 mg, Oral, Daily  chlorthalidone, 25 mg, Oral, Daily  levothyroxine, 75 mcg, Oral, QAM  losartan, 50 mg, Oral, Q24H  metoprolol tartrate, 25 mg, Oral, Q12H  montelukast, 10 mg, Oral, Q PM  pantoprazole, 40 mg, Oral, Q AM  [Held by provider] riTUXimab, 1,000 mg, Intravenous, Q4 Months  sodium chloride, 10 mL, Intravenous, Q12H  spironolactone, 25 mg, Oral, Daily          Results from last 7 days   Lab Units 09/25/24  0539   WBC 10*3/mm3 3.50   HEMOGLOBIN g/dL 10.7*   HEMATOCRIT % 31.9*   PLATELETS 10*3/mm3 183     Results from last 7 days   Lab Units 09/25/24  0928 09/25/24  0539 09/24/24  1506 09/24/24  1458   SODIUM mmol/L  --  142  --  143   POTASSIUM mmol/L 4.0 3.9  --  3.1*   CHLORIDE mmol/L  --  108*  --  108*   CO2 mmol/L  --  24.0  --  23.0   BUN mg/dL  --  16  --  18   CREATININE mg/dL  --  0.98   < > 0.96   CALCIUM mg/dL  --  8.5*  --  9.7   BILIRUBIN mg/dL  --   --   --  0.4   ALK PHOS U/L  --   --   --  75   ALT (SGPT) U/L  --   --   --  15   AST (SGOT) U/L  --   --   --  21   GLUCOSE mg/dL  --  82  --  95    < > = values in this interval not displayed.     Results from last 7 days   Lab Units 09/24/24  1458   INR  1.00     Lab Results   Component Value Date    TROPONINT 62 (C) 09/25/2024     Results from last 7 days   Lab Units 09/24/24  1659   TSH uIU/mL 0.423     Results from last 7 days   Lab Units 09/25/24  0539   CHOLESTEROL mg/dL 138   TRIGLYCERIDES mg/dL 111   HDL CHOL mg/dL 40   LDL CHOL mg/dL 78         Iron   Date Value Ref Range Status   09/19/2022 62 37 - 145 mcg/dL Final     Ferritin   Date Value Ref Range Status   09/19/2022 35.80 13.00 - 150.00 ng/mL Final     Iron Saturation (TSAT)   Date Value Ref Range Status   09/19/2022 16 (L) 20 - 50 % Final      TIBC   Date Value Ref Range Status   09/19/2022 383 298 - 536 mcg/dL Final      Hemoglobin A1C   Date Value Ref Range Status   09/25/2024 4.80 4.80 - 5.60 % Final     Magnesium   Date Value Ref Range Status   05/09/2014 1.8 1.3 - 2.7 mg/dL Final        RESULT REVIEW:    I reviewed the patient's new clinical results.    Tele: Sinus Rythym      ASSESSMENT:     Hypertensive urgency  Hypothyroidism  Rheumatoid arthritis  Sjogren's disease    PLAN:     Patient blood pressure is getting under good control. We will continue her on current medications.   She should be able to go home today and keep a diary of her blood pressure.   I will follow her in the office in the next few weeks.

## 2024-09-27 NOTE — PLAN OF CARE
Goal Outcome Evaluation:  Plan of Care Reviewed With: patient        Progress: improving     Patient was anxious over BP readings at start of shift. BP readings have been WDL. Spouse is at bedside. Patient complained of headache twice and given Tylenol for pain 3/10. No unmet needs.

## 2024-09-27 NOTE — DISCHARGE SUMMARY
[facility] Medicine Services  DISCHARGE SUMMARY    Patient Name: Yanet Clifton  : 1965  MRN: [id number]    Date of Admission: 2024  Date of Discharge:  2024  Primary Care Physician: Brie Jalloh MD    Consults       Date and Time Order Name Status Description    2024  5:19 PM Inpatient Cardiology Consult Completed             Hospital Course     Presenting Problem:   NSTEMI (non-ST elevated myocardial infarction) [I21.4]    Active Hospital Problems    Diagnosis  POA    Rheumatoid arthritis [M06.9]  Yes    Sjogren's syndrome [M35.00]  Yes    Hypertension [I10]  Yes    Hypothyroidism [E03.9]  Yes    Hyperlipidemia [E78.2]  Yes      Resolved Hospital Problems    Diagnosis Date Resolved POA    **NSTEMI (non-ST elevated myocardial infarction) [I21.4] 2024 Yes      Hospital Course:  Yanet Clifton is a 59 y.o. female PMH rheumatoid arthritis, Sjogren's, HLP and hypothyroidism who work with nausea and began having bilateral chest pain with bilateral arm pain and numbness that did not improve as the day progressed. In ED, troponin was elevated and she was started on heparin and nitro gtt with resolution of chest pain and arm numbness. Cardiology consulted with stress test indicating low risk. ECHO with normal EF. Symptoms thought to be related to HTN urgency. Cardiology titrated HTN meds with improvement of blood pressure. Pt had a headache that improved with Fioricet.     Assessment/Plan  Chest Pain  HTN urgency  - elevated troponin de to severe HTN  - acceptable stress test/low risk study  - ECHO with normal EF  - cardiology adjusting BP meds with improvement.     Hypothyroidism  - levothyroxine    Hx of RA   Sjogrens  - Rituxan        Discharge Follow Up Recommendations for labs/diagnostics:  Follow up with PCP in 1 week  Follow up with Dr Perry in 2 weeks  Bring blood pressure diary to Dr Perry's office    Day of Discharge     HPI:   Pt sitting up  in bed reporting headache. Ordered Fioricet with improvement. Pt denies chest pain , shortness of breath. Pt states she has a BP cuff and can record BP readings to take to Dr Burrows's office for next visit.       Otherwise ROS is negative except as mentioned in the HPI.    Vital Signs:   Temp:  [98 °F (36.7 °C)-98.9 °F (37.2 °C)] 98.9 °F (37.2 °C)  Heart Rate:  [61-67] 67  Resp:  [16-18] 16  BP: (132-185)/() 139/78     Physical Exam:  Constitutional: Awake, alert, NAD  HENT: NCAT, mucous membranes moist  Respiratory: Clear to auscultation bilaterally, nonlabored   Cardiovascular: RRR, no murmurs, rubs, or gallops  Gastrointestinal: Positive bowel sounds, soft, nontender, nondistended  Musculoskeletal: No bilateral ankle edema  Psychiatric: Appropriate affect, cooperative  Neurologic: Oriented x 3, DAVIS, speech clear  Skin: No rashes      Pertinent  and/or Most Recent Results     Results from last 7 days   Lab Units 09/25/24  0928 09/25/24  0539 09/24/24  1516 09/24/24  1506 09/24/24  1458   WBC 10*3/mm3  --  3.50  --   --  4.21   HEMOGLOBIN g/dL  --  10.7*  --   --  12.8   HEMOGLOBIN, POC g/dL  --   --   --  11.9*  --    HEMATOCRIT %  --  31.9*  --   --  36.7   HEMATOCRIT POC %  --   --   --  35*  --    PLATELETS 10*3/mm3  --  183  --   --  207   SODIUM mmol/L  --  142  --   --  143   POTASSIUM mmol/L 4.0 3.9  --   --  3.1*   CHLORIDE mmol/L  --  108*  --   --  108*   CO2 mmol/L  --  24.0  --   --  23.0   BUN mg/dL  --  16  --   --  18   CREATININE mg/dL  --  0.98 1.10 1.10 0.96   GLUCOSE mg/dL  --  82  --   --  95   CALCIUM mg/dL  --  8.5*  --   --  9.7     Results from last 7 days   Lab Units 09/24/24  1458   BILIRUBIN mg/dL 0.4   ALK PHOS U/L 75   ALT (SGPT) U/L 15   AST (SGOT) U/L 21   PROTIME Seconds 13.3   INR  1.00   APTT seconds 29.4*     Results from last 7 days   Lab Units 09/25/24  0539   CHOLESTEROL mg/dL 138   TRIGLYCERIDES mg/dL 111   HDL CHOL mg/dL 40     Results from last 7 days   Lab Units  09/25/24  0539 09/24/24  1659 09/24/24  1458   TSH uIU/mL  --  0.423  --    HEMOGLOBIN A1C % 4.80  --   --    PROBNP pg/mL  --   --  146.8   HSTROP T ng/L 62* 54* 56*   LACTATE mmol/L  --   --  0.7       Brief Urine Lab Results  (Last result in the past 365 days)        Color   Clarity   Blood   Leuk Est   Nitrite   Protein   CREAT   Urine HCG        05/13/24 0856 Dark Yellow   Cloudy   Negative   Trace   Positive   30 mg/dL (1+)                   Microbiology Results Abnormal       None            Imaging Results (All)       Procedure Component Value Units Date/Time    CT Angiogram Chest [[id number]] Collected: 09/24/24 1633     Updated: 09/24/24 1642    Narrative:      CT ANGIOGRAM CHEST    Date of Exam: 9/24/2024 4:09 PM EDT    Indication: pe. cp..    Comparison: None available.    Technique: CTA of the chest was performed after the uneventful intravenous administration of 85 mL Isovue-370. Reconstructed coronal and sagittal images were also obtained. In addition, a 3-D volume rendered image was created for interpretation.   Automated exposure control and iterative reconstruction methods were used.    Findings:    Pulmonary arteries / cardiovascular: No intraluminal filling defect to suggest pulmonary embolus. No pericardial effusion. Normal caliber thoracic aorta.    Lymph nodes and mediastinum: Calcified mediastinal and hilar lymph nodes consistent with healed granulomatous disease. No suspicious lymphadenopathy or mediastinal mass.    Lungs and airways: Central airways are patent. No suspicious pulmonary nodules or masses. No focal consolidation.    Pleura: No pleural effusion or pneumothorax.     Bones and soft tissues: No acute or suspicious osseous abnormality. Degenerative changes of the thoracic spine. Soft tissues are within normal limits.    Upper abdomen: See report from same day CT abdomen pelvis for findings below the diaphragm.      Impression:      Impression:    No evidence of pulmonary embolism or  other acute process in the chest.      Electronically Signed: Luis Rojas MD    9/24/2024 4:39 PM EDT    Workstation ID: [id number]    CT Abdomen Pelvis With Contrast [[id number]] Collected: 09/24/24 1624     Updated: 09/24/24 1634    Narrative:      CT ABDOMEN PELVIS W CONTRAST    Date of Exam: 9/24/2024 4:09 PM EDT    Indication: nv. upper abd pain..    Comparison: CT abdomen and pelvis 5/13/2024.    Technique: Axial CT images were obtained of the abdomen and pelvis following the uneventful intravenous administration of 85 cc Isovue-370. Reconstructed coronal and sagittal images were also obtained. Automated exposure control and iterative   construction methods were used.      Findings:  Small esophageal hiatal hernia. Heart size is normal. Lung bases are clear.    Cholecystectomy. No abnormal biliary dilation. Liver, spleen, pancreas, adrenals and right kidney are normal. Left renal cyst. The large and small bowel do not appear thickened, dilated or inflamed. The gastric wall appears slightly generally more   thickened than on the 5/13/2024 examination.    Lipoma within the left gluteal musculature measures 5.5 cm. Urinary bladder and rectum are normal. Hysterectomy.    No acute or suspicious osseous abnormalities.      Impression:      Impression:    1. The gastric wall appears generally more thickened than on the 5/13/2020 for comparison. This could be related to underdistention, although gastric inflammation could be considered.  2. No acute findings are seen elsewhere within the abdomen or pelvis.        Electronically Signed: Marilyn Maldonado MD    9/24/2024 4:31 PM EDT    Workstation ID: [id number]    CT Head Without Contrast [[id number]] Collected: 09/24/24 1623     Updated: 09/24/24 1630    Narrative:      CT HEAD WO CONTRAST    Date of Exam: 9/24/2024 4:09 PM EDT    Indication: ha. htn..    Comparison: 10/18/2018    Technique: Axial CT images were obtained of the head without contrast administration.   Automated exposure control and iterative construction methods were used.      Findings:  The ventricles and CSF containing spaces are normal in size and configuration. No significant intra or extra-axial mass lesions, fluid collections, or mass effect are seen. No focal areas of low-attenuation or acute intracranial hemorrhage. There is a   tiny dural based calcification identified anterior to the right temporal lobe measuring 2 mm in diameter which may represent a very small meningioma. It has increased in size compared to the study of 2018. There is no associated mass effect or edema. The   paranasal sinuses and mastoid air cells are clear. Orbital structures are unremarkable.      Impression:      Impression:    1. 2 mm dural based calcification anterior to the right temporal lobe in the middle cranial fossa which likely represents a very small meningioma. This is an incidental finding. It has increased in size compared with the previous CT.  2. No acute intracranial findings.        Electronically Signed: Foreign Richard MD    9/24/2024 4:27 PM EDT    Workstation ID: [id number]    XR Chest 1 View [[id number]] Collected: 09/24/24 1438     Updated: 09/24/24 1441    Narrative:      XR CHEST 1 VW    Date of Exam: 9/24/2024 2:27 PM EDT    Indication: Chest Pain Triage Protocol    Comparison: 6/2/2023    Findings:  The heart size is normal and the lungs appear clear      Impression:      Impression:  Stable chest, no active disease.      Electronically Signed: Foreign Richard MD    9/24/2024 2:38 PM EDT    Workstation ID: [id number]                    Results for orders placed during the hospital encounter of 09/24/24    Adult Transthoracic Echo Complete W/ Cont if Necessary Per Protocol    Interpretation Summary    Left ventricular systolic function is normal. Calculated left ventricular EF = 53.2% Left ventricular ejection fraction appears to be 51 - 55%.    There is mild calcification of the aortic valve.    Estimated right  ventricular systolic pressure from tricuspid regurgitation is normal (<35 mmHg).        Discharge Details        Discharge Medications        New Medications        Instructions Start Date   acetaminophen 325 MG tablet  Commonly known as: TYLENOL   650 mg, Oral, Every 6 Hours PRN      aspirin 325 MG EC tablet   325 mg, Oral, Daily   Start Date: September 28, 2024     butalbital-acetaminophen-caffeine -40 MG per tablet  Commonly known as: FIORICET, ESGIC   1 tablet, Oral, Every 6 Hours PRN      chlorthalidone 25 MG tablet  Commonly known as: HYGROTON   25 mg, Oral, Daily   Start Date: September 28, 2024     hydrOXYzine 25 MG tablet  Commonly known as: ATARAX   25 mg, Oral, 3 Times Daily PRN      losartan 50 MG tablet  Commonly known as: COZAAR   50 mg, Oral, Every 24 Hours Scheduled   Start Date: September 28, 2024     metoprolol tartrate 25 MG tablet  Commonly known as: LOPRESSOR   25 mg, Oral, Every 12 Hours Scheduled      NIFEdipine XL 30 MG 24 hr tablet  Commonly known as: Procardia XL   30 mg, Oral, Daily PRN      spironolactone 25 MG tablet  Commonly known as: ALDACTONE   25 mg, Oral, Daily   Start Date: September 28, 2024            Changes to Medications        Instructions Start Date   atorvastatin 10 MG tablet  Commonly known as: LIPITOR  What changed: when to take this   10 mg, Oral, Daily      buPROPion  MG 24 hr tablet  Commonly known as: WELLBUTRIN XL  What changed: when to take this   450 mg, Oral, Daily      omeprazole 40 MG capsule  Commonly known as: priLOSEC  What changed: when to take this   40 mg, Oral, Daily             Continue These Medications        Instructions Start Date   albuterol (2.5 MG/3ML) 0.083% nebulizer solution  Commonly known as: PROVENTIL   2.5 mg, Nebulization, 4 Times Daily PRN      budesonide-formoterol 160-4.5 MCG/ACT inhaler  Commonly known as: SYMBICORT   2 puffs, Inhalation, 2 Times Daily      levothyroxine 75 MCG tablet  Commonly known as: SYNTHROID,  LEVOTHROID   75 mcg, Oral, Every Morning      montelukast 10 MG tablet  Commonly known as: SINGULAIR   10 mg, Oral, Every Evening      riTUXimab 500 MG/50ML solution injection  Commonly known as: RITUXAN   1,000 mg, Intravenous, Every 6 Months, Two 1 gram infusions spread out over 2 weeks             Stop These Medications      Aleve 220 MG tablet  Generic drug: naproxen sodium     caffeine 200 MG tablet              Allergies   Allergen Reactions    Adalimumab      Other reaction(s): Headache (Humira)    Duloxetine Hcl Other (See Comments)     sexual dysfunction    Escitalopram Oxalate Other (See Comments)     Weight gain    Macrobid [Nitrofurantoin Macrocrystal] Nausea Only    Cimzia [Certolizumab Pegol] Rash    Sulfa Antibiotics Rash         Discharge Disposition:  Home or Self Care    Discharge Diet:  Diet Order   Procedures    Diet: Cardiac; Healthy Heart (2-3 Na+); Fluid Consistency: Thin (IDDSI 0)         Discharge Activity:   Activity Instructions       Activity as Tolerated      Up WIth Assist                CODE STATUS:    Code Status and Medical Interventions: CPR (Attempt to Resuscitate); Full Support   Ordered at: 09/24/24 0692     Code Status (Patient has no pulse and is not breathing):    CPR (Attempt to Resuscitate)     Medical Interventions (Patient has pulse or is breathing):    Full Support         Future Appointments   Date Time Provider Department Center   10/10/2024 12:00 PM Brie Jalloh MD [facility]   11/19/2024  9:00 AM Brie Jalloh MD [facility]   5/22/2025 10:00 AM Brie Jalloh MD [facility]       Additional Instructions for the Follow-ups that You Need to Schedule       Call MD With Problems / Concerns   As directed      Instructions: Call 911 for chest pain, heart palpitations, shortness of breath    Order Comments: Instructions: Call 911 for chest pain, heart palpitations, shortness of breath         Discharge Follow-up with PCP   As directed       Currently  Documented PCP:    Brie Jalloh MD    PCP Phone Number:    [phone]     Follow Up Details: Follow up with PCP in 1 week        Discharge Follow-up with Specified Provider: Follow up with Dr Perry; 2 Weeks   As directed      To: Follow up with Dr Perry   Follow Up: 2 Weeks                Time Spent on Discharge:  46 minutes    Electronically signed by CHERYL Ernst, 09/27/24, 11:59 AM EDT.

## 2024-10-01 ENCOUNTER — READMISSION MANAGEMENT (OUTPATIENT)
Dept: CALL CENTER | Facility: HOSPITAL | Age: 59
End: 2024-10-01
Payer: COMMERCIAL

## 2024-10-01 NOTE — OUTREACH NOTE
AMI Week 1 Survey      Flowsheet Row Responses   [facility] facility patient discharged from? [place]   Does the patient have one of the following disease processes/diagnoses(primary or secondary)? Acute MI (STEMI,NSTEMI)   Week 1 attempt successful? No   Unsuccessful attempts Attempt 1            FREDY PEREA - Registered Nurse

## 2024-10-07 NOTE — ASSESSMENT & PLAN NOTE
Normal PET stress test this hospitalization; stable lipids LDL 78; was on atorvastatin without s/e prior to hospital admission; ok to temporarily hold atorvastatin until BP med regimen is straightened out; plan to restart at f/u appt

## 2024-10-07 NOTE — PROGRESS NOTES
TRANSITIONAL CARE HOSPITAL FOLLOW-UP VISIT    Hospitalized at:    [x]   [facility]  []   [facility]  []     []   Other -    Outside records reviewed. Pertinent radiology and labs reviewed  []   Records requested    Dates of hospitalization:  Admission - 9/24/24         Discharge - 9/27/24   If transferred to rehab facility, date of discharge : n/a    Hospital Course: admitted from the ER with complaints of chest tightness with radiation into her arms and assoc'd headache. BP was found to be elevated up to 240s/140s. Elevated troponins attributed to hypertensive urgency, BP meds were added with persistent labile BPs. Cardiac evaluation included neg PET stress test.      Lab/Radiology Results:   9/24/24 pCXR - stable, no active disease    9/24/24 CT head - incidental 2mm dural based calcification ant to R temporal lobe, likely very sm meningioma    9/24/24 CT chest angiogram - no evidence of PE    9/24/24 CT abd/pelvis - bord thickened gastric wall; no acute findings    9/24/24 CBC with H&H 11.7/36.2, CMP with K 3.1, nl lactate, BNP,and TSH; elevated troponins 56 and 54    9/25/24 troponin 62, CBC with H&H 10.7/31.9, BMP with K 3.9, A1C 4.8, lipids 138, 111, HDL 40, LDL 78; elev hsCRP 1.085, ESR 13    9/25/24 nl nuclear stress test - EF 69%, no evidence of ischemia     9/25/24 ECHO - EF 51-55%, mild AV calcification, mild MR/TR, tr-mild W[place]    9/26/24 decr'd hsCRP 0.684    New Medications or Medication Changes:   Metoprolol 25mg BID  Nifedipine XL 30mg QD prn SBP > 170  Spironolactone 25mg QD  Chlorthalidone 25mg QD  Losartan 50mg QD  ASA 325mg QD     Current outpatient and discharge medications have been reconciled for the patient.  Reviewed by: Brie Jalloh MD      Status:  better    Current symptoms:   Myalgias, brenna upper back pain  Fatigue, decreased physical capacity    Post discharge concerns/issues: Upper back pain and muscle pain; fatigue    Treatment plan   Med Changes:    reviewed and updated with  patient        Discussed future BP med changes to streamline regimen; awaiting lab results     Referrals: none    Risk for Readmission (LACE) No data recorded      Follow-up appointments:   Already saw cards Dr. Perry this morning prior to our appt    Discussed with: patient  ___________________________________________________________  Chief Complaint   Patient presents with    Hospital Follow Up Visit       History of Present Illness  59 y.o.  woman presents for hospital follow-up for elevated troponin attributed to hypertensive urgency. Several new medications were added this hospitalization. Note also unremarkable cardiac testing, including neg PET stress test and ECHO. BPs remains labile during hospitalization.    She reports increased upper back pain and muscle pain so has stopped losartan as well as PM dose of metoprolol. Just saw cards Dr. Perry this morning, and he ok'd stopping atorvastatin as well. Reports she was also instructed to hold spironolactone on Sa/Persaud.    Home BPs have been 100-110s/70s.    Review of Systems  ROS (+) for fatigue and upper back pain and myalgias as noted,  attributed to combination of new meds per patient. Denies further CP. Denies SOB. Denies palpitations. All other ROS reviewed and negative.      Current Outpatient Medications:     acetaminophen (TYLENOL) 325 MG prn    albuterol (PROVENTIL) (2.5 MG/3ML) 0.083% neb prn    aspirin 325 MG QD    budesonide-formoterol (SYMBICORT) 160-4.5 MCG/ACT inhaler 2 puffs BID    buPROPion  MG 3 QD    butalbital-acetaminophen-caffeine (FIORICET) -40 MG prn    chlorthalidone 25 MG QD    hydrOXYzine 25 MG TID prn    levothyroxine 75 MCG QD    metoprolol tartrate  25 MG qam    montelukast 10 MG QD    NIFEdipine XL 30 MG  QD prn    omeprazole 40 MG QD    riTUXimab (RITUXAN) 500 MG/50ML 1000mg Q6 mos    spironolactone 25 MG QD    VITALS:  /64 (BP Location: Left arm, Patient Position: Sitting)   Pulse 70   Ht 167.6 cm  "(65.98\")   Wt 82.9 kg (182 lb 12.8 oz)   SpO2 96%   BMI 29.52 kg/m²     Physical Exam  Vitals and nursing note reviewed.   Constitutional:       General: She is not in acute distress.     Appearance: Normal appearance. She is not ill-appearing.   Eyes:      Extraocular Movements: Extraocular movements intact.      Conjunctiva/sclera: Conjunctivae normal.   Cardiovascular:      Rate and Rhythm: Normal rate and regular rhythm.      Heart sounds: Normal heart sounds.   Pulmonary:      Effort: Pulmonary effort is normal. No respiratory distress.      Breath sounds: No wheezing, rhonchi or rales.   Neurological:      Mental Status: She is alert and oriented to person, place, and time. Mental status is at baseline.      Gait: Gait normal.   Psychiatric:         Mood and Affect: Mood normal.         Behavior: Behavior normal.         LABS  9/17/24 MRI brain angiogram - nl    9/17/24 MRI T-spine - disc bulge T2-3 with mild central canal stenosis; mild posterior left central canal stenosis at T10-11    5/13/24 H&H 13.7/41.4, LDL 88, A1C 5.2    ASSESSMENT/PLAN  Diagnoses and all orders for this visit:    1. Hypertensive urgency (Primary)  Comments:  see BP mgmt under Hypertension; ADDENDUM: see note from Dr. Perry; no NSTEMI; renal function much worsened, will hold spironolactone and chlorthalidone    2. Hypertension  Assessment & Plan:  BP bord low today and patient symptomatic with fatigue; note cardio/nephro-protective effects of ARBs so recommend consideration to restart; discussed also metoprolol short-acting QAM is likely contributing to her exertional fatigue; also discussed 2 diuretics with chlorthalidone and spironolactone; awaiting f/u CMP labs; she will monitoring BPs at home - f/u in 3 wks and bring BP cuff to appt to verify accuracy; ADDENDUM: very abnl Cr 2.37, GFR 23.1      3. Normocytic anemia  Comments:  NEW during hospitalization; consider dilutional; rec f/u CBC - ADDENDUM: resolved with H&H " 12.9/38.0    4. Hypokalemia  Comments:  on admission; resolved during hospitalization; note spironolactone +chlorthalidone; ADDENDUM: K 3.8    5. Hyperlipidemia  Assessment & Plan:  Normal PET stress test this hospitalization; stable lipids LDL 78; was on atorvastatin without s/e prior to hospital admission; ok to temporarily hold atorvastatin until BP med regimen is straightened out; plan to restart at f/u appt      6. Impaired fasting glucose  Assessment & Plan:  BG control with A1C 4.8 this hospitalization      7. Hypothyroidism  Assessment & Plan:  Euthyroid this hospitlaization; cont levothyroxine 75mcg QD      8. Vaccine counseling  Comments:  rec flu vacc and COVID19 vacc        FOLLOW-UP  Health maintenance - rec flu vacc and COVID19 vacc, counseling given  CBC and BMP already done this AM, results pending  RTC 3wks for f/u BP    ADDENDUM: CBC back to normal; CMP with very abnl Cr 2.37, GFR 23.1, K 3.8; rec BP med adjustment (will consult with Dr. Perry), also needs f/u BMP on Monday    ADDENDUM #2: per Dr. Perry, hold chlorthalidone and spironolactone and repeat BMP.    Ric Perry MD sent to Brie Jalloh MD Dr. Ko    Her diagnosis is hypertensive urgency with normal PET scan.  I do not believe she has NSTEMI.  I saw her in the clinic and took her off of couple of medications that can affect renal function.  I sent her for blood work.  I am going to hold spironolactone and chlorthalidone along with Cozaar.  I also told her to stop her Lipitor for now.  Will check her BMP again in few days.  We will adjust her medications after that.    Electronically signed by:    Brie Jalloh MD, FACP  10/10/2024

## 2024-10-08 ENCOUNTER — READMISSION MANAGEMENT (OUTPATIENT)
Dept: CALL CENTER | Facility: HOSPITAL | Age: 59
End: 2024-10-08
Payer: COMMERCIAL

## 2024-10-08 NOTE — OUTREACH NOTE
AMI Week 2 Survey      Flowsheet Row Responses   [facility] facility patient discharged from? [place]   Does the patient have one of the following disease processes/diagnoses(primary or secondary)? Acute MI (STEMI,NSTEMI)   Week 2 attempt successful? No   Unsuccessful attempts Attempt 1            Francoise REYNA - Licensed Nurse

## 2024-10-09 NOTE — PROGRESS NOTES
Follow-up Visit      Date: 10/10/2024  Patient Name: Yanet Clifton  : 1965   MRN: [id number]     Chief Complaint:    Chief Complaint   Patient presents with    NSTEMI       History of Present Illness: Yanet Clifton is a 59 y.o. female who is here today for follow-up from the hospital for her high blood pressure.      Patient blood pressure has been under very good control and basically has gone down.  She also started feeling a lot of muscle aches and pains.  It is very difficult for her to go to the stairs.  She denies any chest pain any shortness of breath any dizziness at this time.    She denies any bleeding or any other symptoms.  She denies any weight loss or any weight gain.      Problem List     CARDIAC  Coronary Artery Disease:   Troponin elevation  Stress PET, 2024: Normal     Myocardium:   Echo, 2024: LVEF 55%     Valvular:   No known valvular disease     Electrical:   Normal sinus rhythm     Pericardium:   Normal     VASCULAR  Arterial  Cerebrovascular disease:    CT head normal with calcified meningioma    AAA:    CT abdomen no aneurysm     CARDIAC RISK FACTORS  Hypertension  Dyslipidemia  2024   HDL 40 LDL 78  Obesity    NON-CARDIAC  Rheumatoid arthritis  Sjogren's syndrome  Hypothyroidism  Asthma with mild obstruction on PFTs    SURGERIES  Hysterectomy with salpingo-oophorectomy  Tonsillectomy  Temporal artery biopsy  Lithotripsy  Cholecystectomy  Breast biopsies      Subjective      Review of Systems:   Review of Systems   Respiratory: Negative.     Cardiovascular: Negative.        Medications:     Current Outpatient Medications:     albuterol (PROVENTIL) (2.5 MG/3ML) 0.083% nebulizer solution, Take 2.5 mg by nebulization 4 (Four) Times a Day As Needed for Wheezing., Disp: 120 each, Rfl: 5    aspirin 325 MG EC tablet, Take 1 tablet by mouth Daily., Disp: 30 tablet, Rfl: 0    budesonide-formoterol (SYMBICORT) 160-4.5 MCG/ACT inhaler, Inhale 2  puffs 2 (Two) Times a Day., Disp: 10.2 g, Rfl: 3    buPROPion XL (WELLBUTRIN XL) 150 MG 24 hr tablet, Take 3 tablets by mouth Daily. (Patient taking differently: Take 3 tablets by mouth Every Morning.), Disp: 270 tablet, Rfl: 3    butalbital-acetaminophen-caffeine (FIORICET, ESGIC) -40 MG per tablet, Take 1 tablet by mouth Every 6 (Six) Hours As Needed for Headache., Disp: 4 tablet, Rfl: 0    chlorthalidone (HYGROTON) 25 MG tablet, Take 1 tablet by mouth Daily., Disp: 30 tablet, Rfl: 0    hydrOXYzine (ATARAX) 25 MG tablet, Take 1 tablet by mouth 3 (Three) Times a Day As Needed for Anxiety., Disp: 20 tablet, Rfl: 0    levothyroxine (SYNTHROID, LEVOTHROID) 75 MCG tablet, TAKE 1 TABLET BY MOUTH EVERY MORNING, Disp: 90 tablet, Rfl: 3    metoprolol tartrate (LOPRESSOR) 25 MG tablet, Take 1 tablet by mouth Every 12 (Twelve) Hours. (Patient taking differently: Take 1 tablet by mouth Daily.), Disp: 60 tablet, Rfl: 0    montelukast (SINGULAIR) 10 MG tablet, Take 1 tablet by mouth Every Evening., Disp: , Rfl:     NIFEdipine XL (Procardia XL) 30 MG 24 hr tablet, Take 1 tablet by mouth Daily As Needed (Take as needed for SBP>170)., Disp: 30 tablet, Rfl: 0    omeprazole (priLOSEC) 40 MG capsule, Take 1 capsule by mouth Daily. (Patient taking differently: Take 1 capsule by mouth every night at bedtime.), Disp: 90 capsule, Rfl: 3    riTUXimab (RITUXAN) 500 MG/50ML solution injection, Infuse 100 mL into a venous catheter Every 6 (Six) Months. Two 1 gram infusions spread out over 2 weeks, Disp: , Rfl:     spironolactone (ALDACTONE) 25 MG tablet, Take 1 tablet by mouth Daily., Disp: 30 tablet, Rfl: 0    Allergies:   Allergies   Allergen Reactions    Adalimumab      Other reaction(s): Headache (Humira)    Duloxetine Hcl Other (See Comments)     sexual dysfunction    Escitalopram Oxalate Other (See Comments)     Weight gain    Macrobid [Nitrofurantoin Macrocrystal] Nausea Only    Cimzia [Certolizumab Pegol] Rash    Sulfa  "Antibiotics Rash       Objective     Physical Exam:  Vitals:    10/10/24 0912   BP: 110/84   BP Location: Right arm   Patient Position: Sitting   Cuff Size: Adult   Pulse: 60   SpO2: 100%   Weight: 83.6 kg (184 lb 3.2 oz)   Height: 167.6 cm (66\")     Body mass index is 29.73 kg/m².    Constitutional:       General: Not in acute distress.     Appearance: Healthy appearance. Not in distress.     Neck:     JVP:Not elevated     Carotid artery: Normal    Pulmonary:      Effort: Pulmonary effort is normal.      Breath sounds: Normal breath sounds. No wheezing. No rhonchi. No rales.     Cardiovascular:      Normal rate. Regular rhythm. Normal S1. Normal S2.      Murmurs: There is no significant murmur.      No gallop. No click. No rub.     Abdominal:      General: Bowel sounds are normal.      Palpations: Abdomen is soft.      Tenderness: There is no abdominal tenderness.    Extremities:     Pulses:Normal radial and pedal pulses     Edema:no edema    Smoking Cessation:   Tobacco Product History : Patient never smoked    Lab Review:   Lab Results   Component Value Date    GLUCOSE 91 10/14/2024    BUN 36 (H) 10/14/2024    CREATININE 2.03 (H) 10/14/2024    EGFRIFNONA 55 (L) 02/09/2022    BCR 17.7 10/14/2024    K 4.3 10/14/2024    CO2 24.2 10/14/2024    CALCIUM 9.4 10/14/2024    PROTENTOTREF 6.9 12/18/2019    ALBUMIN 4.5 10/10/2024    LABIL2 1.1 12/18/2019    AST 21 10/10/2024    ALT 16 10/10/2024     Lab Results   Component Value Date    WBC 5.58 10/10/2024    HGB 12.9 10/10/2024    HCT 38.0 10/10/2024    MCV 89.6 10/10/2024     10/10/2024     Lab Results   Component Value Date    TSH 0.423 09/24/2024       Assessment / Plan      Assessment:   Diagnosis Plan   1. Hypertension  CBC & Differential    Comprehensive Metabolic Panel      2. Mixed hyperlipidemia             Plan:  I have advised her to stop her Cozaar.  I have also advised her to stop taking her spironolactone on Saturday and Sunday.  I have advised her to " stop her Lipitor also.  We will check her CBC and CMP today and if everything is okay we might try to cut down her chlorthalidone also.      Follow Up:       Return in about 1 year (around 10/10/2025).    Ric Perry MD

## 2024-10-10 ENCOUNTER — OFFICE VISIT (OUTPATIENT)
Dept: INTERNAL MEDICINE | Facility: CLINIC | Age: 59
End: 2024-10-10
Payer: COMMERCIAL

## 2024-10-10 ENCOUNTER — OFFICE VISIT (OUTPATIENT)
Dept: CARDIOLOGY | Facility: CLINIC | Age: 59
End: 2024-10-10
Payer: COMMERCIAL

## 2024-10-10 ENCOUNTER — LAB (OUTPATIENT)
Dept: LAB | Facility: HOSPITAL | Age: 59
End: 2024-10-10
Payer: COMMERCIAL

## 2024-10-10 VITALS
BODY MASS INDEX: 29.6 KG/M2 | DIASTOLIC BLOOD PRESSURE: 84 MMHG | SYSTOLIC BLOOD PRESSURE: 110 MMHG | HEIGHT: 66 IN | WEIGHT: 184.2 LBS | OXYGEN SATURATION: 100 % | HEART RATE: 60 BPM

## 2024-10-10 VITALS
SYSTOLIC BLOOD PRESSURE: 102 MMHG | HEIGHT: 66 IN | HEART RATE: 70 BPM | OXYGEN SATURATION: 96 % | WEIGHT: 182.8 LBS | DIASTOLIC BLOOD PRESSURE: 64 MMHG | BODY MASS INDEX: 29.38 KG/M2

## 2024-10-10 DIAGNOSIS — E78.2 MIXED HYPERLIPIDEMIA: ICD-10-CM

## 2024-10-10 DIAGNOSIS — D64.9 NORMOCYTIC ANEMIA: ICD-10-CM

## 2024-10-10 DIAGNOSIS — Z71.85 VACCINE COUNSELING: ICD-10-CM

## 2024-10-10 DIAGNOSIS — E87.6 HYPOKALEMIA: ICD-10-CM

## 2024-10-10 DIAGNOSIS — E03.9 ACQUIRED HYPOTHYROIDISM: Chronic | ICD-10-CM

## 2024-10-10 DIAGNOSIS — I10 PRIMARY HYPERTENSION: Chronic | ICD-10-CM

## 2024-10-10 DIAGNOSIS — E78.2 MIXED HYPERLIPIDEMIA: Chronic | ICD-10-CM

## 2024-10-10 DIAGNOSIS — R73.01 IMPAIRED FASTING GLUCOSE: Chronic | ICD-10-CM

## 2024-10-10 DIAGNOSIS — I10 PRIMARY HYPERTENSION: ICD-10-CM

## 2024-10-10 DIAGNOSIS — I10 PRIMARY HYPERTENSION: Primary | Chronic | ICD-10-CM

## 2024-10-10 DIAGNOSIS — I16.0 HYPERTENSIVE URGENCY: Primary | ICD-10-CM

## 2024-10-10 LAB
ALBUMIN SERPL-MCNC: 4.5 G/DL (ref 3.5–5.2)
ALBUMIN/GLOB SERPL: 1.9 G/DL
ALP SERPL-CCNC: 62 U/L (ref 39–117)
ALT SERPL W P-5'-P-CCNC: 16 U/L (ref 1–33)
ANION GAP SERPL CALCULATED.3IONS-SCNC: 13.6 MMOL/L (ref 5–15)
AST SERPL-CCNC: 21 U/L (ref 1–32)
BASOPHILS # BLD AUTO: 0.04 10*3/MM3 (ref 0–0.2)
BASOPHILS NFR BLD AUTO: 0.7 % (ref 0–1.5)
BILIRUB SERPL-MCNC: 0.3 MG/DL (ref 0–1.2)
BUN SERPL-MCNC: 41 MG/DL (ref 6–20)
BUN/CREAT SERPL: 17.3 (ref 7–25)
CALCIUM SPEC-SCNC: 9.7 MG/DL (ref 8.6–10.5)
CHLORIDE SERPL-SCNC: 102 MMOL/L (ref 98–107)
CO2 SERPL-SCNC: 25.4 MMOL/L (ref 22–29)
CREAT SERPL-MCNC: 2.37 MG/DL (ref 0.57–1)
DEPRECATED RDW RBC AUTO: 40.3 FL (ref 37–54)
EGFRCR SERPLBLD CKD-EPI 2021: 23.1 ML/MIN/1.73
EOSINOPHIL # BLD AUTO: 0.56 10*3/MM3 (ref 0–0.4)
EOSINOPHIL NFR BLD AUTO: 10 % (ref 0.3–6.2)
ERYTHROCYTE [DISTWIDTH] IN BLOOD BY AUTOMATED COUNT: 12.5 % (ref 12.3–15.4)
GLOBULIN UR ELPH-MCNC: 2.4 GM/DL
GLUCOSE SERPL-MCNC: 99 MG/DL (ref 65–99)
HCT VFR BLD AUTO: 38 % (ref 34–46.6)
HGB BLD-MCNC: 12.9 G/DL (ref 12–15.9)
IMM GRANULOCYTES # BLD AUTO: 0.02 10*3/MM3 (ref 0–0.05)
IMM GRANULOCYTES NFR BLD AUTO: 0.4 % (ref 0–0.5)
LYMPHOCYTES # BLD AUTO: 0.58 10*3/MM3 (ref 0.7–3.1)
LYMPHOCYTES NFR BLD AUTO: 10.4 % (ref 19.6–45.3)
MCH RBC QN AUTO: 30.4 PG (ref 26.6–33)
MCHC RBC AUTO-ENTMCNC: 33.9 G/DL (ref 31.5–35.7)
MCV RBC AUTO: 89.6 FL (ref 79–97)
MONOCYTES # BLD AUTO: 0.66 10*3/MM3 (ref 0.1–0.9)
MONOCYTES NFR BLD AUTO: 11.8 % (ref 5–12)
NEUTROPHILS NFR BLD AUTO: 3.72 10*3/MM3 (ref 1.7–7)
NEUTROPHILS NFR BLD AUTO: 66.7 % (ref 42.7–76)
NRBC BLD AUTO-RTO: 0 /100 WBC (ref 0–0.2)
PLATELET # BLD AUTO: 254 10*3/MM3 (ref 140–450)
PMV BLD AUTO: 11 FL (ref 6–12)
POTASSIUM SERPL-SCNC: 3.8 MMOL/L (ref 3.5–5.2)
PROT SERPL-MCNC: 6.9 G/DL (ref 6–8.5)
RBC # BLD AUTO: 4.24 10*6/MM3 (ref 3.77–5.28)
SODIUM SERPL-SCNC: 141 MMOL/L (ref 136–145)
WBC NRBC COR # BLD AUTO: 5.58 10*3/MM3 (ref 3.4–10.8)

## 2024-10-10 PROCEDURE — 99495 TRANSJ CARE MGMT MOD F2F 14D: CPT | Performed by: INTERNAL MEDICINE

## 2024-10-10 PROCEDURE — 36415 COLL VENOUS BLD VENIPUNCTURE: CPT

## 2024-10-10 PROCEDURE — 99214 OFFICE O/P EST MOD 30 MIN: CPT | Performed by: INTERNAL MEDICINE

## 2024-10-10 PROCEDURE — 85025 COMPLETE CBC W/AUTO DIFF WBC: CPT

## 2024-10-10 PROCEDURE — 80053 COMPREHEN METABOLIC PANEL: CPT

## 2024-10-10 NOTE — Clinical Note
Pls call patient - blood counts back to normal but electrolytes show very abnormal kidney function. She should be getting a call from Dr. Perry's office to hold spironolactone and chlorthalidone. Rec repeat labs on Monday. Rec that she check her BPs since she will be off of almost all BP meds.

## 2024-10-10 NOTE — Clinical Note
Dr. Perry, Wanted to f/u with on our pleasant mutual patient. CMP shows significantly abnl renal function woith Crt 2.37, GFR 23.1 (nl during hospitalization). I would like to adjust her BP regimen, but since you directed those changes in the hospital, wanted to defer to you. Currently on spironolactone 25mg, chlorthalidone 25mg, and metoprolol short-acting 25mg QAM with complaints of fatigue and myalgias, as you know.  Also please clarify for me, I think the cardiology notes indicate elevated troponins due hypertensive urgency, but the hospitalists dx'd her with NSTEMI with a nl PET stress test. What is your diagnosis?  I don't mind following up with her on this if you would rather I manage it. Thanks, Brie

## 2024-10-11 ENCOUNTER — TELEPHONE (OUTPATIENT)
Dept: CARDIOLOGY | Facility: CLINIC | Age: 59
End: 2024-10-11
Payer: COMMERCIAL

## 2024-10-11 ENCOUNTER — TELEPHONE (OUTPATIENT)
Dept: INTERNAL MEDICINE | Facility: CLINIC | Age: 59
End: 2024-10-11
Payer: COMMERCIAL

## 2024-10-11 DIAGNOSIS — N17.9 AKI (ACUTE KIDNEY INJURY): Primary | ICD-10-CM

## 2024-10-11 NOTE — ASSESSMENT & PLAN NOTE
BP bord low today and patient symptomatic with fatigue; note cardio/nephro-protective effects of ARBs so recommend consideration to restart; discussed also metoprolol short-acting QAM is likely contributing to her exertional fatigue; also discussed 2 diuretics with chlorthalidone and spironolactone; awaiting f/u CMP labs; she will monitoring BPs at home - f/u in 3 wks and bring BP cuff to appt to verify accuracy; ADDENDUM: very abnl Cr 2.37, GFR 23.1

## 2024-10-11 NOTE — TELEPHONE ENCOUNTER
----- Message from Brie Jalloh sent at 10/11/2024  8:05 AM EDT -----  Pls call patient - blood counts back to normal but electrolytes show very abnormal kidney function. She should be getting a call from Dr. Perry's office to hold spironolactone and chlorthalidone. Rec repeat labs on Monday. Rec that she check her BPs since she will be off of almost all BP meds.

## 2024-10-11 NOTE — TELEPHONE ENCOUNTER
Called and informed patient of lab results and recommendations.  Dr. Tian's office had called patient.  Patient will come to have labs drawn on Monday and will monitor blood pressure.   [phone]

## 2024-10-11 NOTE — TELEPHONE ENCOUNTER
Per , Please advise her to stop her spironolactone, chlorthalidone. Check her BMP on Monday. Her creatinine went high.     Spoke with patient, she verbalizes understanding and is agreeable to plan.     BMP order placed.

## 2024-10-13 PROBLEM — G89.29 CHRONIC BILATERAL LOW BACK PAIN WITHOUT SCIATICA: Chronic | Status: ACTIVE | Noted: 2024-05-20

## 2024-10-13 PROBLEM — M54.50 CHRONIC BILATERAL LOW BACK PAIN WITHOUT SCIATICA: Chronic | Status: ACTIVE | Noted: 2024-05-20

## 2024-10-14 ENCOUNTER — LAB (OUTPATIENT)
Dept: LAB | Facility: HOSPITAL | Age: 59
End: 2024-10-14
Payer: COMMERCIAL

## 2024-10-14 DIAGNOSIS — N17.9 AKI (ACUTE KIDNEY INJURY): ICD-10-CM

## 2024-10-14 DIAGNOSIS — N28.9 RENAL INSUFFICIENCY: ICD-10-CM

## 2024-10-14 PROCEDURE — 80048 BASIC METABOLIC PNL TOTAL CA: CPT

## 2024-10-15 ENCOUNTER — READMISSION MANAGEMENT (OUTPATIENT)
Dept: CALL CENTER | Facility: HOSPITAL | Age: 59
End: 2024-10-15
Payer: COMMERCIAL

## 2024-10-15 LAB
ANION GAP SERPL CALCULATED.3IONS-SCNC: 14.8 MMOL/L (ref 5–15)
BUN SERPL-MCNC: 36 MG/DL (ref 6–20)
BUN/CREAT SERPL: 17.7 (ref 7–25)
CALCIUM SPEC-SCNC: 9.4 MG/DL (ref 8.6–10.5)
CHLORIDE SERPL-SCNC: 102 MMOL/L (ref 98–107)
CO2 SERPL-SCNC: 24.2 MMOL/L (ref 22–29)
CREAT SERPL-MCNC: 2.03 MG/DL (ref 0.57–1)
EGFRCR SERPLBLD CKD-EPI 2021: 27.8 ML/MIN/1.73
GLUCOSE SERPL-MCNC: 91 MG/DL (ref 65–99)
POTASSIUM SERPL-SCNC: 4.3 MMOL/L (ref 3.5–5.2)
SODIUM SERPL-SCNC: 141 MMOL/L (ref 136–145)

## 2024-10-15 NOTE — OUTREACH NOTE
AMI Week 3 Survey      Flowsheet Row Responses   [facility] facility patient discharged from? [place]   Does the patient have one of the following disease processes/diagnoses(primary or secondary)? Acute MI (STEMI,NSTEMI)   Week 3 attempt successful? No   Unsuccessful attempts Attempt 1  [attempted all numbers.]   Revoke Decline to participate  [attempted to contact x 3, unsuccessful. Revoked per unit policy.]            Ronda MATTHEWS - Registered Nurse

## 2024-10-16 DIAGNOSIS — E03.9 ACQUIRED HYPOTHYROIDISM: Primary | Chronic | ICD-10-CM

## 2024-10-16 DIAGNOSIS — R11.0 NAUSEA: ICD-10-CM

## 2024-10-16 DIAGNOSIS — R53.83 FATIGUE, UNSPECIFIED TYPE: ICD-10-CM

## 2024-10-17 ENCOUNTER — TELEPHONE (OUTPATIENT)
Dept: CARDIOLOGY | Facility: CLINIC | Age: 59
End: 2024-10-17
Payer: COMMERCIAL

## 2024-10-17 NOTE — TELEPHONE ENCOUNTER
----- Message from Ric Perry sent at 10/16/2024  5:29 PM EDT -----  Please call the patient regarding her abnormal result.  Kidney function is getting better.  If she is off of all medications but she was supposed to be.

## 2024-10-18 ENCOUNTER — LAB (OUTPATIENT)
Dept: LAB | Facility: HOSPITAL | Age: 59
End: 2024-10-18
Payer: COMMERCIAL

## 2024-10-18 DIAGNOSIS — E03.9 ACQUIRED HYPOTHYROIDISM: Chronic | ICD-10-CM

## 2024-10-18 DIAGNOSIS — R11.0 NAUSEA: ICD-10-CM

## 2024-10-18 DIAGNOSIS — R53.83 FATIGUE, UNSPECIFIED TYPE: ICD-10-CM

## 2024-10-18 LAB
BASOPHILS # BLD AUTO: 0.03 10*3/MM3 (ref 0–0.2)
BASOPHILS NFR BLD AUTO: 0.8 % (ref 0–1.5)
DEPRECATED RDW RBC AUTO: 41.4 FL (ref 37–54)
EOSINOPHIL # BLD AUTO: 0.36 10*3/MM3 (ref 0–0.4)
EOSINOPHIL NFR BLD AUTO: 10.1 % (ref 0.3–6.2)
ERYTHROCYTE [DISTWIDTH] IN BLOOD BY AUTOMATED COUNT: 12.7 % (ref 12.3–15.4)
ERYTHROCYTE [SEDIMENTATION RATE] IN BLOOD: 14 MM/HR (ref 0–30)
HCT VFR BLD AUTO: 35.1 % (ref 34–46.6)
HGB BLD-MCNC: 11.6 G/DL (ref 12–15.9)
IMM GRANULOCYTES # BLD AUTO: 0.01 10*3/MM3 (ref 0–0.05)
IMM GRANULOCYTES NFR BLD AUTO: 0.3 % (ref 0–0.5)
LYMPHOCYTES # BLD AUTO: 0.54 10*3/MM3 (ref 0.7–3.1)
LYMPHOCYTES NFR BLD AUTO: 15.1 % (ref 19.6–45.3)
MCH RBC QN AUTO: 29.9 PG (ref 26.6–33)
MCHC RBC AUTO-ENTMCNC: 33 G/DL (ref 31.5–35.7)
MCV RBC AUTO: 90.5 FL (ref 79–97)
MONOCYTES # BLD AUTO: 0.4 10*3/MM3 (ref 0.1–0.9)
MONOCYTES NFR BLD AUTO: 11.2 % (ref 5–12)
NEUTROPHILS NFR BLD AUTO: 2.23 10*3/MM3 (ref 1.7–7)
NEUTROPHILS NFR BLD AUTO: 62.5 % (ref 42.7–76)
NRBC BLD AUTO-RTO: 0 /100 WBC (ref 0–0.2)
PLATELET # BLD AUTO: 161 10*3/MM3 (ref 140–450)
PMV BLD AUTO: 10.7 FL (ref 6–12)
RBC # BLD AUTO: 3.88 10*6/MM3 (ref 3.77–5.28)
WBC NRBC COR # BLD AUTO: 3.57 10*3/MM3 (ref 3.4–10.8)

## 2024-10-18 PROCEDURE — 83880 ASSAY OF NATRIURETIC PEPTIDE: CPT

## 2024-10-18 PROCEDURE — 86140 C-REACTIVE PROTEIN: CPT

## 2024-10-18 PROCEDURE — 83735 ASSAY OF MAGNESIUM: CPT

## 2024-10-18 PROCEDURE — 80050 GENERAL HEALTH PANEL: CPT

## 2024-10-18 PROCEDURE — 85652 RBC SED RATE AUTOMATED: CPT

## 2024-10-19 DIAGNOSIS — E83.42 HYPOMAGNESEMIA: ICD-10-CM

## 2024-10-19 DIAGNOSIS — N28.9 RENAL INSUFFICIENCY: Primary | ICD-10-CM

## 2024-10-19 LAB
ALBUMIN SERPL-MCNC: 4.2 G/DL (ref 3.5–5.2)
ALBUMIN/GLOB SERPL: 1.6 G/DL
ALP SERPL-CCNC: 56 U/L (ref 39–117)
ALT SERPL W P-5'-P-CCNC: 11 U/L (ref 1–33)
ANION GAP SERPL CALCULATED.3IONS-SCNC: 11 MMOL/L (ref 5–15)
AST SERPL-CCNC: 14 U/L (ref 1–32)
BILIRUB SERPL-MCNC: 0.4 MG/DL (ref 0–1.2)
BUN SERPL-MCNC: 27 MG/DL (ref 6–20)
BUN/CREAT SERPL: 14.8 (ref 7–25)
CALCIUM SPEC-SCNC: 9.8 MG/DL (ref 8.6–10.5)
CHLORIDE SERPL-SCNC: 105 MMOL/L (ref 98–107)
CO2 SERPL-SCNC: 26 MMOL/L (ref 22–29)
CREAT SERPL-MCNC: 1.83 MG/DL (ref 0.57–1)
CRP SERPL-MCNC: 0.42 MG/DL (ref 0–0.5)
EGFRCR SERPLBLD CKD-EPI 2021: 31.5 ML/MIN/1.73
GLOBULIN UR ELPH-MCNC: 2.6 GM/DL
GLUCOSE SERPL-MCNC: 86 MG/DL (ref 65–99)
MAGNESIUM SERPL-MCNC: 1.3 MG/DL (ref 1.6–2.6)
NT-PROBNP SERPL-MCNC: 62.3 PG/ML (ref 0–900)
POTASSIUM SERPL-SCNC: 3.9 MMOL/L (ref 3.5–5.2)
PROT SERPL-MCNC: 6.8 G/DL (ref 6–8.5)
SODIUM SERPL-SCNC: 142 MMOL/L (ref 136–145)
TSH SERPL DL<=0.05 MIU/L-ACNC: 0.39 UIU/ML (ref 0.27–4.2)

## 2024-10-20 PROBLEM — E83.42 HYPOMAGNESEMIA: Status: ACTIVE | Noted: 2024-10-20

## 2024-10-20 PROBLEM — E87.6 HYPOKALEMIA: Status: ACTIVE | Noted: 2024-10-20

## 2024-10-20 RX ORDER — MAGNESIUM OXIDE 400 MG/1
400 TABLET ORAL 2 TIMES DAILY
COMMUNITY

## 2024-10-21 ENCOUNTER — TELEPHONE (OUTPATIENT)
Dept: INTERNAL MEDICINE | Facility: CLINIC | Age: 59
End: 2024-10-21
Payer: COMMERCIAL

## 2024-10-21 ENCOUNTER — LAB (OUTPATIENT)
Dept: LAB | Facility: HOSPITAL | Age: 59
End: 2024-10-21
Payer: COMMERCIAL

## 2024-10-21 DIAGNOSIS — N28.9 RENAL INSUFFICIENCY: ICD-10-CM

## 2024-10-21 DIAGNOSIS — E83.42 HYPOMAGNESEMIA: ICD-10-CM

## 2024-10-21 LAB
ANION GAP SERPL CALCULATED.3IONS-SCNC: 8.4 MMOL/L (ref 5–15)
BUN SERPL-MCNC: 27 MG/DL (ref 6–20)
BUN/CREAT SERPL: 16.8 (ref 7–25)
CALCIUM SPEC-SCNC: 9.7 MG/DL (ref 8.6–10.5)
CHLORIDE SERPL-SCNC: 106 MMOL/L (ref 98–107)
CO2 SERPL-SCNC: 26.6 MMOL/L (ref 22–29)
CREAT SERPL-MCNC: 1.61 MG/DL (ref 0.57–1)
EGFRCR SERPLBLD CKD-EPI 2021: 36.7 ML/MIN/1.73
GLUCOSE SERPL-MCNC: 91 MG/DL (ref 65–99)
MAGNESIUM SERPL-MCNC: 1.2 MG/DL (ref 1.6–2.6)
POTASSIUM SERPL-SCNC: 4.5 MMOL/L (ref 3.5–5.2)
SODIUM SERPL-SCNC: 141 MMOL/L (ref 136–145)

## 2024-10-21 PROCEDURE — 83735 ASSAY OF MAGNESIUM: CPT

## 2024-10-21 PROCEDURE — 80048 BASIC METABOLIC PNL TOTAL CA: CPT

## 2024-10-21 NOTE — PROGRESS NOTES
"Chief Complaint   Patient presents with    Hypertension    Kidney dysfunction       History of Present Illness  59 y.o.  female presents for f/u on BP and kidney function. Home BPs have been mostly 110s/80s with a few SBP down to 90s and up to 130s.  Feels bad when SBP is in the 90s.  Has been drinking plenty of water.  Denies taking any NSAIDs.  Has not started magnesium supplement yet.    Reports episodes of tingling on the arms have followed violent episodes of coughing or vomiting.  In the past, the numbness sensation in the arms went away.  This time she went to the ER because it persisted.  Has not had any recurrence since then.    Review of Systems  ROS (+) for mild cough attributed to asthma. Denies CP, lightheadedness, abd pain, urinary sxs. All other ROS reviewed and negative.      Current Outpatient Medications:     albuterol (PROVENTIL) (2.5 MG/3ML) 0.083% neb 2.5 mg prn    aspirin 325 MG EC QD    budesonide-formoterol (SYMBICORT) 160-4.5 MCG/ACT inhaler 2  puffs BID    buPROPion  MG 3 QD    butalbital-acetaminophen-caffeine (FIORICET) -40 MG prn    hydrOXYzine 25 MG prn    levothyroxine 75 MCG QD    montelukast 10 MG QD    NIFEdipine XL 30 MG  prn SBP> 170    omeprazole 40 MG QD    riTUXimab (RITUXAN) 500 MG/50ML 100ml Q6 mos      VITALS:  /82   Pulse 95   Ht 167.6 cm (66\")   Wt 84.6 kg (186 lb 6.4 oz)   SpO2 99%   BMI 30.09 kg/m²   Repeat BP her cuff 120/95    Physical Exam  Vitals and nursing note reviewed.   Constitutional:       General: She is not in acute distress.     Appearance: Normal appearance. She is not ill-appearing.   Eyes:      Extraocular Movements: Extraocular movements intact.      Conjunctiva/sclera: Conjunctivae normal.   Pulmonary:      Effort: Pulmonary effort is normal. No respiratory distress.   Neurological:      Mental Status: She is alert. Mental status is at baseline.   Psychiatric:         Mood and Affect: Mood normal.         Behavior: " Behavior normal.         LABS  Results for orders placed or performed in visit on 10/21/24   Basic Metabolic Panel    Collection Time: 10/21/24  9:49 AM    Specimen: Blood   Result Value Ref Range    Glucose 91 65 - 99 mg/dL    BUN 27 (H) 6 - 20 mg/dL    Creatinine 1.61 (H) 0.57 - 1.00 mg/dL    Sodium 141 136 - 145 mmol/L    Potassium 4.5 3.5 - 5.2 mmol/L    Chloride 106 98 - 107 mmol/L    CO2 26.6 22.0 - 29.0 mmol/L    Calcium 9.7 8.6 - 10.5 mg/dL    BUN/Creatinine Ratio 16.8 7.0 - 25.0    Anion Gap 8.4 5.0 - 15.0 mmol/L    eGFR 36.7 (L) >60.0 mL/min/1.73   Magnesium    Collection Time: 10/21/24  9:49 AM    Specimen: Blood   Result Value Ref Range    Magnesium 1.2 (L) 1.6 - 2.6 mg/dL     Results for orders placed or performed in visit on 10/18/24   Comprehensive Metabolic Panel    Collection Time: 10/18/24  1:54 PM    Specimen: Blood   Result Value Ref Range    Glucose 86 65 - 99 mg/dL    BUN 27 (H) 6 - 20 mg/dL    Creatinine 1.83 (H) 0.57 - 1.00 mg/dL    Sodium 142 136 - 145 mmol/L    Potassium 3.9 3.5 - 5.2 mmol/L    Chloride 105 98 - 107 mmol/L    CO2 26.0 22.0 - 29.0 mmol/L    Calcium 9.8 8.6 - 10.5 mg/dL    Total Protein 6.8 6.0 - 8.5 g/dL    Albumin 4.2 3.5 - 5.2 g/dL    ALT (SGPT) 11 1 - 33 U/L    AST (SGOT) 14 1 - 32 U/L    Alkaline Phosphatase 56 39 - 117 U/L    Total Bilirubin 0.4 0.0 - 1.2 mg/dL    Globulin 2.6 gm/dL    A/G Ratio 1.6 g/dL    BUN/Creatinine Ratio 14.8 7.0 - 25.0    Anion Gap 11.0 5.0 - 15.0 mmol/L    eGFR 31.5 (L) >60.0 mL/min/1.73   Magnesium    Collection Time: 10/18/24  1:54 PM    Specimen: Blood   Result Value Ref Range    Magnesium 1.3 (L) 1.6 - 2.6 mg/dL   TSH    Collection Time: 10/18/24  1:54 PM    Specimen: Blood   Result Value Ref Range    TSH 0.388 0.270 - 4.200 uIU/mL   Sedimentation Rate    Collection Time: 10/18/24  1:54 PM    Specimen: Blood   Result Value Ref Range    Sed Rate 14 0 - 30 mm/hr   C-reactive Protein    Collection Time: 10/18/24  1:54 PM    Specimen: Blood    Result Value Ref Range    C-Reactive Protein 0.42 0.00 - 0.50 mg/dL   BNP    Collection Time: 10/18/24  1:54 PM    Specimen: Blood   Result Value Ref Range    proBNP 62.3 0.0 - 900.0 pg/mL   CBC Auto Differential    Collection Time: 10/18/24  1:54 PM    Specimen: Blood   Result Value Ref Range    WBC 3.57 3.40 - 10.80 10*3/mm3    RBC 3.88 3.77 - 5.28 10*6/mm3    Hemoglobin 11.6 (L) 12.0 - 15.9 g/dL    Hematocrit 35.1 34.0 - 46.6 %    MCV 90.5 79.0 - 97.0 fL    MCH 29.9 26.6 - 33.0 pg    MCHC 33.0 31.5 - 35.7 g/dL    RDW 12.7 12.3 - 15.4 %    RDW-SD 41.4 37.0 - 54.0 fl    MPV 10.7 6.0 - 12.0 fL    Platelets 161 140 - 450 10*3/mm3    Neutrophil % 62.5 42.7 - 76.0 %    Lymphocyte % 15.1 (L) 19.6 - 45.3 %    Monocyte % 11.2 5.0 - 12.0 %    Eosinophil % 10.1 (H) 0.3 - 6.2 %    Basophil % 0.8 0.0 - 1.5 %    Immature Grans % 0.3 0.0 - 0.5 %    Neutrophils, Absolute 2.23 1.70 - 7.00 10*3/mm3    Lymphocytes, Absolute 0.54 (L) 0.70 - 3.10 10*3/mm3    Monocytes, Absolute 0.40 0.10 - 0.90 10*3/mm3    Eosinophils, Absolute 0.36 0.00 - 0.40 10*3/mm3    Basophils, Absolute 0.03 0.00 - 0.20 10*3/mm3    Immature Grans, Absolute 0.01 0.00 - 0.05 10*3/mm3    nRBC 0.0 0.0 - 0.2 /100 WBC       ASSESSMENT/PLAN    Diagnoses and all orders for this visit:    1. Hypertension (Primary)  Assessment & Plan:  BP has been fairly stable 90-130s/70-80s off of all meds; note ongoing issues with renal insufficiency, which is new dev't since hospitalization; continue to drink enough water daily      2. Renal insufficiency  Assessment & Plan:  Not back to normal but slowly improving, most recent Cr 1.61, GFR 36.7; check renal u/s; off of all antihypertensives at this time; pt understands to avoid NSAIDs and to drink enough water on daily basis; f/u BMP in 1 week    Orders:  -     Basic Metabolic Panel; Future  -     US Renal Bilateral; Future    3. Hypokalemia  Assessment & Plan:  Resolved; K 4.5    Orders:  -     Basic Metabolic Panel; Future    4.  Hypomagnesemia  Assessment & Plan:  Still low Mg at 1.2; will be starting Mg oxide 400mg BID; discussed potential side effect of diarrhea; f/u level in 1 week    Orders:  -     Magnesium; Future    5. Iron deficiency anemia, unspecified iron deficiency anemia type  Assessment & Plan:  Labile blood counts; no overt bleeding    Orders:  -     CBC (No Diff); Future    6. Vaccine counseling  Comments:  rec flu vacc and COVID19 vacc        FOLLOW-UP  Health maintenance - rec flu vacc and COVID19 vacc, counseling given  F/u BMP, Mg and CBC in 1 week (escribed)  RTC 11/19/24 as scheduled - with BMP    Electronically signed by:    Brie Jalloh MD, FACP  10/22/2024

## 2024-10-21 NOTE — TELEPHONE ENCOUNTER
----- Message from Brie Jalloh sent at 10/19/2024  8:37 PM EDT -----  Kidney function still abnormal but continued improvement.  Thyroid test and tests for inflammation are all normal/negative.  Magnesium is low.  Please take over-the-counter magnesium oxide 400mg 2x/day.  Check lab again on Monday (nonfasting)

## 2024-10-21 NOTE — ASSESSMENT & PLAN NOTE
Not back to normal but slowly improving, most recent Cr 1.61, GFR 36.7; check renal u/s; off of all antihypertensives at this time; pt understands to avoid NSAIDs and to drink enough water on daily basis; f/u BMP in 1 week

## 2024-10-21 NOTE — ASSESSMENT & PLAN NOTE
BP has been fairly stable 90-130s/70-80s off of all meds; note ongoing issues with renal insufficiency, which is new dev't since hospitalization; continue to drink enough water daily

## 2024-10-22 ENCOUNTER — OFFICE VISIT (OUTPATIENT)
Dept: INTERNAL MEDICINE | Facility: CLINIC | Age: 59
End: 2024-10-22
Payer: COMMERCIAL

## 2024-10-22 VITALS
WEIGHT: 186.4 LBS | BODY MASS INDEX: 29.96 KG/M2 | SYSTOLIC BLOOD PRESSURE: 114 MMHG | OXYGEN SATURATION: 99 % | HEIGHT: 66 IN | HEART RATE: 95 BPM | DIASTOLIC BLOOD PRESSURE: 82 MMHG

## 2024-10-22 DIAGNOSIS — I10 PRIMARY HYPERTENSION: Primary | Chronic | ICD-10-CM

## 2024-10-22 DIAGNOSIS — D50.9 IRON DEFICIENCY ANEMIA, UNSPECIFIED IRON DEFICIENCY ANEMIA TYPE: Chronic | ICD-10-CM

## 2024-10-22 DIAGNOSIS — Z71.85 VACCINE COUNSELING: ICD-10-CM

## 2024-10-22 DIAGNOSIS — E87.6 HYPOKALEMIA: ICD-10-CM

## 2024-10-22 DIAGNOSIS — E83.42 HYPOMAGNESEMIA: ICD-10-CM

## 2024-10-22 DIAGNOSIS — N28.9 RENAL INSUFFICIENCY: ICD-10-CM

## 2024-10-22 PROCEDURE — 99214 OFFICE O/P EST MOD 30 MIN: CPT | Performed by: INTERNAL MEDICINE

## 2024-10-22 NOTE — ASSESSMENT & PLAN NOTE
Still low Mg at 1.2; will be starting Mg oxide 400mg BID; discussed potential side effect of diarrhea; f/u level in 1 week

## 2024-10-29 ENCOUNTER — LAB (OUTPATIENT)
Dept: LAB | Facility: HOSPITAL | Age: 59
End: 2024-10-29
Payer: COMMERCIAL

## 2024-10-29 ENCOUNTER — HOSPITAL ENCOUNTER (OUTPATIENT)
Dept: ULTRASOUND IMAGING | Facility: HOSPITAL | Age: 59
Discharge: HOME | End: 2024-10-29
Payer: COMMERCIAL

## 2024-10-29 DIAGNOSIS — D50.9 IRON DEFICIENCY ANEMIA, UNSPECIFIED IRON DEFICIENCY ANEMIA TYPE: Chronic | ICD-10-CM

## 2024-10-29 DIAGNOSIS — N28.9 RENAL INSUFFICIENCY: ICD-10-CM

## 2024-10-29 DIAGNOSIS — E87.6 HYPOKALEMIA: ICD-10-CM

## 2024-10-29 DIAGNOSIS — E83.42 HYPOMAGNESEMIA: ICD-10-CM

## 2024-10-29 DIAGNOSIS — D64.9 ANEMIA, UNSPECIFIED TYPE: Chronic | ICD-10-CM

## 2024-10-29 PROCEDURE — 80048 BASIC METABOLIC PNL TOTAL CA: CPT

## 2024-10-29 PROCEDURE — 76775 US EXAM ABDO BACK WALL LIM: CPT

## 2024-10-29 PROCEDURE — 85027 COMPLETE CBC AUTOMATED: CPT

## 2024-10-29 PROCEDURE — 83735 ASSAY OF MAGNESIUM: CPT

## 2024-10-29 PROCEDURE — 85045 AUTOMATED RETICULOCYTE COUNT: CPT

## 2024-10-30 ENCOUNTER — TELEPHONE (OUTPATIENT)
Dept: INTERNAL MEDICINE | Facility: CLINIC | Age: 59
End: 2024-10-30
Payer: COMMERCIAL

## 2024-10-30 DIAGNOSIS — E83.42 HYPOMAGNESEMIA: ICD-10-CM

## 2024-10-30 DIAGNOSIS — N28.9 RENAL INSUFFICIENCY: Primary | ICD-10-CM

## 2024-10-30 DIAGNOSIS — D64.9 ANEMIA, UNSPECIFIED TYPE: Chronic | ICD-10-CM

## 2024-10-30 LAB
ANION GAP SERPL CALCULATED.3IONS-SCNC: 8.6 MMOL/L (ref 5–15)
BUN SERPL-MCNC: 17 MG/DL (ref 6–20)
BUN/CREAT SERPL: 12.2 (ref 7–25)
CALCIUM SPEC-SCNC: 9.3 MG/DL (ref 8.6–10.5)
CHLORIDE SERPL-SCNC: 105 MMOL/L (ref 98–107)
CO2 SERPL-SCNC: 27.4 MMOL/L (ref 22–29)
CREAT SERPL-MCNC: 1.39 MG/DL (ref 0.57–1)
DEPRECATED RDW RBC AUTO: 42.7 FL (ref 37–54)
EGFRCR SERPLBLD CKD-EPI 2021: 43.8 ML/MIN/1.73
ERYTHROCYTE [DISTWIDTH] IN BLOOD BY AUTOMATED COUNT: 12.8 % (ref 12.3–15.4)
GLUCOSE SERPL-MCNC: 56 MG/DL (ref 65–99)
HCT VFR BLD AUTO: 32.9 % (ref 34–46.6)
HGB BLD-MCNC: 11 G/DL (ref 12–15.9)
MAGNESIUM SERPL-MCNC: 1.3 MG/DL (ref 1.6–2.6)
MCH RBC QN AUTO: 30.7 PG (ref 26.6–33)
MCHC RBC AUTO-ENTMCNC: 33.4 G/DL (ref 31.5–35.7)
MCV RBC AUTO: 91.9 FL (ref 79–97)
PLATELET # BLD AUTO: 210 10*3/MM3 (ref 140–450)
PMV BLD AUTO: 10.4 FL (ref 6–12)
POTASSIUM SERPL-SCNC: 3.7 MMOL/L (ref 3.5–5.2)
RBC # BLD AUTO: 3.58 10*6/MM3 (ref 3.77–5.28)
RETICS # AUTO: 0.07 10*6/MM3 (ref 0.02–0.13)
RETICS/RBC NFR AUTO: 1.9 % (ref 0.7–1.9)
SODIUM SERPL-SCNC: 141 MMOL/L (ref 136–145)
WBC NRBC COR # BLD AUTO: 5.16 10*3/MM3 (ref 3.4–10.8)

## 2024-10-30 NOTE — TELEPHONE ENCOUNTER
Also please tell patient kidney ultrasound is normal. There is a small cyst in the left kidney - this is benign. No further evaluation needed

## 2024-10-30 NOTE — TELEPHONE ENCOUNTER
PATIENT HAS RETURNED CALL AND HAS A FEW QUESTIONS IN REGARDS OF MESSAGE LEFT BY PROVIDER.    PLEASE CONTACT PT [phone]

## 2024-10-30 NOTE — TELEPHONE ENCOUNTER
Called pt and left VM for return call to office.         ----- Message from Brie Jalloh sent at 10/30/2024  2:40 AM EDT -----  Kidney function continues to slowly improve; still not back to baseline.  We should stay the course. Continue monitoring your BPs.  Magnesium is still low. Needs to cont supplementation

## 2024-10-30 NOTE — TELEPHONE ENCOUNTER
Returned call to pt. Pt stated that she saw that she was still anemic, was wondering if provider needed her to do anything about this.   Please advise.

## 2024-11-05 ENCOUNTER — LAB (OUTPATIENT)
Dept: LAB | Facility: HOSPITAL | Age: 59
End: 2024-11-05
Payer: COMMERCIAL

## 2024-11-05 DIAGNOSIS — N28.9 RENAL INSUFFICIENCY: ICD-10-CM

## 2024-11-05 DIAGNOSIS — D64.9 ANEMIA, UNSPECIFIED TYPE: Chronic | ICD-10-CM

## 2024-11-05 DIAGNOSIS — E83.42 HYPOMAGNESEMIA: ICD-10-CM

## 2024-11-05 LAB
DEPRECATED RDW RBC AUTO: 41.6 FL (ref 37–54)
ERYTHROCYTE [DISTWIDTH] IN BLOOD BY AUTOMATED COUNT: 12.6 % (ref 12.3–15.4)
HCT VFR BLD AUTO: 33.8 % (ref 34–46.6)
HGB BLD-MCNC: 11.3 G/DL (ref 12–15.9)
MCH RBC QN AUTO: 30.4 PG (ref 26.6–33)
MCHC RBC AUTO-ENTMCNC: 33.4 G/DL (ref 31.5–35.7)
MCV RBC AUTO: 90.9 FL (ref 79–97)
PLATELET # BLD AUTO: 247 10*3/MM3 (ref 140–450)
PMV BLD AUTO: 10 FL (ref 6–12)
RBC # BLD AUTO: 3.72 10*6/MM3 (ref 3.77–5.28)
WBC NRBC COR # BLD AUTO: 4.33 10*3/MM3 (ref 3.4–10.8)

## 2024-11-05 PROCEDURE — 85027 COMPLETE CBC AUTOMATED: CPT

## 2024-11-05 PROCEDURE — 83540 ASSAY OF IRON: CPT

## 2024-11-05 PROCEDURE — 84466 ASSAY OF TRANSFERRIN: CPT

## 2024-11-05 PROCEDURE — 82746 ASSAY OF FOLIC ACID SERUM: CPT

## 2024-11-05 PROCEDURE — 83735 ASSAY OF MAGNESIUM: CPT

## 2024-11-05 PROCEDURE — 80048 BASIC METABOLIC PNL TOTAL CA: CPT

## 2024-11-05 PROCEDURE — 82668 ASSAY OF ERYTHROPOIETIN: CPT

## 2024-11-05 PROCEDURE — 82728 ASSAY OF FERRITIN: CPT

## 2024-11-05 PROCEDURE — 82607 VITAMIN B-12: CPT

## 2024-11-06 ENCOUNTER — TELEPHONE (OUTPATIENT)
Dept: INTERNAL MEDICINE | Facility: CLINIC | Age: 59
End: 2024-11-06
Payer: COMMERCIAL

## 2024-11-06 DIAGNOSIS — N28.9 RENAL INSUFFICIENCY: Primary | ICD-10-CM

## 2024-11-06 DIAGNOSIS — E78.2 MIXED HYPERLIPIDEMIA: Chronic | ICD-10-CM

## 2024-11-06 DIAGNOSIS — D50.9 IRON DEFICIENCY ANEMIA, UNSPECIFIED IRON DEFICIENCY ANEMIA TYPE: Chronic | ICD-10-CM

## 2024-11-06 LAB
ANION GAP SERPL CALCULATED.3IONS-SCNC: 11.3 MMOL/L (ref 5–15)
BUN SERPL-MCNC: 19 MG/DL (ref 6–20)
BUN/CREAT SERPL: 13.5 (ref 7–25)
CALCIUM SPEC-SCNC: 9.5 MG/DL (ref 8.6–10.5)
CHLORIDE SERPL-SCNC: 105 MMOL/L (ref 98–107)
CO2 SERPL-SCNC: 25.7 MMOL/L (ref 22–29)
CREAT SERPL-MCNC: 1.41 MG/DL (ref 0.57–1)
EGFRCR SERPLBLD CKD-EPI 2021: 43.1 ML/MIN/1.73
FERRITIN SERPL-MCNC: 66.5 NG/ML (ref 13–150)
FOLATE SERPL-MCNC: 7.53 NG/ML (ref 4.78–24.2)
GLUCOSE SERPL-MCNC: 76 MG/DL (ref 65–99)
IRON 24H UR-MRATE: 54 MCG/DL (ref 37–145)
IRON SATN MFR SERPL: 17 % (ref 20–50)
MAGNESIUM SERPL-MCNC: 1.9 MG/DL (ref 1.6–2.6)
POTASSIUM SERPL-SCNC: 3.7 MMOL/L (ref 3.5–5.2)
SODIUM SERPL-SCNC: 142 MMOL/L (ref 136–145)
TIBC SERPL-MCNC: 316 MCG/DL (ref 298–536)
TRANSFERRIN SERPL-MCNC: 212 MG/DL (ref 200–360)
VIT B12 BLD-MCNC: 345 PG/ML (ref 211–946)

## 2024-11-06 RX ORDER — ATORVASTATIN CALCIUM 10 MG/1
10 TABLET, FILM COATED ORAL DAILY
Qty: 60 TABLET | Refills: 0 | OUTPATIENT
Start: 2024-11-06

## 2024-11-06 NOTE — TELEPHONE ENCOUNTER
----- Message from Brie Jalloh sent at 11/6/2024  4:01 PM EST -----  Kidney function did not continue to improve this time.  Also persistent mild anemia.  Pls f/u as scheduled but I will go ahead and enter referral to nephrology (kidney specialist) to get 2nd opinion

## 2024-11-06 NOTE — TELEPHONE ENCOUNTER
Pls call patient for clarification - we stopped atorvastatin temporarily at 10/11/24 visit due to her complaints of back pain and muscle pain. We didn't think it was the atorvastatin since she did not have side effects previously, but just in case.    We did not discuss it at her 10/22/24 appt.    Is she feeling better and requesting to resume atorvastatin?

## 2024-11-07 LAB — EPO SERPL-ACNC: 14.8 MIU/ML (ref 2.6–18.5)

## 2024-11-10 PROBLEM — R07.89 CHEST DISCOMFORT: Status: ACTIVE | Noted: 2024-11-10

## 2024-11-10 PROBLEM — R90.89 ABNORMAL BRAIN MRI: Status: ACTIVE | Noted: 2024-11-10

## 2024-11-18 NOTE — PROGRESS NOTES
"Chief Complaint   Patient presents with    Impaired fasting glucose       History of Present Illness  59 y.o.  female presents for f/u on kidney function, anemia, and BP. Home BPs mostly 120-130s/90s. Reports we verified her BP cuff previously. Reports BP this morning 125/95.    Reports consult with neurology. Reports headaches are reminiscent of prior meningitis headaches; gets them episodically; however, neurology RX'd propranolol. She has not started medication. Currently not very symptomatic. Denies fevers.     Also reports cough that started a few weeks ago. Has coughing spells that might intermittently elevate her BPs up to -160s. Denies fevers/chills. Denies coughing up blood. Denies shortness of breath. Reports compliance with Symbicort. Did not test for COVID19 or flu.     Complains of fatigue.     Review of Systems  ROS (+) for cough with chest congestion; denies hemoptysis. Denies fevers/chills. ROS (+) for intermittent headaches, c/w prior episodes of meningitis. Denies confusion, visual changes, dizziness. ROS (+) for fatigue. All other ROS reviewed and negative.    FH: cousin - [place]    Current Outpatient Medications:     albuterol (PROVENTIL) (2.5 MG/3ML) 0.083% neb prn    aspirin 325 MG QD    budesonide-formoterol (SYMBICORT) 160-4.5 MCG/ACT inhaler 2 puffs BID    buPROPion  MG  3 QD    butalbital-acetaminophen-caffeine (FIORICET) -40 MG prn    hydrOXYzine 25 MG prn    levothyroxine 75 MCG QD    magnesium oxide 400 MG  BID    montelukast 10 MG QD    NIFEdipine XL  30 MG prn    omeprazole 40 MG QD    riTUXimab (RITUXAN) 500 MG/50ML solution 100 mL q6 mos; Two 1 gram infusions spread out over 2 weeks,     VITALS:  /94   Pulse 84   Ht 167.6 cm (66\")   Wt 83.9 kg (185 lb)   SpO2 98%   BMI 29.86 kg/m²   Repeat BP left arm 154/88    Physical Exam  Vitals and nursing note reviewed.   Constitutional:       General: She is not in acute distress.     Appearance: Normal " appearance. She is not ill-appearing.   HENT:      Right Ear: External ear normal.      Left Ear: External ear normal.   Eyes:      Extraocular Movements: Extraocular movements intact.      Conjunctiva/sclera: Conjunctivae normal.   Cardiovascular:      Rate and Rhythm: Normal rate and regular rhythm.      Heart sounds: Normal heart sounds.   Pulmonary:      Effort: Pulmonary effort is normal. No respiratory distress.      Breath sounds: Normal breath sounds. No wheezing or rales.      Comments: Paroxysms of cough with chest congestion during OV  Musculoskeletal:      Cervical back: Normal range of motion and neck supple. No rigidity or tenderness.   Lymphadenopathy:      Cervical: No cervical adenopathy.   Neurological:      Mental Status: She is alert and oriented to person, place, and time. Mental status is at baseline.      Gait: Gait normal.   Psychiatric:         Mood and Affect: Mood normal.         Behavior: Behavior normal.         LABS  Results for orders placed or performed in visit on 11/05/24   Basic Metabolic Panel    Collection Time: 11/05/24 11:35 AM    Specimen: Blood   Result Value Ref Range    Glucose 76 65 - 99 mg/dL    BUN 19 6 - 20 mg/dL    Creatinine 1.41 (H) 0.57 - 1.00 mg/dL    Sodium 142 136 - 145 mmol/L    Potassium 3.7 3.5 - 5.2 mmol/L    Chloride 105 98 - 107 mmol/L    CO2 25.7 22.0 - 29.0 mmol/L    Calcium 9.5 8.6 - 10.5 mg/dL    BUN/Creatinine Ratio 13.5 7.0 - 25.0    Anion Gap 11.3 5.0 - 15.0 mmol/L    eGFR 43.1 (L) >60.0 mL/min/1.73   Magnesium    Collection Time: 11/05/24 11:35 AM    Specimen: Blood   Result Value Ref Range    Magnesium 1.9 1.6 - 2.6 mg/dL   CBC (No Diff)    Collection Time: 11/05/24 11:35 AM    Specimen: Blood   Result Value Ref Range    WBC 4.33 3.40 - 10.80 10*3/mm3    RBC 3.72 (L) 3.77 - 5.28 10*6/mm3    Hemoglobin 11.3 (L) 12.0 - 15.9 g/dL    Hematocrit 33.8 (L) 34.0 - 46.6 %    MCV 90.9 79.0 - 97.0 fL    MCH 30.4 26.6 - 33.0 pg    MCHC 33.4 31.5 - 35.7 g/dL     RDW 12.6 12.3 - 15.4 %    RDW-SD 41.6 37.0 - 54.0 fl    MPV 10.0 6.0 - 12.0 fL    Platelets 247 140 - 450 10*3/mm3   Vitamin B12    Collection Time: 11/05/24 11:35 AM    Specimen: Blood   Result Value Ref Range    Vitamin B-12 345 211 - 946 pg/mL   Folate    Collection Time: 11/05/24 11:35 AM    Specimen: Blood   Result Value Ref Range    Folate 7.53 4.78 - 24.20 ng/mL   Ferritin    Collection Time: 11/05/24 11:35 AM    Specimen: Blood   Result Value Ref Range    Ferritin 66.50 13.00 - 150.00 ng/mL   Iron Profile    Collection Time: 11/05/24 11:35 AM    Specimen: Blood   Result Value Ref Range    Iron 54 37 - 145 mcg/dL    Iron Saturation (TSAT) 17 (L) 20 - 50 %    Transferrin 212 200 - 360 mg/dL    TIBC 316 298 - 536 mcg/dL   Erythropoietin    Collection Time: 11/05/24 11:35 AM    Specimen: Blood   Result Value Ref Range    Erythropoietin 14.8 2.6 - 18.5 mIU/mL     10/29/24 Cr 1.39, GFR 43.8    10/29/24 renal u/s - simple cyst L kidney    ASSESSMENT/PLAN    Diagnoses and all orders for this visit:    1. Renal insufficiency (Primary)  Assessment & Plan:  Sudden onset of renal insufficiency after episode of hypertensive urgency; was given several anti-hypertensive agents during hospitalization but currently on no meds; renal function slowly improved but now improvement has plateaued; renal u/s done 10/29/24; await nephrology consult; update BMP for cont'd serial examination    Note nl ESR with improved CRP on recent labs; vasculitis on ddx - check SHAWN, ANCA, C3/C4    Orders:  -     Basic Metabolic Panel; Future    2. Iron deficiency anemia, unspecified iron deficiency anemia type  Assessment & Plan:  Also new dev't and persistent since hospitalization for hypertensive urgency; only bord for Fe deficiency; nl B12, folate, and epo; f/u CBC for cont'd serial examination; has nephrology consultation pending 12/24    Orders:  -     CBC & Differential; Future    3. Hypertension  Assessment & Plan:  BP elevated today,  worsened on repeat, but acceptable BPs on home readings; no meds currently; advised patient to cont home BP monitoring; she will be seeing nephrology in 12/24;  f/u in 2 mos      4. Vaccine counseling  Comments:  rec flu vacc and COVID19 vacc    5. Mild intermittent asthma with acute exacerbation  Assessment & Plan:  Acute exacerbation ongoing for several week; had CT angio chest 9/24/24; cont Symbicort 160/4.5 2 puffs BID and add pred taper #30, 0RF; CXR if no better        Orders:  -     XR Chest PA & Lateral; Future  -     predniSONE (DELTASONE) 10 MG tablet; 4 PO QD for 3 days, then 3 PO QD for 3 days, then 2 PO QD for 3 days, then 1 PO QD for 3 days, then stop  Dispense: 30 tablet; Refill: 0    6. Fatigue, unspecified type  Assessment & Plan:  Note mild persistent anemia; nl B12 and folate levels    Orders:  -     Sedimentation Rate; Future  -     C-reactive Protein; Future  -     ANCA Panel; Future  -     SHAWN by IFA, Reflex 9-biomarkers profile; Future  -     C3 Complement; Future  -     C4 Complement; Future    7. Need for influenza vaccination  -     Fluzone >6mos    8. Need for COVID-19 vaccine  -     COVID-19 (Pfizer) 12yrs+ (COMIRNATY)    9. Nonintractable episodic headache, unspecified headache type  Assessment & Plan:  Patient reports headaches similar to prev meningitis headaches; reports RX propranolol per neurology - she will hold off on propranolol due to fatigue as well as acute asthma exacerbation      10. Meningitis  Assessment & Plan:  Recent flare-up is waning; note pred taper for asthma exac, which will help meningitis headache as well        Time Documentation    Counseled patient  I spent 30 minutes face to face and 25 minutes non-face to face on today's office visit. Time was spent reviewing patient's previous notes, lab results, test results, vitals, and/or other records as well as examining the patient, ordering tests/medicines/procedures, providing counseling, coordinating care,  answering questions, discussing evaluation and treatment plans, and writing this note.     Total time: 55 minutes  (Level 5 40 minutes)   ([id number] 55 minutes)    FOLLOW-UP  Health maintenance - rec flu vacc and COVID19 vacc, counseling given (given today)  F/u BMP and CBC today  Has nephrology consultation pending 12/3/24  RTC 2 mos for BP follow-up; also f/u renal insufficiency and anemia; BMP and CBC for serial examination    Electronically signed by:    Brie Jalloh MD, FACP  11/19/2024

## 2024-11-18 NOTE — ASSESSMENT & PLAN NOTE
Sudden onset of renal insufficiency after episode of hypertensive urgency; was given several anti-hypertensive agents during hospitalization but currently on no meds; renal function slowly improved but now improvement has plateaued; renal u/s done 10/29/24; await nephrology consult; update BMP for cont'd serial examination    Note nl ESR with improved CRP on recent labs; vasculitis on ddx - check SHAWN, ANCA, C3/C4

## 2024-11-18 NOTE — ASSESSMENT & PLAN NOTE
Also new dev't and persistent since hospitalization for hypertensive urgency; only bord for Fe deficiency; nl B12, folate, and epo; f/u CBC for cont'd serial examination; has nephrology consultation pending 12/24

## 2024-11-18 NOTE — ASSESSMENT & PLAN NOTE
BP elevated today, worsened on repeat, but acceptable BPs on home readings; no meds currently; advised patient to cont home BP monitoring; she will be seeing nephrology in 12/24;  f/u in 2 mos

## 2024-11-19 ENCOUNTER — OFFICE VISIT (OUTPATIENT)
Dept: INTERNAL MEDICINE | Facility: CLINIC | Age: 59
End: 2024-11-19
Payer: COMMERCIAL

## 2024-11-19 ENCOUNTER — HOSPITAL ENCOUNTER (OUTPATIENT)
Dept: GENERAL RADIOLOGY | Facility: HOSPITAL | Age: 59
Discharge: HOME | End: 2024-11-19
Payer: COMMERCIAL

## 2024-11-19 ENCOUNTER — LAB (OUTPATIENT)
Dept: LAB | Facility: HOSPITAL | Age: 59
End: 2024-11-19
Payer: COMMERCIAL

## 2024-11-19 VITALS
DIASTOLIC BLOOD PRESSURE: 94 MMHG | HEART RATE: 84 BPM | HEIGHT: 66 IN | BODY MASS INDEX: 29.73 KG/M2 | WEIGHT: 185 LBS | OXYGEN SATURATION: 98 % | SYSTOLIC BLOOD PRESSURE: 140 MMHG

## 2024-11-19 DIAGNOSIS — J45.21 MILD INTERMITTENT ASTHMA WITH ACUTE EXACERBATION: ICD-10-CM

## 2024-11-19 DIAGNOSIS — Z23 NEED FOR INFLUENZA VACCINATION: ICD-10-CM

## 2024-11-19 DIAGNOSIS — D50.9 IRON DEFICIENCY ANEMIA, UNSPECIFIED IRON DEFICIENCY ANEMIA TYPE: ICD-10-CM

## 2024-11-19 DIAGNOSIS — G03.9 MENINGITIS: Chronic | ICD-10-CM

## 2024-11-19 DIAGNOSIS — Z71.85 VACCINE COUNSELING: ICD-10-CM

## 2024-11-19 DIAGNOSIS — Z23 NEED FOR COVID-19 VACCINE: ICD-10-CM

## 2024-11-19 DIAGNOSIS — N28.9 RENAL INSUFFICIENCY: Primary | ICD-10-CM

## 2024-11-19 DIAGNOSIS — I10 PRIMARY HYPERTENSION: Chronic | ICD-10-CM

## 2024-11-19 DIAGNOSIS — R51.9 NONINTRACTABLE EPISODIC HEADACHE, UNSPECIFIED HEADACHE TYPE: Chronic | ICD-10-CM

## 2024-11-19 DIAGNOSIS — D50.9 IRON DEFICIENCY ANEMIA, UNSPECIFIED IRON DEFICIENCY ANEMIA TYPE: Chronic | ICD-10-CM

## 2024-11-19 DIAGNOSIS — N28.9 RENAL INSUFFICIENCY: ICD-10-CM

## 2024-11-19 DIAGNOSIS — R53.83 FATIGUE, UNSPECIFIED TYPE: Chronic | ICD-10-CM

## 2024-11-19 DIAGNOSIS — R53.83 FATIGUE, UNSPECIFIED TYPE: ICD-10-CM

## 2024-11-19 LAB
BASOPHILS # BLD AUTO: 0.04 10*3/MM3 (ref 0–0.2)
BASOPHILS NFR BLD AUTO: 1 % (ref 0–1.5)
DEPRECATED RDW RBC AUTO: 42.6 FL (ref 37–54)
EOSINOPHIL # BLD AUTO: 0.43 10*3/MM3 (ref 0–0.4)
EOSINOPHIL NFR BLD AUTO: 11.2 % (ref 0.3–6.2)
ERYTHROCYTE [DISTWIDTH] IN BLOOD BY AUTOMATED COUNT: 12.5 % (ref 12.3–15.4)
ERYTHROCYTE [SEDIMENTATION RATE] IN BLOOD: 16 MM/HR (ref 0–30)
HCT VFR BLD AUTO: 35.2 % (ref 34–46.6)
HGB BLD-MCNC: 11.8 G/DL (ref 12–15.9)
IMM GRANULOCYTES # BLD AUTO: 0.01 10*3/MM3 (ref 0–0.05)
IMM GRANULOCYTES NFR BLD AUTO: 0.3 % (ref 0–0.5)
LYMPHOCYTES # BLD AUTO: 0.45 10*3/MM3 (ref 0.7–3.1)
LYMPHOCYTES NFR BLD AUTO: 11.7 % (ref 19.6–45.3)
MCH RBC QN AUTO: 31 PG (ref 26.6–33)
MCHC RBC AUTO-ENTMCNC: 33.5 G/DL (ref 31.5–35.7)
MCV RBC AUTO: 92.4 FL (ref 79–97)
MONOCYTES # BLD AUTO: 0.44 10*3/MM3 (ref 0.1–0.9)
MONOCYTES NFR BLD AUTO: 11.4 % (ref 5–12)
NEUTROPHILS NFR BLD AUTO: 2.48 10*3/MM3 (ref 1.7–7)
NEUTROPHILS NFR BLD AUTO: 64.4 % (ref 42.7–76)
NRBC BLD AUTO-RTO: 0 /100 WBC (ref 0–0.2)
PLATELET # BLD AUTO: 217 10*3/MM3 (ref 140–450)
PMV BLD AUTO: 10.3 FL (ref 6–12)
RBC # BLD AUTO: 3.81 10*6/MM3 (ref 3.77–5.28)
WBC NRBC COR # BLD AUTO: 3.85 10*3/MM3 (ref 3.4–10.8)

## 2024-11-19 PROCEDURE — 99417 PROLNG OP E/M EACH 15 MIN: CPT | Performed by: INTERNAL MEDICINE

## 2024-11-19 PROCEDURE — 99215 OFFICE O/P EST HI 40 MIN: CPT | Performed by: INTERNAL MEDICINE

## 2024-11-19 PROCEDURE — 86037 ANCA TITER EACH ANTIBODY: CPT

## 2024-11-19 PROCEDURE — 86160 COMPLEMENT ANTIGEN: CPT

## 2024-11-19 PROCEDURE — 90480 ADMN SARSCOV2 VAC 1/ONLY CMP: CPT | Performed by: INTERNAL MEDICINE

## 2024-11-19 PROCEDURE — 83516 IMMUNOASSAY NONANTIBODY: CPT

## 2024-11-19 PROCEDURE — 90471 IMMUNIZATION ADMIN: CPT | Performed by: INTERNAL MEDICINE

## 2024-11-19 PROCEDURE — 90656 IIV3 VACC NO PRSV 0.5 ML IM: CPT | Performed by: INTERNAL MEDICINE

## 2024-11-19 PROCEDURE — 85652 RBC SED RATE AUTOMATED: CPT

## 2024-11-19 PROCEDURE — 86235 NUCLEAR ANTIGEN ANTIBODY: CPT

## 2024-11-19 PROCEDURE — 85025 COMPLETE CBC W/AUTO DIFF WBC: CPT

## 2024-11-19 PROCEDURE — 91320 SARSCV2 VAC 30MCG TRS-SUC IM: CPT | Performed by: INTERNAL MEDICINE

## 2024-11-19 PROCEDURE — 86038 ANTINUCLEAR ANTIBODIES: CPT

## 2024-11-19 PROCEDURE — 86225 DNA ANTIBODY NATIVE: CPT

## 2024-11-19 PROCEDURE — 80048 BASIC METABOLIC PNL TOTAL CA: CPT

## 2024-11-19 PROCEDURE — 71046 X-RAY EXAM CHEST 2 VIEWS: CPT

## 2024-11-19 PROCEDURE — 86140 C-REACTIVE PROTEIN: CPT

## 2024-11-19 RX ORDER — PREDNISONE 10 MG/1
TABLET ORAL
Qty: 30 TABLET | Refills: 0 | Status: SHIPPED | OUTPATIENT
Start: 2024-11-19 | End: 2024-12-01

## 2024-11-20 LAB
ANION GAP SERPL CALCULATED.3IONS-SCNC: 9.7 MMOL/L (ref 5–15)
BUN SERPL-MCNC: 24 MG/DL (ref 6–20)
BUN/CREAT SERPL: 17.4 (ref 7–25)
C3 SERPL-MCNC: 168 MG/DL (ref 82–167)
C4 SERPL-MCNC: 29 MG/DL (ref 14–44)
CALCIUM SPEC-SCNC: 9.8 MG/DL (ref 8.6–10.5)
CHLORIDE SERPL-SCNC: 106 MMOL/L (ref 98–107)
CO2 SERPL-SCNC: 25.3 MMOL/L (ref 22–29)
CREAT SERPL-MCNC: 1.38 MG/DL (ref 0.57–1)
CRP SERPL-MCNC: 0.92 MG/DL (ref 0–0.5)
EGFRCR SERPLBLD CKD-EPI 2021: 44.2 ML/MIN/1.73
GLUCOSE SERPL-MCNC: 80 MG/DL (ref 65–99)
POTASSIUM SERPL-SCNC: 4.3 MMOL/L (ref 3.5–5.2)
SODIUM SERPL-SCNC: 141 MMOL/L (ref 136–145)

## 2024-11-20 NOTE — ASSESSMENT & PLAN NOTE
Acute exacerbation ongoing for several week; had CT angio chest 9/24/24; cont Symbicort 160/4.5 2 puffs BID and add pred taper #30, 0RF; CXR if no better

## 2024-11-20 NOTE — ASSESSMENT & PLAN NOTE
Recent flare-up is waning; note pred taper for asthma exac, which will help meningitis headache as well

## 2024-11-20 NOTE — ASSESSMENT & PLAN NOTE
Patient reports headaches similar to prev meningitis headaches; reports RX propranolol per neurology - she will hold off on propranolol due to fatigue as well as acute asthma exacerbation

## 2024-11-21 LAB
C-ANCA TITR SER IF: NORMAL TITER
MYELOPEROXIDASE AB SER IA-ACNC: <0.2 UNITS (ref 0–0.9)
P-ANCA ATYPICAL TITR SER IF: NORMAL TITER
P-ANCA TITR SER IF: NORMAL TITER
PROTEINASE3 AB SER IA-ACNC: <0.2 UNITS (ref 0–0.9)

## 2024-11-22 ENCOUNTER — TELEPHONE (OUTPATIENT)
Dept: INTERNAL MEDICINE | Facility: CLINIC | Age: 59
End: 2024-11-22
Payer: COMMERCIAL

## 2024-11-22 LAB
ANA HOMOGEN TITR SER: ABNORMAL {TITER}
ANA SER QL IF: POSITIVE
CENTROMERE B AB SER-ACNC: <0.2 AI (ref 0–0.9)
CHROMATIN AB SERPL-ACNC: <0.2 AI (ref 0–0.9)
DSDNA AB SER-ACNC: <1 IU/ML (ref 0–9)
ENA JO1 AB SER-ACNC: <0.2 AI (ref 0–0.9)
ENA RNP AB SER-ACNC: <0.2 AI (ref 0–0.9)
ENA SCL70 AB SER-ACNC: 0.2 AI (ref 0–0.9)
ENA SM AB SER-ACNC: <0.2 AI (ref 0–0.9)
ENA SS-A AB SER-ACNC: <0.2 AI (ref 0–0.9)
ENA SS-B AB SER-ACNC: <0.2 AI (ref 0–0.9)
LABORATORY COMMENT REPORT: ABNORMAL
Lab: ABNORMAL
Lab: ABNORMAL

## 2024-11-22 NOTE — TELEPHONE ENCOUNTER
"----- Message from Brie Jalloh sent at 11/22/2024  3:17 AM EST -----  Kidney function is \"stable abnormal\" - f/u kidney doctor as discussed.  Anemia is improved  Screening test for vasculitis is negative  Tests for inflammation are borderline  Finish prednisone  and monitor blood pressures as discussed.  Follow-up earlier if cough does not resolve or blood pressures worsen  "

## 2024-11-22 NOTE — TELEPHONE ENCOUNTER
Called and spoke to pt. Gave message from provider. Pt voiced understanding and appreciation.       ----- Message from Brie Jalloh sent at 11/22/2024 11:12 AM EST -----  CXR is normal without signs of infection or pneumonia; noted to have arthritis changes in the spine; finish prednisone taper

## 2024-11-25 ENCOUNTER — TELEPHONE (OUTPATIENT)
Dept: INTERNAL MEDICINE | Facility: CLINIC | Age: 59
End: 2024-11-25
Payer: COMMERCIAL

## 2024-11-25 PROBLEM — R76.8 POSITIVE ANA (ANTINUCLEAR ANTIBODY): Chronic | Status: ACTIVE | Noted: 2024-11-25

## 2024-11-25 PROBLEM — R76.8 POSITIVE ANA (ANTINUCLEAR ANTIBODY): Status: ACTIVE | Noted: 2024-11-25

## 2024-11-25 NOTE — TELEPHONE ENCOUNTER
Called and spoke to pt. Gave message from provider. Pt voiced understanding and appreciation.       ----- Message from Brie Jalloh sent at 11/25/2024  4:52 PM EST -----  SHAWN, one of the rheum tests, came back positive, but only low-grade positive. Plus all the other subtypes/connected tests are all negative. It has been positive in the past in 2014 at a much higher level.  This is not the cause of her current issues.

## 2024-11-26 ENCOUNTER — TRANSCRIBE ORDERS (OUTPATIENT)
Dept: LAB | Facility: HOSPITAL | Age: 59
End: 2024-11-26
Payer: COMMERCIAL

## 2024-11-26 ENCOUNTER — LAB (OUTPATIENT)
Dept: LAB | Facility: HOSPITAL | Age: 59
End: 2024-11-26
Payer: COMMERCIAL

## 2024-11-26 DIAGNOSIS — N28.9 URETERAL SLUDGE: Primary | ICD-10-CM

## 2024-11-26 DIAGNOSIS — N28.9 URETERAL SLUDGE: ICD-10-CM

## 2024-11-26 LAB
ALBUMIN SERPL-MCNC: 4.1 G/DL (ref 3.5–5.2)
ANION GAP SERPL CALCULATED.3IONS-SCNC: 9.7 MMOL/L (ref 5–15)
BACTERIA UR QL AUTO: ABNORMAL /HPF
BASOPHILS # BLD AUTO: 0.04 10*3/MM3 (ref 0–0.2)
BASOPHILS NFR BLD AUTO: 0.8 % (ref 0–1.5)
BILIRUB UR QL STRIP: NEGATIVE
BUN SERPL-MCNC: 22 MG/DL (ref 6–20)
BUN/CREAT SERPL: 16.9 (ref 7–25)
CALCIUM SPEC-SCNC: 9.5 MG/DL (ref 8.6–10.5)
CHLORIDE SERPL-SCNC: 101 MMOL/L (ref 98–107)
CLARITY UR: ABNORMAL
CO2 SERPL-SCNC: 28.3 MMOL/L (ref 22–29)
COLOR UR: YELLOW
CREAT SERPL-MCNC: 1.3 MG/DL (ref 0.57–1)
DEPRECATED RDW RBC AUTO: 41 FL (ref 37–54)
EGFRCR SERPLBLD CKD-EPI 2021: 47.5 ML/MIN/1.73
EOSINOPHIL # BLD AUTO: 0.2 10*3/MM3 (ref 0–0.4)
EOSINOPHIL NFR BLD AUTO: 3.8 % (ref 0.3–6.2)
ERYTHROCYTE [DISTWIDTH] IN BLOOD BY AUTOMATED COUNT: 12 % (ref 12.3–15.4)
GLUCOSE SERPL-MCNC: 96 MG/DL (ref 65–99)
GLUCOSE UR STRIP-MCNC: NEGATIVE MG/DL
HCT VFR BLD AUTO: 36.7 % (ref 34–46.6)
HGB BLD-MCNC: 11.8 G/DL (ref 12–15.9)
HGB UR QL STRIP.AUTO: NEGATIVE
HYALINE CASTS UR QL AUTO: ABNORMAL /LPF
IMM GRANULOCYTES # BLD AUTO: 0.02 10*3/MM3 (ref 0–0.05)
IMM GRANULOCYTES NFR BLD AUTO: 0.4 % (ref 0–0.5)
KETONES UR QL STRIP: NEGATIVE
LEUKOCYTE ESTERASE UR QL STRIP.AUTO: ABNORMAL
LYMPHOCYTES # BLD AUTO: 0.45 10*3/MM3 (ref 0.7–3.1)
LYMPHOCYTES NFR BLD AUTO: 8.6 % (ref 19.6–45.3)
MCH RBC QN AUTO: 29.9 PG (ref 26.6–33)
MCHC RBC AUTO-ENTMCNC: 32.2 G/DL (ref 31.5–35.7)
MCV RBC AUTO: 92.9 FL (ref 79–97)
MONOCYTES # BLD AUTO: 0.33 10*3/MM3 (ref 0.1–0.9)
MONOCYTES NFR BLD AUTO: 6.3 % (ref 5–12)
NEUTROPHILS NFR BLD AUTO: 4.22 10*3/MM3 (ref 1.7–7)
NEUTROPHILS NFR BLD AUTO: 80.1 % (ref 42.7–76)
NITRITE UR QL STRIP: POSITIVE
NRBC BLD AUTO-RTO: 0 /100 WBC (ref 0–0.2)
PH UR STRIP.AUTO: 6 [PH] (ref 5–8)
PHOSPHATE SERPL-MCNC: 3.2 MG/DL (ref 2.5–4.5)
PLATELET # BLD AUTO: 213 10*3/MM3 (ref 140–450)
PMV BLD AUTO: 10 FL (ref 6–12)
POTASSIUM SERPL-SCNC: 4.4 MMOL/L (ref 3.5–5.2)
PROT UR QL STRIP: NEGATIVE
RBC # BLD AUTO: 3.95 10*6/MM3 (ref 3.77–5.28)
RBC # UR STRIP: ABNORMAL /HPF
REF LAB TEST METHOD: ABNORMAL
SODIUM SERPL-SCNC: 139 MMOL/L (ref 136–145)
SP GR UR STRIP: 1.02 (ref 1–1.03)
SQUAMOUS #/AREA URNS HPF: ABNORMAL /HPF
UROBILINOGEN UR QL STRIP: ABNORMAL
WBC # UR STRIP: ABNORMAL /HPF
WBC NRBC COR # BLD AUTO: 5.26 10*3/MM3 (ref 3.4–10.8)

## 2024-11-26 PROCEDURE — 85025 COMPLETE CBC W/AUTO DIFF WBC: CPT

## 2024-11-26 PROCEDURE — 36415 COLL VENOUS BLD VENIPUNCTURE: CPT

## 2024-11-26 PROCEDURE — 81001 URINALYSIS AUTO W/SCOPE: CPT

## 2024-11-26 PROCEDURE — 84156 ASSAY OF PROTEIN URINE: CPT

## 2024-11-26 PROCEDURE — 82570 ASSAY OF URINE CREATININE: CPT

## 2024-11-26 PROCEDURE — 80069 RENAL FUNCTION PANEL: CPT

## 2024-11-27 LAB
CREAT UR-MCNC: 158.4 MG/DL
PROT ?TM UR-MCNC: 12.7 MG/DL
PROT/CREAT UR: 80.2 MG/G CREA (ref 0–200)

## 2024-12-31 ENCOUNTER — OFFICE VISIT (OUTPATIENT)
Dept: INTERNAL MEDICINE | Facility: CLINIC | Age: 59
End: 2024-12-31
Payer: COMMERCIAL

## 2024-12-31 VITALS
HEIGHT: 66 IN | WEIGHT: 189 LBS | TEMPERATURE: 97.9 F | SYSTOLIC BLOOD PRESSURE: 140 MMHG | BODY MASS INDEX: 30.37 KG/M2 | DIASTOLIC BLOOD PRESSURE: 98 MMHG | HEART RATE: 79 BPM | OXYGEN SATURATION: 98 %

## 2024-12-31 DIAGNOSIS — R09.81 CONGESTION OF NASAL SINUS: ICD-10-CM

## 2024-12-31 DIAGNOSIS — J02.9 SORE THROAT: Primary | ICD-10-CM

## 2024-12-31 DIAGNOSIS — R05.1 ACUTE COUGH: ICD-10-CM

## 2024-12-31 LAB
EXPIRATION DATE: NORMAL
EXPIRATION DATE: NORMAL
FLUAV AG UPPER RESP QL IA.RAPID: NOT DETECTED
FLUBV AG UPPER RESP QL IA.RAPID: NOT DETECTED
INTERNAL CONTROL: NORMAL
INTERNAL CONTROL: NORMAL
Lab: NORMAL
Lab: NORMAL
S PYO AG THROAT QL: NEGATIVE
SARS-COV-2 AG UPPER RESP QL IA.RAPID: NOT DETECTED

## 2024-12-31 PROCEDURE — 96372 THER/PROPH/DIAG INJ SC/IM: CPT | Performed by: NURSE PRACTITIONER

## 2024-12-31 PROCEDURE — 99213 OFFICE O/P EST LOW 20 MIN: CPT | Performed by: NURSE PRACTITIONER

## 2024-12-31 PROCEDURE — 87428 SARSCOV & INF VIR A&B AG IA: CPT | Performed by: NURSE PRACTITIONER

## 2024-12-31 PROCEDURE — 87880 STREP A ASSAY W/OPTIC: CPT | Performed by: NURSE PRACTITIONER

## 2024-12-31 RX ORDER — DEXAMETHASONE SODIUM PHOSPHATE 10 MG/ML
10 INJECTION INTRAMUSCULAR; INTRAVENOUS ONCE
Status: COMPLETED | OUTPATIENT
Start: 2024-12-31 | End: 2024-12-31

## 2024-12-31 RX ADMIN — DEXAMETHASONE SODIUM PHOSPHATE 10 MG: 10 INJECTION INTRAMUSCULAR; INTRAVENOUS at 11:12

## 2024-12-31 NOTE — PROGRESS NOTES
Office Note     Name: Yanet Clifton    : 1965     MRN: [id number]     Chief Complaint  Cough (Patient stated she's been coughing and having congestion, no fever or body aches or sore throat. )    Subjective     History of Present Illness:  Yanet Clifton is a 59 y.o. female who presents today for cough, congestion and sore throat    Patient regularly follows with Dr. Jalloh    Patient is here today with symptoms including cough and congestion.  She states she has been on quite a bit of antibiotics lately.  She recently had a UTI about 1 month ago with use of antibiotics.  She did fully complete that prescription.  She also recently had strep in December and also finished the full course of those antibiotics.  She states she also had cold-like symptoms during that time as well.  She states she does have a variant of asthma that does cause a cough.  She is currently on Symbicort and albuterol.  She woke up this morning with quite a bit of thick sputum and coughing.  No recent fevers.  She has also been helping her sisters clean out her parents old home.        Past Medical History:   Diagnosis Date    Aseptic meningitis 2009    Encephalitis     Hx of bone density study 2023    DEXA (23): L/H -0.3, repeat 3 yrs    Hx of brain MRI angiogram 2024    nl brain MRI angiogram (24, [facility]))    Hx of cardiovascular stress test 2024    nl PET stress test (24): EF 69%, no evidence of ischemia    Hx of colonoscopy 2016    colonosc (16): hyperplastic polyp, ext/int hemorrhoids, diverticulosis, repeat 5 yrs; GI - Dr. Robertson    Hx of colonoscopy 2021    nl colonosc (21) except mild sigmoid diverticulosis, repeat 5 yrs; CRS - Dr. Berrios    Hx of CT scan of abd/pelvis 2024    CT abd/pelvis (24):    Hx of CT scan of abd/pelvis 2024    CT abd/pelvis (24, ER): s/p priya/hyst, 5cm L gluteal lipoma, sl thickened gastric wall    Hx of CT scan  of chest angio 09/24/2024    CT angio chest (9/24/24, ER): no PE; healed granulomatous disease    Hx of CT scan of head 09/24/2024    CT head (9/24/24, ER): incidental likely 2mm meningioma in middle cranial fossa ant to R temporal lobe    Hx of echocardiogram 09/25/2024    ECHO (9/25/24): EF 51-55%, calcification AV, mild MR/TR/OK    Hx of echocardiogram 09/25/2024    ECHO (9/25/24): EF 51-55%, mild AV calcification, mild MR/TR, tr-mild OK    Hx of EGD 05/24/2016    EGD (5/24/16): gastritis, reflux esophagitis, neg for H.pylori; GI - Dr. Robertson    Hx of MRI of thoracic spine 09/17/2024    MRI T-spine (9/17/24, [facility]): disc bulge T2-3 with mild central canal stenosis; mild posterior left central canal stenosis at T10-11    Hx of nl SPEP 12/18/2019    Hx of renal ultrasound 10/29/2024    renal u/s (10/29/24): nl; 2cm simple cyst L kidney       Past Surgical History:   Procedure Laterality Date    BREAST BIOPSY Left 02/09/2018    u/s guided axillary lymph node    BREAST EXCISIONAL BIOPSY Left 1984    CHOLECYSTECTOMY  2001    secondary to cholelithiasis    CYSTOSCOPY W/ LASER LITHOTRIPSY      LUMBAR PUNCTURE  04/2019    hospitalized at [facility]; ? viral meningitis    MAMMO SKIN LESION BIOPSY SINGLE LESION UNILATERAL Left 01/30/2017    s/p L. breast skin bx (1/30/17); nonspecific chronic perivascular dermatitis    MAMMO US BREAST BIOPSY 1ST W WO DEVICE UNILATERAL Left 02/09/2018    s/p u/s-guided L. breast axillary lymph node bx (2/9/18): benign reactive LN, repeat dx mammo in 6 mos; rads - Dr. Bailey    TEMPORAL ARTERY BIOPSY Left 11/05/2015    neg for arteritis; surg - Dr. Cunha    TONSILLECTOMY      TOTAL ABDOMINAL HYSTERECTOMY WITH SALPINGO OOPHORECTOMY  03/2013    secondary to fibroids/endometriosis (3/13); GYN - Dr. Naik       Social History     Socioeconomic History    Marital status:     Number of children: 2   Tobacco Use    Smoking status: Never     Passive exposure: Never    Smokeless tobacco: Never  "  Vaping Use    Vaping status: Never Used   Substance and Sexual Activity    Alcohol use: Yes     Alcohol/week: 0.0 standard drinks of alcohol     Comment: Very rarely    Drug use: No    Sexual activity: Yes     Partners: Male     Birth control/protection: Post-menopausal         Current Outpatient Medications:     albuterol (PROVENTIL) (2.5 MG/3ML) 0.083% nebulizer solution, Take 2.5 mg by nebulization 4 (Four) Times a Day As Needed for Wheezing., Disp: 120 each, Rfl: 5    budesonide-formoterol (SYMBICORT) 160-4.5 MCG/ACT inhaler, Inhale 2 puffs 2 (Two) Times a Day., Disp: 10.2 g, Rfl: 3    buPROPion XL (WELLBUTRIN XL) 150 MG 24 hr tablet, Take 3 tablets by mouth Daily. (Patient taking differently: Take 3 tablets by mouth Every Morning.), Disp: 270 tablet, Rfl: 3    levothyroxine (SYNTHROID, LEVOTHROID) 75 MCG tablet, TAKE 1 TABLET BY MOUTH EVERY MORNING, Disp: 90 tablet, Rfl: 3    losartan (COZAAR) 25 MG tablet, Take 1 tablet by mouth Daily., Disp: , Rfl:     montelukast (SINGULAIR) 10 MG tablet, Take 1 tablet by mouth Every Evening., Disp: , Rfl:     omeprazole (priLOSEC) 40 MG capsule, Take 1 capsule by mouth Daily. (Patient taking differently: Take 1 capsule by mouth every night at bedtime.), Disp: 90 capsule, Rfl: 3    riTUXimab (RITUXAN) 500 MG/50ML solution injection, Infuse 100 mL into a venous catheter Every 6 (Six) Months. Two 1 gram infusions spread out over 2 weeks, Disp: , Rfl:     magnesium oxide (MAG-OX) 400 MG tablet, Take 1 tablet by mouth 2 (Two) Times a Day. Indications: Disorder with Low Magnesium Levels (Patient not taking: Reported on 12/31/2024), Disp: , Rfl:   No current facility-administered medications for this visit.    Objective     Vital Signs  /98 (BP Location: Left arm, Patient Position: Sitting, Cuff Size: Adult)   Pulse 79   Temp 97.9 °F (36.6 °C)   Ht 166.4 cm (65.5\")   Wt 85.7 kg (189 lb)   SpO2 98%   BMI 30.97 kg/m²   Estimated body mass index is 30.97 kg/m² as " "calculated from the following:    Height as of this encounter: 166.4 cm (65.5\").    Weight as of this encounter: 85.7 kg (189 lb).               Physical Exam  Vitals and nursing note reviewed.   Constitutional:       General: She is awake.      Appearance: Normal appearance.   HENT:      Head: Normocephalic and atraumatic.      Right Ear: Hearing, tympanic membrane, ear canal and external ear normal.      Left Ear: Hearing, tympanic membrane, ear canal and external ear normal.      Nose: Congestion present.      Mouth/Throat:      Lips: Pink.      Mouth: Mucous membranes are moist.      Pharynx: Oropharynx is clear.   Eyes:      Extraocular Movements: Extraocular movements intact.      Pupils: Pupils are equal, round, and reactive to light.   Cardiovascular:      Rate and Rhythm: Normal rate and regular rhythm.      Heart sounds: Normal heart sounds.   Pulmonary:      Effort: Pulmonary effort is normal.      Breath sounds: Normal breath sounds.      Comments: Cough noted on exam  Musculoskeletal:         General: Normal range of motion.   Lymphadenopathy:      Cervical: No cervical adenopathy.   Skin:     General: Skin is warm and dry.   Neurological:      Mental Status: She is alert and oriented to person, place, and time.   Psychiatric:         Mood and Affect: Mood normal.         Behavior: Behavior normal. Behavior is cooperative.          Lab Review:  v   Latest Reference Range & Units 12/31/24 11:03   SARS Antigen Not Detected, Presumptive Negative  Not Detected   Expiration Date  10/18/25   Lot Number  [id number]   Influenza A Antigen MARVIN Not Detected  Not Detected   Influenza B Antigen MARVIN Not Detected  Not Detected      Latest Reference Range & Units 12/31/24 10:58   Rapid Strep A Screen Negative, VALID, INVALID, Not Performed  Negative   Expiration Date  12/3/26   Lot Number  [id number]              Assessment and Plan     Diagnoses and all orders for this visit:    1. Sore throat (Primary)  -     POCT " SARS-CoV-2 Antigen MARVIN + Flu  -     POCT rapid strep A    2. Congestion of nasal sinus  -     POCT SARS-CoV-2 Antigen MARVIN + Flu  -     POCT rapid strep A    3. Acute cough  -     dexAMETHasone (DECADRON) injection 10 mg    Plan  Discussed results of in office testing with patient today  Lungs were clear throughout so we will hold on chest x-ray  Patient can continue with current medication management  Continue to stay well-hydrated  Continue coughing and deep breathing exercises  Go to ER if any condition worsens or severe  Steroid shot provided in office today  Plan to follow-up as scheduled    Follow Up  Return for Next scheduled follow up.    CHERYL Ga    Part of this note may be an electronic transcription/translation of spoken language to printed text using the Dragon Dictation System.

## 2025-01-03 ENCOUNTER — OFFICE VISIT (OUTPATIENT)
Dept: INTERNAL MEDICINE | Facility: CLINIC | Age: 60
End: 2025-01-03
Payer: COMMERCIAL

## 2025-01-03 VITALS
SYSTOLIC BLOOD PRESSURE: 140 MMHG | HEART RATE: 100 BPM | DIASTOLIC BLOOD PRESSURE: 82 MMHG | HEIGHT: 66 IN | RESPIRATION RATE: 16 BRPM | TEMPERATURE: 98.4 F | OXYGEN SATURATION: 99 % | WEIGHT: 189 LBS | BODY MASS INDEX: 30.37 KG/M2

## 2025-01-03 DIAGNOSIS — B96.89 BACTERIAL URI: Primary | ICD-10-CM

## 2025-01-03 DIAGNOSIS — R05.1 ACUTE COUGH: ICD-10-CM

## 2025-01-03 DIAGNOSIS — J06.9 BACTERIAL URI: Primary | ICD-10-CM

## 2025-01-03 PROCEDURE — 99213 OFFICE O/P EST LOW 20 MIN: CPT | Performed by: FAMILY MEDICINE

## 2025-01-03 PROCEDURE — 87428 SARSCOV & INF VIR A&B AG IA: CPT | Performed by: FAMILY MEDICINE

## 2025-01-03 RX ORDER — AZITHROMYCIN 250 MG/1
TABLET, FILM COATED ORAL
Qty: 6 TABLET | Refills: 0 | Status: SHIPPED | OUTPATIENT
Start: 2025-01-03

## 2025-01-03 RX ORDER — METHYLPREDNISOLONE 4 MG/1
TABLET ORAL
Qty: 21 TABLET | Refills: 0 | Status: SHIPPED | OUTPATIENT
Start: 2025-01-03

## 2025-01-03 NOTE — PROGRESS NOTES
Office Note     Name: Yanet Clifton    : 1965     MRN: [id number]     Chief Complaint  Cough, Headache, and Generalized Body Aches    Subjective     History of Present Illness:  Yanet Clifton is a 59 y.o. female who presents today for cough, headache and bodyaches x 2 days     Ongoing cold symptoms x 1-2 weeks   Seen on - steroid injection     Symptoms have progressively worsened   Tmax 100- all night last night   Cough- productive- green/thick   Cough is worse at night   Nasal drainage- green/thick   Headache, sinus pressure, congestion   Denies chest pain, sob, abdominal pain, diarrhea, constipation, nausea or vomiting     OTC- mucinex, tylenol   Lots of sick contacts     Denies DM, no hx of smoking   Does cough variant asthma     Review of Systems:   Review of Systems   Constitutional:  Negative for chills and fever.   HENT:  Positive for congestion, postnasal drip and sinus pressure. Negative for trouble swallowing and voice change.    Respiratory:  Positive for cough. Negative for chest tightness, shortness of breath and wheezing.    Cardiovascular:  Negative for chest pain, palpitations and leg swelling.   Gastrointestinal:  Negative for abdominal pain, constipation, diarrhea, nausea and vomiting.   Genitourinary:  Negative for dysuria.   Musculoskeletal:  Negative for gait problem.   Neurological:  Positive for headache.       Past Medical History:   Past Medical History:   Diagnosis Date    Aseptic meningitis 2009    Encephalitis     Hx of bone density study 2023    DEXA (23): L/H -0.3, repeat 3 yrs    Hx of brain MRI angiogram 2024    nl brain MRI angiogram (24, [facility]))    Hx of cardiovascular stress test 2024    nl PET stress test (24): EF 69%, no evidence of ischemia    Hx of colonoscopy 2016    colonosc (16): hyperplastic polyp, ext/int hemorrhoids, diverticulosis, repeat 5 yrs; GI - Dr. Robertson    Hx of colonoscopy 2021     nl colonosc (8/16/21) except mild sigmoid diverticulosis, repeat 5 yrs; CRS - Dr. Berrios    Hx of CT scan of abd/pelvis 05/13/2024    CT abd/pelvis (5/13/24):    Hx of CT scan of abd/pelvis 09/24/2024    CT abd/pelvis (9/24/24, ER): s/p priya/hyst, 5cm L gluteal lipoma, sl thickened gastric wall    Hx of CT scan of chest angio 09/24/2024    CT angio chest (9/24/24, ER): no PE; healed granulomatous disease    Hx of CT scan of head 09/24/2024    CT head (9/24/24, ER): incidental likely 2mm meningioma in middle cranial fossa ant to R temporal lobe    Hx of echocardiogram 09/25/2024    ECHO (9/25/24): EF 51-55%, calcification AV, mild MR/TR/OR    Hx of echocardiogram 09/25/2024    ECHO (9/25/24): EF 51-55%, mild AV calcification, mild MR/TR, tr-mild OR    Hx of EGD 05/24/2016    EGD (5/24/16): gastritis, reflux esophagitis, neg for H.pylori; GI - Dr. Robertson    Hx of MRI of thoracic spine 09/17/2024    MRI T-spine (9/17/24, [facility]): disc bulge T2-3 with mild central canal stenosis; mild posterior left central canal stenosis at T10-11    Hx of nl SPEP 12/18/2019    Hx of renal ultrasound 10/29/2024    renal u/s (10/29/24): nl; 2cm simple cyst L kidney       Past Surgical History:   Past Surgical History:   Procedure Laterality Date    BREAST BIOPSY Left 02/09/2018    u/s guided axillary lymph node    BREAST EXCISIONAL BIOPSY Left 1984    CHOLECYSTECTOMY  2001    secondary to cholelithiasis    CYSTOSCOPY W/ LASER LITHOTRIPSY      LUMBAR PUNCTURE  04/2019    hospitalized at [facility]; ? viral meningitis    MAMMO SKIN LESION BIOPSY SINGLE LESION UNILATERAL Left 01/30/2017    s/p L. breast skin bx (1/30/17); nonspecific chronic perivascular dermatitis    MAMMO US BREAST BIOPSY 1ST W WO DEVICE UNILATERAL Left 02/09/2018    s/p u/s-guided L. breast axillary lymph node bx (2/9/18): benign reactive LN, repeat dx mammo in 6 mos; rads - Dr. Bailey    TEMPORAL ARTERY BIOPSY Left 11/05/2015    neg for arteritis; surg - Dr. Cunha     TONSILLECTOMY      TOTAL ABDOMINAL HYSTERECTOMY WITH SALPINGO OOPHORECTOMY  03/2013    secondary to fibroids/endometriosis (3/13); GYN - Dr. Naik       Immunizations:   Immunization History   Administered Date(s) Administered    COVID-19 (PFIZER) 12YRS+ (COMIRNATY) 11/19/2024    COVID-19 (PFIZER) BIVALENT 12+YRS 02/07/2023    COVID-19 (PFIZER) Purple Cap Monovalent 02/25/2021, 03/19/2021, 08/19/2021    FluMist 2-49yrs 09/19/2014    Fluzone  >6mos 11/19/2024    Fluzone (or Fluarix & Flulaval for VFC) >6mos 12/15/2020, 09/10/2021, 09/19/2022, 01/16/2024    Fluzone Quad >6mos (Multi-dose) 11/22/2016, 11/28/2017    Hepatitis A 11/29/2018, 12/03/2019    Influenza, Unspecified 11/01/2018, 09/19/2022    PPD Test 02/26/2008    Pneumococcal Conjugate 13-Valent (PCV13) 09/29/2015    Pneumococcal Polysaccharide (PPSV23) 09/23/2008, 11/21/2011, 11/22/2016    Pneumococcal, Unspecified 01/01/2017    TD Preservative Free (Tenivac) 12/15/2020    Tdap 02/03/2011    flucelvax quad pfs =>4 YRS 11/29/2018, 12/03/2019        Medications:     Current Outpatient Medications:     albuterol (PROVENTIL) (2.5 MG/3ML) 0.083% nebulizer solution, Take 2.5 mg by nebulization 4 (Four) Times a Day As Needed for Wheezing., Disp: 120 each, Rfl: 5    budesonide-formoterol (SYMBICORT) 160-4.5 MCG/ACT inhaler, Inhale 2 puffs 2 (Two) Times a Day., Disp: 10.2 g, Rfl: 3    buPROPion XL (WELLBUTRIN XL) 150 MG 24 hr tablet, Take 3 tablets by mouth Daily. (Patient taking differently: Take 3 tablets by mouth Every Morning.), Disp: 270 tablet, Rfl: 3    levothyroxine (SYNTHROID, LEVOTHROID) 75 MCG tablet, TAKE 1 TABLET BY MOUTH EVERY MORNING, Disp: 90 tablet, Rfl: 3    losartan (COZAAR) 25 MG tablet, Take 1 tablet by mouth Daily., Disp: , Rfl:     magnesium oxide (MAG-OX) 400 MG tablet, Take 1 tablet by mouth 2 (Two) Times a Day., Disp: , Rfl:     montelukast (SINGULAIR) 10 MG tablet, Take 1 tablet by mouth Every Evening., Disp: , Rfl:     omeprazole  (priLOSEC) 40 MG capsule, Take 1 capsule by mouth Daily. (Patient taking differently: Take 1 capsule by mouth every night at bedtime.), Disp: 90 capsule, Rfl: 3    riTUXimab (RITUXAN) 500 MG/50ML solution injection, Infuse 100 mL into a venous catheter Every 6 (Six) Months. Two 1 gram infusions spread out over 2 weeks, Disp: , Rfl:     azithromycin (Zithromax Z-Cornel) 250 MG tablet, Take 2 tablets by mouth on day 1, then 1 tablet daily on days 2-5, Disp: 6 tablet, Rfl: 0    methylPREDNISolone (MEDROL) 4 MG dose pack, Take as directed on package instructions., Disp: 21 tablet, Rfl: 0    Allergies:   Allergies   Allergen Reactions    Adalimumab Headache     Other reaction(s): Headache (Humira)    Cimzia [Certolizumab Pegol] Rash    Duloxetine Hcl Other (See Comments)     sexual dysfunction    Escitalopram Oxalate Other (See Comments)     Weight gain    Macrobid [Nitrofurantoin Macrocrystal] Nausea Only    Sulfa Antibiotics Rash       Family History:   Family History   Problem Relation Age of Onset    Fibromyalgia Mother     Migraines Mother     Depression Mother     Hypertension Sister     Migraines Sister     Asthma Sister     Breast cancer Maternal Grandmother 60    Cancer Maternal Grandmother         Breast Cancer    Multiple myeloma Paternal Grandmother     Cancer Paternal Grandmother         Multiple Myeloma    Multiple myeloma Paternal Uncle     Other (Other) Cousin         Wegeners    Thyroid disease Neg Hx     Diabetes Neg Hx     BRCA 1/2 Neg Hx     Colon cancer Neg Hx     Endometrial cancer Neg Hx     Ovarian cancer Neg Hx        Social History:   Social History     Socioeconomic History    Marital status:     Number of children: 2   Tobacco Use    Smoking status: Never     Passive exposure: Never    Smokeless tobacco: Never   Vaping Use    Vaping status: Never Used   Substance and Sexual Activity    Alcohol use: Yes     Alcohol/week: 0.0 standard drinks of alcohol     Comment: Very rarely    Drug use:  "No    Sexual activity: Yes     Partners: Male     Birth control/protection: Post-menopausal         Objective     Vital Signs  /82   Pulse 100   Temp 98.4 °F (36.9 °C)   Resp 16   Ht 166.4 cm (65.5\")   Wt 85.7 kg (189 lb)   SpO2 99%   BMI 30.97 kg/m²   Estimated body mass index is 30.97 kg/m² as calculated from the following:    Height as of this encounter: 166.4 cm (65.5\").    Weight as of this encounter: 85.7 kg (189 lb).            Physical Exam  Vitals and nursing note reviewed.   Constitutional:       General: She is not in acute distress.     Appearance: Normal appearance.   HENT:      Head: Normocephalic and atraumatic.      Right Ear: Tympanic membrane normal.      Left Ear: Tympanic membrane normal.      Nose: Nose normal. Congestion and rhinorrhea present.      Mouth/Throat:      Mouth: Mucous membranes are moist.      Comments: Purulent post nasal drip   Cardiovascular:      Rate and Rhythm: Normal rate and regular rhythm.      Heart sounds: Normal heart sounds.   Pulmonary:      Effort: Pulmonary effort is normal. No respiratory distress.      Breath sounds: Normal breath sounds.   Skin:     General: Skin is warm and dry.   Neurological:      General: No focal deficit present.      Mental Status: She is alert and oriented to person, place, and time.          Procedures     Assessment and Plan   Diagnosis Discussed   Continue to monitor   Plenty of fluids  Mucinex 1200mg twice a day x 7-10 days   Please complete full course of steroids   Please complete full course of antibiotics   If symptoms worsen or persist please seek further evaluation     1. Bacterial URI  - methylPREDNISolone (MEDROL) 4 MG dose pack; Take as directed on package instructions.  Dispense: 21 tablet; Refill: 0  - azithromycin (Zithromax Z-Cornel) 250 MG tablet; Take 2 tablets by mouth on day 1, then 1 tablet daily on days 2-5  Dispense: 6 tablet; Refill: 0    2. Acute cough  - POCT SARS-CoV-2 Antigen MARVIN + Flu  - " methylPREDNISolone (MEDROL) 4 MG dose pack; Take as directed on package instructions.  Dispense: 21 tablet; Refill: 0  - azithromycin (Zithromax Z-Cornel) 250 MG tablet; Take 2 tablets by mouth on day 1, then 1 tablet daily on days 2-5  Dispense: 6 tablet; Refill: 0       Follow Up  No follow-ups on file.    Rebecca Monteiro MD  [facility]  [address] [place] [id number] [phone]

## 2025-01-03 NOTE — PATIENT INSTRUCTIONS
Diagnosis Discussed   Continue to monitor   Plenty of fluids  Mucinex 1200mg twice a day x 7-10 days   Please complete full course of steroids   Please complete full course of antibiotics   If symptoms worsen or persist please seek further evaluation

## 2025-01-11 PROBLEM — N18.31 CKD STAGE 3A, GFR 45-59 ML/MIN: Status: ACTIVE | Noted: 2024-09-20

## 2025-01-12 NOTE — PROGRESS NOTES
"Chief Complaint   Patient presents with    Hypertension    Anemia    Renak Insufficiency       History of Present Illness  59 y.o.  female presents for f/u on BP, kidney function, and anemia.    Has recovered from strep infection - tx with zpak and steroids per patient. Feels the best she has had in awhile.    Home BPs mostly 130-140s/80-90s systolic. Reports renal consultation f/u pending 2/25 with adrenal hormone labs. Nephrology felt that renal insufficiency was due to sequelae of hypertensive urgency. No other recommendations. Also had UTI at that time, treated.    Has had neuro  consultation; repeat brain MRI pending 3/25 for f/u of nonspecific white matter lesions, currently attributed to Sjogren's disease.    Review of Systems  Denies CP, SOB, abd pain, dizziness. All other ROS reviewed and negative.    Current Outpatient Medications:     albuterol (PROVENTIL) (2.5 MG/3ML) 0.083% nebulizer prn    budesonide-formoterol (SYMBICORT) 160-4.5 MCG/ACT inhaler 2 puffs BID    buPROPion  MG 3 QD    levothyroxine 75 MCG QD    losartan 25 MG QD    magnesium oxide (MAG-OX) 400 MG BID    montelukast 10 MG QD    omeprazole 40 MG QD    riTUXimab (RITUXAN) 500 MG/50ML Q6wks      VITALS:  /90   Pulse 77   Ht 166.4 cm (65.5\")   Wt 84.7 kg (186 lb 12.8 oz)   SpO2 100%   BMI 30.61 kg/m²   Repeat BP left arm 148/88    Physical Exam  Vitals and nursing note reviewed.   Constitutional:       General: She is not in acute distress.     Appearance: Normal appearance. She is not ill-appearing.   Eyes:      Extraocular Movements: Extraocular movements intact.      Conjunctiva/sclera: Conjunctivae normal.   Cardiovascular:      Rate and Rhythm: Normal rate and regular rhythm.      Heart sounds: Normal heart sounds.   Pulmonary:      Effort: Pulmonary effort is normal. No respiratory distress.      Breath sounds: Normal breath sounds. No wheezing, rhonchi or rales.   Neurological:      Mental Status: She is alert " and oriented to person, place, and time. Mental status is at baseline.      Gait: Gait normal.   Psychiatric:         Mood and Affect: Mood normal.         Behavior: Behavior normal.         LABS  Results for orders placed or performed in visit on 01/03/25   POCT SARS-CoV-2 Antigen MARVIN + Flu    Collection Time: 01/03/25 10:15 AM    Specimen: Swab   Result Value Ref Range    SARS Antigen Not Detected Not Detected, Presumptive Negative    Influenza A Antigen MARVIN Not Detected Not Detected    Influenza B Antigen MARVIN Not Detected Not Detected    Internal Control Passed Passed    Lot Number [id number]     Expiration Date 9/4/25 11/26/24 CBC with H&H 11.8/36.7; BMP with Cr 1.3, GFR 47.5    11/19/24 CBC with H&H 11.8/35.2, BMP with Cr 1.38, GFR 44.2    ASSESSMENT/PLAN    Diagnoses and all orders for this visit:    1. Hypertension (Primary)  Assessment & Plan:  BP overall improved but remains bord elevated, worsened on repeat; incr losartan 50mg QD #90, 0RF and f/u nephrology as scheduled    Orders:  -     losartan (COZAAR) 50 MG tablet; Take 1 tablet by mouth Daily.  Dispense: 90 tablet; Refill: 0    2. CKD stage 3a, GFR 45-59 ml/min  Assessment & Plan:  Needs f/u BMP; discussed importance of improving BP control; will request note from [facility] and next appt 2/25 per pt    Orders:  -     Basic Metabolic Panel; Future    3. Anemia, unspecified type  Assessment & Plan:  Needs f/u CBC for serial examination    Orders:  -     CBC & Differential; Future        FOLLOW-UP  Health maintenance - flu vacc and COVID19 vacc done 11/24  F/u CBC and BMP on the way out the door  RTC for next PE 5/23/25; fasting labs prior to appt (CBC, CMP, TSH, lipids, UA/micr, FT4, vit D, CPK, A1C)    Electronically signed by:    Brie Jalloh MD, FACP  01/13/2025

## 2025-01-12 NOTE — ASSESSMENT & PLAN NOTE
BP overall improved but remains bord elevated, worsened on repeat; incr losartan 50mg QD #90, 0RF and f/u nephrology as scheduled

## 2025-01-12 NOTE — ASSESSMENT & PLAN NOTE
Needs f/u BMP; discussed importance of improving BP control; will request note from [facility] and next appt 2/25 per pt

## 2025-01-13 ENCOUNTER — LAB (OUTPATIENT)
Dept: LAB | Facility: HOSPITAL | Age: 60
End: 2025-01-13
Payer: COMMERCIAL

## 2025-01-13 ENCOUNTER — OFFICE VISIT (OUTPATIENT)
Dept: INTERNAL MEDICINE | Facility: CLINIC | Age: 60
End: 2025-01-13
Payer: COMMERCIAL

## 2025-01-13 VITALS
HEIGHT: 66 IN | WEIGHT: 186.8 LBS | BODY MASS INDEX: 30.02 KG/M2 | HEART RATE: 77 BPM | DIASTOLIC BLOOD PRESSURE: 90 MMHG | SYSTOLIC BLOOD PRESSURE: 134 MMHG | OXYGEN SATURATION: 100 %

## 2025-01-13 DIAGNOSIS — N18.31 CKD STAGE 3A, GFR 45-59 ML/MIN: ICD-10-CM

## 2025-01-13 DIAGNOSIS — D64.9 ANEMIA, UNSPECIFIED TYPE: ICD-10-CM

## 2025-01-13 DIAGNOSIS — D64.9 ANEMIA, UNSPECIFIED TYPE: Chronic | ICD-10-CM

## 2025-01-13 DIAGNOSIS — I10 PRIMARY HYPERTENSION: Primary | Chronic | ICD-10-CM

## 2025-01-13 LAB
BASOPHILS # BLD AUTO: 0.04 10*3/MM3 (ref 0–0.2)
BASOPHILS NFR BLD AUTO: 0.7 % (ref 0–1.5)
DEPRECATED RDW RBC AUTO: 38.7 FL (ref 37–54)
EOSINOPHIL # BLD AUTO: 0.26 10*3/MM3 (ref 0–0.4)
EOSINOPHIL NFR BLD AUTO: 4.8 % (ref 0.3–6.2)
ERYTHROCYTE [DISTWIDTH] IN BLOOD BY AUTOMATED COUNT: 11.7 % (ref 12.3–15.4)
HCT VFR BLD AUTO: 36 % (ref 34–46.6)
HGB BLD-MCNC: 12.2 G/DL (ref 12–15.9)
IMM GRANULOCYTES # BLD AUTO: 0.03 10*3/MM3 (ref 0–0.05)
IMM GRANULOCYTES NFR BLD AUTO: 0.6 % (ref 0–0.5)
LYMPHOCYTES # BLD AUTO: 0.7 10*3/MM3 (ref 0.7–3.1)
LYMPHOCYTES NFR BLD AUTO: 12.9 % (ref 19.6–45.3)
MCH RBC QN AUTO: 31 PG (ref 26.6–33)
MCHC RBC AUTO-ENTMCNC: 33.9 G/DL (ref 31.5–35.7)
MCV RBC AUTO: 91.6 FL (ref 79–97)
MONOCYTES # BLD AUTO: 0.54 10*3/MM3 (ref 0.1–0.9)
MONOCYTES NFR BLD AUTO: 9.9 % (ref 5–12)
NEUTROPHILS NFR BLD AUTO: 3.87 10*3/MM3 (ref 1.7–7)
NEUTROPHILS NFR BLD AUTO: 71.1 % (ref 42.7–76)
NRBC BLD AUTO-RTO: 0 /100 WBC (ref 0–0.2)
PLATELET # BLD AUTO: 244 10*3/MM3 (ref 140–450)
PMV BLD AUTO: 10.1 FL (ref 6–12)
RBC # BLD AUTO: 3.93 10*6/MM3 (ref 3.77–5.28)
WBC NRBC COR # BLD AUTO: 5.44 10*3/MM3 (ref 3.4–10.8)

## 2025-01-13 PROCEDURE — 85025 COMPLETE CBC W/AUTO DIFF WBC: CPT

## 2025-01-13 PROCEDURE — 99214 OFFICE O/P EST MOD 30 MIN: CPT | Performed by: INTERNAL MEDICINE

## 2025-01-13 PROCEDURE — 80048 BASIC METABOLIC PNL TOTAL CA: CPT

## 2025-01-13 RX ORDER — LOSARTAN POTASSIUM 50 MG/1
50 TABLET ORAL DAILY
Qty: 90 TABLET | Refills: 0 | Status: SHIPPED | OUTPATIENT
Start: 2025-01-13

## 2025-01-14 ENCOUNTER — TELEPHONE (OUTPATIENT)
Dept: INTERNAL MEDICINE | Facility: CLINIC | Age: 60
End: 2025-01-14
Payer: COMMERCIAL

## 2025-01-14 LAB
ANION GAP SERPL CALCULATED.3IONS-SCNC: 9.1 MMOL/L (ref 5–15)
BUN SERPL-MCNC: 14 MG/DL (ref 6–20)
BUN/CREAT SERPL: 10.1 (ref 7–25)
CALCIUM SPEC-SCNC: 9.3 MG/DL (ref 8.6–10.5)
CHLORIDE SERPL-SCNC: 106 MMOL/L (ref 98–107)
CO2 SERPL-SCNC: 25.9 MMOL/L (ref 22–29)
CREAT SERPL-MCNC: 1.39 MG/DL (ref 0.57–1)
EGFRCR SERPLBLD CKD-EPI 2021: 43.8 ML/MIN/1.73
GLUCOSE SERPL-MCNC: 85 MG/DL (ref 65–99)
POTASSIUM SERPL-SCNC: 4 MMOL/L (ref 3.5–5.2)
SODIUM SERPL-SCNC: 141 MMOL/L (ref 136–145)

## 2025-01-14 NOTE — TELEPHONE ENCOUNTER
----- Message from Brie Jalloh sent at 1/13/2025 12:31 PM EST -----  Pls request note from [facility] from 12/24; thanks

## 2025-01-15 ENCOUNTER — TELEPHONE (OUTPATIENT)
Dept: INTERNAL MEDICINE | Facility: CLINIC | Age: 60
End: 2025-01-15
Payer: COMMERCIAL

## 2025-01-15 DIAGNOSIS — N18.31 CKD STAGE 3A, GFR 45-59 ML/MIN: Primary | ICD-10-CM

## 2025-01-15 NOTE — TELEPHONE ENCOUNTER
Pt notified and verbalized understanding. Will drink plenty of water and repeat labs in 1 month. BMP ordered

## 2025-01-15 NOTE — TELEPHONE ENCOUNTER
----- Message from Brie Jalloh sent at 1/15/2025 10:29 AM EST -----  Kidney function worsened. Continue to drink 64-100oz water per day. Rec repeat kidney function in 3-4 weeks for further follow-up.  Blood counts are stable.

## 2025-02-05 ENCOUNTER — OFFICE VISIT (OUTPATIENT)
Dept: INTERNAL MEDICINE | Facility: CLINIC | Age: 60
End: 2025-02-05
Payer: COMMERCIAL

## 2025-02-05 VITALS
HEIGHT: 66 IN | BODY MASS INDEX: 30.57 KG/M2 | WEIGHT: 190.2 LBS | SYSTOLIC BLOOD PRESSURE: 132 MMHG | DIASTOLIC BLOOD PRESSURE: 84 MMHG | OXYGEN SATURATION: 97 % | HEART RATE: 94 BPM

## 2025-02-05 DIAGNOSIS — J02.9 SORE THROAT: ICD-10-CM

## 2025-02-05 DIAGNOSIS — R05.1 ACUTE COUGH: Primary | ICD-10-CM

## 2025-02-05 PROCEDURE — 99213 OFFICE O/P EST LOW 20 MIN: CPT | Performed by: STUDENT IN AN ORGANIZED HEALTH CARE EDUCATION/TRAINING PROGRAM

## 2025-02-05 PROCEDURE — 87880 STREP A ASSAY W/OPTIC: CPT | Performed by: STUDENT IN AN ORGANIZED HEALTH CARE EDUCATION/TRAINING PROGRAM

## 2025-02-05 PROCEDURE — 87428 SARSCOV & INF VIR A&B AG IA: CPT | Performed by: STUDENT IN AN ORGANIZED HEALTH CARE EDUCATION/TRAINING PROGRAM

## 2025-02-05 NOTE — PROGRESS NOTES
Follow Up Office Visit      Date: 2025   Patient Name: Yanet Clifton  : 1965   MRN: [id number]     Chief Complaint:    Chief Complaint   Patient presents with    Chills    Generalized Body Aches    Sore Throat    Cough     Symptoms started last night. Patient stated she had a fever of 101       History of Present Illness: Yanet Clifton is a 59 y.o. female who is here today for one day history of  fever ( 101F), dry cough, sore-throat, myalgias. 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     Subjective      Review of Systems:   Review of Systems   Constitutional:  Negative for chills and fever.   Respiratory:  Positive for cough. Negative for chest tightness and shortness of breath.    Cardiovascular:  Negative for chest pain.       I have reviewed the patients family history, social history, past medical history, past surgical history and have updated it as appropriate.     Medications:     Current Outpatient Medications:     buPROPion XL (WELLBUTRIN XL) 150 MG 24 hr tablet, Take 3 tablets by mouth Daily. (Patient taking differently: Take 3 tablets by mouth Every Morning.), Disp: 270 tablet, Rfl: 3    levothyroxine (SYNTHROID, LEVOTHROID) 75 MCG tablet, TAKE 1 TABLET BY MOUTH EVERY MORNING, Disp: 90 tablet, Rfl: 3    losartan (COZAAR) 50 MG tablet, Take 1 tablet by mouth Daily., Disp: 90 tablet, Rfl: 0    montelukast (SINGULAIR) 10 MG tablet, Take 1 tablet by mouth Every Evening., Disp: , Rfl:     omeprazole (priLOSEC) 40 MG capsule, Take 1 capsule by mouth Daily. (Patient taking differently: Take 1 capsule by mouth every night at bedtime.), Disp: 90 capsule, Rfl: 3    riTUXimab (RITUXAN) 500 MG/50ML solution injection, Infuse 100 mL into a venous catheter Every 6 (Six) Months. Two 1 gram infusions spread out over 2 weeks, Disp: , Rfl:     albuterol (PROVENTIL) (2.5 MG/3ML) 0.083% nebulizer solution, Take 2.5 mg by nebulization 4 (Four) Times a Day As Needed for Wheezing.  "(Patient not taking: Reported on 2/5/2025), Disp: 120 each, Rfl: 5    budesonide-formoterol (SYMBICORT) 160-4.5 MCG/ACT inhaler, Inhale 2 puffs 2 (Two) Times a Day. (Patient not taking: Reported on 2/5/2025), Disp: 10.2 g, Rfl: 3    magnesium oxide (MAG-OX) 400 MG tablet, Take 1 tablet by mouth 2 (Two) Times a Day. Indications: Disorder with Low Magnesium Levels (Patient not taking: Reported on 2/5/2025), Disp: , Rfl:     Allergies:   Allergies   Allergen Reactions    Adalimumab Headache     Other reaction(s): Headache (Humira)    Cimzia [Certolizumab Pegol] Rash    Duloxetine Hcl Other (See Comments)     sexual dysfunction    Escitalopram Oxalate Other (See Comments)     Weight gain    Macrobid [Nitrofurantoin Macrocrystal] Nausea Only    Sulfa Antibiotics Rash       Objective     Physical Exam: Please see above  Vital Signs:   Vitals:    02/05/25 0950   BP: 132/84   Pulse: 94   SpO2: 97%   Weight: 86.3 kg (190 lb 3.2 oz)   Height: 166.4 cm (65.51\")   PainSc:   3   PainLoc: Head     Body mass index is 31.16 kg/m².          Physical Exam  Vitals and nursing note reviewed.   Constitutional:       Appearance: Normal appearance.   HENT:      Head: Normocephalic and atraumatic.      Mouth/Throat:      Mouth: Mucous membranes are moist.      Pharynx: Oropharynx is clear.   Eyes:      Extraocular Movements: Extraocular movements intact.      Pupils: Pupils are equal, round, and reactive to light.   Cardiovascular:      Rate and Rhythm: Normal rate and regular rhythm.      Pulses: Normal pulses.      Heart sounds: Normal heart sounds.   Pulmonary:      Effort: Pulmonary effort is normal.      Breath sounds: Normal breath sounds.   Musculoskeletal:         General: Normal range of motion.      Cervical back: Normal range of motion.   Skin:     General: Skin is warm.   Neurological:      Mental Status: She is alert and oriented to person, place, and time.   Psychiatric:         Mood and Affect: Mood normal.         Behavior: " Behavior normal.         Procedures    Results:   Labs:   Hemoglobin A1C   Date Value Ref Range Status   09/25/2024 4.80 4.80 - 5.60 % Final     TSH   Date Value Ref Range Status   10/18/2024 0.388 0.270 - 4.200 uIU/mL Final        POCT Results (if applicable):   Results for orders placed or performed in visit on 02/05/25   POCT rapid strep A    Collection Time: 02/05/25 10:14 AM    Specimen: Swab   Result Value Ref Range    Rapid Strep A Screen Negative Negative, VALID, INVALID, Not Performed    Internal Control Passed Passed    Lot Number [id number]     Expiration Date 03/06/2027        Imaging:   No valid procedures specified.       Assessment / Plan      Assessment/Plan:   Diagnoses and all orders for this visit:    1. Acute cough (Primary)  Likely viral bronchitis: advised symptomatic at home treatment with hydration, Tylenol for pain and cough drops, can schedule a repeat appointment for worsening symptoms : high grade fever, chest pain, dyspnea and worsening lethargy  -     POCT SARS-CoV-2 Antigen MARVIN + Flu: negative    2. Sore throat  -     POCT rapid strep A: negative        Vaccine Counseling:      Follow Up:   No follow-ups on file.      Singh Alva MD  [facility] [place] [facility]

## 2025-03-16 DIAGNOSIS — F33.1 MODERATE EPISODE OF RECURRENT MAJOR DEPRESSIVE DISORDER: ICD-10-CM

## 2025-03-17 RX ORDER — BUPROPION HYDROCHLORIDE 150 MG/1
450 TABLET ORAL DAILY
Qty: 270 TABLET | Refills: 3 | OUTPATIENT
Start: 2025-03-17

## 2025-03-27 PROBLEM — N18.31 CKD STAGE 3A, GFR 45-59 ML/MIN: Chronic | Status: ACTIVE | Noted: 2024-09-20

## 2025-04-02 NOTE — PROGRESS NOTES
"Chief Complaint   Patient presents with    Hypertension       History of Present Illness  59 y.o.  female presents for BP follow-up. Home BP readings have been mostly 110-120s/60-70s; however had another \"episode\" on Sunday. Had sudden onset of vomiting followed by prickly sensation in her arms and slowly rising blood pressure.  The vomiting and elevated blood pressures resolved after 10 minutes but both arms were achy for about 30 minutes after.  No associated chest pain, palpitations, fainting.  Did feel sweaty and flushed.      Had another similar episode in Feb 2025 which last about 1 day, and then of course the hospitalization in 9/20254.  Each episode, no premonitory symptoms.  She had not been ill.  Had sudden onset of vomiting and then slowly escalating blood pressures as noted.  This time she was able to take an extra nifedipine but thinks symptoms lasted shorter because she is already on maintenance nifedipine.    Reports labs per rheumatology a week ago showed persistent abnormal GFR at 45.    Review of Systems  Denies CP, SOB, palpitations. ROS (+) for total 3 episodes, the last of which was 4 days ago with sudden onset of vomiting associated with prickly sensations in the arms, flushing, sweating, elevated blood pressures.  Episode was self-limited with mild residual arm pain but no chest pain, difficulty in breathing, coughing, palpitations, visual changes.  All other ROS reviewed and negative.    Current Outpatient Medications:     buPROPion  MG 3 QD    fluticasone (FLONASE) 50 MCG/ACT nasal spray AD    levothyroxine 75 MCG  QD    losartan 50 MG QD    montelukast 10 MG QD    NIFEdipine CC 30 MG QD    omeprazole 40 MG QD    riTUXimab (RITUXAN) 500 MG/50ML q6 mos    VITALS:  /88   Pulse 93   Ht 152.4 cm (60\")   Wt 86.1 kg (189 lb 12.8 oz)   SpO2 99%   BMI 37.07 kg/m²     Physical Exam  Vitals and nursing note reviewed.   Constitutional:       General: She is not in acute " "distress.     Appearance: Normal appearance. She is not ill-appearing.   Eyes:      Extraocular Movements: Extraocular movements intact.      Conjunctiva/sclera: Conjunctivae normal.   Cardiovascular:      Rate and Rhythm: Normal rate and regular rhythm.      Heart sounds: Normal heart sounds.   Pulmonary:      Effort: Pulmonary effort is normal. No respiratory distress.      Breath sounds: Normal breath sounds. No wheezing or rales.   Neurological:      Mental Status: She is alert and oriented to person, place, and time. Mental status is at baseline.      Gait: Gait normal.   Psychiatric:         Mood and Affect: Mood normal.         Behavior: Behavior normal.       LABS  Results for orders placed or performed in visit on 02/05/25   POCT rapid strep A    Collection Time: 02/05/25 10:14 AM    Specimen: Swab   Result Value Ref Range    Rapid Strep A Screen Negative Negative, VALID, INVALID, Not Performed    Internal Control Passed Passed    Lot Number [id number]     Expiration Date 03/06/2027    POCT SARS-CoV-2 Antigen MARVIN + Flu    Collection Time: 02/05/25 10:23 AM    Specimen: Swab   Result Value Ref Range    SARS Antigen Not Detected Not Detected, Presumptive Negative    Influenza A Antigen MARVIN Not Detected Not Detected    Influenza B Antigen MARVIN Not Detected Not Detected    Internal Control Passed Passed    Lot Number [id number]     Expiration Date 2025-11-14 1/13/25 Cr 1.39, GFR 47.5    11/26/24 Cr 1.3, GFR  43.8    ASSESSMENT/PLAN    Diagnoses and all orders for this visit:    1. Hypertension (Primary)  Assessment & Plan:  BP improved 124/88 and presents of readings at home; cont losartan 50mg QD #90, 0RF and nifedipine CC 30mg QD per nephrology (next f/u in 8/2025 per pt); check BMP on the way out the door    Regarding her \"episodes,\" evaluation in previous hospitalization did not show cardiac etiology despite very elevated blood pressures and correlating elevated troponins.  She has had 2 more episodes " since the hospitalization, the last of which was on Sunday and was much shorter than her previous episodes with no subsequent sequela.  There is also no associated trigger.  To review, her episodes consist of sudden vomiting, followed by prickly sensation in the arms, flushing, sweating and progressively worsened blood pressure, as high as 180-190s systolic.  Episodes have been self-limited, including the shortest duration this last week.  With previous normal cardiac evaluation and nl CT abd/pelvis (nl adrenal glands), reviewed ddx could still include coronary vasospasm or pheochromocytoma    Will check plasma metanephrines and catecholamines although discussed 24-hour urine testing would be more accurate; consider going back to cardiology if she wants to further explore potential diagnosis of coronary vasospasm; otherwise follow-up with nephrology as scheduled    Orders:  -     losartan (COZAAR) 50 MG tablet; Take 1 tablet by mouth Daily.  Dispense: 90 tablet; Refill: 0  -     Catecholamines, Fractionated, Plasma; Future  -     Metanephrines, Frac. Free, Plasma; Future    2. CKD stage 3a, GFR 45-59 ml/min  Assessment & Plan:  F/u BMP; baseline GFR around 45, presumed to be sequelae of hypertensive urgency from 9/2024    Orders:  -     Basic Metabolic Panel; Future        FOLLOW-UP  Health maintenance - flu vacc and COVID19 vacc done for this season  RTC for next PE 5/22/25; fasting labs prior to appt (CBC, CMP, TSH, lipids, UA/micr, FT4, vit D, CPK, A1C)    Electronically signed by:    Brie Jalloh MD, FACP  04/03/2025

## 2025-04-03 ENCOUNTER — OFFICE VISIT (OUTPATIENT)
Dept: INTERNAL MEDICINE | Facility: CLINIC | Age: 60
End: 2025-04-03
Payer: COMMERCIAL

## 2025-04-03 ENCOUNTER — RESULTS FOLLOW-UP (OUTPATIENT)
Dept: INTERNAL MEDICINE | Facility: CLINIC | Age: 60
End: 2025-04-03

## 2025-04-03 ENCOUNTER — LAB (OUTPATIENT)
Dept: LAB | Facility: HOSPITAL | Age: 60
End: 2025-04-03
Payer: COMMERCIAL

## 2025-04-03 VITALS
DIASTOLIC BLOOD PRESSURE: 88 MMHG | SYSTOLIC BLOOD PRESSURE: 124 MMHG | OXYGEN SATURATION: 99 % | HEART RATE: 93 BPM | HEIGHT: 60 IN | BODY MASS INDEX: 37.26 KG/M2 | WEIGHT: 189.8 LBS

## 2025-04-03 DIAGNOSIS — N18.31 CKD STAGE 3A, GFR 45-59 ML/MIN: Chronic | ICD-10-CM

## 2025-04-03 DIAGNOSIS — N18.31 CKD STAGE 3A, GFR 45-59 ML/MIN: ICD-10-CM

## 2025-04-03 DIAGNOSIS — I10 PRIMARY HYPERTENSION: Primary | Chronic | ICD-10-CM

## 2025-04-03 DIAGNOSIS — I10 PRIMARY HYPERTENSION: Chronic | ICD-10-CM

## 2025-04-03 LAB
ANION GAP SERPL CALCULATED.3IONS-SCNC: 10.3 MMOL/L (ref 5–15)
BUN SERPL-MCNC: 18 MG/DL (ref 6–20)
BUN/CREAT SERPL: 11.5 (ref 7–25)
CALCIUM SPEC-SCNC: 9.3 MG/DL (ref 8.6–10.5)
CHLORIDE SERPL-SCNC: 105 MMOL/L (ref 98–107)
CO2 SERPL-SCNC: 26.7 MMOL/L (ref 22–29)
CREAT SERPL-MCNC: 1.56 MG/DL (ref 0.57–1)
EGFRCR SERPLBLD CKD-EPI 2021: 38.1 ML/MIN/1.73
GLUCOSE SERPL-MCNC: 87 MG/DL (ref 65–99)
POTASSIUM SERPL-SCNC: 4.1 MMOL/L (ref 3.5–5.2)
SODIUM SERPL-SCNC: 142 MMOL/L (ref 136–145)

## 2025-04-03 PROCEDURE — 82384 ASSAY THREE CATECHOLAMINES: CPT

## 2025-04-03 PROCEDURE — 80048 BASIC METABOLIC PNL TOTAL CA: CPT

## 2025-04-03 PROCEDURE — 83835 ASSAY OF METANEPHRINES: CPT

## 2025-04-03 RX ORDER — LOSARTAN POTASSIUM 50 MG/1
50 TABLET ORAL DAILY
Qty: 90 TABLET | Refills: 0 | Status: SHIPPED | OUTPATIENT
Start: 2025-04-03

## 2025-04-03 NOTE — ASSESSMENT & PLAN NOTE
"BP improved 124/88 and presents of readings at home; cont losartan 50mg QD #90, 0RF and nifedipine CC 30mg QD per nephrology (next f/u in 8/2025 per pt); check BMP on the way out the door    Regarding her \"episodes,\" evaluation in previous hospitalization did not show cardiac etiology despite very elevated blood pressures and correlating elevated troponins.  She has had 2 more episodes since the hospitalization, the last of which was on Sunday and was much shorter than her previous episodes with no subsequent sequela.  There is also no associated trigger.  To review, her episodes consist of sudden vomiting, followed by prickly sensation in the arms, flushing, sweating and progressively worsened blood pressure, as high as 180-190s systolic.  Episodes have been self-limited, including the shortest duration this last week.  With previous normal cardiac evaluation and nl CT abd/pelvis (nl adrenal glands), reviewed ddx could still include coronary vasospasm or pheochromocytoma    Will check plasma metanephrines and catecholamines although discussed 24-hour urine testing would be more accurate; consider going back to cardiology if she wants to further explore potential diagnosis of coronary vasospasm; otherwise follow-up with nephrology as scheduled  "

## 2025-04-06 DIAGNOSIS — K21.9 GASTROESOPHAGEAL REFLUX DISEASE: ICD-10-CM

## 2025-04-06 DIAGNOSIS — E03.9 ACQUIRED HYPOTHYROIDISM: Chronic | ICD-10-CM

## 2025-04-06 DIAGNOSIS — F33.1 MODERATE EPISODE OF RECURRENT MAJOR DEPRESSIVE DISORDER: ICD-10-CM

## 2025-04-07 LAB
METANEPH FREE SERPL-MCNC: <25 PG/ML (ref 0–88)
NORMETANEPHRINE SERPL-MCNC: 25.9 PG/ML (ref 0–244)

## 2025-04-07 RX ORDER — OMEPRAZOLE 40 MG/1
40 CAPSULE, DELAYED RELEASE ORAL DAILY
Qty: 90 CAPSULE | Refills: 3 | OUTPATIENT
Start: 2025-04-07

## 2025-04-07 RX ORDER — LEVOTHYROXINE SODIUM 75 UG/1
75 TABLET ORAL EVERY MORNING
Qty: 90 TABLET | Refills: 3 | OUTPATIENT
Start: 2025-04-07

## 2025-04-07 RX ORDER — BUPROPION HYDROCHLORIDE 150 MG/1
450 TABLET ORAL DAILY
Qty: 270 TABLET | Refills: 3 | OUTPATIENT
Start: 2025-04-07

## 2025-04-11 LAB
DOPAMINE SERPL-MCNC: <30 PG/ML (ref 0–48)
EPINEPH PLAS-MCNC: <15 PG/ML (ref 0–62)
NOREPINEPH PLAS-MCNC: 418 PG/ML (ref 0–874)

## 2025-04-25 DIAGNOSIS — E03.9 ACQUIRED HYPOTHYROIDISM: Chronic | ICD-10-CM

## 2025-04-25 DIAGNOSIS — R73.01 IMPAIRED FASTING GLUCOSE: Chronic | ICD-10-CM

## 2025-04-25 DIAGNOSIS — Z00.00 PE (PHYSICAL EXAM), ROUTINE: Primary | Chronic | ICD-10-CM

## 2025-04-25 DIAGNOSIS — E55.9 VITAMIN D DEFICIENCY: Chronic | ICD-10-CM

## 2025-04-25 DIAGNOSIS — E78.2 MIXED HYPERLIPIDEMIA: Chronic | ICD-10-CM

## 2025-05-15 DIAGNOSIS — F33.1 MODERATE EPISODE OF RECURRENT MAJOR DEPRESSIVE DISORDER: ICD-10-CM

## 2025-05-15 DIAGNOSIS — K21.9 GASTROESOPHAGEAL REFLUX DISEASE: ICD-10-CM

## 2025-05-15 RX ORDER — BUPROPION HYDROCHLORIDE 150 MG/1
450 TABLET ORAL DAILY
Qty: 10 TABLET | Refills: 0 | Status: SHIPPED | OUTPATIENT
Start: 2025-05-15

## 2025-05-15 RX ORDER — OMEPRAZOLE 40 MG/1
40 CAPSULE, DELAYED RELEASE ORAL DAILY
Qty: 90 CAPSULE | Refills: 3 | OUTPATIENT
Start: 2025-05-15

## 2025-05-20 ENCOUNTER — LAB (OUTPATIENT)
Dept: LAB | Facility: HOSPITAL | Age: 60
End: 2025-05-20
Payer: COMMERCIAL

## 2025-05-20 DIAGNOSIS — E55.9 VITAMIN D DEFICIENCY: Chronic | ICD-10-CM

## 2025-05-20 DIAGNOSIS — E78.2 MIXED HYPERLIPIDEMIA: Chronic | ICD-10-CM

## 2025-05-20 DIAGNOSIS — Z00.00 PE (PHYSICAL EXAM), ROUTINE: ICD-10-CM

## 2025-05-20 DIAGNOSIS — E03.9 ACQUIRED HYPOTHYROIDISM: Chronic | ICD-10-CM

## 2025-05-20 DIAGNOSIS — R73.01 IMPAIRED FASTING GLUCOSE: Chronic | ICD-10-CM

## 2025-05-20 LAB
BASOPHILS # BLD AUTO: 0.03 10*3/MM3 (ref 0–0.2)
BASOPHILS NFR BLD AUTO: 0.9 % (ref 0–1.5)
BILIRUB UR QL STRIP: NEGATIVE
CLARITY UR: CLEAR
COLOR UR: YELLOW
DEPRECATED RDW RBC AUTO: 43.1 FL (ref 37–54)
EOSINOPHIL # BLD AUTO: 0.22 10*3/MM3 (ref 0–0.4)
EOSINOPHIL NFR BLD AUTO: 6.4 % (ref 0.3–6.2)
ERYTHROCYTE [DISTWIDTH] IN BLOOD BY AUTOMATED COUNT: 12.6 % (ref 12.3–15.4)
GLUCOSE UR STRIP-MCNC: NEGATIVE MG/DL
HBA1C MFR BLD: 4.7 % (ref 4.8–5.6)
HCT VFR BLD AUTO: 35.9 % (ref 34–46.6)
HGB BLD-MCNC: 12.1 G/DL (ref 12–15.9)
HGB UR QL STRIP.AUTO: NEGATIVE
IMM GRANULOCYTES # BLD AUTO: 0.01 10*3/MM3 (ref 0–0.05)
IMM GRANULOCYTES NFR BLD AUTO: 0.3 % (ref 0–0.5)
KETONES UR QL STRIP: NEGATIVE
LEUKOCYTE ESTERASE UR QL STRIP.AUTO: NEGATIVE
LYMPHOCYTES # BLD AUTO: 0.53 10*3/MM3 (ref 0.7–3.1)
LYMPHOCYTES NFR BLD AUTO: 15.5 % (ref 19.6–45.3)
MCH RBC QN AUTO: 31.5 PG (ref 26.6–33)
MCHC RBC AUTO-ENTMCNC: 33.7 G/DL (ref 31.5–35.7)
MCV RBC AUTO: 93.5 FL (ref 79–97)
MONOCYTES # BLD AUTO: 0.3 10*3/MM3 (ref 0.1–0.9)
MONOCYTES NFR BLD AUTO: 8.8 % (ref 5–12)
NEUTROPHILS NFR BLD AUTO: 2.33 10*3/MM3 (ref 1.7–7)
NEUTROPHILS NFR BLD AUTO: 68.1 % (ref 42.7–76)
NITRITE UR QL STRIP: NEGATIVE
NRBC BLD AUTO-RTO: 0 /100 WBC (ref 0–0.2)
PH UR STRIP.AUTO: 6 [PH] (ref 5–8)
PLATELET # BLD AUTO: 179 10*3/MM3 (ref 140–450)
PMV BLD AUTO: 10.4 FL (ref 6–12)
PROT UR QL STRIP: NEGATIVE
RBC # BLD AUTO: 3.84 10*6/MM3 (ref 3.77–5.28)
SP GR UR STRIP: 1.01 (ref 1–1.03)
UROBILINOGEN UR QL STRIP: NORMAL
WBC NRBC COR # BLD AUTO: 3.42 10*3/MM3 (ref 3.4–10.8)

## 2025-05-20 PROCEDURE — 80061 LIPID PANEL: CPT

## 2025-05-20 PROCEDURE — 82306 VITAMIN D 25 HYDROXY: CPT

## 2025-05-20 PROCEDURE — 81001 URINALYSIS AUTO W/SCOPE: CPT

## 2025-05-20 PROCEDURE — 82550 ASSAY OF CK (CPK): CPT

## 2025-05-20 PROCEDURE — 83036 HEMOGLOBIN GLYCOSYLATED A1C: CPT

## 2025-05-20 PROCEDURE — 80050 GENERAL HEALTH PANEL: CPT

## 2025-05-20 PROCEDURE — 84439 ASSAY OF FREE THYROXINE: CPT

## 2025-05-21 LAB
25(OH)D3 SERPL-MCNC: 27.6 NG/ML (ref 30–100)
ALBUMIN SERPL-MCNC: 4.3 G/DL (ref 3.5–5.2)
ALBUMIN/GLOB SERPL: 1.8 G/DL
ALP SERPL-CCNC: 62 U/L (ref 39–117)
ALT SERPL W P-5'-P-CCNC: 17 U/L (ref 1–33)
ANION GAP SERPL CALCULATED.3IONS-SCNC: 12.1 MMOL/L (ref 5–15)
AST SERPL-CCNC: 21 U/L (ref 1–32)
BACTERIA UR QL AUTO: NORMAL /HPF
BILIRUB SERPL-MCNC: 0.3 MG/DL (ref 0–1.2)
BUN SERPL-MCNC: 18 MG/DL (ref 6–20)
BUN/CREAT SERPL: 14.6 (ref 7–25)
CALCIUM SPEC-SCNC: 9.8 MG/DL (ref 8.6–10.5)
CHLORIDE SERPL-SCNC: 110 MMOL/L (ref 98–107)
CHOLEST SERPL-MCNC: 171 MG/DL (ref 0–200)
CK SERPL-CCNC: 284 U/L (ref 20–180)
CO2 SERPL-SCNC: 22.9 MMOL/L (ref 22–29)
CREAT SERPL-MCNC: 1.23 MG/DL (ref 0.57–1)
EGFRCR SERPLBLD CKD-EPI 2021: 50.7 ML/MIN/1.73
GLOBULIN UR ELPH-MCNC: 2.4 GM/DL
GLUCOSE SERPL-MCNC: 89 MG/DL (ref 65–99)
HDLC SERPL-MCNC: 53 MG/DL (ref 40–60)
HYALINE CASTS UR QL AUTO: NORMAL /LPF
LDLC SERPL CALC-MCNC: 102 MG/DL (ref 0–100)
LDLC/HDLC SERPL: 1.89 {RATIO}
POTASSIUM SERPL-SCNC: 4.1 MMOL/L (ref 3.5–5.2)
PROT SERPL-MCNC: 6.7 G/DL (ref 6–8.5)
RBC # UR STRIP: NORMAL /HPF
REF LAB TEST METHOD: NORMAL
SODIUM SERPL-SCNC: 145 MMOL/L (ref 136–145)
SQUAMOUS #/AREA URNS HPF: NORMAL /HPF
T4 FREE SERPL-MCNC: 1.59 NG/DL (ref 0.92–1.68)
TRIGL SERPL-MCNC: 89 MG/DL (ref 0–150)
TSH SERPL DL<=0.05 MIU/L-ACNC: 0.79 UIU/ML (ref 0.27–4.2)
VLDLC SERPL-MCNC: 16 MG/DL (ref 5–40)
WBC # UR STRIP: NORMAL /HPF

## 2025-05-22 ENCOUNTER — OFFICE VISIT (OUTPATIENT)
Dept: INTERNAL MEDICINE | Facility: CLINIC | Age: 60
End: 2025-05-22
Payer: COMMERCIAL

## 2025-05-22 VITALS
HEIGHT: 65 IN | BODY MASS INDEX: 30.99 KG/M2 | HEART RATE: 91 BPM | OXYGEN SATURATION: 99 % | SYSTOLIC BLOOD PRESSURE: 122 MMHG | DIASTOLIC BLOOD PRESSURE: 80 MMHG | WEIGHT: 186 LBS

## 2025-05-22 DIAGNOSIS — F33.1 MODERATE EPISODE OF RECURRENT MAJOR DEPRESSIVE DISORDER: ICD-10-CM

## 2025-05-22 DIAGNOSIS — I10 PRIMARY HYPERTENSION: Chronic | ICD-10-CM

## 2025-05-22 DIAGNOSIS — N18.31 CKD STAGE 3A, GFR 45-59 ML/MIN: Chronic | ICD-10-CM

## 2025-05-22 DIAGNOSIS — E55.9 VITAMIN D DEFICIENCY: Chronic | ICD-10-CM

## 2025-05-22 DIAGNOSIS — R73.01 IMPAIRED FASTING GLUCOSE: Chronic | ICD-10-CM

## 2025-05-22 DIAGNOSIS — E03.9 ACQUIRED HYPOTHYROIDISM: Chronic | ICD-10-CM

## 2025-05-22 DIAGNOSIS — G44.039 EPISODIC PAROXYSMAL HEMICRANIA, NOT INTRACTABLE: Chronic | ICD-10-CM

## 2025-05-22 DIAGNOSIS — Z00.00 PE (PHYSICAL EXAM), ROUTINE: Primary | Chronic | ICD-10-CM

## 2025-05-22 DIAGNOSIS — E78.2 MIXED HYPERLIPIDEMIA: Chronic | ICD-10-CM

## 2025-05-22 DIAGNOSIS — K21.9 GASTROESOPHAGEAL REFLUX DISEASE, UNSPECIFIED WHETHER ESOPHAGITIS PRESENT: Chronic | ICD-10-CM

## 2025-05-22 DIAGNOSIS — Z23 NEED FOR SHINGLES VACCINE: ICD-10-CM

## 2025-05-22 DIAGNOSIS — R42 DIZZINESS: ICD-10-CM

## 2025-05-22 RX ORDER — OMEPRAZOLE 40 MG/1
40 CAPSULE, DELAYED RELEASE ORAL DAILY
Qty: 90 CAPSULE | Refills: 3 | Status: SHIPPED | OUTPATIENT
Start: 2025-05-22

## 2025-05-22 RX ORDER — LOSARTAN POTASSIUM 50 MG/1
50 TABLET ORAL DAILY
Qty: 90 TABLET | Refills: 3 | Status: SHIPPED | OUTPATIENT
Start: 2025-05-22

## 2025-05-22 RX ORDER — ATORVASTATIN CALCIUM 10 MG/1
10 TABLET, FILM COATED ORAL NIGHTLY
COMMUNITY
Start: 2023-04-21 | End: 2025-05-23 | Stop reason: SDUPTHER

## 2025-05-22 RX ORDER — BUPROPION HYDROCHLORIDE 150 MG/1
450 TABLET ORAL DAILY
Qty: 270 TABLET | Refills: 3 | Status: SHIPPED | OUTPATIENT
Start: 2025-05-22

## 2025-05-22 RX ORDER — LEVOTHYROXINE SODIUM 75 UG/1
75 TABLET ORAL EVERY MORNING
Qty: 90 TABLET | Refills: 3 | Status: SHIPPED | OUTPATIENT
Start: 2025-05-22

## 2025-05-22 RX ORDER — NIFEDIPINE 30 MG
30 TABLET, EXTENDED RELEASE ORAL DAILY
Qty: 90 TABLET | Refills: 3 | Status: SHIPPED | OUTPATIENT
Start: 2025-05-22

## 2025-05-22 NOTE — ASSESSMENT & PLAN NOTE
Lipids worsened with  and note patient has resumed atorvastatin 10mg QD#90, 3RF; goal LDL < 130, ideally < 100

## 2025-05-22 NOTE — ASSESSMENT & PLAN NOTE
BG control good/normal with A1C 4.7; encouraged reg phys activity to decr insulin resistance, moderation in unhealthy starches/sweets; f/u A1C in 12 mos

## 2025-05-22 NOTE — PROGRESS NOTES
Immunization  Immunization performed in left deltoid by Basilia Mead CMA. Patient tolerated the procedure well without complications.  05/22/25   Basilia Mead CMA

## 2025-05-22 NOTE — ASSESSMENT & PLAN NOTE
Renal function improved with Cr 1.2, GFR 50.7; cont maintaining good BG and BP control and maintaining daily water intake; f/u BMP in 3 mos

## 2025-05-22 NOTE — PROGRESS NOTES
Chief Complaint   Patient presents with    Annual Exam    Hypertension    Hypothyroidism       History of Present Illness  59 y.o.  female presents for updated phys examination and f/u on BP, cholesterol, sugars, kidney function, and thyroid as well as f/u on episodes of headache, nausea, dizziness.    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 Reports nearly vomiting and BP down to 80/55. Went to her car and developed dull CP at the central chest. Reports irreg heartbeat on monitor; however, did not feel palpitations. Sxs last for about 1-1/2 hrs and spont resolved.    Otherwise has not been having any chest pains. Also reports no further spikes in BP with current BP meds.    Reports these headaches feel different than her migraines, which are usually located behind her eyes. These headaches cover the entire, assoc'd with nausea. Reports fairly persistent nausea over the last few weeks; no vomiting. Does not have abortive medication for migraine headaches.    Describes dizziness as a woozy, swimmy-headed sensation; not spinning and not about to pass out. Has not passed out. Denies ear sxs or assoc'd visual changes.     From her research, she feels she may have had coronary vasospasm. Again, no further episodes of chest pain except for the isolated episode as noted above, assoc'd with low BP.  Also reports no palpitations.     Feels BP has been stable after addition of nifedipine. Does not have cards f/u.     Has been working hard to drink more water on a regular basis.    Review of Systems  Denies visual changes, palpitations, SOB, cough, abd pain, diarrhea, difficulty with urination, numbness/tingling, falls, mood changes, lightheadedness, hearing changes, rashes.  ROS (+) for persistent nausea x few weeks, no vomiting. ROS (+) for recurrent all over head headaches. Denies syncope.    All other ROS reviewed and negative.    Current  "Outpatient Medications:     atorvastatin 10mg QD     buPROPion  MG 3 QD    levothyroxine 75 MCG QD    losartan 50 MG QD    montelukast 10 MG QD    NIFEdipine CC 30 MG QD    omeprazole 40 MG QD    propranolol 20 MG prn    riTUXimab (RITUXAN) 500 MG/50ML solution 100ml Q6 mos    VITALS:  /80   Pulse 91   Ht 165.1 cm (65\")   Wt 84.4 kg (186 lb)   SpO2 99%   BMI 30.95 kg/m²     Physical Exam  Vitals and nursing note reviewed.   Constitutional:       General: She is not in acute distress.     Appearance: Normal appearance. She is well-developed.   HENT:      Head: Normocephalic.      Right Ear: Ear canal and external ear normal.      Left Ear: Ear canal and external ear normal.      Nose: Nose normal.   Eyes:      Extraocular Movements: Extraocular movements intact.      Conjunctiva/sclera: Conjunctivae normal.      Pupils: Pupils are equal, round, and reactive to light.   Neck:      Vascular: No carotid bruit (bilaterally).   Cardiovascular:      Rate and Rhythm: Normal rate and regular rhythm.      Heart sounds: Normal heart sounds.   Pulmonary:      Effort: Pulmonary effort is normal. No respiratory distress.      Breath sounds: Normal breath sounds.   Abdominal:      General: Bowel sounds are normal. There is no distension.      Palpations: Abdomen is soft. There is no mass.      Tenderness: There is no abdominal tenderness.   Genitourinary:     Comments: Chaperone declined by patient.    Breast exam unremarkable without masses, skin changes, nipple discharge, or axillary adenopathy.      Musculoskeletal:      Cervical back: Normal range of motion and neck supple.   Lymphadenopathy:      Cervical: No cervical adenopathy.   Skin:     General: Skin is warm and dry.      Findings: No rash.   Neurological:      Mental Status: She is alert.   Psychiatric:         Mood and Affect: Mood normal.         Behavior: Behavior normal.         LABS  Results for orders placed or performed in visit on 05/20/25 "   Comprehensive Metabolic Panel    Collection Time: 05/20/25  1:14 PM    Specimen: Blood   Result Value Ref Range    Glucose 89 65 - 99 mg/dL    BUN 18 6 - 20 mg/dL    Creatinine 1.23 (H) 0.57 - 1.00 mg/dL    Sodium 145 136 - 145 mmol/L    Potassium 4.1 3.5 - 5.2 mmol/L    Chloride 110 (H) 98 - 107 mmol/L    CO2 22.9 22.0 - 29.0 mmol/L    Calcium 9.8 8.6 - 10.5 mg/dL    Total Protein 6.7 6.0 - 8.5 g/dL    Albumin 4.3 3.5 - 5.2 g/dL    ALT (SGPT) 17 1 - 33 U/L    AST (SGOT) 21 1 - 32 U/L    Alkaline Phosphatase 62 39 - 117 U/L    Total Bilirubin 0.3 0.0 - 1.2 mg/dL    Globulin 2.4 gm/dL    A/G Ratio 1.8 g/dL    BUN/Creatinine Ratio 14.6 7.0 - 25.0    Anion Gap 12.1 5.0 - 15.0 mmol/L    eGFR 50.7 (L) >60.0 mL/min/1.73   Lipid Panel    Collection Time: 05/20/25  1:14 PM    Specimen: Blood   Result Value Ref Range    Total Cholesterol 171 0 - 200 mg/dL    Triglycerides 89 0 - 150 mg/dL    HDL Cholesterol 53 40 - 60 mg/dL    LDL Cholesterol  102 (H) 0 - 100 mg/dL    VLDL Cholesterol 16 5 - 40 mg/dL    LDL/HDL Ratio 1.89    TSH    Collection Time: 05/20/25  1:14 PM    Specimen: Blood   Result Value Ref Range    TSH 0.788 0.270 - 4.200 uIU/mL   T4, Free    Collection Time: 05/20/25  1:14 PM    Specimen: Blood   Result Value Ref Range    Free T4 1.59 0.92 - 1.68 ng/dL   Vitamin D,25-Hydroxy    Collection Time: 05/20/25  1:14 PM    Specimen: Blood   Result Value Ref Range    25 Hydroxy, Vitamin D 27.6 (L) 30.0 - 100.0 ng/ml   CK    Collection Time: 05/20/25  1:14 PM    Specimen: Blood   Result Value Ref Range    Creatine Kinase 284 (H) 20 - 180 U/L   Hemoglobin A1c    Collection Time: 05/20/25  1:14 PM    Specimen: Blood   Result Value Ref Range    Hemoglobin A1C 4.70 (L) 4.80 - 5.60 %   CBC Auto Differential    Collection Time: 05/20/25  1:14 PM    Specimen: Blood   Result Value Ref Range    WBC 3.42 3.40 - 10.80 10*3/mm3    RBC 3.84 3.77 - 5.28 10*6/mm3    Hemoglobin 12.1 12.0 - 15.9 g/dL    Hematocrit 35.9 34.0 - 46.6 %     MCV 93.5 79.0 - 97.0 fL    MCH 31.5 26.6 - 33.0 pg    MCHC 33.7 31.5 - 35.7 g/dL    RDW 12.6 12.3 - 15.4 %    RDW-SD 43.1 37.0 - 54.0 fl    MPV 10.4 6.0 - 12.0 fL    Platelets 179 140 - 450 10*3/mm3    Neutrophil % 68.1 42.7 - 76.0 %    Lymphocyte % 15.5 (L) 19.6 - 45.3 %    Monocyte % 8.8 5.0 - 12.0 %    Eosinophil % 6.4 (H) 0.3 - 6.2 %    Basophil % 0.9 0.0 - 1.5 %    Immature Grans % 0.3 0.0 - 0.5 %    Neutrophils, Absolute 2.33 1.70 - 7.00 10*3/mm3    Lymphocytes, Absolute 0.53 (L) 0.70 - 3.10 10*3/mm3    Monocytes, Absolute 0.30 0.10 - 0.90 10*3/mm3    Eosinophils, Absolute 0.22 0.00 - 0.40 10*3/mm3    Basophils, Absolute 0.03 0.00 - 0.20 10*3/mm3    Immature Grans, Absolute 0.01 0.00 - 0.05 10*3/mm3    nRBC 0.0 0.0 - 0.2 /100 WBC   Urinalysis without microscopic (no culture) - Urine, Clean Catch    Collection Time: 05/20/25  1:14 PM    Specimen: Urine, Clean Catch   Result Value Ref Range    Color, UA Yellow Yellow, Straw    Appearance, UA Clear Clear    pH, UA 6.0 5.0 - 8.0    Specific Gravity, UA 1.014 1.005 - 1.030    Glucose, UA Negative Negative    Ketones, UA Negative Negative    Bilirubin, UA Negative Negative    Blood, UA Negative Negative    Protein, UA Negative Negative    Leuk Esterase, UA Negative Negative    Nitrite, UA Negative Negative    Urobilinogen, UA 0.2 E.U./dL 0.2 - 1.0 E.U./dL   Urinalysis, Microscopic Only - Urine, Clean Catch    Collection Time: 05/20/25  1:14 PM    Specimen: Urine, Clean Catch   Result Value Ref Range    RBC, UA 0-2 None Seen, 0-2 /HPF    WBC, UA 0-2 None Seen, 0-2 /HPF    Bacteria, UA None Seen None Seen /HPF    Squamous Epithelial Cells, UA 0-2 None Seen, 0-2 /HPF    Hyaline Casts, UA None Seen None Seen /LPF    Methodology Automated Microscopy      11/3/25 Cr 1.56, GFR 38.1    4/14/25 MRI angio brain -  nl except moderate periventricular and deep white matter change     9/25/24 LDL 78; nl stress test      ECG 12 Lead    Date/Time: 5/22/2025 11:08 AM  Performed  by: Brie Jalloh MD    Authorized by: Brie Jalloh MD  Comparison: compared with previous ECG from 9/24/2024  Similar to previous ECG  Rhythm: sinus rhythm  Rate: normal  BPM: 72  Conduction: conduction normal  ST Segments: ST segments normal  T Waves: T waves normal  QRS axis: normal  Clinical impression comment: stable EKG            ASSESSMENT/PLAN    Diagnoses and all orders for this visit:    1. PE (physical exam), routine (Primary)  Assessment & Plan:  Health maintenance - COVID19 11/24; flu vacc 11/24; Prevnar 9/15; PVX 11/16; Td 12/20 (next Tdap due 2030), HAV done; rec Shingrix, counseling given (dose #1 given today); overdue for mammo (last 4/4/23) - reviewed benefits/goals of reg screening and early detection; no further Paps s/p USMAN/BSO; nl DEXA 4/23, repeat 2026; colonosc 8/21, repeat 2026 per Dr. Berrios; eye exam TBD per pt; dental exam q6 mos, TBD per pt; (+) seat belt use          2. Hypertension  Assessment & Plan:  BP stable 122/80; cont losartan 50mg QD; also nifedipine XL 30mg QD per nephrology    Orders:  -     losartan (COZAAR) 50 MG tablet; Take 1 tablet by mouth Daily.  Dispense: 90 tablet; Refill: 3  -     NIFEdipine CC (ADALAT CC) 30 MG 24 hr tablet; Take 1 tablet by mouth Daily.  Dispense: 90 tablet; Refill: 3  -     ECG 12 Lead    3. Hyperlipidemia  Assessment & Plan:  Lipids worsened with  and note patient has resumed atorvastatin 10mg QD#90, 3RF; goal LDL < 130, ideally < 100      4. Impaired fasting glucose  Assessment & Plan:  BG control good/normal with A1C 4.7; encouraged reg phys activity to decr insulin resistance, moderation in unhealthy starches/sweets; f/u A1C in 12 mos        5. Hypothyroidism  Assessment & Plan:  Euthyroid; cont levothyroxine 75mcg QD #90, 3RF    Orders:  -     levothyroxine (SYNTHROID, LEVOTHROID) 75 MCG tablet; Take 1 tablet by mouth Every Morning.  Dispense: 90 tablet; Refill: 3    6. CKD stage 3a, GFR 45-59 ml/min  Assessment & Plan:  Renal  function improved with Cr 1.2, GFR 50.7; cont maintaining good BG and BP control and maintaining daily water intake; f/u BMP in 3 mos    Orders:  -     Basic Metabolic Panel; Future    7. Depression  Assessment & Plan:  Stable; cont bupropion XL 150mg 3 QD #270, 3RF    Orders:  -     buPROPion XL (WELLBUTRIN XL) 150 MG 24 hr tablet; Take 3 tablets by mouth Daily.  Dispense: 270 tablet; Refill: 3    8. Gastroesophageal reflux disease, unspecified whether esophagitis present  Assessment & Plan:  Counseled patient that omeprazole 40mg QD #90, 3RF; verified that patient is taking med prior to meal    Orders:  -     omeprazole (priLOSEC) 40 MG capsule; Take 1 capsule by mouth Daily.  Dispense: 90 capsule; Refill: 3    9. Vitamin D deficiency  Assessment & Plan:  Vit D bord at 27.6 with goal : 30; rec OTC 1000 units QD supplementation; f/u level in 3 mos    Orders:  -     Vitamin D,25-Hydroxy; Future    10. Need for shingles vaccine  -     Shingrix Vaccine    11. Headache  Assessment & Plan:  Different than her migraines per patient; note also assoc'd dysequilibrium and nausea; MRI angio brain done 4/14/25; trial prn Ubrelvy 100mg; f/u neuro as scheduled (CHERYL Khan on 6/17/25)      12. Dizziness  Assessment & Plan:  Persistent dizziness x few weeks assoc'd with nausea; described as described as dysequilibrium; referral to ENT for vestibular testing; 1st available MD at [facility]          FOLLOW-UP  RTC 3 mos with BMP and vit D (escribed) and Shingrix #2    Electronically signed by:    Brie Jalloh MD, FACP  05/22/2025

## 2025-05-22 NOTE — ASSESSMENT & PLAN NOTE
Health maintenance - COVID19 11/24; flu vacc 11/24; Prevnar 9/15; PVX 11/16; Td 12/20 (next Tdap due 2030), HAV done; rec Shingrix, counseling given (dose #1 given today); overdue for mammo (last 4/4/23) - reviewed benefits/goals of reg screening and early detection; no further Paps s/p USMAN/BSO; nl DEXA 4/23, repeat 2026; colonosc 8/21, repeat 2026 per Dr. Berrios; eye exam TBD per pt; dental exam q6 mos, TBD per pt; (+) seat belt use

## 2025-05-22 NOTE — ASSESSMENT & PLAN NOTE
Counseled patient that omeprazole 40mg QD #90, 3RF; verified that patient is taking med prior to meal

## 2025-05-23 ENCOUNTER — TRANSCRIBE ORDERS (OUTPATIENT)
Dept: ADMINISTRATIVE | Facility: HOSPITAL | Age: 60
End: 2025-05-23
Payer: COMMERCIAL

## 2025-05-23 DIAGNOSIS — Z12.31 VISIT FOR SCREENING MAMMOGRAM: Primary | ICD-10-CM

## 2025-05-23 PROBLEM — R42 DIZZINESS: Status: ACTIVE | Noted: 2025-05-23

## 2025-05-23 RX ORDER — ATORVASTATIN CALCIUM 10 MG/1
10 TABLET, FILM COATED ORAL NIGHTLY
Qty: 90 TABLET | Refills: 3 | Status: SHIPPED | OUTPATIENT
Start: 2025-05-23

## 2025-05-23 NOTE — ASSESSMENT & PLAN NOTE
Different than her migraines per patient; note also assoc'd dysequilibrium and nausea; MRI angio brain done 4/14/25; trial prn Ubrelvy 100mg; f/u neuro as scheduled (CHERYL Khan on 6/17/25)

## 2025-05-23 NOTE — ASSESSMENT & PLAN NOTE
Persistent dizziness x few weeks assoc'd with nausea; described as described as dysequilibrium; referral to ENT for vestibular testing; 1st available MD at [facility]

## 2025-06-10 ENCOUNTER — HOSPITAL ENCOUNTER (OUTPATIENT)
Dept: MAMMOGRAPHY | Facility: HOSPITAL | Age: 60
Discharge: HOME | End: 2025-06-10
Admitting: INTERNAL MEDICINE
Payer: COMMERCIAL

## 2025-06-10 DIAGNOSIS — Z12.31 VISIT FOR SCREENING MAMMOGRAM: ICD-10-CM

## 2025-06-10 LAB
NCCN CRITERIA FLAG: NORMAL
TYRER CUZICK SCORE: 10.8

## 2025-06-10 PROCEDURE — 77063 BREAST TOMOSYNTHESIS BI: CPT

## 2025-06-10 PROCEDURE — 77067 SCR MAMMO BI INCL CAD: CPT

## 2025-06-16 ENCOUNTER — OFFICE VISIT (OUTPATIENT)
Dept: INTERNAL MEDICINE | Facility: CLINIC | Age: 60
End: 2025-06-16
Payer: COMMERCIAL

## 2025-06-16 ENCOUNTER — LAB (OUTPATIENT)
Dept: LAB | Facility: HOSPITAL | Age: 60
End: 2025-06-16
Payer: COMMERCIAL

## 2025-06-16 VITALS
SYSTOLIC BLOOD PRESSURE: 116 MMHG | WEIGHT: 185.6 LBS | OXYGEN SATURATION: 100 % | DIASTOLIC BLOOD PRESSURE: 80 MMHG | HEART RATE: 88 BPM | BODY MASS INDEX: 29.83 KG/M2 | HEIGHT: 66 IN

## 2025-06-16 DIAGNOSIS — E55.9 VITAMIN D DEFICIENCY: Chronic | ICD-10-CM

## 2025-06-16 DIAGNOSIS — R59.1 LYMPHADENOPATHY: ICD-10-CM

## 2025-06-16 DIAGNOSIS — R53.83 FATIGUE, UNSPECIFIED TYPE: Chronic | ICD-10-CM

## 2025-06-16 DIAGNOSIS — R11.0 NAUSEA: ICD-10-CM

## 2025-06-16 DIAGNOSIS — N18.31 CKD STAGE 3A, GFR 45-59 ML/MIN: Chronic | ICD-10-CM

## 2025-06-16 DIAGNOSIS — R59.1 LYMPHADENOPATHY: Primary | ICD-10-CM

## 2025-06-16 LAB
BASOPHILS # BLD AUTO: 0.02 10*3/MM3 (ref 0–0.2)
BASOPHILS NFR BLD AUTO: 0.4 % (ref 0–1.5)
DEPRECATED RDW RBC AUTO: 41.4 FL (ref 37–54)
EOSINOPHIL # BLD AUTO: 0.17 10*3/MM3 (ref 0–0.4)
EOSINOPHIL NFR BLD AUTO: 3.8 % (ref 0.3–6.2)
ERYTHROCYTE [DISTWIDTH] IN BLOOD BY AUTOMATED COUNT: 12.3 % (ref 12.3–15.4)
ERYTHROCYTE [SEDIMENTATION RATE] IN BLOOD: 13 MM/HR (ref 0–30)
HCT VFR BLD AUTO: 37.7 % (ref 34–46.6)
HGB BLD-MCNC: 12.5 G/DL (ref 12–15.9)
IMM GRANULOCYTES # BLD AUTO: 0.02 10*3/MM3 (ref 0–0.05)
IMM GRANULOCYTES NFR BLD AUTO: 0.4 % (ref 0–0.5)
LYMPHOCYTES # BLD AUTO: 0.61 10*3/MM3 (ref 0.7–3.1)
LYMPHOCYTES NFR BLD AUTO: 13.5 % (ref 19.6–45.3)
MCH RBC QN AUTO: 30.7 PG (ref 26.6–33)
MCHC RBC AUTO-ENTMCNC: 33.2 G/DL (ref 31.5–35.7)
MCV RBC AUTO: 92.6 FL (ref 79–97)
MONOCYTES # BLD AUTO: 0.47 10*3/MM3 (ref 0.1–0.9)
MONOCYTES NFR BLD AUTO: 10.4 % (ref 5–12)
NEUTROPHILS NFR BLD AUTO: 3.24 10*3/MM3 (ref 1.7–7)
NEUTROPHILS NFR BLD AUTO: 71.5 % (ref 42.7–76)
NRBC BLD AUTO-RTO: 0 /100 WBC (ref 0–0.2)
PLATELET # BLD AUTO: 252 10*3/MM3 (ref 140–450)
PMV BLD AUTO: 10.5 FL (ref 6–12)
RBC # BLD AUTO: 4.07 10*6/MM3 (ref 3.77–5.28)
WBC NRBC COR # BLD AUTO: 4.53 10*3/MM3 (ref 3.4–10.8)

## 2025-06-16 PROCEDURE — 99215 OFFICE O/P EST HI 40 MIN: CPT | Performed by: INTERNAL MEDICINE

## 2025-06-16 PROCEDURE — 82607 VITAMIN B-12: CPT

## 2025-06-16 PROCEDURE — 82306 VITAMIN D 25 HYDROXY: CPT

## 2025-06-16 PROCEDURE — 80048 BASIC METABOLIC PNL TOTAL CA: CPT

## 2025-06-16 PROCEDURE — 86140 C-REACTIVE PROTEIN: CPT

## 2025-06-16 PROCEDURE — 85025 COMPLETE CBC W/AUTO DIFF WBC: CPT

## 2025-06-16 PROCEDURE — 85652 RBC SED RATE AUTOMATED: CPT

## 2025-06-16 PROCEDURE — 82746 ASSAY OF FOLIC ACID SERUM: CPT

## 2025-06-16 NOTE — PROGRESS NOTES
"Chief Complaint   Patient presents with    Lump above clavical       History of Present Illness  59 y.o.  female presents for further evaluation of swollen lymph nodes. Worried about lymphoma. Noticed lymph node above L collar bone 3 wks ago, right after last visit. Has not changed in size and does not hurt. Also has noticed a smaller one in the R mid-neck area, also nontender. Denies sore throat, gum pain, cold sxs, teeth pain, fevers.    Feels badly overall with fatigue, headaches, persistent nausea, achy legs (brenna at nighttime). Had diarrhea just today. No vomiting. Also has occ lightheadedness. Has not passed out.     Review of Systems  ROS (+) for persistent nausea without vomiting. Isolated diarrhea x 1 d, today only. Denies abd pain. ROS (+) for fatigue, lightheadedness, headaches without fevers, night sweats, cold sxs.  All other ROS reviewed and negative.      Current Outpatient Medications:     atorvastatin 10 MG QD    buPROPion  MG 3 QD    levothyroxine 75 MCG QD    losartan 50 MG QD    montelukast 10 MG QD    NIFEdipine CC 30 MG QD    omeprazole 40 MG QD    propranolol 20 MG AD    riTUXimab (RITUXAN) 500 MG/50ML q6 mos    VITALS:  /80   Pulse 88   Ht 167.6 cm (66\")   Wt 84.2 kg (185 lb 9.6 oz)   SpO2 100%   BMI 29.96 kg/m²     Physical Exam  Vitals and nursing note reviewed.   Constitutional:       General: She is not in acute distress.     Appearance: Normal appearance. She is not ill-appearing.   Eyes:      Extraocular Movements: Extraocular movements intact.      Conjunctiva/sclera: Conjunctivae normal.   Neck:      Comments: mid-clavicular left deep possibly lymph node; well-circumscribed, smooth, nontender, no skin changes  Cardiovascular:      Rate and Rhythm: Normal rate and regular rhythm.      Heart sounds: Normal heart sounds.   Pulmonary:      Effort: Pulmonary effort is normal. No respiratory distress.      Breath sounds: Normal breath sounds. No wheezing or rales. "   Lymphadenopathy:      Head:      Right side of head: No submental, submandibular, preauricular, posterior auricular or occipital adenopathy.      Left side of head: No submental, submandibular, preauricular, posterior auricular or occipital adenopathy.      Cervical: Cervical adenopathy (pea-sized lymph nodes any/post cervical chainr; well-circumscribed, smooth, nontender, no skin changes) present.      Upper Body:      Right upper body: No axillary adenopathy.      Left upper body: Supraclavicular adenopathy present. No axillary adenopathy.   Neurological:      Mental Status: She is alert and oriented to person, place, and time. Mental status is at baseline.   Psychiatric:         Mood and Affect: Mood normal.         Behavior: Behavior normal.       LABS  Results for orders placed or performed in visit on 06/10/25   [facility] GENETIC RISK ASSESSMENT QUESTIONNAIRE - ,    Collection Time: 06/10/25  9:38 AM   Result Value Ref Range    TyrerCuzick 10.8     NCCN NCCN not met      5/20/25 A1C 4.7, TSH 0.788, WBC 3.42, vit D 27.6    ASSESSMENT/PLAN    Diagnoses and all orders for this visit:    1. Lymphadenopathy (Primary)  Comments:  unremarkable lymph nodes R cervical and L supraclavicular as noted; check CBC, ESR, CRP; discussed imaging would be next step if further concerns  Orders:  -     CBC & Differential; Future  -     Sedimentation Rate; Future  -     C-reactive Protein; Future    2. Fatigue, unspecified type  Assessment & Plan:  Nonspecific with other vague sxs of shotty adenopathy, persistent nausea, headaches, weakness, lightheadedness; 5/20/25 labs included nl CBC, TSH, A1c, and bord vit D; check labs to incl B12, folate, CBC, vit D, BMP, ESR, CRP    Orders:  -     Vitamin B12; Future  -     Folate; Future    3. CKD stage 3a, GFR 45-59 ml/min  Assessment & Plan:  Renal function labile; concern due to decreased PO intake with persistent nausea; pt aware not to take NSAIDs; f/u BMP    Orders:  -     Basic  Metabolic Panel; Future    4. Nausea  Assessment & Plan:  Persistent ongoing nausea without vomiting; unlikely med s/e; no food triggers or association with eating - cont omeprazole 40mg QD; could try sucralfate or metoclopramide; ultimately rec EGD and/or imaging        Time Documentation    Counseled patient  I spent 25 minutes face to face and 15 minutes non-face to face on today's office visit. Time was spent reviewing patient's previous notes, lab results, test results, vitals, and/or other records as well as examining the patient, ordering tests/medicines/procedures, providing counseling, coordinating care, answering questions, discussing evaluation and treatment plans, and writing this note.     Total time: 40 minutes  (Level 5 40 minutes)     FOLLOW-UP  Health maintenance - flu vacc and COVID19 vacc done for this season; rec RSV vacc in the fall; Shingrix #2 due end of 7/25-11/25  Labs on the way out the door (CBC, B12, folate, ESR, CRP, BMP)  RTC 8/25/25 as scheduled with BMP, vit D, and Shingrix #2    Electronically signed by:    Brie Jalloh MD, FACP  06/16/2025

## 2025-06-16 NOTE — ASSESSMENT & PLAN NOTE
Nonspecific with other vague sxs of shotty adenopathy, persistent nausea, headaches, weakness, lightheadedness; 5/20/25 labs included nl CBC, TSH, A1c, and bord vit D; check labs to incl B12, folate, CBC, vit D, BMP, ESR, CRP

## 2025-06-16 NOTE — ASSESSMENT & PLAN NOTE
Renal function labile; concern due to decreased PO intake with persistent nausea; pt aware not to take NSAIDs; f/u BMP

## 2025-06-16 NOTE — ASSESSMENT & PLAN NOTE
Persistent ongoing nausea without vomiting; unlikely med s/e; no food triggers or association with eating - cont omeprazole 40mg QD; could try sucralfate or metoclopramide; ultimately rec EGD and/or imaging

## 2025-06-17 LAB
25(OH)D3 SERPL-MCNC: 35.8 NG/ML (ref 30–100)
ANION GAP SERPL CALCULATED.3IONS-SCNC: 12 MMOL/L (ref 5–15)
BUN SERPL-MCNC: 19 MG/DL (ref 6–20)
BUN/CREAT SERPL: 12.9 (ref 7–25)
CALCIUM SPEC-SCNC: 10 MG/DL (ref 8.6–10.5)
CHLORIDE SERPL-SCNC: 106 MMOL/L (ref 98–107)
CO2 SERPL-SCNC: 23 MMOL/L (ref 22–29)
CREAT SERPL-MCNC: 1.47 MG/DL (ref 0.57–1)
CRP SERPL-MCNC: 0.62 MG/DL (ref 0–0.5)
EGFRCR SERPLBLD CKD-EPI 2021: 41 ML/MIN/1.73
FOLATE SERPL-MCNC: 12.3 NG/ML (ref 4.78–24.2)
GLUCOSE SERPL-MCNC: 85 MG/DL (ref 65–99)
POTASSIUM SERPL-SCNC: 4.4 MMOL/L (ref 3.5–5.2)
SODIUM SERPL-SCNC: 141 MMOL/L (ref 136–145)
VIT B12 BLD-MCNC: 391 PG/ML (ref 211–946)

## 2025-08-10 PROBLEM — M54.12 CERVICAL RADICULOPATHY: Status: ACTIVE | Noted: 2025-08-10

## 2025-08-25 ENCOUNTER — OFFICE VISIT (OUTPATIENT)
Dept: INTERNAL MEDICINE | Facility: CLINIC | Age: 60
End: 2025-08-25
Payer: COMMERCIAL

## 2025-08-25 VITALS
WEIGHT: 188 LBS | HEIGHT: 66 IN | HEART RATE: 80 BPM | RESPIRATION RATE: 12 BRPM | TEMPERATURE: 96.8 F | BODY MASS INDEX: 30.22 KG/M2 | DIASTOLIC BLOOD PRESSURE: 82 MMHG | SYSTOLIC BLOOD PRESSURE: 124 MMHG | OXYGEN SATURATION: 98 %

## 2025-08-25 DIAGNOSIS — I10 PRIMARY HYPERTENSION: Chronic | ICD-10-CM

## 2025-08-25 DIAGNOSIS — Z71.85 VACCINE COUNSELING: ICD-10-CM

## 2025-08-25 DIAGNOSIS — J45.21 MILD INTERMITTENT ASTHMA WITH ACUTE EXACERBATION: ICD-10-CM

## 2025-08-25 DIAGNOSIS — N18.31 CKD STAGE 3A, GFR 45-59 ML/MIN: Primary | Chronic | ICD-10-CM

## 2025-08-25 DIAGNOSIS — Z23 NEED FOR VACCINATION: ICD-10-CM

## 2025-08-25 PROCEDURE — 90750 HZV VACC RECOMBINANT IM: CPT | Performed by: INTERNAL MEDICINE

## 2025-08-25 PROCEDURE — 99214 OFFICE O/P EST MOD 30 MIN: CPT | Performed by: INTERNAL MEDICINE

## 2025-08-25 PROCEDURE — 94664 DEMO&/EVAL PT USE INHALER: CPT | Performed by: INTERNAL MEDICINE

## 2025-08-25 PROCEDURE — 90471 IMMUNIZATION ADMIN: CPT | Performed by: INTERNAL MEDICINE

## 2025-08-25 RX ORDER — FLUTICASONE PROPIONATE AND SALMETEROL 250; 50 UG/1; UG/1
1 POWDER RESPIRATORY (INHALATION)
Qty: 60 EACH | Refills: 2 | Status: SHIPPED | OUTPATIENT
Start: 2025-08-25

## 2025-08-25 RX ORDER — METHOCARBAMOL 500 MG/1
1 TABLET, FILM COATED ORAL 3 TIMES DAILY
COMMUNITY
Start: 2025-07-21

## 2025-08-25 RX ORDER — ALBUTEROL SULFATE 90 UG/1
2 INHALANT RESPIRATORY (INHALATION) EVERY 6 HOURS PRN
Qty: 18 G | Refills: 2 | Status: SHIPPED | OUTPATIENT
Start: 2025-08-25